# Patient Record
Sex: FEMALE | Race: WHITE | URBAN - METROPOLITAN AREA
[De-identification: names, ages, dates, MRNs, and addresses within clinical notes are randomized per-mention and may not be internally consistent; named-entity substitution may affect disease eponyms.]

---

## 2018-07-11 ENCOUNTER — APPOINTMENT (OUTPATIENT)
Dept: OTHER | Facility: HOSPITAL | Age: 48
End: 2018-07-11
Attending: EMERGENCY MEDICINE

## 2022-12-10 ENCOUNTER — HOSPITAL ENCOUNTER (INPATIENT)
Age: 52
LOS: 3 days | Discharge: HOME | DRG: 470 | End: 2022-12-14
Attending: EMERGENCY MEDICINE | Admitting: INTERNAL MEDICINE

## 2022-12-10 ENCOUNTER — APPOINTMENT (OUTPATIENT)
Dept: ULTRASOUND IMAGING | Age: 52
DRG: 470 | End: 2022-12-10
Attending: INTERNAL MEDICINE

## 2022-12-10 ENCOUNTER — HOSPITAL ENCOUNTER (OUTPATIENT)
Dept: LAB | Age: 52
End: 2022-12-10

## 2022-12-10 ENCOUNTER — APPOINTMENT (OUTPATIENT)
Dept: LAB | Age: 52
End: 2022-12-10

## 2022-12-10 ENCOUNTER — HOSPITAL ENCOUNTER (OUTPATIENT)
Dept: LAB | Age: 52
Discharge: HOME | End: 2022-12-10

## 2022-12-10 DIAGNOSIS — N17.9 ACUTE RENAL FAILURE, UNSPECIFIED ACUTE RENAL FAILURE TYPE (CMD): Primary | ICD-10-CM

## 2022-12-10 DIAGNOSIS — I10 ESSENTIAL (PRIMARY) HYPERTENSION: ICD-10-CM

## 2022-12-10 DIAGNOSIS — D64.9 ANEMIA, UNSPECIFIED: ICD-10-CM

## 2022-12-10 DIAGNOSIS — I10 HYPERTENSION, UNSPECIFIED TYPE: ICD-10-CM

## 2022-12-10 DIAGNOSIS — Z00.00 ENCOUNTER FOR GENERAL ADULT MEDICAL EXAMINATION WITHOUT ABNORMAL FINDINGS: Primary | ICD-10-CM

## 2022-12-10 DIAGNOSIS — D64.9 ANEMIA: Primary | ICD-10-CM

## 2022-12-10 LAB
ALBUMIN SERPL-MCNC: 3.6 G/DL (ref 3.6–5.1)
ALBUMIN/GLOB SERPL: 1.1 {RATIO} (ref 1–2.4)
ALP SERPL-CCNC: 120 UNITS/L (ref 45–117)
ALT SERPL-CCNC: 17 UNITS/L
ANION GAP SERPL CALC-SCNC: 12 MMOL/L (ref 7–19)
APPEARANCE UR: CLEAR
AST SERPL-CCNC: 14 UNITS/L
BACTERIA #/AREA URNS HPF: ABNORMAL /HPF
BASOPHILS # BLD: 0.1 K/MCL (ref 0–0.3)
BASOPHILS NFR BLD: 1 %
BILIRUB SERPL-MCNC: 0.2 MG/DL (ref 0.2–1)
BILIRUB UR QL STRIP: NEGATIVE
BUN SERPL-MCNC: 89 MG/DL (ref 6–20)
BUN/CREAT SERPL: 18 (ref 7–25)
CALCIUM SERPL-MCNC: 8.3 MG/DL (ref 8.4–10.2)
CHLORIDE SERPL-SCNC: 121 MMOL/L (ref 97–110)
CHOLEST SERPL-MCNC: 169 MG/DL
CHOLEST/HDLC SERPL: 4.8 {RATIO}
CO2 SERPL-SCNC: 14 MMOL/L (ref 21–32)
COLOR UR: COLORLESS
CREAT SERPL-MCNC: 5.01 MG/DL (ref 0.51–0.95)
CREAT UR-MCNC: 53.7 MG/DL
DEPRECATED RDW RBC: 46.8 FL (ref 39–50)
EOSINOPHIL # BLD: 0.2 K/MCL (ref 0–0.5)
EOSINOPHIL NFR BLD: 4 %
EOSINOPHIL URNS QL WRIGHT STN: NORMAL
ERYTHROCYTE [DISTWIDTH] IN BLOOD: 14.2 % (ref 11–15)
FASTING DURATION TIME PATIENT: 10 HOURS (ref 0–999)
FASTING DURATION TIME PATIENT: 10 HOURS (ref 0–999)
FERRITIN SERPL-MCNC: 50 NG/ML (ref 8–252)
GFR SERPLBLD BASED ON 1.73 SQ M-ARVRAT: 10 ML/MIN
GLOBULIN SER-MCNC: 3.3 G/DL (ref 2–4)
GLUCOSE SERPL-MCNC: 94 MG/DL (ref 70–99)
GLUCOSE UR STRIP-MCNC: NEGATIVE MG/DL
HCT VFR BLD CALC: 32.4 % (ref 36–46.5)
HDLC SERPL-MCNC: 35 MG/DL
HGB BLD-MCNC: 10.1 G/DL (ref 12–15.5)
HGB UR QL STRIP: ABNORMAL
HYALINE CASTS #/AREA URNS LPF: ABNORMAL /LPF
IMM GRANULOCYTES # BLD AUTO: 0 K/MCL (ref 0–0.2)
IMM GRANULOCYTES # BLD: 0 %
IRON SATN MFR SERPL: 23 % (ref 15–45)
IRON SERPL-MCNC: 46 MCG/DL (ref 50–170)
KETONES UR STRIP-MCNC: NEGATIVE MG/DL
LDLC SERPL CALC-MCNC: 102 MG/DL
LEUKOCYTE ESTERASE UR QL STRIP: ABNORMAL
LYMPHOCYTES # BLD: 1.6 K/MCL (ref 1–4.8)
LYMPHOCYTES NFR BLD: 29 %
MCH RBC QN AUTO: 28 PG (ref 26–34)
MCHC RBC AUTO-ENTMCNC: 31.2 G/DL (ref 32–36.5)
MCV RBC AUTO: 89.8 FL (ref 78–100)
MONOCYTES # BLD: 0.3 K/MCL (ref 0.3–0.9)
MONOCYTES NFR BLD: 5 %
NEUTROPHILS # BLD: 3.3 K/MCL (ref 1.8–7.7)
NEUTROPHILS NFR BLD: 61 %
NITRITE UR QL STRIP: NEGATIVE
NONHDLC SERPL-MCNC: 134 MG/DL
NRBC BLD MANUAL-RTO: 0 /100 WBC
PH UR STRIP: 6 [PH] (ref 5–7)
PLATELET # BLD AUTO: 128 K/MCL (ref 140–450)
POTASSIUM SERPL-SCNC: 4.8 MMOL/L (ref 3.4–5.1)
PROT SERPL-MCNC: 6.9 G/DL (ref 6.4–8.2)
PROT UR STRIP-MCNC: 100 MG/DL
PROT UR-MCNC: 212 MG/DL
RBC # BLD: 3.61 MIL/MCL (ref 4–5.2)
RBC #/AREA URNS HPF: ABNORMAL /HPF
SARS-COV-2 RNA RESP QL NAA+PROBE: NOT DETECTED
SERVICE CMNT-IMP: NORMAL
SERVICE CMNT-IMP: NORMAL
SODIUM SERPL-SCNC: 142 MMOL/L (ref 135–145)
SP GR UR STRIP: 1.01 (ref 1–1.03)
SQUAMOUS #/AREA URNS HPF: ABNORMAL /HPF
T4 FREE SERPL-MCNC: 0.8 NG/DL (ref 0.8–1.5)
TIBC SERPL-MCNC: 203 MCG/DL (ref 250–450)
TRIGL SERPL-MCNC: 161 MG/DL
TSH SERPL-ACNC: 2.04 MCUNITS/ML (ref 0.35–5)
UROBILINOGEN UR STRIP-MCNC: 0.2 MG/DL
WBC # BLD: 5.4 K/MCL (ref 4.2–11)
WBC #/AREA URNS HPF: ABNORMAL /HPF

## 2022-12-10 PROCEDURE — 80074 ACUTE HEPATITIS PANEL: CPT | Performed by: INTERNAL MEDICINE

## 2022-12-10 PROCEDURE — 36415 COLL VENOUS BLD VENIPUNCTURE: CPT

## 2022-12-10 PROCEDURE — 10002803 HB RX 637: Performed by: INTERNAL MEDICINE

## 2022-12-10 PROCEDURE — 10002807 HB RX 258: Performed by: EMERGENCY MEDICINE

## 2022-12-10 PROCEDURE — 86160 COMPLEMENT ANTIGEN: CPT | Performed by: INTERNAL MEDICINE

## 2022-12-10 PROCEDURE — G0378 HOSPITAL OBSERVATION PER HR: HCPCS

## 2022-12-10 PROCEDURE — 83540 ASSAY OF IRON: CPT | Performed by: INTERNAL MEDICINE

## 2022-12-10 PROCEDURE — 82728 ASSAY OF FERRITIN: CPT | Performed by: INTERNAL MEDICINE

## 2022-12-10 PROCEDURE — 96372 THER/PROPH/DIAG INJ SC/IM: CPT

## 2022-12-10 PROCEDURE — 82652 VIT D 1 25-DIHYDROXY: CPT

## 2022-12-10 PROCEDURE — 84443 ASSAY THYROID STIM HORMONE: CPT

## 2022-12-10 PROCEDURE — 10002800 HB RX 250 W HCPCS: Performed by: INTERNAL MEDICINE

## 2022-12-10 PROCEDURE — 84165 PROTEIN E-PHORESIS SERUM: CPT | Performed by: INTERNAL MEDICINE

## 2022-12-10 PROCEDURE — 84439 ASSAY OF FREE THYROXINE: CPT

## 2022-12-10 PROCEDURE — 76775 US EXAM ABDO BACK WALL LIM: CPT

## 2022-12-10 PROCEDURE — 86038 ANTINUCLEAR ANTIBODIES: CPT | Performed by: INTERNAL MEDICINE

## 2022-12-10 PROCEDURE — 82570 ASSAY OF URINE CREATININE: CPT | Performed by: INTERNAL MEDICINE

## 2022-12-10 PROCEDURE — 84156 ASSAY OF PROTEIN URINE: CPT | Performed by: INTERNAL MEDICINE

## 2022-12-10 PROCEDURE — 80061 LIPID PANEL: CPT

## 2022-12-10 PROCEDURE — 83516 IMMUNOASSAY NONANTIBODY: CPT | Performed by: INTERNAL MEDICINE

## 2022-12-10 PROCEDURE — 87635 SARS-COV-2 COVID-19 AMP PRB: CPT | Performed by: EMERGENCY MEDICINE

## 2022-12-10 PROCEDURE — 85025 COMPLETE CBC W/AUTO DIFF WBC: CPT

## 2022-12-10 PROCEDURE — 99284 EMERGENCY DEPT VISIT MOD MDM: CPT

## 2022-12-10 PROCEDURE — 80053 COMPREHEN METABOLIC PANEL: CPT

## 2022-12-10 PROCEDURE — 87205 SMEAR GRAM STAIN: CPT | Performed by: INTERNAL MEDICINE

## 2022-12-10 PROCEDURE — 86335 IMMUNFIX E-PHORSIS/URINE/CSF: CPT | Performed by: INTERNAL MEDICINE

## 2022-12-10 PROCEDURE — 81001 URINALYSIS AUTO W/SCOPE: CPT

## 2022-12-10 RX ORDER — AMLODIPINE BESYLATE 5 MG/1
2.5 TABLET ORAL DAILY
Status: DISCONTINUED | OUTPATIENT
Start: 2022-12-10 | End: 2022-12-14 | Stop reason: HOSPADM

## 2022-12-10 RX ORDER — HYDROCODONE BITARTRATE AND ACETAMINOPHEN 5; 325 MG/1; MG/1
1 TABLET ORAL EVERY 4 HOURS PRN
Status: DISCONTINUED | OUTPATIENT
Start: 2022-12-10 | End: 2022-12-14 | Stop reason: HOSPADM

## 2022-12-10 RX ORDER — MAGNESIUM HYDROXIDE/ALUMINUM HYDROXICE/SIMETHICONE 120; 1200; 1200 MG/30ML; MG/30ML; MG/30ML
30 SUSPENSION ORAL EVERY 4 HOURS PRN
Status: DISCONTINUED | OUTPATIENT
Start: 2022-12-10 | End: 2022-12-14 | Stop reason: HOSPADM

## 2022-12-10 RX ORDER — SODIUM CHLORIDE 9 MG/ML
INJECTION, SOLUTION INTRAVENOUS CONTINUOUS
Status: DISCONTINUED | OUTPATIENT
Start: 2022-12-10 | End: 2022-12-12

## 2022-12-10 RX ORDER — SODIUM CHLORIDE, SODIUM LACTATE, POTASSIUM CHLORIDE, CALCIUM CHLORIDE 600; 310; 30; 20 MG/100ML; MG/100ML; MG/100ML; MG/100ML
INJECTION, SOLUTION INTRAVENOUS CONTINUOUS
Status: DISCONTINUED | OUTPATIENT
Start: 2022-12-10 | End: 2022-12-10

## 2022-12-10 RX ORDER — ACETAMINOPHEN 325 MG/1
650 TABLET ORAL EVERY 4 HOURS PRN
Status: DISCONTINUED | OUTPATIENT
Start: 2022-12-10 | End: 2022-12-14 | Stop reason: HOSPADM

## 2022-12-10 RX ORDER — ONDANSETRON 2 MG/ML
4 INJECTION INTRAMUSCULAR; INTRAVENOUS 2 TIMES DAILY PRN
Status: DISCONTINUED | OUTPATIENT
Start: 2022-12-10 | End: 2022-12-14 | Stop reason: HOSPADM

## 2022-12-10 RX ORDER — HEPARIN SODIUM 5000 [USP'U]/ML
5000 INJECTION, SOLUTION INTRAVENOUS; SUBCUTANEOUS EVERY 8 HOURS SCHEDULED
Status: DISCONTINUED | OUTPATIENT
Start: 2022-12-10 | End: 2022-12-14 | Stop reason: HOSPADM

## 2022-12-10 RX ORDER — AMOXICILLIN 250 MG
2 CAPSULE ORAL DAILY PRN
Status: DISCONTINUED | OUTPATIENT
Start: 2022-12-10 | End: 2022-12-14 | Stop reason: HOSPADM

## 2022-12-10 RX ADMIN — HEPARIN SODIUM 5000 UNITS: 5000 INJECTION INTRAVENOUS; SUBCUTANEOUS at 21:20

## 2022-12-10 RX ADMIN — SODIUM CHLORIDE: 9 INJECTION, SOLUTION INTRAVENOUS at 18:13

## 2022-12-10 RX ADMIN — AMLODIPINE BESYLATE 2.5 MG: 5 TABLET ORAL at 21:16

## 2022-12-10 RX ADMIN — SODIUM CHLORIDE: 9 INJECTION, SOLUTION INTRAVENOUS at 21:11

## 2022-12-10 ASSESSMENT — COLUMBIA-SUICIDE SEVERITY RATING SCALE - C-SSRS
1. WITHIN THE PAST MONTH, HAVE YOU WISHED YOU WERE DEAD OR WISHED YOU COULD GO TO SLEEP AND NOT WAKE UP?: NO
2. HAVE YOU ACTUALLY HAD ANY THOUGHTS OF KILLING YOURSELF?: NO
6. HAVE YOU EVER DONE ANYTHING, STARTED TO DO ANYTHING, OR PREPARED TO DO ANYTHING TO END YOUR LIFE?: NO
IS THE PATIENT ABLE TO COMPLETE C-SSRS: YES

## 2022-12-10 ASSESSMENT — ACTIVITIES OF DAILY LIVING (ADL)
ADL_SCORE: 12
SENSORY_SUPPORT_DEVICES: EYEGLASSES

## 2022-12-10 ASSESSMENT — LIFESTYLE VARIABLES
HAVE YOU BEEN EATING POORLY BECAUSE OF A DECREASED APPETITE: 0
HOW OFTEN DO YOU HAVE 6 OR MORE DRINKS ON ONE OCCASION: NEVER
ADL NEEDS ASSIST: NO
SMOKING_YEARS: NO
RECENTLY LOST WEIGHT WITHOUT TRYING: 0
ARE YOU DEAF OR DO YOU HAVE SERIOUS DIFFICULTY  HEARING: NO
SHORT OF BREATH OR FATIGUE WITH ADLS: NO
ALCOHOL_USE_STATUS: NO OR LOW RISK WITH VALIDATED TOOL
CHRONIC/CANCER PAIN PRESENT: NO
RECENT DECLINE IN ADLS: NO
HISTORY OF PROBLEMS WHEN YOU STOP DRINKING ALCOHOL: NO
AUDIT-C TOTAL SCORE: 0
LAST DRINK YOU HAD: DENIES INTAKE
HOW MANY STANDARD DRINKS CONTAINING ALCOHOL DO YOU HAVE ON A TYPICAL DAY: 0,1 OR 2
IS PATIENT ABLE TO COMPLETE ASSESSMENT AT THIS TIME: YES
ARE YOU BLIND OR DO YOU HAVE SERIOUS DIFFICULTY SEEING, EVEN WHEN WEARING GLASSES: NO
ADL BEFORE ADMISSION: INDEPENDENT
HOW OFTEN DO YOU HAVE A DRINK CONTAINING ALCOHOL: NEVER

## 2022-12-10 ASSESSMENT — PAIN SCALES - GENERAL: PAINLEVEL_OUTOF10: 0

## 2022-12-10 ASSESSMENT — COGNITIVE AND FUNCTIONAL STATUS - GENERAL
BECAUSE OF A PHYSICAL, MENTAL, OR EMOTIONAL CONDITION, DO YOU HAVE SERIOUS DIFFICULTY CONCENTRATING, REMEMBERING OR MAKING DECISIONS: NO
DO YOU HAVE SERIOUS DIFFICULTY WALKING OR CLIMBING STAIRS: NO
BECAUSE OF A PHYSICAL, MENTAL, OR EMOTIONAL CONDITION, DO YOU HAVE DIFFICULTY DOING ERRANDS ALONE: NO
DO YOU HAVE DIFFICULTY DRESSING OR BATHING: NO

## 2022-12-10 ASSESSMENT — PATIENT HEALTH QUESTIONNAIRE - PHQ9
1. LITTLE INTEREST OR PLEASURE IN DOING THINGS: NOT AT ALL
SUM OF ALL RESPONSES TO PHQ9 QUESTIONS 1 AND 2: 0
CLINICAL INTERPRETATION OF PHQ2 SCORE: NO FURTHER SCREENING NEEDED
IS PATIENT ABLE TO COMPLETE PHQ2 OR PHQ9: YES
2. FEELING DOWN, DEPRESSED OR HOPELESS: NOT AT ALL
SUM OF ALL RESPONSES TO PHQ9 QUESTIONS 1 AND 2: 0

## 2022-12-11 ENCOUNTER — APPOINTMENT (OUTPATIENT)
Dept: MRI IMAGING | Age: 52
DRG: 470 | End: 2022-12-11
Attending: INTERNAL MEDICINE

## 2022-12-11 ENCOUNTER — APPOINTMENT (OUTPATIENT)
Dept: CT IMAGING | Age: 52
DRG: 470 | End: 2022-12-11
Attending: UROLOGY

## 2022-12-11 PROBLEM — N17.9 ACUTE RENAL FAILURE (ARF) (CMD): Status: ACTIVE | Noted: 2022-12-11

## 2022-12-11 LAB
ALBUMIN SERPL-MCNC: 2.9 G/DL (ref 3.6–5.1)
ALBUMIN/GLOB SERPL: 1 {RATIO} (ref 1–2.4)
ALP SERPL-CCNC: 100 UNITS/L (ref 45–117)
ALT SERPL-CCNC: 13 UNITS/L
ANION GAP SERPL CALC-SCNC: 13 MMOL/L (ref 7–19)
ANNOTATION COMMENT IMP: NORMAL
AST SERPL-CCNC: 12 UNITS/L
BILIRUB SERPL-MCNC: 0.2 MG/DL (ref 0.2–1)
BUN SERPL-MCNC: 86 MG/DL (ref 6–20)
BUN/CREAT SERPL: 18 (ref 7–25)
C3 SERPL-MCNC: 73 MG/DL (ref 90–180)
C4 SERPL-MCNC: 31.3 MG/DL (ref 10–40)
CALCIUM SERPL-MCNC: 7.9 MG/DL (ref 8.4–10.2)
CHLORIDE SERPL-SCNC: 125 MMOL/L (ref 97–110)
CO2 SERPL-SCNC: 11 MMOL/L (ref 21–32)
CREAT SERPL-MCNC: 4.68 MG/DL (ref 0.51–0.95)
DEPRECATED RDW RBC: 48.5 FL (ref 39–50)
ERYTHROCYTE [DISTWIDTH] IN BLOOD: 14.3 % (ref 11–15)
FASTING DURATION TIME PATIENT: ABNORMAL H
GFR SERPLBLD BASED ON 1.73 SQ M-ARVRAT: 11 ML/MIN
GLOBULIN SER-MCNC: 2.9 G/DL (ref 2–4)
GLUCOSE SERPL-MCNC: 91 MG/DL (ref 70–99)
HAV IGM SER QL: NEGATIVE
HBV CORE IGM SER QL: NEGATIVE
HBV SURFACE AG SER QL: NEGATIVE
HCT VFR BLD CALC: 28.6 % (ref 36–46.5)
HCV AB SER QL: NEGATIVE
HGB BLD-MCNC: 8.7 G/DL (ref 12–15.5)
IMP & REVIEW OF LAB RESULTS: NORMAL
MAGNESIUM SERPL-MCNC: 1.9 MG/DL (ref 1.7–2.4)
MCH RBC QN AUTO: 28.2 PG (ref 26–34)
MCHC RBC AUTO-ENTMCNC: 30.4 G/DL (ref 32–36.5)
MCV RBC AUTO: 92.9 FL (ref 78–100)
NRBC BLD MANUAL-RTO: 0 /100 WBC
PLATELET # BLD AUTO: 119 K/MCL (ref 140–450)
POTASSIUM SERPL-SCNC: 4.9 MMOL/L (ref 3.4–5.1)
PROT SERPL-MCNC: 5.8 G/DL (ref 6.4–8.2)
RBC # BLD: 3.08 MIL/MCL (ref 4–5.2)
SODIUM SERPL-SCNC: 144 MMOL/L (ref 135–145)
WBC # BLD: 4.5 K/MCL (ref 4.2–11)

## 2022-12-11 PROCEDURE — 71250 CT THORAX DX C-: CPT

## 2022-12-11 PROCEDURE — 10002807 HB RX 258: Performed by: EMERGENCY MEDICINE

## 2022-12-11 PROCEDURE — 82784 ASSAY IGA/IGD/IGG/IGM EACH: CPT | Performed by: INTERNAL MEDICINE

## 2022-12-11 PROCEDURE — G0378 HOSPITAL OBSERVATION PER HR: HCPCS

## 2022-12-11 PROCEDURE — 10002800 HB RX 250 W HCPCS: Performed by: INTERNAL MEDICINE

## 2022-12-11 PROCEDURE — 99255 IP/OBS CONSLTJ NEW/EST HI 80: CPT | Performed by: INTERNAL MEDICINE

## 2022-12-11 PROCEDURE — 85027 COMPLETE CBC AUTOMATED: CPT | Performed by: INTERNAL MEDICINE

## 2022-12-11 PROCEDURE — 99254 IP/OBS CNSLTJ NEW/EST MOD 60: CPT | Performed by: SURGERY

## 2022-12-11 PROCEDURE — 10006031 HB ROOM CHARGE TELEMETRY

## 2022-12-11 PROCEDURE — 83735 ASSAY OF MAGNESIUM: CPT | Performed by: INTERNAL MEDICINE

## 2022-12-11 PROCEDURE — 36415 COLL VENOUS BLD VENIPUNCTURE: CPT | Performed by: INTERNAL MEDICINE

## 2022-12-11 PROCEDURE — G1004 CDSM NDSC: HCPCS

## 2022-12-11 PROCEDURE — 74181 MRI ABDOMEN W/O CONTRAST: CPT

## 2022-12-11 PROCEDURE — 74176 CT ABD & PELVIS W/O CONTRAST: CPT

## 2022-12-11 PROCEDURE — 96372 THER/PROPH/DIAG INJ SC/IM: CPT

## 2022-12-11 PROCEDURE — 10002803 HB RX 637: Performed by: INTERNAL MEDICINE

## 2022-12-11 PROCEDURE — 80053 COMPREHEN METABOLIC PANEL: CPT | Performed by: INTERNAL MEDICINE

## 2022-12-11 RX ORDER — SODIUM BICARBONATE 650 MG/1
1300 TABLET ORAL 2 TIMES DAILY
Status: DISCONTINUED | OUTPATIENT
Start: 2022-12-11 | End: 2022-12-12

## 2022-12-11 RX ADMIN — SODIUM CHLORIDE: 9 INJECTION, SOLUTION INTRAVENOUS at 07:08

## 2022-12-11 RX ADMIN — HEPARIN SODIUM 5000 UNITS: 5000 INJECTION INTRAVENOUS; SUBCUTANEOUS at 06:39

## 2022-12-11 RX ADMIN — IRON SUCROSE 200 MG: 20 INJECTION, SOLUTION INTRAVENOUS at 13:38

## 2022-12-11 RX ADMIN — AMLODIPINE BESYLATE 2.5 MG: 5 TABLET ORAL at 08:24

## 2022-12-11 RX ADMIN — SODIUM BICARBONATE 1300 MG: 650 TABLET ORAL at 21:10

## 2022-12-11 RX ADMIN — SODIUM CHLORIDE: 9 INJECTION, SOLUTION INTRAVENOUS at 19:45

## 2022-12-11 RX ADMIN — HEPARIN SODIUM 5000 UNITS: 5000 INJECTION INTRAVENOUS; SUBCUTANEOUS at 21:11

## 2022-12-11 RX ADMIN — SODIUM BICARBONATE 1300 MG: 650 TABLET ORAL at 13:37

## 2022-12-11 RX ADMIN — HEPARIN SODIUM 5000 UNITS: 5000 INJECTION INTRAVENOUS; SUBCUTANEOUS at 13:38

## 2022-12-11 ASSESSMENT — ENCOUNTER SYMPTOMS
WHEEZING: 0
NAUSEA: 0
SLEEP DISTURBANCE: 0
NUMBNESS: 0
BACK PAIN: 0
SHORTNESS OF BREATH: 0
FATIGUE: 0
CHEST TIGHTNESS: 0
ABDOMINAL PAIN: 0
CONFUSION: 0
DIAPHORESIS: 0
ABDOMINAL DISTENTION: 0
CHILLS: 0
VOMITING: 0
CHOKING: 0
TROUBLE SWALLOWING: 0
BLOOD IN STOOL: 0
CONSTIPATION: 0
COUGH: 0
APNEA: 0
HEADACHES: 0
LIGHT-HEADEDNESS: 0
VOICE CHANGE: 0
UNEXPECTED WEIGHT CHANGE: 0
APPETITE CHANGE: 0
FEVER: 0
DIARRHEA: 0
WEAKNESS: 0
ACTIVITY CHANGE: 0
ADENOPATHY: 0
DIZZINESS: 0
BRUISES/BLEEDS EASILY: 0
SPEECH DIFFICULTY: 0

## 2022-12-11 ASSESSMENT — PAIN SCALES - GENERAL
PAINLEVEL_OUTOF10: 0

## 2022-12-12 ENCOUNTER — APPOINTMENT (OUTPATIENT)
Dept: INTERVENTIONAL RADIOLOGY/VASCULAR | Age: 52
DRG: 470 | End: 2022-12-12
Attending: INTERNAL MEDICINE

## 2022-12-12 ENCOUNTER — APPOINTMENT (OUTPATIENT)
Dept: ULTRASOUND IMAGING | Age: 52
DRG: 470 | End: 2022-12-12
Attending: INTERNAL MEDICINE

## 2022-12-12 LAB
ANA SER QL IA: NEGATIVE
ANION GAP SERPL CALC-SCNC: 11 MMOL/L (ref 7–19)
BASOPHILS # BLD: 0.1 K/MCL (ref 0–0.3)
BASOPHILS NFR BLD: 1 %
BUN SERPL-MCNC: 80 MG/DL (ref 6–20)
BUN/CREAT SERPL: 17 (ref 7–25)
CALCIUM SERPL-MCNC: 8.2 MG/DL (ref 8.4–10.2)
CHLORIDE SERPL-SCNC: 125 MMOL/L (ref 97–110)
CO2 SERPL-SCNC: 13 MMOL/L (ref 21–32)
CREAT SERPL-MCNC: 4.68 MG/DL (ref 0.51–0.95)
DEPRECATED RDW RBC: 48.1 FL (ref 39–50)
EOSINOPHIL # BLD: 0.2 K/MCL (ref 0–0.5)
EOSINOPHIL NFR BLD: 4 %
ERYTHROCYTE [DISTWIDTH] IN BLOOD: 14.3 % (ref 11–15)
FASTING DURATION TIME PATIENT: ABNORMAL H
GFR SERPLBLD BASED ON 1.73 SQ M-ARVRAT: 11 ML/MIN
GLUCOSE SERPL-MCNC: 90 MG/DL (ref 70–99)
HCT VFR BLD CALC: 27.1 % (ref 36–46.5)
HGB BLD-MCNC: 8.4 G/DL (ref 12–15.5)
IMM GRANULOCYTES # BLD AUTO: 0 K/MCL (ref 0–0.2)
IMM GRANULOCYTES # BLD: 0 %
LYMPHOCYTES # BLD: 1.3 K/MCL (ref 1–4.8)
LYMPHOCYTES NFR BLD: 28 %
MAGNESIUM SERPL-MCNC: 1.8 MG/DL (ref 1.7–2.4)
MCH RBC QN AUTO: 28.4 PG (ref 26–34)
MCHC RBC AUTO-ENTMCNC: 31 G/DL (ref 32–36.5)
MCV RBC AUTO: 91.6 FL (ref 78–100)
MONOCYTES # BLD: 0.4 K/MCL (ref 0.3–0.9)
MONOCYTES NFR BLD: 8 %
MYELOPEROXIDASE AB SER-ACNC: <0.2 AI
NEUTROPHILS # BLD: 2.8 K/MCL (ref 1.8–7.7)
NEUTROPHILS NFR BLD: 59 %
NRBC BLD MANUAL-RTO: 0 /100 WBC
PHOSPHATE SERPL-MCNC: 4.7 MG/DL (ref 2.4–4.7)
PLATELET # BLD AUTO: 118 K/MCL (ref 140–450)
POTASSIUM SERPL-SCNC: 4.8 MMOL/L (ref 3.4–5.1)
PROTEINASE3 AB SER-ACNC: <0.2 AI
RBC # BLD: 2.96 MIL/MCL (ref 4–5.2)
SODIUM SERPL-SCNC: 144 MMOL/L (ref 135–145)
WBC # BLD: 4.8 K/MCL (ref 4.2–11)

## 2022-12-12 PROCEDURE — 10002803 HB RX 637: Performed by: INTERNAL MEDICINE

## 2022-12-12 PROCEDURE — 84100 ASSAY OF PHOSPHORUS: CPT | Performed by: INTERNAL MEDICINE

## 2022-12-12 PROCEDURE — 36415 COLL VENOUS BLD VENIPUNCTURE: CPT | Performed by: INTERNAL MEDICINE

## 2022-12-12 PROCEDURE — C1894 INTRO/SHEATH, NON-LASER: HCPCS

## 2022-12-12 PROCEDURE — 85025 COMPLETE CBC W/AUTO DIFF WBC: CPT | Performed by: INTERNAL MEDICINE

## 2022-12-12 PROCEDURE — 10006023 HB SUPPLY 272

## 2022-12-12 PROCEDURE — 10002800 HB RX 250 W HCPCS: Performed by: INTERNAL MEDICINE

## 2022-12-12 PROCEDURE — 10002801 HB RX 250 W/O HCPCS: Performed by: RADIOLOGY

## 2022-12-12 PROCEDURE — 83735 ASSAY OF MAGNESIUM: CPT | Performed by: INTERNAL MEDICINE

## 2022-12-12 PROCEDURE — 10006031 HB ROOM CHARGE TELEMETRY

## 2022-12-12 PROCEDURE — 0JH63XZ INSERTION OF TUNNELED VASCULAR ACCESS DEVICE INTO CHEST SUBCUTANEOUS TISSUE AND FASCIA, PERCUTANEOUS APPROACH: ICD-10-PCS | Performed by: RADIOLOGY

## 2022-12-12 PROCEDURE — 02H633Z INSERTION OF INFUSION DEVICE INTO RIGHT ATRIUM, PERCUTANEOUS APPROACH: ICD-10-PCS | Performed by: RADIOLOGY

## 2022-12-12 PROCEDURE — 76937 US GUIDE VASCULAR ACCESS: CPT

## 2022-12-12 PROCEDURE — 80048 BASIC METABOLIC PNL TOTAL CA: CPT | Performed by: INTERNAL MEDICINE

## 2022-12-12 PROCEDURE — 77001 FLUOROGUIDE FOR VEIN DEVICE: CPT

## 2022-12-12 PROCEDURE — C1750 CATH, HEMODIALYSIS,LONG-TERM: HCPCS

## 2022-12-12 PROCEDURE — 93970 EXTREMITY STUDY: CPT

## 2022-12-12 PROCEDURE — 10006027 HB SUPPLY 278

## 2022-12-12 PROCEDURE — 10002800 HB RX 250 W HCPCS: Performed by: RADIOLOGY

## 2022-12-12 PROCEDURE — 10002807 HB RX 258: Performed by: EMERGENCY MEDICINE

## 2022-12-12 RX ORDER — SODIUM BICARBONATE 650 MG/1
1300 TABLET ORAL 3 TIMES DAILY
Status: DISCONTINUED | OUTPATIENT
Start: 2022-12-12 | End: 2022-12-14 | Stop reason: HOSPADM

## 2022-12-12 RX ORDER — HEPARIN SODIUM 1000 [USP'U]/ML
INJECTION, SOLUTION INTRAVENOUS; SUBCUTANEOUS PRN
Status: COMPLETED | OUTPATIENT
Start: 2022-12-12 | End: 2022-12-12

## 2022-12-12 RX ORDER — LIDOCAINE HYDROCHLORIDE 10 MG/ML
INJECTION, SOLUTION INFILTRATION; PERINEURAL PRN
Status: COMPLETED | OUTPATIENT
Start: 2022-12-12 | End: 2022-12-12

## 2022-12-12 RX ADMIN — HEPARIN SODIUM 5000 UNITS: 5000 INJECTION INTRAVENOUS; SUBCUTANEOUS at 21:30

## 2022-12-12 RX ADMIN — ONDANSETRON 4 MG: 2 INJECTION INTRAMUSCULAR; INTRAVENOUS at 09:46

## 2022-12-12 RX ADMIN — LIDOCAINE HYDROCHLORIDE 7 ML: 10 INJECTION, SOLUTION INFILTRATION; PERINEURAL at 10:48

## 2022-12-12 RX ADMIN — HEPARIN SODIUM 1500 UNITS: 1000 INJECTION, SOLUTION INTRAVENOUS; SUBCUTANEOUS at 10:56

## 2022-12-12 RX ADMIN — SODIUM BICARBONATE 1300 MG: 650 TABLET ORAL at 22:41

## 2022-12-12 RX ADMIN — HEPARIN SODIUM 5000 UNITS: 5000 INJECTION INTRAVENOUS; SUBCUTANEOUS at 05:29

## 2022-12-12 RX ADMIN — SODIUM CHLORIDE: 9 INJECTION, SOLUTION INTRAVENOUS at 04:52

## 2022-12-12 RX ADMIN — HEPARIN SODIUM 5000 UNITS: 5000 INJECTION INTRAVENOUS; SUBCUTANEOUS at 13:36

## 2022-12-12 RX ADMIN — SODIUM BICARBONATE 1300 MG: 650 TABLET ORAL at 09:33

## 2022-12-12 RX ADMIN — SODIUM BICARBONATE 1300 MG: 650 TABLET ORAL at 13:36

## 2022-12-12 RX ADMIN — HEPARIN SODIUM 1500 UNITS: 1000 INJECTION, SOLUTION INTRAVENOUS; SUBCUTANEOUS at 10:55

## 2022-12-12 RX ADMIN — IRON SUCROSE 200 MG: 20 INJECTION, SOLUTION INTRAVENOUS at 09:34

## 2022-12-12 RX ADMIN — AMLODIPINE BESYLATE 2.5 MG: 5 TABLET ORAL at 09:33

## 2022-12-12 RX ADMIN — CEFAZOLIN SODIUM 1000 MG: 300 INJECTION, POWDER, LYOPHILIZED, FOR SOLUTION INTRAVENOUS at 10:49

## 2022-12-12 ASSESSMENT — PAIN SCALES - GENERAL
PAINLEVEL_OUTOF10: 0
PAINLEVEL_OUTOF10: 0

## 2022-12-12 ASSESSMENT — ENCOUNTER SYMPTOMS
DIETARY ISSUES: ADEQUATE INTAKE
VOMITING: 0
NAUSEA: 0

## 2022-12-13 ENCOUNTER — APPOINTMENT (OUTPATIENT)
Dept: GENERAL RADIOLOGY | Age: 52
DRG: 470 | End: 2022-12-13
Attending: INTERNAL MEDICINE

## 2022-12-13 LAB
ALBUMIN SERPL ELPH-MCNC: 3.5 G/DL (ref 3.5–4.9)
ALPHA 1: 0.3 G/DL (ref 0.2–0.4)
ALPHA2 GLOB SERPL ELPH-MCNC: 0.6 G/DL (ref 0.5–0.9)
ANION GAP SERPL CALC-SCNC: 10 MMOL/L (ref 7–19)
B-GLOBULIN SERPL ELPH-MCNC: 0.6 G/DL (ref 0.7–1.2)
BASOPHILS # BLD: 0 K/MCL (ref 0–0.3)
BASOPHILS NFR BLD: 1 %
BUN SERPL-MCNC: 77 MG/DL (ref 6–20)
BUN/CREAT SERPL: 17 (ref 7–25)
CALCIUM SERPL-MCNC: 8.2 MG/DL (ref 8.4–10.2)
CHLORIDE SERPL-SCNC: 122 MMOL/L (ref 97–110)
CO2 SERPL-SCNC: 16 MMOL/L (ref 21–32)
CREAT SERPL-MCNC: 4.66 MG/DL (ref 0.51–0.95)
DEPRECATED RDW RBC: 46.2 FL (ref 39–50)
EOSINOPHIL # BLD: 0.3 K/MCL (ref 0–0.5)
EOSINOPHIL NFR BLD: 5 %
ERYTHROCYTE [DISTWIDTH] IN BLOOD: 14.3 % (ref 11–15)
FASTING DURATION TIME PATIENT: ABNORMAL H
GAMMA GLOB SERPL ELPH-MCNC: 0.9 G/DL (ref 0.7–1.7)
GFR SERPLBLD BASED ON 1.73 SQ M-ARVRAT: 11 ML/MIN
GLOBULIN SER-MCNC: 2.4 G/DL (ref 2.1–4.2)
GLUCOSE SERPL-MCNC: 90 MG/DL (ref 70–99)
HCT VFR BLD CALC: 27.6 % (ref 36–46.5)
HGB BLD-MCNC: 8.5 G/DL (ref 12–15.5)
IGA SERPL-MCNC: 236 MG/DL (ref 70–400)
IGG SERPL-MCNC: 878 MG/DL (ref 700–1600)
IGM SERPL-MCNC: 68 MG/DL (ref 40–230)
IMM GRANULOCYTES # BLD AUTO: 0 K/MCL (ref 0–0.2)
IMM GRANULOCYTES # BLD: 0 %
KAPPA LC FREE SER NEPH-MCNC: 11.61 MG/DL (ref 0.33–1.94)
KAPPA LC/LAMBDA SER: 0.97 {RATIO} (ref 0.26–1.65)
LAMBDA LC FREE SER NEPH-MCNC: 11.93 MG/DL (ref 0.57–2.63)
LYMPHOCYTES # BLD: 1.5 K/MCL (ref 1–4.8)
LYMPHOCYTES NFR BLD: 27 %
MAGNESIUM SERPL-MCNC: 1.6 MG/DL (ref 1.7–2.4)
MCH RBC QN AUTO: 27.5 PG (ref 26–34)
MCHC RBC AUTO-ENTMCNC: 30.8 G/DL (ref 32–36.5)
MCV RBC AUTO: 89.3 FL (ref 78–100)
MONOCYTES # BLD: 0.4 K/MCL (ref 0.3–0.9)
MONOCYTES NFR BLD: 7 %
NEUTROPHILS # BLD: 3.3 K/MCL (ref 1.8–7.7)
NEUTROPHILS NFR BLD: 60 %
NRBC BLD MANUAL-RTO: 0 /100 WBC
PATH INTERP BLD-IMP: ABNORMAL
PATH INTERP SPEC-IMP: ABNORMAL
PATH INTERP SPEC-IMP: ABNORMAL
PATH INTERP SPEC-IMP: NORMAL
PHOSPHATE SERPL-MCNC: 4.4 MG/DL (ref 2.4–4.7)
PLATELET # BLD AUTO: 127 K/MCL (ref 140–450)
POTASSIUM SERPL-SCNC: 4.9 MMOL/L (ref 3.4–5.1)
PROT SERPL-MCNC: 5.9 G/DL (ref 6.4–8.2)
PROT UR-MCNC: 112 MG/DL
RBC # BLD: 3.09 MIL/MCL (ref 4–5.2)
SODIUM SERPL-SCNC: 143 MMOL/L (ref 135–145)
VITAMIN D2 SERPL-MCNC: 28.6 PG/ML (ref 19.9–79.3)
WBC # BLD: 5.4 K/MCL (ref 4.2–11)

## 2022-12-13 PROCEDURE — 71045 X-RAY EXAM CHEST 1 VIEW: CPT

## 2022-12-13 PROCEDURE — 96372 THER/PROPH/DIAG INJ SC/IM: CPT

## 2022-12-13 PROCEDURE — 5A1D70Z PERFORMANCE OF URINARY FILTRATION, INTERMITTENT, LESS THAN 6 HOURS PER DAY: ICD-10-PCS | Performed by: INTERNAL MEDICINE

## 2022-12-13 PROCEDURE — 84100 ASSAY OF PHOSPHORUS: CPT | Performed by: INTERNAL MEDICINE

## 2022-12-13 PROCEDURE — 36415 COLL VENOUS BLD VENIPUNCTURE: CPT | Performed by: INTERNAL MEDICINE

## 2022-12-13 PROCEDURE — 80048 BASIC METABOLIC PNL TOTAL CA: CPT | Performed by: INTERNAL MEDICINE

## 2022-12-13 PROCEDURE — 83735 ASSAY OF MAGNESIUM: CPT | Performed by: INTERNAL MEDICINE

## 2022-12-13 PROCEDURE — 10002800 HB RX 250 W HCPCS: Performed by: INTERNAL MEDICINE

## 2022-12-13 PROCEDURE — 10002803 HB RX 637: Performed by: INTERNAL MEDICINE

## 2022-12-13 PROCEDURE — G0378 HOSPITAL OBSERVATION PER HR: HCPCS

## 2022-12-13 PROCEDURE — 99233 SBSQ HOSP IP/OBS HIGH 50: CPT | Performed by: INTERNAL MEDICINE

## 2022-12-13 PROCEDURE — 90935 HEMODIALYSIS ONE EVALUATION: CPT

## 2022-12-13 PROCEDURE — 10006031 HB ROOM CHARGE TELEMETRY

## 2022-12-13 PROCEDURE — 85025 COMPLETE CBC W/AUTO DIFF WBC: CPT | Performed by: INTERNAL MEDICINE

## 2022-12-13 RX ADMIN — SODIUM BICARBONATE 1300 MG: 650 TABLET ORAL at 08:22

## 2022-12-13 RX ADMIN — HEPARIN SODIUM 5000 UNITS: 5000 INJECTION INTRAVENOUS; SUBCUTANEOUS at 21:00

## 2022-12-13 RX ADMIN — SODIUM BICARBONATE 1300 MG: 650 TABLET ORAL at 15:00

## 2022-12-13 RX ADMIN — SODIUM BICARBONATE 1300 MG: 650 TABLET ORAL at 21:04

## 2022-12-13 RX ADMIN — IRON SUCROSE 200 MG: 20 INJECTION, SOLUTION INTRAVENOUS at 08:23

## 2022-12-13 RX ADMIN — HEPARIN SODIUM 5000 UNITS: 5000 INJECTION INTRAVENOUS; SUBCUTANEOUS at 06:25

## 2022-12-13 RX ADMIN — HEPARIN SODIUM 5000 UNITS: 5000 INJECTION INTRAVENOUS; SUBCUTANEOUS at 15:00

## 2022-12-13 ASSESSMENT — PAIN SCALES - GENERAL
PAINLEVEL_OUTOF10: 0
PAINLEVEL_OUTOF10: 0

## 2022-12-14 ENCOUNTER — APPOINTMENT (OUTPATIENT)
Dept: DIALYSIS | Age: 52
DRG: 470 | End: 2022-12-14
Attending: INTERNAL MEDICINE

## 2022-12-14 VITALS
HEIGHT: 61 IN | SYSTOLIC BLOOD PRESSURE: 147 MMHG | TEMPERATURE: 96.4 F | WEIGHT: 128.97 LBS | DIASTOLIC BLOOD PRESSURE: 97 MMHG | RESPIRATION RATE: 16 BRPM | HEART RATE: 70 BPM | BODY MASS INDEX: 24.35 KG/M2 | OXYGEN SATURATION: 99 %

## 2022-12-14 PROCEDURE — 10002800 HB RX 250 W HCPCS: Performed by: INTERNAL MEDICINE

## 2022-12-14 PROCEDURE — 90935 HEMODIALYSIS ONE EVALUATION: CPT

## 2022-12-14 PROCEDURE — 10002803 HB RX 637: Performed by: INTERNAL MEDICINE

## 2022-12-14 PROCEDURE — G0378 HOSPITAL OBSERVATION PER HR: HCPCS

## 2022-12-14 PROCEDURE — 96372 THER/PROPH/DIAG INJ SC/IM: CPT

## 2022-12-14 RX ORDER — SODIUM BICARBONATE 650 MG/1
650 TABLET ORAL 2 TIMES DAILY
Qty: 60 TABLET | Refills: 3 | Status: SHIPPED | OUTPATIENT
Start: 2022-12-14 | End: 2023-01-13

## 2022-12-14 RX ORDER — AMLODIPINE BESYLATE 2.5 MG/1
2.5 TABLET ORAL DAILY
Qty: 30 TABLET | Refills: 3 | Status: ON HOLD | OUTPATIENT
Start: 2022-12-14 | End: 2023-09-23

## 2022-12-14 RX ADMIN — SODIUM BICARBONATE 1300 MG: 650 TABLET ORAL at 09:22

## 2022-12-14 RX ADMIN — HEPARIN SODIUM 5000 UNITS: 5000 INJECTION INTRAVENOUS; SUBCUTANEOUS at 14:55

## 2022-12-14 RX ADMIN — IRON SUCROSE 200 MG: 20 INJECTION, SOLUTION INTRAVENOUS at 09:17

## 2022-12-14 RX ADMIN — SODIUM BICARBONATE 1300 MG: 650 TABLET ORAL at 15:46

## 2022-12-14 RX ADMIN — HEPARIN SODIUM 5000 UNITS: 5000 INJECTION INTRAVENOUS; SUBCUTANEOUS at 06:46

## 2022-12-14 ASSESSMENT — PAIN SCALES - GENERAL: PAINLEVEL_OUTOF10: 0

## 2022-12-16 ENCOUNTER — APPOINTMENT (OUTPATIENT)
Dept: INTERPRETER SERVICES | Age: 52
End: 2022-12-16

## 2022-12-20 ENCOUNTER — HOSPITAL ENCOUNTER (OUTPATIENT)
Age: 52
Setting detail: SURGERY ADMIT
End: 2022-12-20
Attending: UROLOGY | Admitting: UROLOGY

## 2023-01-13 ENCOUNTER — HOSPITAL ENCOUNTER (OUTPATIENT)
Dept: LAB | Age: 53
Discharge: HOME | End: 2023-01-13
Attending: EMERGENCY MEDICINE

## 2023-01-13 ENCOUNTER — HOSPITAL ENCOUNTER (OUTPATIENT)
Dept: GENERAL RADIOLOGY | Age: 53
Discharge: HOME | End: 2023-01-13
Attending: EMERGENCY MEDICINE

## 2023-01-13 DIAGNOSIS — Z01.818 PRE-OP TESTING: Primary | ICD-10-CM

## 2023-01-13 DIAGNOSIS — Z01.818 PRE-OP TESTING: ICD-10-CM

## 2023-01-13 DIAGNOSIS — Z01.818 ENCOUNTER FOR OTHER PREPROCEDURAL EXAMINATION: Primary | ICD-10-CM

## 2023-01-13 DIAGNOSIS — Z01.818 PREOP EXAMINATION: ICD-10-CM

## 2023-01-13 DIAGNOSIS — Z01.818 ENCOUNTER FOR OTHER PREPROCEDURAL EXAMINATION: ICD-10-CM

## 2023-01-13 LAB
ALBUMIN SERPL-MCNC: 4.1 G/DL (ref 3.6–5.1)
ALBUMIN/GLOB SERPL: 1 {RATIO} (ref 1–2.4)
ALP SERPL-CCNC: 157 UNITS/L (ref 45–117)
ALT SERPL-CCNC: 34 UNITS/L
ANION GAP SERPL CALC-SCNC: 14 MMOL/L (ref 7–19)
APTT PPP: 24 SEC (ref 22–30)
AST SERPL-CCNC: 32 UNITS/L
ATRIAL RATE (BPM): 72
BASOPHILS # BLD: 0.1 K/MCL (ref 0–0.3)
BASOPHILS NFR BLD: 1 %
BILIRUB SERPL-MCNC: 0.5 MG/DL (ref 0.2–1)
BUN SERPL-MCNC: 37 MG/DL (ref 6–20)
BUN/CREAT SERPL: 9 (ref 7–25)
CALCIUM SERPL-MCNC: 9.6 MG/DL (ref 8.4–10.2)
CHLORIDE SERPL-SCNC: 100 MMOL/L (ref 97–110)
CO2 SERPL-SCNC: 27 MMOL/L (ref 21–32)
CREAT SERPL-MCNC: 4.34 MG/DL (ref 0.51–0.95)
DEPRECATED RDW RBC: 44.6 FL (ref 39–50)
EOSINOPHIL # BLD: 0.4 K/MCL (ref 0–0.5)
EOSINOPHIL NFR BLD: 7 %
ERYTHROCYTE [DISTWIDTH] IN BLOOD: 13.9 % (ref 11–15)
FASTING DURATION TIME PATIENT: ABNORMAL H
GFR SERPLBLD BASED ON 1.73 SQ M-ARVRAT: 12 ML/MIN
GLOBULIN SER-MCNC: 4 G/DL (ref 2–4)
GLUCOSE SERPL-MCNC: 96 MG/DL (ref 70–99)
HCT VFR BLD CALC: 38.3 % (ref 36–46.5)
HGB BLD-MCNC: 12 G/DL (ref 12–15.5)
IMM GRANULOCYTES # BLD AUTO: 0 K/MCL (ref 0–0.2)
IMM GRANULOCYTES # BLD: 0 %
INR PPP: 1
LYMPHOCYTES # BLD: 1.4 K/MCL (ref 1–4.8)
LYMPHOCYTES NFR BLD: 23 %
MCH RBC QN AUTO: 28.1 PG (ref 26–34)
MCHC RBC AUTO-ENTMCNC: 31.3 G/DL (ref 32–36.5)
MCV RBC AUTO: 89.7 FL (ref 78–100)
MONOCYTES # BLD: 0.4 K/MCL (ref 0.3–0.9)
MONOCYTES NFR BLD: 6 %
NEUTROPHILS # BLD: 3.9 K/MCL (ref 1.8–7.7)
NEUTROPHILS NFR BLD: 63 %
NRBC BLD MANUAL-RTO: 0 /100 WBC
P AXIS (DEGREES): 23
PLATELET # BLD AUTO: 179 K/MCL (ref 140–450)
POTASSIUM SERPL-SCNC: 5.5 MMOL/L (ref 3.4–5.1)
PR-INTERVAL (MSEC): 144
PROT SERPL-MCNC: 8.1 G/DL (ref 6.4–8.2)
PROTHROMBIN TIME: 10.1 SEC (ref 9.7–11.8)
QRS-INTERVAL (MSEC): 96
QT-INTERVAL (MSEC): 428
QTC: 468
R AXIS (DEGREES): 26
RBC # BLD: 4.27 MIL/MCL (ref 4–5.2)
REPORT TEXT: NORMAL
SODIUM SERPL-SCNC: 135 MMOL/L (ref 135–145)
T AXIS (DEGREES): -155
VENTRICULAR RATE EKG/MIN (BPM): 72
WBC # BLD: 6.1 K/MCL (ref 4.2–11)

## 2023-01-13 PROCEDURE — 85025 COMPLETE CBC W/AUTO DIFF WBC: CPT | Performed by: EMERGENCY MEDICINE

## 2023-01-13 PROCEDURE — 93005 ELECTROCARDIOGRAM TRACING: CPT | Performed by: EMERGENCY MEDICINE

## 2023-01-13 PROCEDURE — 85730 THROMBOPLASTIN TIME PARTIAL: CPT | Performed by: EMERGENCY MEDICINE

## 2023-01-13 PROCEDURE — 85610 PROTHROMBIN TIME: CPT | Performed by: EMERGENCY MEDICINE

## 2023-01-13 PROCEDURE — 80053 COMPREHEN METABOLIC PANEL: CPT | Performed by: EMERGENCY MEDICINE

## 2023-01-13 PROCEDURE — 36415 COLL VENOUS BLD VENIPUNCTURE: CPT | Performed by: EMERGENCY MEDICINE

## 2023-01-13 PROCEDURE — 71046 X-RAY EXAM CHEST 2 VIEWS: CPT

## 2023-01-16 ENCOUNTER — HOSPITAL ENCOUNTER (OUTPATIENT)
Dept: LAB | Age: 53
Discharge: HOME | End: 2023-01-16
Attending: UROLOGY

## 2023-01-16 DIAGNOSIS — Z01.812 PRE-PROCEDURAL LABORATORY EXAMINATIONS: ICD-10-CM

## 2023-01-16 LAB
SARS-COV-2 RNA RESP QL NAA+PROBE: NOT DETECTED
SERVICE CMNT-IMP: NORMAL
SERVICE CMNT-IMP: NORMAL

## 2023-01-16 PROCEDURE — U0003 INFECTIOUS AGENT DETECTION BY NUCLEIC ACID (DNA OR RNA); SEVERE ACUTE RESPIRATORY SYNDROME CORONAVIRUS 2 (SARS-COV-2) (CORONAVIRUS DISEASE [COVID-19]), AMPLIFIED PROBE TECHNIQUE, MAKING USE OF HIGH THROUGHPUT TECHNOLOGIES AS DESCRIBED BY CMS-2020-01-R: HCPCS | Performed by: UROLOGY

## 2023-01-17 RX ORDER — SODIUM BICARBONATE 650 MG/1
650 TABLET ORAL 2 TIMES DAILY
Status: ON HOLD | COMMUNITY
End: 2023-01-21 | Stop reason: HOSPADM

## 2023-01-17 ASSESSMENT — ACTIVITIES OF DAILY LIVING (ADL)
ADL_SHORT_OF_BREATH: NO
RECENT_DECLINE_ADL: NO
ADL_BEFORE_ADMISSION: INDEPENDENT
NEEDS_ASSIST: NO
ADL_SCORE: 12

## 2023-01-17 ASSESSMENT — COGNITIVE AND FUNCTIONAL STATUS - GENERAL
ARE YOU DEAF OR DO YOU HAVE SERIOUS DIFFICULTY  HEARING: NO
ARE YOU DEAF OR DO YOU HAVE SERIOUS DIFFICULTY  HEARING: NO
ARE YOU BLIND OR DO YOU HAVE SERIOUS DIFFICULTY SEEING, EVEN WHEN WEARING GLASSES: NO

## 2023-01-18 ENCOUNTER — ANESTHESIA EVENT (OUTPATIENT)
Dept: SURGERY | Age: 53
DRG: 443 | End: 2023-01-18

## 2023-01-18 ENCOUNTER — APPOINTMENT (OUTPATIENT)
Dept: INTERPRETER SERVICES | Age: 53
DRG: 443 | End: 2023-01-18
Attending: UROLOGY

## 2023-01-18 ENCOUNTER — ANESTHESIA (OUTPATIENT)
Dept: SURGERY | Age: 53
DRG: 443 | End: 2023-01-18

## 2023-01-18 ENCOUNTER — HOSPITAL ENCOUNTER (INPATIENT)
Age: 53
LOS: 3 days | Discharge: HOME | DRG: 443 | End: 2023-01-21
Attending: UROLOGY | Admitting: INTERNAL MEDICINE

## 2023-01-18 DIAGNOSIS — Z01.812 PRE-PROCEDURAL LABORATORY EXAMINATIONS: Primary | ICD-10-CM

## 2023-01-18 DIAGNOSIS — Z90.5 H/O LEFT NEPHRECTOMY: ICD-10-CM

## 2023-01-18 PROBLEM — N28.89 RENAL MASS: Status: ACTIVE | Noted: 2023-01-18

## 2023-01-18 PROBLEM — N18.6 ESRD (END STAGE RENAL DISEASE) (CMD): Status: ACTIVE | Noted: 2023-01-18

## 2023-01-18 PROBLEM — I10 ESSENTIAL HYPERTENSION: Status: ACTIVE | Noted: 2023-01-18

## 2023-01-18 PROCEDURE — 10006027 HB SUPPLY 278: Performed by: UROLOGY

## 2023-01-18 PROCEDURE — 10002801 HB RX 250 W/O HCPCS: Performed by: ANESTHESIOLOGY

## 2023-01-18 PROCEDURE — 10004651 HB RX, NO CHARGE ITEM: Performed by: UROLOGY

## 2023-01-18 PROCEDURE — 10002807 HB RX 258: Performed by: ANESTHESIOLOGY

## 2023-01-18 PROCEDURE — 10006023 HB SUPPLY 272: Performed by: UROLOGY

## 2023-01-18 PROCEDURE — 0TT10ZZ RESECTION OF LEFT KIDNEY, OPEN APPROACH: ICD-10-PCS | Performed by: UROLOGY

## 2023-01-18 PROCEDURE — 10004451 HB PACU RECOVERY 1ST 30 MINUTES: Performed by: UROLOGY

## 2023-01-18 PROCEDURE — 13000038 HB MAJOR COMPLEX CASE S/U + 1ST 15 MIN: Performed by: UROLOGY

## 2023-01-18 PROCEDURE — 10002803 HB RX 637: Performed by: ANESTHESIOLOGY

## 2023-01-18 PROCEDURE — 88341 IMHCHEM/IMCYTCHM EA ADD ANTB: CPT | Performed by: UROLOGY

## 2023-01-18 PROCEDURE — 5A1D70Z PERFORMANCE OF URINARY FILTRATION, INTERMITTENT, LESS THAN 6 HOURS PER DAY: ICD-10-PCS | Performed by: INTERNAL MEDICINE

## 2023-01-18 PROCEDURE — 10002803 HB RX 637: Performed by: INTERNAL MEDICINE

## 2023-01-18 PROCEDURE — 10002803 HB RX 637: Performed by: UROLOGY

## 2023-01-18 PROCEDURE — 10002800 HB RX 250 W HCPCS: Performed by: UROLOGY

## 2023-01-18 PROCEDURE — 13000002 HB ANESTHESIA  GENERAL  S/U + 1ST 15 MIN: Performed by: UROLOGY

## 2023-01-18 PROCEDURE — 10002800 HB RX 250 W HCPCS: Performed by: ANESTHESIOLOGY

## 2023-01-18 PROCEDURE — 10004452 HB PACU ADDL 30 MINUTES: Performed by: UROLOGY

## 2023-01-18 PROCEDURE — 13000003 HB ANESTHESIA  GENERAL EA ADD MINUTE: Performed by: UROLOGY

## 2023-01-18 PROCEDURE — 99223 1ST HOSP IP/OBS HIGH 75: CPT | Performed by: INTERNAL MEDICINE

## 2023-01-18 PROCEDURE — 10000002 HB ROOM CHARGE MED SURG

## 2023-01-18 PROCEDURE — 10002801 HB RX 250 W/O HCPCS: Performed by: UROLOGY

## 2023-01-18 PROCEDURE — 13001086 HB INCENTIVE SPIROMETER W INSTRUCT

## 2023-01-18 PROCEDURE — 13000039 HB MAJOR COMPLEX CASE EA ADD MINUTE: Performed by: UROLOGY

## 2023-01-18 DEVICE — CLIP INTERNAL LG CHEVRON HRT LIGATE TRIANGULATE XSECT WIRE: Type: IMPLANTABLE DEVICE | Site: FLANK | Status: FUNCTIONAL

## 2023-01-18 DEVICE — HORIZON TI MED 6/CART
Type: IMPLANTABLE DEVICE | Site: FLANK | Status: FUNCTIONAL
Brand: WECK

## 2023-01-18 RX ORDER — OXYCODONE HYDROCHLORIDE 5 MG/1
10 TABLET ORAL EVERY 4 HOURS PRN
Status: DISCONTINUED | OUTPATIENT
Start: 2023-01-18 | End: 2023-01-21 | Stop reason: HOSPADM

## 2023-01-18 RX ORDER — 0.9 % SODIUM CHLORIDE 0.9 %
2 VIAL (ML) INJECTION EVERY 12 HOURS SCHEDULED
Status: DISCONTINUED | OUTPATIENT
Start: 2023-01-18 | End: 2023-01-18 | Stop reason: HOSPADM

## 2023-01-18 RX ORDER — DEXAMETHASONE SODIUM PHOSPHATE 4 MG/ML
INJECTION, SOLUTION INTRA-ARTICULAR; INTRALESIONAL; INTRAMUSCULAR; INTRAVENOUS; SOFT TISSUE PRN
Status: DISCONTINUED | OUTPATIENT
Start: 2023-01-18 | End: 2023-01-18

## 2023-01-18 RX ORDER — ONDANSETRON 2 MG/ML
INJECTION INTRAMUSCULAR; INTRAVENOUS PRN
Status: DISCONTINUED | OUTPATIENT
Start: 2023-01-18 | End: 2023-01-18

## 2023-01-18 RX ORDER — AMLODIPINE BESYLATE 5 MG/1
2.5 TABLET ORAL DAILY
Status: DISCONTINUED | OUTPATIENT
Start: 2023-01-19 | End: 2023-01-21 | Stop reason: HOSPADM

## 2023-01-18 RX ORDER — SCOLOPAMINE TRANSDERMAL SYSTEM 1 MG/1
1 PATCH, EXTENDED RELEASE TRANSDERMAL PRN
Status: DISCONTINUED | OUTPATIENT
Start: 2023-01-18 | End: 2023-01-18 | Stop reason: HOSPADM

## 2023-01-18 RX ORDER — HEPARIN SODIUM 5000 [USP'U]/ML
5000 INJECTION, SOLUTION INTRAVENOUS; SUBCUTANEOUS EVERY 8 HOURS SCHEDULED
Status: DISCONTINUED | OUTPATIENT
Start: 2023-01-18 | End: 2023-01-21 | Stop reason: HOSPADM

## 2023-01-18 RX ORDER — FAMOTIDINE 20 MG/1
20 TABLET, FILM COATED ORAL
Status: COMPLETED | OUTPATIENT
Start: 2023-01-18 | End: 2023-01-18

## 2023-01-18 RX ORDER — DIPHENHYDRAMINE HYDROCHLORIDE 50 MG/ML
12.5 INJECTION INTRAMUSCULAR; INTRAVENOUS
Status: ACTIVE | OUTPATIENT
Start: 2023-01-18 | End: 2023-01-18

## 2023-01-18 RX ORDER — BUPIVACAINE HYDROCHLORIDE 5 MG/ML
INJECTION, SOLUTION EPIDURAL; INTRACAUDAL PRN
Status: DISCONTINUED | OUTPATIENT
Start: 2023-01-18 | End: 2023-01-18 | Stop reason: HOSPADM

## 2023-01-18 RX ORDER — LIDOCAINE HYDROCHLORIDE 10 MG/ML
5-10 INJECTION, SOLUTION EPIDURAL; INFILTRATION; INTRACAUDAL; PERINEURAL PRN
Status: DISCONTINUED | OUTPATIENT
Start: 2023-01-18 | End: 2023-01-18 | Stop reason: HOSPADM

## 2023-01-18 RX ORDER — ONDANSETRON 2 MG/ML
4 INJECTION INTRAMUSCULAR; INTRAVENOUS
Status: DISPENSED | OUTPATIENT
Start: 2023-01-18 | End: 2023-01-18

## 2023-01-18 RX ORDER — SODIUM CHLORIDE, SODIUM LACTATE, POTASSIUM CHLORIDE, CALCIUM CHLORIDE 600; 310; 30; 20 MG/100ML; MG/100ML; MG/100ML; MG/100ML
INJECTION, SOLUTION INTRAVENOUS CONTINUOUS PRN
Status: DISCONTINUED | OUTPATIENT
Start: 2023-01-18 | End: 2023-01-18

## 2023-01-18 RX ORDER — 0.9 % SODIUM CHLORIDE 0.9 %
2 VIAL (ML) INJECTION EVERY 12 HOURS SCHEDULED
Status: DISCONTINUED | OUTPATIENT
Start: 2023-01-18 | End: 2023-01-21 | Stop reason: HOSPADM

## 2023-01-18 RX ORDER — HYDRALAZINE HYDROCHLORIDE 20 MG/ML
5 INJECTION INTRAMUSCULAR; INTRAVENOUS EVERY 10 MIN PRN
Status: DISCONTINUED | OUTPATIENT
Start: 2023-01-18 | End: 2023-01-18 | Stop reason: HOSPADM

## 2023-01-18 RX ORDER — SODIUM CHLORIDE, SODIUM LACTATE, POTASSIUM CHLORIDE, CALCIUM CHLORIDE 600; 310; 30; 20 MG/100ML; MG/100ML; MG/100ML; MG/100ML
INJECTION, SOLUTION INTRAVENOUS CONTINUOUS
Status: DISCONTINUED | OUTPATIENT
Start: 2023-01-18 | End: 2023-01-18 | Stop reason: HOSPADM

## 2023-01-18 RX ORDER — HYDRALAZINE HYDROCHLORIDE 20 MG/ML
10 INJECTION INTRAMUSCULAR; INTRAVENOUS EVERY 6 HOURS PRN
Status: DISCONTINUED | OUTPATIENT
Start: 2023-01-18 | End: 2023-01-21 | Stop reason: HOSPADM

## 2023-01-18 RX ORDER — GLYCOPYRROLATE 0.2 MG/ML
INJECTION, SOLUTION INTRAMUSCULAR; INTRAVENOUS PRN
Status: DISCONTINUED | OUTPATIENT
Start: 2023-01-18 | End: 2023-01-18

## 2023-01-18 RX ORDER — LIDOCAINE HYDROCHLORIDE 20 MG/ML
INJECTION, SOLUTION INFILTRATION; PERINEURAL PRN
Status: DISCONTINUED | OUTPATIENT
Start: 2023-01-18 | End: 2023-01-18

## 2023-01-18 RX ORDER — DROPERIDOL 2.5 MG/ML
0.62 INJECTION, SOLUTION INTRAMUSCULAR; INTRAVENOUS
Status: ACTIVE | OUTPATIENT
Start: 2023-01-18 | End: 2023-01-18

## 2023-01-18 RX ORDER — PROPOFOL 10 MG/ML
INJECTION, EMULSION INTRAVENOUS PRN
Status: DISCONTINUED | OUTPATIENT
Start: 2023-01-18 | End: 2023-01-18

## 2023-01-18 RX ORDER — METOPROLOL TARTRATE 1 MG/ML
INJECTION, SOLUTION INTRAVENOUS PRN
Status: DISCONTINUED | OUTPATIENT
Start: 2023-01-18 | End: 2023-01-18

## 2023-01-18 RX ORDER — OXYCODONE HYDROCHLORIDE 5 MG/1
TABLET ORAL
Status: DISPENSED
Start: 2023-01-18 | End: 2023-01-19

## 2023-01-18 RX ORDER — POLYETHYLENE GLYCOL 3350 17 G/17G
17 POWDER, FOR SOLUTION ORAL DAILY
Status: DISCONTINUED | OUTPATIENT
Start: 2023-01-18 | End: 2023-01-21 | Stop reason: HOSPADM

## 2023-01-18 RX ADMIN — METOPROLOL TARTRATE 2 MG: 1 INJECTION, SOLUTION INTRAVENOUS at 15:55

## 2023-01-18 RX ADMIN — FENTANYL CITRATE 50 MCG: 50 INJECTION, SOLUTION INTRAMUSCULAR; INTRAVENOUS at 16:41

## 2023-01-18 RX ADMIN — SODIUM CHLORIDE, PRESERVATIVE FREE 2 ML: 5 INJECTION INTRAVENOUS at 20:51

## 2023-01-18 RX ADMIN — CEFAZOLIN SODIUM 2000 MG: 300 INJECTION, POWDER, LYOPHILIZED, FOR SOLUTION INTRAVENOUS at 15:28

## 2023-01-18 RX ADMIN — MORPHINE SULFATE 2 MG: 2 INJECTION, SOLUTION INTRAMUSCULAR; INTRAVENOUS at 20:46

## 2023-01-18 RX ADMIN — HEPARIN SODIUM 5000 UNITS: 5000 INJECTION INTRAVENOUS; SUBCUTANEOUS at 21:54

## 2023-01-18 RX ADMIN — FENTANYL CITRATE 50 MCG: 50 INJECTION, SOLUTION INTRAMUSCULAR; INTRAVENOUS at 15:54

## 2023-01-18 RX ADMIN — SODIUM CHLORIDE, POTASSIUM CHLORIDE, SODIUM LACTATE AND CALCIUM CHLORIDE: 600; 310; 30; 20 INJECTION, SOLUTION INTRAVENOUS at 15:14

## 2023-01-18 RX ADMIN — ONDANSETRON 4 MG: 2 INJECTION INTRAMUSCULAR; INTRAVENOUS at 16:41

## 2023-01-18 RX ADMIN — FAMOTIDINE 20 MG: 20 TABLET, FILM COATED ORAL at 14:25

## 2023-01-18 RX ADMIN — LIDOCAINE HYDROCHLORIDE 3 ML: 20 INJECTION, SOLUTION INFILTRATION; PERINEURAL at 15:21

## 2023-01-18 RX ADMIN — OXYCODONE HYDROCHLORIDE 10 MG: 5 TABLET ORAL at 17:46

## 2023-01-18 RX ADMIN — FENTANYL CITRATE 50 MCG: 50 INJECTION INTRAMUSCULAR; INTRAVENOUS at 17:35

## 2023-01-18 RX ADMIN — PROPOFOL 150 MG: 10 INJECTION, EMULSION INTRAVENOUS at 15:21

## 2023-01-18 RX ADMIN — CISATRACURIUM BESYLATE 10 MG: 2 INJECTION INTRAVENOUS at 15:22

## 2023-01-18 RX ADMIN — DEXAMETHASONE SODIUM PHOSPHATE 4 MG: 4 INJECTION, SOLUTION INTRAMUSCULAR; INTRAVENOUS at 15:22

## 2023-01-18 RX ADMIN — GLYCOPYRROLATE 0.2 MG: 0.2 INJECTION, SOLUTION INTRAMUSCULAR; INTRAVENOUS at 15:31

## 2023-01-18 RX ADMIN — POLYETHYLENE GLYCOL (3350) 17 G: 17 POWDER, FOR SOLUTION ORAL at 21:07

## 2023-01-18 SDOH — SOCIAL STABILITY: SOCIAL INSECURITY: RISK FACTORS: KIDNEY DISEASE

## 2023-01-18 ASSESSMENT — PAIN SCALES - GENERAL
PAINLEVEL_OUTOF10: 2
PAINLEVEL_OUTOF10: 5
PAINLEVEL_OUTOF10: 3
PAINLEVEL_OUTOF10: 0
PAINLEVEL_OUTOF10: 3
PAINLEVEL_OUTOF10: 5
PAINLEVEL_OUTOF10: 3
PAINLEVEL_OUTOF10: 5

## 2023-01-19 ENCOUNTER — APPOINTMENT (OUTPATIENT)
Dept: DIALYSIS | Age: 53
DRG: 443 | End: 2023-01-19
Attending: INTERNAL MEDICINE

## 2023-01-19 LAB
ALBUMIN SERPL-MCNC: 3.2 G/DL (ref 3.6–5.1)
ANION GAP SERPL CALC-SCNC: 18 MMOL/L (ref 7–19)
BUN SERPL-MCNC: 53 MG/DL (ref 6–20)
BUN/CREAT SERPL: 9 (ref 7–25)
CALCIUM SERPL-MCNC: 8.2 MG/DL (ref 8.4–10.2)
CHLORIDE SERPL-SCNC: 100 MMOL/L (ref 97–110)
CO2 SERPL-SCNC: 21 MMOL/L (ref 21–32)
CREAT SERPL-MCNC: 5.68 MG/DL (ref 0.51–0.95)
DEPRECATED RDW RBC: 45.8 FL (ref 39–50)
ERYTHROCYTE [DISTWIDTH] IN BLOOD: 14.1 % (ref 11–15)
FASTING DURATION TIME PATIENT: ABNORMAL H
GFR SERPLBLD BASED ON 1.73 SQ M-ARVRAT: 9 ML/MIN
GLUCOSE SERPL-MCNC: 125 MG/DL (ref 70–99)
HCT VFR BLD CALC: 32.9 % (ref 36–46.5)
HGB BLD-MCNC: 10.3 G/DL (ref 12–15.5)
MCH RBC QN AUTO: 28.1 PG (ref 26–34)
MCHC RBC AUTO-ENTMCNC: 31.3 G/DL (ref 32–36.5)
MCV RBC AUTO: 89.9 FL (ref 78–100)
NRBC BLD MANUAL-RTO: 0 /100 WBC
PHOSPHATE SERPL-MCNC: 6.9 MG/DL (ref 2.4–4.7)
PLATELET # BLD AUTO: 149 K/MCL (ref 140–450)
POTASSIUM SERPL-SCNC: 6.2 MMOL/L (ref 3.4–5.1)
RBC # BLD: 3.66 MIL/MCL (ref 4–5.2)
SODIUM SERPL-SCNC: 133 MMOL/L (ref 135–145)
WBC # BLD: 10.9 K/MCL (ref 4.2–11)

## 2023-01-19 PROCEDURE — 10004651 HB RX, NO CHARGE ITEM: Performed by: UROLOGY

## 2023-01-19 PROCEDURE — 10002800 HB RX 250 W HCPCS: Performed by: UROLOGY

## 2023-01-19 PROCEDURE — 36415 COLL VENOUS BLD VENIPUNCTURE: CPT | Performed by: INTERNAL MEDICINE

## 2023-01-19 PROCEDURE — 10002803 HB RX 637: Performed by: INTERNAL MEDICINE

## 2023-01-19 PROCEDURE — 99233 SBSQ HOSP IP/OBS HIGH 50: CPT | Performed by: INTERNAL MEDICINE

## 2023-01-19 PROCEDURE — 10000002 HB ROOM CHARGE MED SURG

## 2023-01-19 PROCEDURE — 13001086 HB INCENTIVE SPIROMETER W INSTRUCT

## 2023-01-19 PROCEDURE — 90935 HEMODIALYSIS ONE EVALUATION: CPT

## 2023-01-19 PROCEDURE — 85027 COMPLETE CBC AUTOMATED: CPT | Performed by: INTERNAL MEDICINE

## 2023-01-19 PROCEDURE — 10002016 HB COUNTER INCENTIVE SPIROMETRY

## 2023-01-19 PROCEDURE — 80069 RENAL FUNCTION PANEL: CPT | Performed by: INTERNAL MEDICINE

## 2023-01-19 PROCEDURE — 10002803 HB RX 637: Performed by: UROLOGY

## 2023-01-19 RX ORDER — HYDROXYZINE HYDROCHLORIDE 25 MG/1
25 TABLET, FILM COATED ORAL EVERY 6 HOURS PRN
Status: DISCONTINUED | OUTPATIENT
Start: 2023-01-19 | End: 2023-01-21 | Stop reason: HOSPADM

## 2023-01-19 RX ORDER — SODIUM BICARBONATE 650 MG/1
650 TABLET ORAL 2 TIMES DAILY
Status: DISCONTINUED | OUTPATIENT
Start: 2023-01-19 | End: 2023-01-21

## 2023-01-19 RX ADMIN — HEPARIN SODIUM 5000 UNITS: 5000 INJECTION INTRAVENOUS; SUBCUTANEOUS at 06:12

## 2023-01-19 RX ADMIN — HEPARIN SODIUM 5000 UNITS: 5000 INJECTION INTRAVENOUS; SUBCUTANEOUS at 21:09

## 2023-01-19 RX ADMIN — SODIUM BICARBONATE 650 MG: 650 TABLET ORAL at 21:11

## 2023-01-19 RX ADMIN — POLYETHYLENE GLYCOL (3350) 17 G: 17 POWDER, FOR SOLUTION ORAL at 08:56

## 2023-01-19 RX ADMIN — HYDROXYZINE HYDROCHLORIDE 25 MG: 25 TABLET ORAL at 18:50

## 2023-01-19 RX ADMIN — OXYCODONE HYDROCHLORIDE 10 MG: 5 TABLET ORAL at 00:15

## 2023-01-19 RX ADMIN — SODIUM CHLORIDE, PRESERVATIVE FREE 2 ML: 5 INJECTION INTRAVENOUS at 21:11

## 2023-01-19 RX ADMIN — SODIUM CHLORIDE, PRESERVATIVE FREE 2 ML: 5 INJECTION INTRAVENOUS at 08:56

## 2023-01-19 RX ADMIN — SODIUM ZIRCONIUM CYCLOSILICATE 10 G: 10 POWDER, FOR SUSPENSION ORAL at 09:05

## 2023-01-19 RX ADMIN — MORPHINE SULFATE 2 MG: 2 INJECTION, SOLUTION INTRAMUSCULAR; INTRAVENOUS at 22:54

## 2023-01-19 RX ADMIN — CEFAZOLIN SODIUM 2000 MG: 300 INJECTION, POWDER, LYOPHILIZED, FOR SOLUTION INTRAVENOUS at 00:19

## 2023-01-19 RX ADMIN — OXYCODONE HYDROCHLORIDE 10 MG: 5 TABLET ORAL at 20:47

## 2023-01-19 RX ADMIN — HEPARIN SODIUM 5000 UNITS: 5000 INJECTION INTRAVENOUS; SUBCUTANEOUS at 13:43

## 2023-01-19 RX ADMIN — OXYCODONE HYDROCHLORIDE 10 MG: 5 TABLET ORAL at 14:00

## 2023-01-19 SDOH — SOCIAL STABILITY: SOCIAL NETWORK
HOW OFTEN DO YOU SEE OR TALK TO PEOPLE THAT YOU CARE ABOUT AND FEEL CLOSE TO? (FOR EXAMPLE: TALKING TO FRIENDS ON THE PHONE, VISITING FRIENDS OR FAMILY, GOING TO CHURCH OR CLUB MEETINGS): 5 OR MORE TIMES A WEEK

## 2023-01-19 SDOH — ECONOMIC STABILITY: HOUSING INSECURITY: ARE YOU WORRIED ABOUT LOSING YOUR HOUSING?: NO

## 2023-01-19 SDOH — HEALTH STABILITY: PHYSICAL HEALTH: DO YOU HAVE DIFFICULTY DRESSING OR BATHING?: NO

## 2023-01-19 SDOH — HEALTH STABILITY: PHYSICAL HEALTH: DO YOU HAVE SERIOUS DIFFICULTY WALKING OR CLIMBING STAIRS?: NO

## 2023-01-19 SDOH — ECONOMIC STABILITY: HOUSING INSECURITY: WHAT IS YOUR LIVING SITUATION TODAY?: CHILDREN

## 2023-01-19 SDOH — SOCIAL STABILITY: SOCIAL NETWORK: SUPPORT SYSTEMS: CHILDREN;FAMILY MEMBERS

## 2023-01-19 SDOH — ECONOMIC STABILITY: HOUSING INSECURITY: WHAT IS YOUR LIVING SITUATION TODAY?: APARTMENT

## 2023-01-19 SDOH — ECONOMIC STABILITY: GENERAL

## 2023-01-19 SDOH — ECONOMIC STABILITY: TRANSPORTATION INSECURITY
IN THE PAST 12 MONTHS, HAS LACK OF TRANSPORTATION KEPT YOU FROM MEETINGS, WORK, OR FROM GETTING THINGS NEEDED FOR DAILY LIVING?: NO

## 2023-01-19 SDOH — HEALTH STABILITY: GENERAL: BECAUSE OF A PHYSICAL, MENTAL, OR EMOTIONAL CONDITION, DO YOU HAVE DIFFICULTY DOING ERRANDS ALONE?: NO

## 2023-01-19 SDOH — HEALTH STABILITY: GENERAL
BECAUSE OF A PHYSICAL, MENTAL, OR EMOTIONAL CONDITION, DO YOU HAVE SERIOUS DIFFICULTY CONCENTRATING, REMEMBERING OR MAKING DECISIONS?: NO

## 2023-01-19 SDOH — ECONOMIC STABILITY: TRANSPORTATION INSECURITY
IN THE PAST 12 MONTHS, HAS THE LACK OF TRANSPORTATION KEPT YOU FROM MEDICAL APPOINTMENTS OR FROM GETTING MEDICATIONS?: NO

## 2023-01-19 SDOH — ECONOMIC STABILITY: FOOD INSECURITY: HOW OFTEN IN THE PAST 12 MONTHS WERE YOU WORRIED OR STRESSED ABOUT HAVING ENOUGH MONEY TO BUY NUTRITIOUS MEALS?: NEVER

## 2023-01-19 ASSESSMENT — PAIN SCALES - GENERAL
PAINLEVEL_OUTOF10: 10
PAINLEVEL_OUTOF10: 5
PAINLEVEL_OUTOF10: 9
PAINLEVEL_OUTOF10: 0
PAINLEVEL_OUTOF10: 7
PAINLEVEL_OUTOF10: 0
PAINLEVEL_OUTOF10: 7

## 2023-01-19 ASSESSMENT — PATIENT HEALTH QUESTIONNAIRE - PHQ9
SUM OF ALL RESPONSES TO PHQ9 QUESTIONS 1 AND 2: 0
IS PATIENT ABLE TO COMPLETE PHQ2 OR PHQ9: YES
2. FEELING DOWN, DEPRESSED OR HOPELESS: NOT AT ALL
1. LITTLE INTEREST OR PLEASURE IN DOING THINGS: NOT AT ALL
CLINICAL INTERPRETATION OF PHQ2 SCORE: NO FURTHER SCREENING NEEDED
SUM OF ALL RESPONSES TO PHQ9 QUESTIONS 1 AND 2: 0

## 2023-01-19 ASSESSMENT — LIFESTYLE VARIABLES
HOW OFTEN DO YOU HAVE 6 OR MORE DRINKS ON ONE OCCASION: NEVER
AUDIT-C TOTAL SCORE: 0
HOW OFTEN DO YOU HAVE A DRINK CONTAINING ALCOHOL: NEVER
ALCOHOL_USE_STATUS: NO OR LOW RISK WITH VALIDATED TOOL
HOW MANY STANDARD DRINKS CONTAINING ALCOHOL DO YOU HAVE ON A TYPICAL DAY: 0,1 OR 2

## 2023-01-19 ASSESSMENT — ACTIVITIES OF DAILY LIVING (ADL)
RECENT_DECLINE_ADL: NO
ADL_SHORT_OF_BREATH: NO
ADL_BEFORE_ADMISSION: INDEPENDENT
ADL_SCORE: 12

## 2023-01-19 ASSESSMENT — COLUMBIA-SUICIDE SEVERITY RATING SCALE - C-SSRS
2. HAVE YOU ACTUALLY HAD ANY THOUGHTS OF KILLING YOURSELF?: NO
6. HAVE YOU EVER DONE ANYTHING, STARTED TO DO ANYTHING, OR PREPARED TO DO ANYTHING TO END YOUR LIFE?: NO
IS THE PATIENT ABLE TO COMPLETE C-SSRS: YES
1. WITHIN THE PAST MONTH, HAVE YOU WISHED YOU WERE DEAD OR WISHED YOU COULD GO TO SLEEP AND NOT WAKE UP?: NO

## 2023-01-19 ASSESSMENT — COGNITIVE AND FUNCTIONAL STATUS - GENERAL: DO YOU HAVE DIFFICULTY DRESSING OR BATHING: NO

## 2023-01-20 LAB
ANION GAP SERPL CALC-SCNC: 10 MMOL/L (ref 7–19)
BUN SERPL-MCNC: 25 MG/DL (ref 6–20)
BUN/CREAT SERPL: 5 (ref 7–25)
CALCIUM SERPL-MCNC: 8.2 MG/DL (ref 8.4–10.2)
CHLORIDE SERPL-SCNC: 95 MMOL/L (ref 97–110)
CO2 SERPL-SCNC: 31 MMOL/L (ref 21–32)
CREAT SERPL-MCNC: 5.26 MG/DL (ref 0.51–0.95)
FASTING DURATION TIME PATIENT: ABNORMAL H
GFR SERPLBLD BASED ON 1.73 SQ M-ARVRAT: 9 ML/MIN
GLUCOSE SERPL-MCNC: 89 MG/DL (ref 70–99)
POTASSIUM SERPL-SCNC: 3.5 MMOL/L (ref 3.4–5.1)
RAINBOW EXTRA TUBES HOLD SPECIMEN: NORMAL
SODIUM SERPL-SCNC: 132 MMOL/L (ref 135–145)

## 2023-01-20 PROCEDURE — 10002800 HB RX 250 W HCPCS: Performed by: UROLOGY

## 2023-01-20 PROCEDURE — 10000002 HB ROOM CHARGE MED SURG

## 2023-01-20 PROCEDURE — 10002803 HB RX 637: Performed by: INTERNAL MEDICINE

## 2023-01-20 PROCEDURE — 80048 BASIC METABOLIC PNL TOTAL CA: CPT | Performed by: INTERNAL MEDICINE

## 2023-01-20 PROCEDURE — 99233 SBSQ HOSP IP/OBS HIGH 50: CPT | Performed by: INTERNAL MEDICINE

## 2023-01-20 PROCEDURE — 10002803 HB RX 637: Performed by: UROLOGY

## 2023-01-20 PROCEDURE — 10004651 HB RX, NO CHARGE ITEM: Performed by: INTERNAL MEDICINE

## 2023-01-20 PROCEDURE — 36415 COLL VENOUS BLD VENIPUNCTURE: CPT | Performed by: INTERNAL MEDICINE

## 2023-01-20 PROCEDURE — 10004651 HB RX, NO CHARGE ITEM: Performed by: UROLOGY

## 2023-01-20 RX ORDER — ACETAMINOPHEN 500 MG
1000 TABLET ORAL 3 TIMES DAILY
Status: DISCONTINUED | OUTPATIENT
Start: 2023-01-20 | End: 2023-01-21 | Stop reason: HOSPADM

## 2023-01-20 RX ORDER — POLYETHYLENE GLYCOL 3350 17 G/17G
17 POWDER, FOR SOLUTION ORAL DAILY
Status: SHIPPED | COMMUNITY
Start: 2023-01-20 | End: 2023-08-01 | Stop reason: ALTCHOICE

## 2023-01-20 RX ORDER — ACETAMINOPHEN 500 MG
500 TABLET ORAL EVERY 6 HOURS PRN
Qty: 30 TABLET | Refills: 0 | Status: ON HOLD | OUTPATIENT
Start: 2023-01-20 | End: 2023-02-10 | Stop reason: HOSPADM

## 2023-01-20 RX ADMIN — SODIUM CHLORIDE, PRESERVATIVE FREE 2 ML: 5 INJECTION INTRAVENOUS at 08:26

## 2023-01-20 RX ADMIN — HEPARIN SODIUM 5000 UNITS: 5000 INJECTION INTRAVENOUS; SUBCUTANEOUS at 14:49

## 2023-01-20 RX ADMIN — HEPARIN SODIUM 5000 UNITS: 5000 INJECTION INTRAVENOUS; SUBCUTANEOUS at 05:33

## 2023-01-20 RX ADMIN — AMLODIPINE BESYLATE 2.5 MG: 5 TABLET ORAL at 08:24

## 2023-01-20 RX ADMIN — SODIUM BICARBONATE 650 MG: 650 TABLET ORAL at 08:25

## 2023-01-20 RX ADMIN — SODIUM CHLORIDE, PRESERVATIVE FREE 2 ML: 5 INJECTION INTRAVENOUS at 20:38

## 2023-01-20 RX ADMIN — HEPARIN SODIUM 5000 UNITS: 5000 INJECTION INTRAVENOUS; SUBCUTANEOUS at 20:37

## 2023-01-20 RX ADMIN — ACETAMINOPHEN 1000 MG: 500 TABLET ORAL at 14:48

## 2023-01-20 RX ADMIN — OXYCODONE HYDROCHLORIDE 10 MG: 5 TABLET ORAL at 08:28

## 2023-01-20 RX ADMIN — SODIUM BICARBONATE 650 MG: 650 TABLET ORAL at 20:37

## 2023-01-20 RX ADMIN — ACETAMINOPHEN 1000 MG: 500 TABLET ORAL at 20:37

## 2023-01-20 RX ADMIN — POLYETHYLENE GLYCOL (3350) 17 G: 17 POWDER, FOR SOLUTION ORAL at 08:24

## 2023-01-20 ASSESSMENT — PAIN SCALES - GENERAL
PAINLEVEL_OUTOF10: 8
PAINLEVEL_OUTOF10: 5
PAINLEVEL_OUTOF10: 0
PAINLEVEL_OUTOF10: 2

## 2023-01-21 ENCOUNTER — APPOINTMENT (OUTPATIENT)
Dept: DIALYSIS | Age: 53
DRG: 443 | End: 2023-01-21
Attending: INTERNAL MEDICINE

## 2023-01-21 VITALS
RESPIRATION RATE: 17 BRPM | SYSTOLIC BLOOD PRESSURE: 110 MMHG | HEART RATE: 81 BPM | BODY MASS INDEX: 24.8 KG/M2 | TEMPERATURE: 97 F | DIASTOLIC BLOOD PRESSURE: 69 MMHG | HEIGHT: 60 IN | WEIGHT: 126.32 LBS | OXYGEN SATURATION: 99 %

## 2023-01-21 PROCEDURE — 10002803 HB RX 637: Performed by: INTERNAL MEDICINE

## 2023-01-21 PROCEDURE — 10004651 HB RX, NO CHARGE ITEM: Performed by: INTERNAL MEDICINE

## 2023-01-21 PROCEDURE — 10002800 HB RX 250 W HCPCS: Performed by: UROLOGY

## 2023-01-21 PROCEDURE — 99239 HOSP IP/OBS DSCHRG MGMT >30: CPT | Performed by: INTERNAL MEDICINE

## 2023-01-21 PROCEDURE — 90935 HEMODIALYSIS ONE EVALUATION: CPT

## 2023-01-21 RX ORDER — OXYCODONE HYDROCHLORIDE 10 MG/1
10 TABLET ORAL EVERY 4 HOURS PRN
Qty: 10 TABLET | Refills: 0 | Status: ON HOLD | OUTPATIENT
Start: 2023-01-21 | End: 2023-02-09

## 2023-01-21 RX ORDER — MIDODRINE HYDROCHLORIDE 5 MG/1
10 TABLET ORAL ONCE
Status: COMPLETED | OUTPATIENT
Start: 2023-01-21 | End: 2023-01-21

## 2023-01-21 RX ADMIN — HEPARIN SODIUM 5000 UNITS: 5000 INJECTION INTRAVENOUS; SUBCUTANEOUS at 14:45

## 2023-01-21 RX ADMIN — HEPARIN SODIUM 5000 UNITS: 5000 INJECTION INTRAVENOUS; SUBCUTANEOUS at 06:26

## 2023-01-21 RX ADMIN — MIDODRINE HYDROCHLORIDE 10 MG: 5 TABLET ORAL at 13:01

## 2023-01-21 RX ADMIN — ACETAMINOPHEN 1000 MG: 500 TABLET ORAL at 13:01

## 2023-01-21 RX ADMIN — POLYETHYLENE GLYCOL (3350) 17 G: 17 POWDER, FOR SOLUTION ORAL at 09:30

## 2023-01-21 ASSESSMENT — PAIN SCALES - GENERAL: PAINLEVEL_OUTOF10: 2

## 2023-01-23 ENCOUNTER — HOSPITAL ENCOUNTER (OUTPATIENT)
Age: 53
Setting detail: OBSERVATION
Discharge: HOME | End: 2023-01-25
Attending: EMERGENCY MEDICINE | Admitting: STUDENT IN AN ORGANIZED HEALTH CARE EDUCATION/TRAINING PROGRAM

## 2023-01-23 ENCOUNTER — APPOINTMENT (OUTPATIENT)
Dept: CT IMAGING | Age: 53
End: 2023-01-23
Attending: EMERGENCY MEDICINE

## 2023-01-23 DIAGNOSIS — R34 DECREASED URINE OUTPUT: ICD-10-CM

## 2023-01-23 DIAGNOSIS — K59.00 CONSTIPATION, UNSPECIFIED CONSTIPATION TYPE: Primary | ICD-10-CM

## 2023-01-23 DIAGNOSIS — R10.84 ABDOMINAL PAIN, ACUTE, GENERALIZED: ICD-10-CM

## 2023-01-23 LAB
ALBUMIN SERPL-MCNC: 3.1 G/DL (ref 3.6–5.1)
ALBUMIN/GLOB SERPL: 0.8 {RATIO} (ref 1–2.4)
ALP SERPL-CCNC: 223 UNITS/L (ref 45–117)
ALT SERPL-CCNC: 33 UNITS/L
ANION GAP SERPL CALC-SCNC: 18 MMOL/L (ref 7–19)
AST SERPL-CCNC: 193 UNITS/L
BASOPHILS # BLD: 0 K/MCL (ref 0–0.3)
BASOPHILS NFR BLD: 0 %
BILIRUB SERPL-MCNC: 0.5 MG/DL (ref 0.2–1)
BUN SERPL-MCNC: 66 MG/DL (ref 6–20)
BUN/CREAT SERPL: 7 (ref 7–25)
CALCIUM SERPL-MCNC: 8.3 MG/DL (ref 8.4–10.2)
CHLORIDE SERPL-SCNC: 95 MMOL/L (ref 97–110)
CO2 SERPL-SCNC: 25 MMOL/L (ref 21–32)
CREAT SERPL-MCNC: 9.85 MG/DL (ref 0.51–0.95)
DEPRECATED RDW RBC: 41.8 FL (ref 39–50)
EOSINOPHIL # BLD: 0.7 K/MCL (ref 0–0.5)
EOSINOPHIL NFR BLD: 11 %
ERYTHROCYTE [DISTWIDTH] IN BLOOD: 13.5 % (ref 11–15)
FASTING DURATION TIME PATIENT: ABNORMAL H
GFR SERPLBLD BASED ON 1.73 SQ M-ARVRAT: 4 ML/MIN
GLOBULIN SER-MCNC: 4 G/DL (ref 2–4)
GLUCOSE SERPL-MCNC: 107 MG/DL (ref 70–99)
HCT VFR BLD CALC: 25.4 % (ref 36–46.5)
HGB BLD-MCNC: 8.5 G/DL (ref 12–15.5)
IMM GRANULOCYTES # BLD AUTO: 0 K/MCL (ref 0–0.2)
IMM GRANULOCYTES # BLD: 0 %
LIPASE SERPL-CCNC: 317 UNITS/L (ref 73–393)
LYMPHOCYTES # BLD: 1.5 K/MCL (ref 1–4.8)
LYMPHOCYTES NFR BLD: 26 %
MCH RBC QN AUTO: 28.8 PG (ref 26–34)
MCHC RBC AUTO-ENTMCNC: 33.5 G/DL (ref 32–36.5)
MCV RBC AUTO: 86.1 FL (ref 78–100)
MONOCYTES # BLD: 0.4 K/MCL (ref 0.3–0.9)
MONOCYTES NFR BLD: 7 %
NEUTROPHILS # BLD: 3.3 K/MCL (ref 1.8–7.7)
NEUTROPHILS NFR BLD: 56 %
NRBC BLD MANUAL-RTO: 0 /100 WBC
PLATELET # BLD AUTO: 175 K/MCL (ref 140–450)
POTASSIUM SERPL-SCNC: 4.7 MMOL/L (ref 3.4–5.1)
PROT SERPL-MCNC: 7.1 G/DL (ref 6.4–8.2)
RBC # BLD: 2.95 MIL/MCL (ref 4–5.2)
SODIUM SERPL-SCNC: 133 MMOL/L (ref 135–145)
WBC # BLD: 5.9 K/MCL (ref 4.2–11)

## 2023-01-23 PROCEDURE — 99285 EMERGENCY DEPT VISIT HI MDM: CPT

## 2023-01-23 PROCEDURE — 36415 COLL VENOUS BLD VENIPUNCTURE: CPT

## 2023-01-23 PROCEDURE — 83690 ASSAY OF LIPASE: CPT | Performed by: EMERGENCY MEDICINE

## 2023-01-23 PROCEDURE — 85025 COMPLETE CBC W/AUTO DIFF WBC: CPT | Performed by: EMERGENCY MEDICINE

## 2023-01-23 PROCEDURE — 74176 CT ABD & PELVIS W/O CONTRAST: CPT

## 2023-01-23 PROCEDURE — 80053 COMPREHEN METABOLIC PANEL: CPT | Performed by: EMERGENCY MEDICINE

## 2023-01-23 PROCEDURE — G1004 CDSM NDSC: HCPCS

## 2023-01-23 PROCEDURE — 87340 HEPATITIS B SURFACE AG IA: CPT | Performed by: INTERNAL MEDICINE

## 2023-01-23 ASSESSMENT — ENCOUNTER SYMPTOMS
DIARRHEA: 0
LIGHT-HEADEDNESS: 0
DIZZINESS: 0
NAUSEA: 0
CONSTIPATION: 1
VOMITING: 0
FEVER: 0
SHORTNESS OF BREATH: 0
ABDOMINAL PAIN: 1
CHILLS: 0

## 2023-01-23 ASSESSMENT — PAIN DESCRIPTION - PAIN TYPE: TYPE: ACUTE PAIN

## 2023-01-23 ASSESSMENT — PAIN SCALES - GENERAL: PAINLEVEL_OUTOF10: 9

## 2023-01-24 ENCOUNTER — APPOINTMENT (OUTPATIENT)
Dept: CT IMAGING | Age: 53
End: 2023-01-24

## 2023-01-24 PROBLEM — R10.9 ABDOMINAL PAIN: Status: ACTIVE | Noted: 2023-01-24

## 2023-01-24 LAB
ALBUMIN SERPL-MCNC: 2.7 G/DL (ref 3.6–5.1)
ALBUMIN/GLOB SERPL: 0.8 {RATIO} (ref 1–2.4)
ALP SERPL-CCNC: 197 UNITS/L (ref 45–117)
ALT SERPL-CCNC: 26 UNITS/L
ANION GAP SERPL CALC-SCNC: 16 MMOL/L (ref 7–19)
AST SERPL-CCNC: 134 UNITS/L
BASOPHILS # BLD: 0 K/MCL (ref 0–0.3)
BASOPHILS NFR BLD: 1 %
BILIRUB SERPL-MCNC: 0.4 MG/DL (ref 0.2–1)
BUN SERPL-MCNC: 70 MG/DL (ref 6–20)
BUN/CREAT SERPL: 7 (ref 7–25)
CALCIUM SERPL-MCNC: 8.1 MG/DL (ref 8.4–10.2)
CHLORIDE SERPL-SCNC: 95 MMOL/L (ref 97–110)
CO2 SERPL-SCNC: 26 MMOL/L (ref 21–32)
CREAT SERPL-MCNC: 10.7 MG/DL (ref 0.51–0.95)
DEPRECATED RDW RBC: 42 FL (ref 39–50)
EOSINOPHIL # BLD: 0.7 K/MCL (ref 0–0.5)
EOSINOPHIL NFR BLD: 12 %
ERYTHROCYTE [DISTWIDTH] IN BLOOD: 13.3 % (ref 11–15)
FASTING DURATION TIME PATIENT: ABNORMAL H
FLUAV RNA RESP QL NAA+PROBE: NOT DETECTED
FLUBV RNA RESP QL NAA+PROBE: NOT DETECTED
GFR SERPLBLD BASED ON 1.73 SQ M-ARVRAT: 4 ML/MIN
GLOBULIN SER-MCNC: 3.5 G/DL (ref 2–4)
GLUCOSE SERPL-MCNC: 102 MG/DL (ref 70–99)
HBV SURFACE AG SER QL: NEGATIVE
HCT VFR BLD CALC: 23.2 % (ref 36–46.5)
HGB BLD-MCNC: 7.5 G/DL (ref 12–15.5)
IMM GRANULOCYTES # BLD AUTO: 0 K/MCL (ref 0–0.2)
IMM GRANULOCYTES # BLD: 0 %
LYMPHOCYTES # BLD: 1.5 K/MCL (ref 1–4.8)
LYMPHOCYTES NFR BLD: 26 %
MAGNESIUM SERPL-MCNC: 2.1 MG/DL (ref 1.7–2.4)
MCH RBC QN AUTO: 28.2 PG (ref 26–34)
MCHC RBC AUTO-ENTMCNC: 32.3 G/DL (ref 32–36.5)
MCV RBC AUTO: 87.2 FL (ref 78–100)
MONOCYTES # BLD: 0.4 K/MCL (ref 0.3–0.9)
MONOCYTES NFR BLD: 8 %
NEUTROPHILS # BLD: 3.1 K/MCL (ref 1.8–7.7)
NEUTROPHILS NFR BLD: 53 %
NRBC BLD MANUAL-RTO: 0 /100 WBC
PLATELET # BLD AUTO: 146 K/MCL (ref 140–450)
POTASSIUM SERPL-SCNC: 4.5 MMOL/L (ref 3.4–5.1)
PROT SERPL-MCNC: 6.2 G/DL (ref 6.4–8.2)
RAINBOW EXTRA TUBES HOLD SPECIMEN: NORMAL
RBC # BLD: 2.66 MIL/MCL (ref 4–5.2)
RSV AG NPH QL IA.RAPID: NOT DETECTED
SARS-COV-2 RNA RESP QL NAA+PROBE: NOT DETECTED
SERVICE CMNT-IMP: NORMAL
SERVICE CMNT-IMP: NORMAL
SODIUM SERPL-SCNC: 132 MMOL/L (ref 135–145)
WBC # BLD: 5.7 K/MCL (ref 4.2–11)

## 2023-01-24 PROCEDURE — 80053 COMPREHEN METABOLIC PANEL: CPT | Performed by: STUDENT IN AN ORGANIZED HEALTH CARE EDUCATION/TRAINING PROGRAM

## 2023-01-24 PROCEDURE — 36415 COLL VENOUS BLD VENIPUNCTURE: CPT | Performed by: STUDENT IN AN ORGANIZED HEALTH CARE EDUCATION/TRAINING PROGRAM

## 2023-01-24 PROCEDURE — 86706 HEP B SURFACE ANTIBODY: CPT | Performed by: INTERNAL MEDICINE

## 2023-01-24 PROCEDURE — 10004651 HB RX, NO CHARGE ITEM: Performed by: STUDENT IN AN ORGANIZED HEALTH CARE EDUCATION/TRAINING PROGRAM

## 2023-01-24 PROCEDURE — G1004 CDSM NDSC: HCPCS

## 2023-01-24 PROCEDURE — G0378 HOSPITAL OBSERVATION PER HR: HCPCS

## 2023-01-24 PROCEDURE — 10002800 HB RX 250 W HCPCS: Performed by: STUDENT IN AN ORGANIZED HEALTH CARE EDUCATION/TRAINING PROGRAM

## 2023-01-24 PROCEDURE — 90935 HEMODIALYSIS ONE EVALUATION: CPT

## 2023-01-24 PROCEDURE — 86704 HEP B CORE ANTIBODY TOTAL: CPT | Performed by: INTERNAL MEDICINE

## 2023-01-24 PROCEDURE — 3078F DIAST BP <80 MM HG: CPT

## 2023-01-24 PROCEDURE — 74177 CT ABD & PELVIS W/CONTRAST: CPT

## 2023-01-24 PROCEDURE — 10002807 HB RX 258: Performed by: STUDENT IN AN ORGANIZED HEALTH CARE EDUCATION/TRAINING PROGRAM

## 2023-01-24 PROCEDURE — 10002805 HB CONTRAST AGENT

## 2023-01-24 PROCEDURE — 10002803 HB RX 637: Performed by: STUDENT IN AN ORGANIZED HEALTH CARE EDUCATION/TRAINING PROGRAM

## 2023-01-24 PROCEDURE — 3074F SYST BP LT 130 MM HG: CPT

## 2023-01-24 PROCEDURE — 85025 COMPLETE CBC W/AUTO DIFF WBC: CPT | Performed by: STUDENT IN AN ORGANIZED HEALTH CARE EDUCATION/TRAINING PROGRAM

## 2023-01-24 PROCEDURE — 0241U COVID/FLU/RSV PANEL: CPT | Performed by: EMERGENCY MEDICINE

## 2023-01-24 PROCEDURE — 10002803 HB RX 637: Performed by: INTERNAL MEDICINE

## 2023-01-24 PROCEDURE — 99245 OFF/OP CONSLTJ NEW/EST HI 55: CPT

## 2023-01-24 PROCEDURE — 83735 ASSAY OF MAGNESIUM: CPT | Performed by: STUDENT IN AN ORGANIZED HEALTH CARE EDUCATION/TRAINING PROGRAM

## 2023-01-24 PROCEDURE — 96372 THER/PROPH/DIAG INJ SC/IM: CPT

## 2023-01-24 PROCEDURE — 99223 1ST HOSP IP/OBS HIGH 75: CPT | Performed by: INTERNAL MEDICINE

## 2023-01-24 RX ORDER — ACETAMINOPHEN 325 MG/1
650 TABLET ORAL EVERY 4 HOURS PRN
Status: DISCONTINUED | OUTPATIENT
Start: 2023-01-24 | End: 2023-01-25 | Stop reason: HOSPADM

## 2023-01-24 RX ORDER — AMLODIPINE BESYLATE 5 MG/1
2.5 TABLET ORAL DAILY
Status: DISCONTINUED | OUTPATIENT
Start: 2023-01-24 | End: 2023-01-25 | Stop reason: HOSPADM

## 2023-01-24 RX ORDER — HEPARIN SODIUM 5000 [USP'U]/ML
5000 INJECTION, SOLUTION INTRAVENOUS; SUBCUTANEOUS EVERY 8 HOURS SCHEDULED
Status: DISCONTINUED | OUTPATIENT
Start: 2023-01-24 | End: 2023-01-25 | Stop reason: HOSPADM

## 2023-01-24 RX ORDER — SODIUM CHLORIDE, SODIUM LACTATE, POTASSIUM CHLORIDE, CALCIUM CHLORIDE 600; 310; 30; 20 MG/100ML; MG/100ML; MG/100ML; MG/100ML
INJECTION, SOLUTION INTRAVENOUS CONTINUOUS
Status: DISCONTINUED | OUTPATIENT
Start: 2023-01-24 | End: 2023-01-25 | Stop reason: HOSPADM

## 2023-01-24 RX ORDER — HYDROCORTISONE 10 MG/G
CREAM TOPICAL 2 TIMES DAILY
Status: DISCONTINUED | OUTPATIENT
Start: 2023-01-24 | End: 2023-01-25 | Stop reason: HOSPADM

## 2023-01-24 RX ORDER — AMOXICILLIN 250 MG
2 CAPSULE ORAL DAILY PRN
Status: DISCONTINUED | OUTPATIENT
Start: 2023-01-24 | End: 2023-01-25 | Stop reason: HOSPADM

## 2023-01-24 RX ORDER — SODIUM CITRATE 4 % (5 ML)
3 SYRINGE (ML) MISCELLANEOUS PRN
Status: ACTIVE | OUTPATIENT
Start: 2023-01-24 | End: 2023-01-25

## 2023-01-24 RX ORDER — ONDANSETRON 2 MG/ML
4 INJECTION INTRAMUSCULAR; INTRAVENOUS EVERY 6 HOURS PRN
Status: DISCONTINUED | OUTPATIENT
Start: 2023-01-24 | End: 2023-01-25 | Stop reason: HOSPADM

## 2023-01-24 RX ORDER — BISACODYL 10 MG
10 SUPPOSITORY, RECTAL RECTAL ONCE
Status: COMPLETED | OUTPATIENT
Start: 2023-01-24 | End: 2023-01-24

## 2023-01-24 RX ORDER — 0.9 % SODIUM CHLORIDE 0.9 %
2 VIAL (ML) INJECTION EVERY 12 HOURS SCHEDULED
Status: DISCONTINUED | OUTPATIENT
Start: 2023-01-24 | End: 2023-01-25 | Stop reason: HOSPADM

## 2023-01-24 RX ADMIN — IOHEXOL 75 ML: 350 INJECTION, SOLUTION INTRAVENOUS at 09:34

## 2023-01-24 RX ADMIN — AMLODIPINE BESYLATE 2.5 MG: 5 TABLET ORAL at 08:21

## 2023-01-24 RX ADMIN — HEPARIN SODIUM 5000 UNITS: 5000 INJECTION INTRAVENOUS; SUBCUTANEOUS at 21:17

## 2023-01-24 RX ADMIN — HEPARIN SODIUM 5000 UNITS: 5000 INJECTION INTRAVENOUS; SUBCUTANEOUS at 05:40

## 2023-01-24 RX ADMIN — SODIUM CHLORIDE, POTASSIUM CHLORIDE, SODIUM LACTATE AND CALCIUM CHLORIDE: 600; 310; 30; 20 INJECTION, SOLUTION INTRAVENOUS at 04:00

## 2023-01-24 RX ADMIN — HEPARIN SODIUM 5000 UNITS: 5000 INJECTION INTRAVENOUS; SUBCUTANEOUS at 13:54

## 2023-01-24 RX ADMIN — IOHEXOL 50 ML: 350 INJECTION, SOLUTION INTRAVENOUS at 09:34

## 2023-01-24 RX ADMIN — ACETAMINOPHEN 650 MG: 325 TABLET ORAL at 22:22

## 2023-01-24 RX ADMIN — HYDROCORTISONE: 10 CREAM TOPICAL at 21:18

## 2023-01-24 RX ADMIN — ACETAMINOPHEN 650 MG: 325 TABLET ORAL at 17:45

## 2023-01-24 RX ADMIN — BISACODYL 10 MG: 10 SUPPOSITORY RECTAL at 17:40

## 2023-01-24 RX ADMIN — SODIUM CHLORIDE, POTASSIUM CHLORIDE, SODIUM LACTATE AND CALCIUM CHLORIDE: 600; 310; 30; 20 INJECTION, SOLUTION INTRAVENOUS at 04:05

## 2023-01-24 RX ADMIN — HYDROCORTISONE: 10 CREAM TOPICAL at 13:56

## 2023-01-24 SDOH — HEALTH STABILITY: PHYSICAL HEALTH: DO YOU HAVE DIFFICULTY DRESSING OR BATHING?: NO

## 2023-01-24 SDOH — ECONOMIC STABILITY: HOUSING INSECURITY: ARE YOU WORRIED ABOUT LOSING YOUR HOUSING?: NO

## 2023-01-24 SDOH — HEALTH STABILITY: PHYSICAL HEALTH: DO YOU HAVE SERIOUS DIFFICULTY WALKING OR CLIMBING STAIRS?: YES

## 2023-01-24 SDOH — ECONOMIC STABILITY: FOOD INSECURITY: HOW OFTEN IN THE PAST 12 MONTHS WERE YOU WORRIED OR STRESSED ABOUT HAVING ENOUGH MONEY TO BUY NUTRITIOUS MEALS?: NEVER

## 2023-01-24 SDOH — SOCIAL STABILITY: SOCIAL NETWORK: SUPPORT SYSTEMS: CHILDREN

## 2023-01-24 SDOH — ECONOMIC STABILITY: GENERAL

## 2023-01-24 SDOH — ECONOMIC STABILITY: HOUSING INSECURITY: WHAT IS YOUR LIVING SITUATION TODAY?: APARTMENT

## 2023-01-24 SDOH — ECONOMIC STABILITY: HOUSING INSECURITY: WHAT IS YOUR LIVING SITUATION TODAY?: CHILDREN

## 2023-01-24 SDOH — HEALTH STABILITY: GENERAL: BECAUSE OF A PHYSICAL, MENTAL, OR EMOTIONAL CONDITION, DO YOU HAVE DIFFICULTY DOING ERRANDS ALONE?: YES

## 2023-01-24 ASSESSMENT — PATIENT HEALTH QUESTIONNAIRE - PHQ9
SUM OF ALL RESPONSES TO PHQ9 QUESTIONS 1 AND 2: 0
SUM OF ALL RESPONSES TO PHQ9 QUESTIONS 1 AND 2: 0
IS PATIENT ABLE TO COMPLETE PHQ2 OR PHQ9: YES
2. FEELING DOWN, DEPRESSED OR HOPELESS: NOT AT ALL
1. LITTLE INTEREST OR PLEASURE IN DOING THINGS: NOT AT ALL
CLINICAL INTERPRETATION OF PHQ2 SCORE: NO FURTHER SCREENING NEEDED

## 2023-01-24 ASSESSMENT — PAIN SCALES - GENERAL
PAINLEVEL_OUTOF10: 0
PAINLEVEL_OUTOF10: 0
PAINLEVEL_OUTOF10: 5
PAINLEVEL_OUTOF10: 0
PAINLEVEL_OUTOF10: 5

## 2023-01-24 ASSESSMENT — ACTIVITIES OF DAILY LIVING (ADL)
ADL_BEFORE_ADMISSION: INDEPENDENT
ADL_SHORT_OF_BREATH: NO
RECENT_DECLINE_ADL: NO
ADL_SCORE: 12

## 2023-01-24 ASSESSMENT — LIFESTYLE VARIABLES
ALCOHOL_USE_STATUS: NO OR LOW RISK WITH VALIDATED TOOL
HOW MANY STANDARD DRINKS CONTAINING ALCOHOL DO YOU HAVE ON A TYPICAL DAY: 0,1 OR 2
AUDIT-C TOTAL SCORE: 0
HOW OFTEN DO YOU HAVE A DRINK CONTAINING ALCOHOL: NEVER
HOW OFTEN DO YOU HAVE 6 OR MORE DRINKS ON ONE OCCASION: NEVER

## 2023-01-24 ASSESSMENT — COLUMBIA-SUICIDE SEVERITY RATING SCALE - C-SSRS
6. HAVE YOU EVER DONE ANYTHING, STARTED TO DO ANYTHING, OR PREPARED TO DO ANYTHING TO END YOUR LIFE?: NO
2. HAVE YOU ACTUALLY HAD ANY THOUGHTS OF KILLING YOURSELF?: NO
IS THE PATIENT ABLE TO COMPLETE C-SSRS: YES
1. WITHIN THE PAST MONTH, HAVE YOU WISHED YOU WERE DEAD OR WISHED YOU COULD GO TO SLEEP AND NOT WAKE UP?: NO

## 2023-01-24 ASSESSMENT — COGNITIVE AND FUNCTIONAL STATUS - GENERAL
BECAUSE OF A PHYSICAL, MENTAL, OR EMOTIONAL CONDITION, DO YOU HAVE DIFFICULTY DOING ERRANDS ALONE: NO
DO YOU HAVE SERIOUS DIFFICULTY WALKING OR CLIMBING STAIRS: NO
BECAUSE OF A PHYSICAL, MENTAL, OR EMOTIONAL CONDITION, DO YOU HAVE SERIOUS DIFFICULTY CONCENTRATING, REMEMBERING OR MAKING DECISIONS: NO
DO YOU HAVE DIFFICULTY DRESSING OR BATHING: NO

## 2023-01-25 ENCOUNTER — APPOINTMENT (OUTPATIENT)
Dept: DIALYSIS | Age: 53
End: 2023-01-25
Attending: INTERNAL MEDICINE

## 2023-01-25 VITALS
WEIGHT: 128.31 LBS | BODY MASS INDEX: 24.22 KG/M2 | HEART RATE: 75 BPM | DIASTOLIC BLOOD PRESSURE: 74 MMHG | OXYGEN SATURATION: 97 % | HEIGHT: 61 IN | TEMPERATURE: 98.2 F | SYSTOLIC BLOOD PRESSURE: 121 MMHG | RESPIRATION RATE: 16 BRPM

## 2023-01-25 LAB
DEPRECATED RDW RBC: 43.7 FL (ref 39–50)
ERYTHROCYTE [DISTWIDTH] IN BLOOD: 13.5 % (ref 11–15)
HBV CORE IGG+IGM SER QL: NEGATIVE
HBV SURFACE AB SER-ACNC: <3.1 MUNITS/ML
HCT VFR BLD CALC: 22.2 % (ref 36–46.5)
HGB BLD-MCNC: 7.2 G/DL (ref 12–15.5)
MCH RBC QN AUTO: 28.6 PG (ref 26–34)
MCHC RBC AUTO-ENTMCNC: 32.4 G/DL (ref 32–36.5)
MCV RBC AUTO: 88.1 FL (ref 78–100)
NRBC BLD MANUAL-RTO: 0 /100 WBC
PLATELET # BLD AUTO: 162 K/MCL (ref 140–450)
RAINBOW EXTRA TUBES HOLD SPECIMEN: NORMAL
RBC # BLD: 2.52 MIL/MCL (ref 4–5.2)
WBC # BLD: 4.2 K/MCL (ref 4.2–11)

## 2023-01-25 PROCEDURE — 99233 SBSQ HOSP IP/OBS HIGH 50: CPT | Performed by: NURSE PRACTITIONER

## 2023-01-25 PROCEDURE — G0378 HOSPITAL OBSERVATION PER HR: HCPCS

## 2023-01-25 PROCEDURE — 10002800 HB RX 250 W HCPCS: Performed by: STUDENT IN AN ORGANIZED HEALTH CARE EDUCATION/TRAINING PROGRAM

## 2023-01-25 PROCEDURE — 85027 COMPLETE CBC AUTOMATED: CPT | Performed by: INTERNAL MEDICINE

## 2023-01-25 PROCEDURE — 99239 HOSP IP/OBS DSCHRG MGMT >30: CPT | Performed by: INTERNAL MEDICINE

## 2023-01-25 PROCEDURE — G0257 UNSCHED DIALYSIS ESRD PT HOS: HCPCS

## 2023-01-25 PROCEDURE — 36415 COLL VENOUS BLD VENIPUNCTURE: CPT | Performed by: INTERNAL MEDICINE

## 2023-01-25 PROCEDURE — 10004651 HB RX, NO CHARGE ITEM: Performed by: STUDENT IN AN ORGANIZED HEALTH CARE EDUCATION/TRAINING PROGRAM

## 2023-01-25 PROCEDURE — 10002803 HB RX 637: Performed by: STUDENT IN AN ORGANIZED HEALTH CARE EDUCATION/TRAINING PROGRAM

## 2023-01-25 PROCEDURE — 96372 THER/PROPH/DIAG INJ SC/IM: CPT

## 2023-01-25 RX ORDER — HYDROCORTISONE 10 MG/G
CREAM TOPICAL 2 TIMES DAILY
Qty: 30 G | Refills: 0 | Status: ON HOLD | OUTPATIENT
Start: 2023-01-25 | End: 2023-09-28 | Stop reason: SDUPTHER

## 2023-01-25 RX ORDER — DOCUSATE SODIUM 100 MG/1
100 CAPSULE, LIQUID FILLED ORAL 2 TIMES DAILY
Qty: 20 CAPSULE | Refills: 0 | Status: SHIPPED | OUTPATIENT
Start: 2023-01-25 | End: 2023-02-04

## 2023-01-25 RX ADMIN — HYDROCORTISONE: 10 CREAM TOPICAL at 08:28

## 2023-01-25 RX ADMIN — SODIUM CHLORIDE, PRESERVATIVE FREE 2 ML: 5 INJECTION INTRAVENOUS at 08:25

## 2023-01-25 RX ADMIN — AMLODIPINE BESYLATE 2.5 MG: 5 TABLET ORAL at 08:25

## 2023-01-25 RX ADMIN — HEPARIN SODIUM 5000 UNITS: 5000 INJECTION INTRAVENOUS; SUBCUTANEOUS at 05:13

## 2023-01-25 ASSESSMENT — PAIN SCALES - GENERAL: PAINLEVEL_OUTOF10: 0

## 2023-01-30 LAB
ASR DISCLAIMER: NORMAL
CASE RPRT: NORMAL
CLINICAL INFO: NORMAL
PATH REPORT.FINAL DX SPEC: NORMAL
PATH REPORT.GROSS SPEC: NORMAL

## 2023-02-09 ENCOUNTER — HOSPITAL ENCOUNTER (OUTPATIENT)
Age: 53
Setting detail: OBSERVATION
Discharge: HOME | End: 2023-02-10
Attending: EMERGENCY MEDICINE | Admitting: FAMILY MEDICINE

## 2023-02-09 ENCOUNTER — APPOINTMENT (OUTPATIENT)
Dept: CT IMAGING | Age: 53
End: 2023-02-09
Attending: EMERGENCY MEDICINE

## 2023-02-09 DIAGNOSIS — D62 ACUTE BLOOD LOSS ANEMIA: Primary | ICD-10-CM

## 2023-02-09 LAB
ABO + RH BLD: NORMAL
ALBUMIN SERPL-MCNC: 3.2 G/DL (ref 3.6–5.1)
ALP SERPL-CCNC: 201 UNITS/L (ref 45–117)
ALT SERPL-CCNC: 434 UNITS/L
ANION GAP SERPL CALC-SCNC: 15 MMOL/L (ref 7–19)
APTT PPP: 21 SEC (ref 22–30)
AST SERPL-CCNC: 252 UNITS/L
BASOPHILS # BLD: 0 K/MCL (ref 0–0.3)
BASOPHILS NFR BLD: 0 %
BILIRUB CONJ SERPL-MCNC: <0.1 MG/DL (ref 0–0.2)
BILIRUB SERPL-MCNC: 0.4 MG/DL (ref 0.2–1)
BLD GP AB SCN SERPL QL GEL: NEGATIVE
BLOOD EXPIRATION DATE: NORMAL
BUN SERPL-MCNC: 95 MG/DL (ref 6–20)
BUN/CREAT SERPL: 10 (ref 7–25)
CALCIUM SERPL-MCNC: 7.7 MG/DL (ref 8.4–10.2)
CHLORIDE SERPL-SCNC: 102 MMOL/L (ref 97–110)
CO2 SERPL-SCNC: 25 MMOL/L (ref 21–32)
CREAT SERPL-MCNC: 9.52 MG/DL (ref 0.51–0.95)
CROSSMATCH RESULT: NORMAL
DEPRECATED RDW RBC: 47.7 FL (ref 39–50)
DISPENSE STATUS: NORMAL
EOSINOPHIL # BLD: 0.3 K/MCL (ref 0–0.5)
EOSINOPHIL NFR BLD: 4 %
ERYTHROCYTE [DISTWIDTH] IN BLOOD: 14.3 % (ref 11–15)
FASTING DURATION TIME PATIENT: ABNORMAL H
GFR SERPLBLD BASED ON 1.73 SQ M-ARVRAT: 5 ML/MIN
GLUCOSE SERPL-MCNC: 96 MG/DL (ref 70–99)
HBV SURFACE AG SER QL: NEGATIVE
HCT VFR BLD CALC: 23 % (ref 36–46.5)
HGB BLD-MCNC: 7.1 G/DL (ref 12–15.5)
IMM GRANULOCYTES # BLD AUTO: 0.2 K/MCL (ref 0–0.2)
IMM GRANULOCYTES # BLD: 2 %
INR PPP: 1
ISBT BLOOD TYPE: 5100
ISSUE DATE/TIME: NORMAL
LYMPHOCYTES # BLD: 1.7 K/MCL (ref 1–4.8)
LYMPHOCYTES NFR BLD: 23 %
MCH RBC QN AUTO: 29.1 PG (ref 26–34)
MCHC RBC AUTO-ENTMCNC: 30.9 G/DL (ref 32–36.5)
MCV RBC AUTO: 94.3 FL (ref 78–100)
MONOCYTES # BLD: 0.6 K/MCL (ref 0.3–0.9)
MONOCYTES NFR BLD: 9 %
NEUTROPHILS # BLD: 4.6 K/MCL (ref 1.8–7.7)
NEUTROPHILS NFR BLD: 62 %
NRBC BLD MANUAL-RTO: 0 /100 WBC
PLATELET # BLD AUTO: 273 K/MCL (ref 140–450)
POTASSIUM SERPL-SCNC: 5.4 MMOL/L (ref 3.4–5.1)
PRODUCT CODE: NORMAL
PRODUCT DESCRIPTION: NORMAL
PRODUCT ID: NORMAL
PROT SERPL-MCNC: 6.8 G/DL (ref 6.4–8.2)
PROTHROMBIN TIME: 10.5 SEC (ref 9.7–11.8)
RAINBOW EXTRA TUBES HOLD SPECIMEN: NORMAL
RBC # BLD: 2.44 MIL/MCL (ref 4–5.2)
SODIUM SERPL-SCNC: 137 MMOL/L (ref 135–145)
TYPE AND SCREEN EXPIRATION DATE: NORMAL
UNIT BLOOD TYPE: NORMAL
UNIT NUMBER: NORMAL
WBC # BLD: 7.3 K/MCL (ref 4.2–11)

## 2023-02-09 PROCEDURE — G0378 HOSPITAL OBSERVATION PER HR: HCPCS

## 2023-02-09 PROCEDURE — 80076 HEPATIC FUNCTION PANEL: CPT | Performed by: EMERGENCY MEDICINE

## 2023-02-09 PROCEDURE — 99284 EMERGENCY DEPT VISIT MOD MDM: CPT

## 2023-02-09 PROCEDURE — G1004 CDSM NDSC: HCPCS

## 2023-02-09 PROCEDURE — 87340 HEPATITIS B SURFACE AG IA: CPT | Performed by: INTERNAL MEDICINE

## 2023-02-09 PROCEDURE — 10004651 HB RX, NO CHARGE ITEM: Performed by: INTERNAL MEDICINE

## 2023-02-09 PROCEDURE — 74177 CT ABD & PELVIS W/CONTRAST: CPT

## 2023-02-09 PROCEDURE — 10002800 HB RX 250 W HCPCS: Performed by: EMERGENCY MEDICINE

## 2023-02-09 PROCEDURE — 80048 BASIC METABOLIC PNL TOTAL CA: CPT | Performed by: EMERGENCY MEDICINE

## 2023-02-09 PROCEDURE — 36430 TRANSFUSION BLD/BLD COMPNT: CPT

## 2023-02-09 PROCEDURE — 10002805 HB CONTRAST AGENT: Performed by: EMERGENCY MEDICINE

## 2023-02-09 PROCEDURE — 85730 THROMBOPLASTIN TIME PARTIAL: CPT | Performed by: EMERGENCY MEDICINE

## 2023-02-09 PROCEDURE — 85610 PROTHROMBIN TIME: CPT | Performed by: EMERGENCY MEDICINE

## 2023-02-09 PROCEDURE — 99223 1ST HOSP IP/OBS HIGH 75: CPT | Performed by: INTERNAL MEDICINE

## 2023-02-09 PROCEDURE — 85025 COMPLETE CBC W/AUTO DIFF WBC: CPT | Performed by: EMERGENCY MEDICINE

## 2023-02-09 PROCEDURE — 86901 BLOOD TYPING SEROLOGIC RH(D): CPT | Performed by: EMERGENCY MEDICINE

## 2023-02-09 PROCEDURE — G0257 UNSCHED DIALYSIS ESRD PT HOS: HCPCS

## 2023-02-09 PROCEDURE — 96374 THER/PROPH/DIAG INJ IV PUSH: CPT

## 2023-02-09 PROCEDURE — P9016 RBC LEUKOCYTES REDUCED: HCPCS

## 2023-02-09 PROCEDURE — 86923 COMPATIBILITY TEST ELECTRIC: CPT

## 2023-02-09 RX ORDER — AMLODIPINE BESYLATE 5 MG/1
2.5 TABLET ORAL DAILY
Status: DISCONTINUED | OUTPATIENT
Start: 2023-02-10 | End: 2023-02-10 | Stop reason: HOSPADM

## 2023-02-09 RX ORDER — POLYETHYLENE GLYCOL 3350 17 G/17G
17 POWDER, FOR SOLUTION ORAL DAILY PRN
Status: DISCONTINUED | OUTPATIENT
Start: 2023-02-09 | End: 2023-02-10 | Stop reason: HOSPADM

## 2023-02-09 RX ORDER — 0.9 % SODIUM CHLORIDE 0.9 %
2 VIAL (ML) INJECTION EVERY 12 HOURS SCHEDULED
Status: DISCONTINUED | OUTPATIENT
Start: 2023-02-09 | End: 2023-02-10 | Stop reason: HOSPADM

## 2023-02-09 RX ORDER — SODIUM CITRATE 4 % (5 ML)
3 SYRINGE (ML) MISCELLANEOUS PRN
Status: ACTIVE | OUTPATIENT
Start: 2023-02-09 | End: 2023-02-10

## 2023-02-09 RX ORDER — ACETAMINOPHEN 325 MG/1
650 TABLET ORAL EVERY 4 HOURS PRN
Status: DISCONTINUED | OUTPATIENT
Start: 2023-02-09 | End: 2023-02-10 | Stop reason: HOSPADM

## 2023-02-09 RX ORDER — DIPHENHYDRAMINE HYDROCHLORIDE 50 MG/ML
25 INJECTION INTRAMUSCULAR; INTRAVENOUS ONCE
Status: COMPLETED | OUTPATIENT
Start: 2023-02-09 | End: 2023-02-09

## 2023-02-09 RX ORDER — SODIUM CHLORIDE 9 MG/ML
INJECTION, SOLUTION INTRAVENOUS CONTINUOUS PRN
Status: DISCONTINUED | OUTPATIENT
Start: 2023-02-09 | End: 2023-02-10 | Stop reason: HOSPADM

## 2023-02-09 RX ORDER — ONDANSETRON 2 MG/ML
4 INJECTION INTRAMUSCULAR; INTRAVENOUS 2 TIMES DAILY PRN
Status: DISCONTINUED | OUTPATIENT
Start: 2023-02-09 | End: 2023-02-10 | Stop reason: HOSPADM

## 2023-02-09 RX ORDER — AMOXICILLIN 250 MG
2 CAPSULE ORAL DAILY PRN
Status: DISCONTINUED | OUTPATIENT
Start: 2023-02-09 | End: 2023-02-10 | Stop reason: HOSPADM

## 2023-02-09 RX ADMIN — DIPHENHYDRAMINE HYDROCHLORIDE 25 MG: 50 INJECTION, SOLUTION INTRAMUSCULAR; INTRAVENOUS at 11:49

## 2023-02-09 RX ADMIN — SODIUM CHLORIDE, PRESERVATIVE FREE 2 ML: 5 INJECTION INTRAVENOUS at 22:12

## 2023-02-09 RX ADMIN — IOHEXOL 75 ML: 350 INJECTION, SOLUTION INTRAVENOUS at 13:14

## 2023-02-09 SDOH — SOCIAL STABILITY: SOCIAL NETWORK: SUPPORT SYSTEMS: CHILDREN

## 2023-02-09 SDOH — SOCIAL STABILITY: SOCIAL NETWORK
HOW OFTEN DO YOU SEE OR TALK TO PEOPLE THAT YOU CARE ABOUT AND FEEL CLOSE TO? (FOR EXAMPLE: TALKING TO FRIENDS ON THE PHONE, VISITING FRIENDS OR FAMILY, GOING TO CHURCH OR CLUB MEETINGS): 1 OR 2 TIMES A WEEK

## 2023-02-09 SDOH — ECONOMIC STABILITY: GENERAL

## 2023-02-09 SDOH — ECONOMIC STABILITY: HOUSING INSECURITY: WHAT IS YOUR LIVING SITUATION TODAY?: CHILDREN

## 2023-02-09 SDOH — ECONOMIC STABILITY: HOUSING INSECURITY: ARE YOU WORRIED ABOUT LOSING YOUR HOUSING?: NO

## 2023-02-09 SDOH — ECONOMIC STABILITY: HOUSING INSECURITY: WHAT IS YOUR LIVING SITUATION TODAY?: APARTMENT

## 2023-02-09 SDOH — HEALTH STABILITY: GENERAL: BECAUSE OF A PHYSICAL, MENTAL, OR EMOTIONAL CONDITION, DO YOU HAVE DIFFICULTY DOING ERRANDS ALONE?: NO

## 2023-02-09 SDOH — HEALTH STABILITY: PHYSICAL HEALTH: DO YOU HAVE DIFFICULTY DRESSING OR BATHING?: NO

## 2023-02-09 SDOH — ECONOMIC STABILITY: FOOD INSECURITY: HOW OFTEN IN THE PAST 12 MONTHS WERE YOU WORRIED OR STRESSED ABOUT HAVING ENOUGH MONEY TO BUY NUTRITIOUS MEALS?: NEVER

## 2023-02-09 SDOH — HEALTH STABILITY: PHYSICAL HEALTH: DO YOU HAVE SERIOUS DIFFICULTY WALKING OR CLIMBING STAIRS?: NO

## 2023-02-09 ASSESSMENT — ACTIVITIES OF DAILY LIVING (ADL)
ADL_SCORE: 12
RECENT_DECLINE_ADL: NO
ADL_BEFORE_ADMISSION: INDEPENDENT
ADL_SHORT_OF_BREATH: NO

## 2023-02-09 ASSESSMENT — COLUMBIA-SUICIDE SEVERITY RATING SCALE - C-SSRS
1. WITHIN THE PAST MONTH, HAVE YOU WISHED YOU WERE DEAD OR WISHED YOU COULD GO TO SLEEP AND NOT WAKE UP?: NO
IS THE PATIENT ABLE TO COMPLETE C-SSRS: YES
2. HAVE YOU ACTUALLY HAD ANY THOUGHTS OF KILLING YOURSELF?: NO
6. HAVE YOU EVER DONE ANYTHING, STARTED TO DO ANYTHING, OR PREPARED TO DO ANYTHING TO END YOUR LIFE?: NO

## 2023-02-09 ASSESSMENT — PATIENT HEALTH QUESTIONNAIRE - PHQ9
SUM OF ALL RESPONSES TO PHQ9 QUESTIONS 1 AND 2: 0
2. FEELING DOWN, DEPRESSED OR HOPELESS: NOT AT ALL
SUM OF ALL RESPONSES TO PHQ9 QUESTIONS 1 AND 2: 0
CLINICAL INTERPRETATION OF PHQ2 SCORE: NO FURTHER SCREENING NEEDED
1. LITTLE INTEREST OR PLEASURE IN DOING THINGS: NOT AT ALL
IS PATIENT ABLE TO COMPLETE PHQ2 OR PHQ9: YES

## 2023-02-09 ASSESSMENT — PAIN SCALES - GENERAL
PAINLEVEL_OUTOF10: 0
PAINLEVEL_OUTOF10: 0

## 2023-02-10 VITALS
SYSTOLIC BLOOD PRESSURE: 151 MMHG | HEIGHT: 65 IN | BODY MASS INDEX: 21.93 KG/M2 | TEMPERATURE: 98.4 F | HEART RATE: 105 BPM | WEIGHT: 131.61 LBS | DIASTOLIC BLOOD PRESSURE: 93 MMHG | OXYGEN SATURATION: 99 % | RESPIRATION RATE: 16 BRPM

## 2023-02-10 LAB
ALBUMIN SERPL-MCNC: 3 G/DL (ref 3.6–5.1)
ALP SERPL-CCNC: 176 UNITS/L (ref 45–117)
ALT SERPL-CCNC: 313 UNITS/L
ANION GAP SERPL CALC-SCNC: 12 MMOL/L (ref 7–19)
AST SERPL-CCNC: 92 UNITS/L
BASOPHILS # BLD: 0.1 K/MCL (ref 0–0.3)
BASOPHILS NFR BLD: 1 %
BILIRUB CONJ SERPL-MCNC: <0.1 MG/DL (ref 0–0.2)
BILIRUB SERPL-MCNC: 0.5 MG/DL (ref 0.2–1)
BUN SERPL-MCNC: 43 MG/DL (ref 6–20)
BUN/CREAT SERPL: 8 (ref 7–25)
CALCIUM SERPL-MCNC: 7.7 MG/DL (ref 8.4–10.2)
CHLORIDE SERPL-SCNC: 102 MMOL/L (ref 97–110)
CO2 SERPL-SCNC: 28 MMOL/L (ref 21–32)
CREAT SERPL-MCNC: 5.73 MG/DL (ref 0.51–0.95)
DEPRECATED RDW RBC: 47.2 FL (ref 39–50)
EOSINOPHIL # BLD: 0.6 K/MCL (ref 0–0.5)
EOSINOPHIL NFR BLD: 7 %
ERYTHROCYTE [DISTWIDTH] IN BLOOD: 14.6 % (ref 11–15)
FASTING DURATION TIME PATIENT: ABNORMAL H
GFR SERPLBLD BASED ON 1.73 SQ M-ARVRAT: 8 ML/MIN
GLUCOSE SERPL-MCNC: 90 MG/DL (ref 70–99)
HCT VFR BLD CALC: 25.7 % (ref 36–46.5)
HGB BLD-MCNC: 8.2 G/DL (ref 12–15.5)
IMM GRANULOCYTES # BLD AUTO: 0.2 K/MCL (ref 0–0.2)
IMM GRANULOCYTES # BLD: 2 %
LYMPHOCYTES # BLD: 2.2 K/MCL (ref 1–4.8)
LYMPHOCYTES NFR BLD: 25 %
MAGNESIUM SERPL-MCNC: 1.8 MG/DL (ref 1.7–2.4)
MCH RBC QN AUTO: 29.1 PG (ref 26–34)
MCHC RBC AUTO-ENTMCNC: 31.9 G/DL (ref 32–36.5)
MCV RBC AUTO: 91.1 FL (ref 78–100)
MONOCYTES # BLD: 0.7 K/MCL (ref 0.3–0.9)
MONOCYTES NFR BLD: 8 %
NEUTROPHILS # BLD: 5 K/MCL (ref 1.8–7.7)
NEUTROPHILS NFR BLD: 57 %
NRBC BLD MANUAL-RTO: 0 /100 WBC
PLATELET # BLD AUTO: 284 K/MCL (ref 140–450)
POTASSIUM SERPL-SCNC: 5.2 MMOL/L (ref 3.4–5.1)
PROT SERPL-MCNC: 6.4 G/DL (ref 6.4–8.2)
RBC # BLD: 2.82 MIL/MCL (ref 4–5.2)
SODIUM SERPL-SCNC: 137 MMOL/L (ref 135–145)
WBC # BLD: 8.7 K/MCL (ref 4.2–11)

## 2023-02-10 PROCEDURE — 10004651 HB RX, NO CHARGE ITEM: Performed by: INTERNAL MEDICINE

## 2023-02-10 PROCEDURE — 36415 COLL VENOUS BLD VENIPUNCTURE: CPT | Performed by: INTERNAL MEDICINE

## 2023-02-10 PROCEDURE — 85025 COMPLETE CBC W/AUTO DIFF WBC: CPT | Performed by: INTERNAL MEDICINE

## 2023-02-10 PROCEDURE — G0378 HOSPITAL OBSERVATION PER HR: HCPCS

## 2023-02-10 PROCEDURE — 80076 HEPATIC FUNCTION PANEL: CPT | Performed by: INTERNAL MEDICINE

## 2023-02-10 PROCEDURE — 99239 HOSP IP/OBS DSCHRG MGMT >30: CPT | Performed by: INTERNAL MEDICINE

## 2023-02-10 PROCEDURE — 80048 BASIC METABOLIC PNL TOTAL CA: CPT | Performed by: INTERNAL MEDICINE

## 2023-02-10 PROCEDURE — 83735 ASSAY OF MAGNESIUM: CPT | Performed by: INTERNAL MEDICINE

## 2023-02-10 PROCEDURE — 10002803 HB RX 637: Performed by: INTERNAL MEDICINE

## 2023-02-10 RX ORDER — SODIUM POLYSTYRENE SULFONATE 15 G/60ML
15 SUSPENSION ORAL; RECTAL ONCE
Status: COMPLETED | OUTPATIENT
Start: 2023-02-10 | End: 2023-02-10

## 2023-02-10 RX ADMIN — AMLODIPINE BESYLATE 2.5 MG: 5 TABLET ORAL at 08:43

## 2023-02-10 RX ADMIN — SODIUM POLYSTYRENE SULFONATE 15 G: 15 SUSPENSION ORAL; RECTAL at 12:34

## 2023-02-10 RX ADMIN — SODIUM CHLORIDE, PRESERVATIVE FREE 2 ML: 5 INJECTION INTRAVENOUS at 08:43

## 2023-02-10 ASSESSMENT — PAIN SCALES - GENERAL: PAINLEVEL_OUTOF10: 0

## 2023-02-24 ENCOUNTER — HOSPITAL ENCOUNTER (EMERGENCY)
Age: 53
Discharge: HOME | End: 2023-02-24
Attending: EMERGENCY MEDICINE

## 2023-02-24 VITALS
TEMPERATURE: 98.2 F | DIASTOLIC BLOOD PRESSURE: 88 MMHG | SYSTOLIC BLOOD PRESSURE: 125 MMHG | OXYGEN SATURATION: 100 % | HEART RATE: 96 BPM | RESPIRATION RATE: 16 BRPM

## 2023-02-24 DIAGNOSIS — D64.9 CHRONIC ANEMIA: Primary | ICD-10-CM

## 2023-02-24 LAB
ABO + RH BLD: NORMAL
ANION GAP SERPL CALC-SCNC: 12 MMOL/L (ref 7–19)
BASOPHILS # BLD: 0 K/MCL (ref 0–0.3)
BASOPHILS NFR BLD: 1 %
BLD GP AB SCN SERPL QL GEL: NEGATIVE
BUN SERPL-MCNC: 49 MG/DL (ref 6–20)
BUN/CREAT SERPL: 7 (ref 7–25)
CALCIUM SERPL-MCNC: 8.2 MG/DL (ref 8.4–10.2)
CHLORIDE SERPL-SCNC: 100 MMOL/L (ref 97–110)
CO2 SERPL-SCNC: 28 MMOL/L (ref 21–32)
CREAT SERPL-MCNC: 7.3 MG/DL (ref 0.51–0.95)
DEPRECATED RDW RBC: 49.1 FL (ref 39–50)
EOSINOPHIL # BLD: 0.3 K/MCL (ref 0–0.5)
EOSINOPHIL NFR BLD: 5 %
ERYTHROCYTE [DISTWIDTH] IN BLOOD: 14.5 % (ref 11–15)
FASTING DURATION TIME PATIENT: ABNORMAL H
GFR SERPLBLD BASED ON 1.73 SQ M-ARVRAT: 6 ML/MIN
GLUCOSE SERPL-MCNC: 107 MG/DL (ref 70–99)
HCT VFR BLD CALC: 24.8 % (ref 36–46.5)
HGB BLD-MCNC: 7.7 G/DL (ref 12–15.5)
IMM GRANULOCYTES # BLD AUTO: 0 K/MCL (ref 0–0.2)
IMM GRANULOCYTES # BLD: 0 %
LYMPHOCYTES # BLD: 0.9 K/MCL (ref 1–4.8)
LYMPHOCYTES NFR BLD: 20 %
MCH RBC QN AUTO: 29.8 PG (ref 26–34)
MCHC RBC AUTO-ENTMCNC: 31 G/DL (ref 32–36.5)
MCV RBC AUTO: 96.1 FL (ref 78–100)
MONOCYTES # BLD: 0.3 K/MCL (ref 0.3–0.9)
MONOCYTES NFR BLD: 7 %
NEUTROPHILS # BLD: 3.1 K/MCL (ref 1.8–7.7)
NEUTROPHILS NFR BLD: 67 %
NRBC BLD MANUAL-RTO: 0 /100 WBC
PLATELET # BLD AUTO: 150 K/MCL (ref 140–450)
POTASSIUM SERPL-SCNC: 5.5 MMOL/L (ref 3.4–5.1)
RBC # BLD: 2.58 MIL/MCL (ref 4–5.2)
SODIUM SERPL-SCNC: 134 MMOL/L (ref 135–145)
TYPE AND SCREEN EXPIRATION DATE: NORMAL
WBC # BLD: 4.7 K/MCL (ref 4.2–11)

## 2023-02-24 PROCEDURE — 80048 BASIC METABOLIC PNL TOTAL CA: CPT | Performed by: EMERGENCY MEDICINE

## 2023-02-24 PROCEDURE — 85025 COMPLETE CBC W/AUTO DIFF WBC: CPT | Performed by: EMERGENCY MEDICINE

## 2023-02-24 PROCEDURE — 36415 COLL VENOUS BLD VENIPUNCTURE: CPT

## 2023-02-24 PROCEDURE — 99283 EMERGENCY DEPT VISIT LOW MDM: CPT

## 2023-02-24 PROCEDURE — 86901 BLOOD TYPING SEROLOGIC RH(D): CPT | Performed by: EMERGENCY MEDICINE

## 2023-02-24 RX ORDER — SODIUM CHLORIDE 9 MG/ML
INJECTION, SOLUTION INTRAVENOUS CONTINUOUS PRN
Status: DISCONTINUED | OUTPATIENT
Start: 2023-02-24 | End: 2023-02-24 | Stop reason: HOSPADM

## 2023-02-24 ASSESSMENT — PAIN SCALES - GENERAL: PAINLEVEL_OUTOF10: 0

## 2023-02-25 LAB
RAINBOW EXTRA TUBES HOLD SPECIMEN: NORMAL

## 2023-03-01 ENCOUNTER — OFFICE VISIT (OUTPATIENT)
Dept: SURGERY | Age: 53
End: 2023-03-01

## 2023-03-01 VITALS — OXYGEN SATURATION: 100 % | HEART RATE: 99 BPM | SYSTOLIC BLOOD PRESSURE: 125 MMHG | DIASTOLIC BLOOD PRESSURE: 85 MMHG

## 2023-03-01 DIAGNOSIS — N18.6 ESRD (END STAGE RENAL DISEASE) (CMD): Primary | ICD-10-CM

## 2023-03-01 PROCEDURE — 3079F DIAST BP 80-89 MM HG: CPT | Performed by: SURGERY

## 2023-03-01 PROCEDURE — 99244 OFF/OP CNSLTJ NEW/EST MOD 40: CPT | Performed by: SURGERY

## 2023-03-01 PROCEDURE — 3074F SYST BP LT 130 MM HG: CPT | Performed by: SURGERY

## 2023-03-01 RX ORDER — ACETAMINOPHEN 500 MG
1 TABLET ORAL EVERY 6 HOURS PRN
COMMUNITY

## 2023-03-01 RX ORDER — SODIUM BICARBONATE 650 MG/1
650 TABLET ORAL 2 TIMES DAILY
Status: ON HOLD | COMMUNITY
Start: 2023-02-23 | End: 2023-09-23

## 2023-03-01 ASSESSMENT — PAIN SCALES - GENERAL: PAINLEVEL: 0

## 2023-08-01 ENCOUNTER — TELEPHONE (OUTPATIENT)
Dept: PREADMISSION TESTING | Age: 53
End: 2023-08-01

## 2023-08-01 VITALS — BODY MASS INDEX: 20.83 KG/M2 | HEIGHT: 65 IN | WEIGHT: 125 LBS

## 2023-08-01 ASSESSMENT — ACTIVITIES OF DAILY LIVING (ADL)
ADL_BEFORE_ADMISSION: INDEPENDENT
ADL_SCORE: 12

## 2023-08-03 ENCOUNTER — HOSPITAL ENCOUNTER (OUTPATIENT)
Dept: INTERVENTIONAL RADIOLOGY/VASCULAR | Age: 53
Discharge: HOME | End: 2023-08-03
Attending: INTERNAL MEDICINE

## 2023-08-03 DIAGNOSIS — N18.6 END STAGE RENAL DISEASE (CMD): ICD-10-CM

## 2023-08-03 PROCEDURE — 10002801 HB RX 250 W/O HCPCS: Performed by: RADIOLOGY

## 2023-08-03 PROCEDURE — 36589 REMOVAL TUNNELED CV CATH: CPT

## 2023-08-03 RX ORDER — LIDOCAINE HYDROCHLORIDE 10 MG/ML
INJECTION, SOLUTION INFILTRATION; PERINEURAL PRN
Status: COMPLETED | OUTPATIENT
Start: 2023-08-03 | End: 2023-08-03

## 2023-08-03 RX ADMIN — LIDOCAINE HYDROCHLORIDE 17 ML: 10 INJECTION, SOLUTION INFILTRATION; PERINEURAL at 07:41

## 2023-09-22 ENCOUNTER — APPOINTMENT (OUTPATIENT)
Dept: CT IMAGING | Age: 53
DRG: 466 | End: 2023-09-22
Attending: EMERGENCY MEDICINE

## 2023-09-22 ENCOUNTER — HOSPITAL ENCOUNTER (INPATIENT)
Age: 53
LOS: 7 days | Discharge: HOME | DRG: 466 | End: 2023-09-29
Attending: EMERGENCY MEDICINE | Admitting: FAMILY MEDICINE

## 2023-09-22 DIAGNOSIS — T85.71XA PERITONITIS ASSOCIATED WITH PERITONEAL DIALYSIS, INITIAL ENCOUNTER (CMD): Primary | ICD-10-CM

## 2023-09-22 PROBLEM — K65.9 PERITONITIS  (CMD): Status: ACTIVE | Noted: 2023-09-22

## 2023-09-22 LAB
ALBUMIN SERPL-MCNC: 2.7 G/DL (ref 3.6–5.1)
ALBUMIN/GLOB SERPL: 0.7 {RATIO} (ref 1–2.4)
ALP SERPL-CCNC: 213 UNITS/L (ref 45–117)
ALT SERPL-CCNC: 47 UNITS/L
ANION GAP SERPL CALC-SCNC: 14 MMOL/L (ref 7–19)
APPEARANCE PRT: ABNORMAL
AST SERPL-CCNC: 11 UNITS/L
BASOPHILS # BLD: 0 K/MCL (ref 0–0.3)
BASOPHILS NFR BLD: 1 %
BILIRUB SERPL-MCNC: 0.5 MG/DL (ref 0.2–1)
BUN SERPL-MCNC: 68 MG/DL (ref 6–20)
BUN/CREAT SERPL: 6 (ref 7–25)
CALCIUM SERPL-MCNC: 8.2 MG/DL (ref 8.4–10.2)
CHLORIDE SERPL-SCNC: 95 MMOL/L (ref 97–110)
CO2 SERPL-SCNC: 24 MMOL/L (ref 21–32)
COLOR PRT: ABNORMAL
CREAT SERPL-MCNC: 11.2 MG/DL (ref 0.51–0.95)
DEPRECATED RDW RBC: 50.4 FL (ref 39–50)
EGFRCR SERPLBLD CKD-EPI 2021: 4 ML/MIN/{1.73_M2}
EOSINOPHIL # BLD: 0 K/MCL (ref 0–0.5)
EOSINOPHIL NFR BLD: 0 %
EOSINOPHIL NFR FLD: 5 %
ERYTHROCYTE [DISTWIDTH] IN BLOOD: 15 % (ref 11–15)
FASTING DURATION TIME PATIENT: ABNORMAL H
GLOBULIN SER-MCNC: 4 G/DL (ref 2–4)
GLUCOSE SERPL-MCNC: 101 MG/DL (ref 70–99)
HCT VFR BLD CALC: 39.3 % (ref 36–46.5)
HGB BLD-MCNC: 12.5 G/DL (ref 12–15.5)
IMM GRANULOCYTES # BLD AUTO: 0 K/MCL (ref 0–0.2)
IMM GRANULOCYTES # BLD: 0 %
LIPASE SERPL-CCNC: 62 UNITS/L (ref 15–77)
LYMPHOCYTES # BLD: 0.3 K/MCL (ref 1–4)
LYMPHOCYTES NFR BLD: 4 %
LYMPHOCYTES NFR FLD: 3 %
MCH RBC QN AUTO: 29.2 PG (ref 26–34)
MCHC RBC AUTO-ENTMCNC: 31.8 G/DL (ref 32–36.5)
MCV RBC AUTO: 91.8 FL (ref 78–100)
MONOCYTES # BLD: 0.2 K/MCL (ref 0.3–0.9)
MONOCYTES NFR BLD: 4 %
MONOCYTES NFR FLD: 26 %
NEUTROPHILS # BLD: 5.8 K/MCL (ref 1.8–7.7)
NEUTROPHILS NFR BLD: 91 %
NEUTS SEG NFR PRT: 66 %
NRBC BLD MANUAL-RTO: 0 /100 WBC
PLATELET # BLD AUTO: 143 K/MCL (ref 140–450)
POTASSIUM SERPL-SCNC: 3.9 MMOL/L (ref 3.4–5.1)
PROT SERPL-MCNC: 6.7 G/DL (ref 6.4–8.2)
RBC # BLD: 4.28 MIL/MCL (ref 4–5.2)
SODIUM SERPL-SCNC: 129 MMOL/L (ref 135–145)
TOTAL CELLS COUNTED FLD: 100
WBC # BLD: 6.3 K/MCL (ref 4.2–11)
WBC # PRT: ABNORMAL /MCL (ref 0–1000)

## 2023-09-22 PROCEDURE — 96375 TX/PRO/DX INJ NEW DRUG ADDON: CPT

## 2023-09-22 PROCEDURE — 10002807 HB RX 258: Performed by: EMERGENCY MEDICINE

## 2023-09-22 PROCEDURE — 96374 THER/PROPH/DIAG INJ IV PUSH: CPT

## 2023-09-22 PROCEDURE — 10002800 HB RX 250 W HCPCS: Performed by: EMERGENCY MEDICINE

## 2023-09-22 PROCEDURE — 80053 COMPREHEN METABOLIC PANEL: CPT | Performed by: EMERGENCY MEDICINE

## 2023-09-22 PROCEDURE — 85025 COMPLETE CBC W/AUTO DIFF WBC: CPT | Performed by: EMERGENCY MEDICINE

## 2023-09-22 PROCEDURE — 74176 CT ABD & PELVIS W/O CONTRAST: CPT

## 2023-09-22 PROCEDURE — 83690 ASSAY OF LIPASE: CPT | Performed by: EMERGENCY MEDICINE

## 2023-09-22 PROCEDURE — 87186 SC STD MICRODIL/AGAR DIL: CPT | Performed by: EMERGENCY MEDICINE

## 2023-09-22 PROCEDURE — 99284 EMERGENCY DEPT VISIT MOD MDM: CPT

## 2023-09-22 PROCEDURE — 89051 BODY FLUID CELL COUNT: CPT | Performed by: EMERGENCY MEDICINE

## 2023-09-22 PROCEDURE — 10006031 HB ROOM CHARGE TELEMETRY

## 2023-09-22 RX ORDER — ONDANSETRON 2 MG/ML
4 INJECTION INTRAMUSCULAR; INTRAVENOUS ONCE
Status: COMPLETED | OUTPATIENT
Start: 2023-09-22 | End: 2023-09-22

## 2023-09-22 RX ADMIN — MORPHINE SULFATE 2 MG: 2 INJECTION, SOLUTION INTRAMUSCULAR; INTRAVENOUS at 23:32

## 2023-09-22 RX ADMIN — MORPHINE SULFATE 2 MG: 2 INJECTION, SOLUTION INTRAMUSCULAR; INTRAVENOUS at 20:55

## 2023-09-22 RX ADMIN — CEFEPIME 1000 MG: 1 INJECTION, POWDER, FOR SOLUTION INTRAMUSCULAR; INTRAVENOUS at 23:39

## 2023-09-22 RX ADMIN — ONDANSETRON 4 MG: 2 INJECTION INTRAMUSCULAR; INTRAVENOUS at 20:55

## 2023-09-22 ASSESSMENT — PAIN SCALES - GENERAL
PAINLEVEL_OUTOF10: 8
PAINLEVEL_OUTOF10: 7

## 2023-09-23 PROBLEM — E11.29 TYPE 2 DIABETES MELLITUS WITH KIDNEY COMPLICATION, WITHOUT LONG-TERM CURRENT USE OF INSULIN (CMD): Status: ACTIVE | Noted: 2023-09-23

## 2023-09-23 LAB
ALBUMIN SERPL-MCNC: 2 G/DL (ref 3.6–5.1)
ALBUMIN/GLOB SERPL: 0.6 {RATIO} (ref 1–2.4)
ALP SERPL-CCNC: 160 UNITS/L (ref 45–117)
ALT SERPL-CCNC: 34 UNITS/L
ANION GAP SERPL CALC-SCNC: 15 MMOL/L (ref 7–19)
AST SERPL-CCNC: 7 UNITS/L
BASOPHILS # BLD: 0 K/MCL (ref 0–0.3)
BASOPHILS NFR BLD: 0 %
BILIRUB SERPL-MCNC: 0.5 MG/DL (ref 0.2–1)
BUN SERPL-MCNC: 70 MG/DL (ref 6–20)
BUN/CREAT SERPL: 6 (ref 7–25)
CALCIUM SERPL-MCNC: 7.5 MG/DL (ref 8.4–10.2)
CHLORIDE SERPL-SCNC: 97 MMOL/L (ref 97–110)
CO2 SERPL-SCNC: 22 MMOL/L (ref 21–32)
CREAT SERPL-MCNC: 11.9 MG/DL (ref 0.51–0.95)
DEPRECATED RDW RBC: 51.8 FL (ref 39–50)
EGFRCR SERPLBLD CKD-EPI 2021: 3 ML/MIN/{1.73_M2}
EOSINOPHIL # BLD: 0 K/MCL (ref 0–0.5)
EOSINOPHIL NFR BLD: 1 %
ERYTHROCYTE [DISTWIDTH] IN BLOOD: 15 % (ref 11–15)
FASTING DURATION TIME PATIENT: ABNORMAL H
GLOBULIN SER-MCNC: 3.4 G/DL (ref 2–4)
GLUCOSE BLDC GLUCOMTR-MCNC: 116 MG/DL (ref 70–99)
GLUCOSE BLDC GLUCOMTR-MCNC: 122 MG/DL (ref 70–99)
GLUCOSE BLDC GLUCOMTR-MCNC: 93 MG/DL (ref 70–99)
GLUCOSE SERPL-MCNC: 88 MG/DL (ref 70–99)
HCT VFR BLD CALC: 32.9 % (ref 36–46.5)
HGB BLD-MCNC: 10.4 G/DL (ref 12–15.5)
IMM GRANULOCYTES # BLD AUTO: 0 K/MCL (ref 0–0.2)
IMM GRANULOCYTES # BLD: 0 %
LACTATE BLDV-SCNC: 1.4 MMOL/L (ref 0–2)
LYMPHOCYTES # BLD: 0.5 K/MCL (ref 1–4)
LYMPHOCYTES NFR BLD: 8 %
MAGNESIUM SERPL-MCNC: 1.5 MG/DL (ref 1.7–2.4)
MCH RBC QN AUTO: 29.4 PG (ref 26–34)
MCHC RBC AUTO-ENTMCNC: 31.6 G/DL (ref 32–36.5)
MCV RBC AUTO: 92.9 FL (ref 78–100)
MONOCYTES # BLD: 0.4 K/MCL (ref 0.3–0.9)
MONOCYTES NFR BLD: 6 %
NEUTROPHILS # BLD: 5.9 K/MCL (ref 1.8–7.7)
NEUTROPHILS NFR BLD: 85 %
NRBC BLD MANUAL-RTO: 0 /100 WBC
PLATELET # BLD AUTO: 128 K/MCL (ref 140–450)
POTASSIUM SERPL-SCNC: 3.8 MMOL/L (ref 3.4–5.1)
PROT SERPL-MCNC: 5.4 G/DL (ref 6.4–8.2)
RAINBOW EXTRA TUBES HOLD SPECIMEN: NORMAL
RAINBOW EXTRA TUBES HOLD SPECIMEN: NORMAL
RBC # BLD: 3.54 MIL/MCL (ref 4–5.2)
SODIUM SERPL-SCNC: 130 MMOL/L (ref 135–145)
WBC # BLD: 7 K/MCL (ref 4.2–11)

## 2023-09-23 PROCEDURE — 10002800 HB RX 250 W HCPCS: Performed by: EMERGENCY MEDICINE

## 2023-09-23 PROCEDURE — 83735 ASSAY OF MAGNESIUM: CPT | Performed by: FAMILY MEDICINE

## 2023-09-23 PROCEDURE — 10004651 HB RX, NO CHARGE ITEM: Performed by: FAMILY MEDICINE

## 2023-09-23 PROCEDURE — 80053 COMPREHEN METABOLIC PANEL: CPT | Performed by: FAMILY MEDICINE

## 2023-09-23 PROCEDURE — 3E1M39Z IRRIGATION OF PERITONEAL CAVITY USING DIALYSATE, PERCUTANEOUS APPROACH: ICD-10-PCS | Performed by: INTERNAL MEDICINE

## 2023-09-23 PROCEDURE — 87040 BLOOD CULTURE FOR BACTERIA: CPT | Performed by: FAMILY MEDICINE

## 2023-09-23 PROCEDURE — 83605 ASSAY OF LACTIC ACID: CPT | Performed by: FAMILY MEDICINE

## 2023-09-23 PROCEDURE — 10002019 HB COUNTER RESP ASSESSMENT

## 2023-09-23 PROCEDURE — 10002801 HB RX 250 W/O HCPCS: Performed by: EMERGENCY MEDICINE

## 2023-09-23 PROCEDURE — 85025 COMPLETE CBC W/AUTO DIFF WBC: CPT | Performed by: FAMILY MEDICINE

## 2023-09-23 PROCEDURE — 36415 COLL VENOUS BLD VENIPUNCTURE: CPT | Performed by: FAMILY MEDICINE

## 2023-09-23 PROCEDURE — 10002801 HB RX 250 W/O HCPCS: Performed by: INTERNAL MEDICINE

## 2023-09-23 PROCEDURE — 10006031 HB ROOM CHARGE TELEMETRY

## 2023-09-23 PROCEDURE — 10002800 HB RX 250 W HCPCS: Performed by: FAMILY MEDICINE

## 2023-09-23 PROCEDURE — 96372 THER/PROPH/DIAG INJ SC/IM: CPT | Performed by: FAMILY MEDICINE

## 2023-09-23 PROCEDURE — 10002803 HB RX 637: Performed by: FAMILY MEDICINE

## 2023-09-23 PROCEDURE — 99253 IP/OBS CNSLTJ NEW/EST LOW 45: CPT | Performed by: SURGERY

## 2023-09-23 PROCEDURE — 10002807 HB RX 258: Performed by: EMERGENCY MEDICINE

## 2023-09-23 PROCEDURE — 90945 DIALYSIS ONE EVALUATION: CPT

## 2023-09-23 PROCEDURE — 10004180 HB COUNTER-TRANSPORT

## 2023-09-23 RX ORDER — MAGNESIUM HYDROXIDE/ALUMINUM HYDROXICE/SIMETHICONE 120; 1200; 1200 MG/30ML; MG/30ML; MG/30ML
30 SUSPENSION ORAL EVERY 4 HOURS PRN
Status: DISCONTINUED | OUTPATIENT
Start: 2023-09-23 | End: 2023-09-29 | Stop reason: HOSPADM

## 2023-09-23 RX ORDER — SODIUM CHLORIDE 450 MG/100ML
INJECTION, SOLUTION INTRAVENOUS CONTINUOUS
Status: DISCONTINUED | OUTPATIENT
Start: 2023-09-23 | End: 2023-09-24

## 2023-09-23 RX ORDER — 0.9 % SODIUM CHLORIDE 0.9 %
2 VIAL (ML) INJECTION EVERY 12 HOURS SCHEDULED
Status: DISCONTINUED | OUTPATIENT
Start: 2023-09-23 | End: 2023-09-29 | Stop reason: HOSPADM

## 2023-09-23 RX ORDER — HEPARIN SODIUM 5000 [USP'U]/ML
5000 INJECTION, SOLUTION INTRAVENOUS; SUBCUTANEOUS EVERY 8 HOURS SCHEDULED
Status: DISCONTINUED | OUTPATIENT
Start: 2023-09-23 | End: 2023-09-29 | Stop reason: HOSPADM

## 2023-09-23 RX ORDER — SEVELAMER CARBONATE 800 MG/1
800 TABLET, FILM COATED ORAL
COMMUNITY

## 2023-09-23 RX ORDER — PROCHLORPERAZINE MALEATE 5 MG/1
5 TABLET ORAL EVERY 4 HOURS PRN
Status: DISCONTINUED | OUTPATIENT
Start: 2023-09-23 | End: 2023-09-29 | Stop reason: HOSPADM

## 2023-09-23 RX ORDER — POLYETHYLENE GLYCOL 3350 17 G/17G
17 POWDER, FOR SOLUTION ORAL DAILY PRN
Status: DISCONTINUED | OUTPATIENT
Start: 2023-09-23 | End: 2023-09-29 | Stop reason: HOSPADM

## 2023-09-23 RX ORDER — ACETAMINOPHEN 325 MG/1
650 TABLET ORAL EVERY 4 HOURS PRN
Status: DISCONTINUED | OUTPATIENT
Start: 2023-09-23 | End: 2023-09-29 | Stop reason: HOSPADM

## 2023-09-23 RX ORDER — TRAMADOL HYDROCHLORIDE 50 MG/1
50 TABLET ORAL EVERY 6 HOURS PRN
Status: DISCONTINUED | OUTPATIENT
Start: 2023-09-23 | End: 2023-09-29 | Stop reason: HOSPADM

## 2023-09-23 RX ORDER — HYDRALAZINE HYDROCHLORIDE 20 MG/ML
10 INJECTION INTRAMUSCULAR; INTRAVENOUS EVERY 6 HOURS PRN
Status: DISCONTINUED | OUTPATIENT
Start: 2023-09-23 | End: 2023-09-29 | Stop reason: HOSPADM

## 2023-09-23 RX ADMIN — SODIUM CHLORIDE: 4.5 INJECTION, SOLUTION INTRAVENOUS at 09:48

## 2023-09-23 RX ADMIN — SODIUM CHLORIDE, PRESERVATIVE FREE 2 ML: 5 INJECTION INTRAVENOUS at 21:28

## 2023-09-23 RX ADMIN — HEPARIN SODIUM 5000 UNITS: 5000 INJECTION INTRAVENOUS; SUBCUTANEOUS at 06:40

## 2023-09-23 RX ADMIN — TRAMADOL HYDROCHLORIDE 50 MG: 50 TABLET, COATED ORAL at 21:26

## 2023-09-23 RX ADMIN — HEPARIN SODIUM 5000 UNITS: 5000 INJECTION INTRAVENOUS; SUBCUTANEOUS at 13:43

## 2023-09-23 RX ADMIN — VANCOMYCIN HYDROCHLORIDE 1250 MG: 10 INJECTION, POWDER, LYOPHILIZED, FOR SOLUTION INTRAVENOUS at 00:17

## 2023-09-23 RX ADMIN — ACETAMINOPHEN 650 MG: 325 TABLET ORAL at 04:45

## 2023-09-23 RX ADMIN — ACETAMINOPHEN 650 MG: 325 TABLET ORAL at 16:28

## 2023-09-23 RX ADMIN — SODIUM CHLORIDE, PRESERVATIVE FREE 2 ML: 5 INJECTION INTRAVENOUS at 09:48

## 2023-09-23 RX ADMIN — HEPARIN SODIUM 5000 UNITS: 5000 INJECTION INTRAVENOUS; SUBCUTANEOUS at 21:29

## 2023-09-23 SDOH — ECONOMIC STABILITY: HOUSING INSECURITY: WHAT IS YOUR LIVING SITUATION TODAY?: CHILDREN

## 2023-09-23 SDOH — ECONOMIC STABILITY: FOOD INSECURITY
HOW OFTEN IN THE PAST 12 MONTHS WERE YOU WORRIED OR STRESSED ABOUT HAVING ENOUGH MONEY TO BUY NUTRITIOUS MEALS?: SOMETIMES

## 2023-09-23 SDOH — HEALTH STABILITY: PHYSICAL HEALTH: DO YOU HAVE DIFFICULTY DRESSING OR BATHING?: NO

## 2023-09-23 SDOH — ECONOMIC STABILITY: GENERAL

## 2023-09-23 SDOH — ECONOMIC STABILITY: HOUSING INSECURITY: ARE YOU WORRIED ABOUT LOSING YOUR HOUSING?: NO

## 2023-09-23 SDOH — SOCIAL STABILITY: SOCIAL NETWORK: SUPPORT SYSTEMS: CHILDREN

## 2023-09-23 SDOH — HEALTH STABILITY: GENERAL: BECAUSE OF A PHYSICAL, MENTAL, OR EMOTIONAL CONDITION, DO YOU HAVE DIFFICULTY DOING ERRANDS ALONE?: NO

## 2023-09-23 SDOH — HEALTH STABILITY: PHYSICAL HEALTH: DO YOU HAVE SERIOUS DIFFICULTY WALKING OR CLIMBING STAIRS?: YES

## 2023-09-23 SDOH — ECONOMIC STABILITY: HOUSING INSECURITY: WHAT IS YOUR LIVING SITUATION TODAY?: APARTMENT

## 2023-09-23 SDOH — SOCIAL STABILITY: SOCIAL NETWORK
HOW OFTEN DO YOU SEE OR TALK TO PEOPLE THAT YOU CARE ABOUT AND FEEL CLOSE TO? (FOR EXAMPLE: TALKING TO FRIENDS ON THE PHONE, VISITING FRIENDS OR FAMILY, GOING TO CHURCH OR CLUB MEETINGS): 3 TO 5 TIMES A WEEK

## 2023-09-23 ASSESSMENT — ENCOUNTER SYMPTOMS
ABDOMINAL PAIN: 1
VOMITING: 0
HEARTBURN: 0
PSYCHIATRIC NEGATIVE: 1
COUGH: 0
SHORTNESS OF BREATH: 0
FEVER: 1
BLURRED VISION: 0
RESPIRATORY NEGATIVE: 1
HEMOPTYSIS: 0
DOUBLE VISION: 0
DIARRHEA: 1
SPUTUM PRODUCTION: 0
HEADACHES: 0
PHOTOPHOBIA: 0
EYES NEGATIVE: 1
CHILLS: 0
WEAKNESS: 0
NAUSEA: 0
SORE THROAT: 0
DIZZINESS: 0
ORTHOPNEA: 0
FOCAL WEAKNESS: 0
WHEEZING: 0
TINGLING: 0

## 2023-09-23 ASSESSMENT — PAIN SCALES - GENERAL
PAINLEVEL_OUTOF10: 0
PAINLEVEL_OUTOF10: 5
PAINLEVEL_OUTOF10: 9
PAINLEVEL_OUTOF10: 8
PAINLEVEL_OUTOF10: 0
PAINLEVEL_OUTOF10: 0

## 2023-09-23 ASSESSMENT — LIFESTYLE VARIABLES
ALCOHOL_USE_STATUS: NO OR LOW RISK WITH VALIDATED TOOL
HOW OFTEN DO YOU HAVE A DRINK CONTAINING ALCOHOL: NEVER
HOW OFTEN DO YOU HAVE 6 OR MORE DRINKS ON ONE OCCASION: NEVER
AUDIT-C TOTAL SCORE: 0
HOW MANY STANDARD DRINKS CONTAINING ALCOHOL DO YOU HAVE ON A TYPICAL DAY: 0,1 OR 2

## 2023-09-23 ASSESSMENT — ACTIVITIES OF DAILY LIVING (ADL)
ADL_SCORE: 12
RECENT_DECLINE_ADL: NO
ADL_SHORT_OF_BREATH: NO
ADL_BEFORE_ADMISSION: INDEPENDENT

## 2023-09-23 ASSESSMENT — PATIENT HEALTH QUESTIONNAIRE - PHQ9
1. LITTLE INTEREST OR PLEASURE IN DOING THINGS: NOT AT ALL
CLINICAL INTERPRETATION OF PHQ2 SCORE: NO FURTHER SCREENING NEEDED
SUM OF ALL RESPONSES TO PHQ9 QUESTIONS 1 AND 2: 0
IS PATIENT ABLE TO COMPLETE PHQ2 OR PHQ9: YES
2. FEELING DOWN, DEPRESSED OR HOPELESS: NOT AT ALL
SUM OF ALL RESPONSES TO PHQ9 QUESTIONS 1 AND 2: 0

## 2023-09-23 ASSESSMENT — PULMONARY FUNCTION TESTS: FEV1/FVC: UNABLE TO OBTAIN, OR GREATER THAN 70%

## 2023-09-23 ASSESSMENT — COLUMBIA-SUICIDE SEVERITY RATING SCALE - C-SSRS
1. WITHIN THE PAST MONTH, HAVE YOU WISHED YOU WERE DEAD OR WISHED YOU COULD GO TO SLEEP AND NOT WAKE UP?: NO
6. HAVE YOU EVER DONE ANYTHING, STARTED TO DO ANYTHING, OR PREPARED TO DO ANYTHING TO END YOUR LIFE?: NO
2. HAVE YOU ACTUALLY HAD ANY THOUGHTS OF KILLING YOURSELF?: NO
IS THE PATIENT ABLE TO COMPLETE C-SSRS: YES

## 2023-09-24 LAB
BASOPHILS # BLD: 0 K/MCL (ref 0–0.3)
BASOPHILS NFR BLD: 1 %
DEPRECATED RDW RBC: 51.8 FL (ref 39–50)
EOSINOPHIL # BLD: 0 K/MCL (ref 0–0.5)
EOSINOPHIL NFR BLD: 0 %
ERYTHROCYTE [DISTWIDTH] IN BLOOD: 15.2 % (ref 11–15)
ERYTHROCYTE [SEDIMENTATION RATE] IN BLOOD BY WESTERGREN METHOD: 33 MM/HR (ref 0–20)
GLUCOSE BLDC GLUCOMTR-MCNC: 101 MG/DL (ref 70–99)
GLUCOSE BLDC GLUCOMTR-MCNC: 119 MG/DL (ref 70–99)
GLUCOSE BLDC GLUCOMTR-MCNC: 128 MG/DL (ref 70–99)
GLUCOSE BLDC GLUCOMTR-MCNC: 135 MG/DL (ref 70–99)
HCT VFR BLD CALC: 30.1 % (ref 36–46.5)
HGB BLD-MCNC: 9.5 G/DL (ref 12–15.5)
IMM GRANULOCYTES # BLD AUTO: 0 K/MCL (ref 0–0.2)
IMM GRANULOCYTES # BLD: 0 %
LACTATE BLDV-SCNC: 0.7 MMOL/L (ref 0–2)
LYMPHOCYTES # BLD: 0.4 K/MCL (ref 1–4)
LYMPHOCYTES NFR BLD: 7 %
MCH RBC QN AUTO: 29.3 PG (ref 26–34)
MCHC RBC AUTO-ENTMCNC: 31.6 G/DL (ref 32–36.5)
MCV RBC AUTO: 92.9 FL (ref 78–100)
MONOCYTES # BLD: 0.4 K/MCL (ref 0.3–0.9)
MONOCYTES NFR BLD: 7 %
NEUTROPHILS # BLD: 4.9 K/MCL (ref 1.8–7.7)
NEUTROPHILS NFR BLD: 85 %
NRBC BLD MANUAL-RTO: 0 /100 WBC
PLATELET # BLD AUTO: 110 K/MCL (ref 140–450)
RAINBOW EXTRA TUBES HOLD SPECIMEN: NORMAL
RBC # BLD: 3.24 MIL/MCL (ref 4–5.2)
WBC # BLD: 5.7 K/MCL (ref 4.2–11)

## 2023-09-24 PROCEDURE — 10006031 HB ROOM CHARGE TELEMETRY

## 2023-09-24 PROCEDURE — 83605 ASSAY OF LACTIC ACID: CPT | Performed by: SURGERY

## 2023-09-24 PROCEDURE — 96372 THER/PROPH/DIAG INJ SC/IM: CPT | Performed by: FAMILY MEDICINE

## 2023-09-24 PROCEDURE — 10002800 HB RX 250 W HCPCS

## 2023-09-24 PROCEDURE — 10002801 HB RX 250 W/O HCPCS: Performed by: INTERNAL MEDICINE

## 2023-09-24 PROCEDURE — 10002807 HB RX 258

## 2023-09-24 PROCEDURE — 36415 COLL VENOUS BLD VENIPUNCTURE: CPT | Performed by: SURGERY

## 2023-09-24 PROCEDURE — 10004651 HB RX, NO CHARGE ITEM: Performed by: FAMILY MEDICINE

## 2023-09-24 PROCEDURE — 85652 RBC SED RATE AUTOMATED: CPT | Performed by: SURGERY

## 2023-09-24 PROCEDURE — 10002800 HB RX 250 W HCPCS: Performed by: FAMILY MEDICINE

## 2023-09-24 PROCEDURE — 85025 COMPLETE CBC W/AUTO DIFF WBC: CPT | Performed by: SURGERY

## 2023-09-24 RX ADMIN — HEPARIN SODIUM 5000 UNITS: 5000 INJECTION INTRAVENOUS; SUBCUTANEOUS at 05:52

## 2023-09-24 RX ADMIN — ACETAMINOPHEN 650 MG: 325 TABLET ORAL at 05:57

## 2023-09-24 RX ADMIN — SODIUM CHLORIDE, PRESERVATIVE FREE 2 ML: 5 INJECTION INTRAVENOUS at 09:15

## 2023-09-24 RX ADMIN — CEFEPIME 1000 MG: 1 INJECTION, POWDER, FOR SOLUTION INTRAMUSCULAR; INTRAVENOUS at 09:15

## 2023-09-24 RX ADMIN — HEPARIN SODIUM 5000 UNITS: 5000 INJECTION INTRAVENOUS; SUBCUTANEOUS at 21:01

## 2023-09-24 RX ADMIN — HEPARIN SODIUM 5000 UNITS: 5000 INJECTION INTRAVENOUS; SUBCUTANEOUS at 13:39

## 2023-09-24 RX ADMIN — SODIUM CHLORIDE, PRESERVATIVE FREE 2 ML: 5 INJECTION INTRAVENOUS at 21:10

## 2023-09-24 RX ADMIN — SODIUM CHLORIDE: 4.5 INJECTION, SOLUTION INTRAVENOUS at 05:52

## 2023-09-24 ASSESSMENT — PAIN SCALES - GENERAL
PAINLEVEL_OUTOF10: 4
PAINLEVEL_OUTOF10: 5
PAINLEVEL_OUTOF10: 0

## 2023-09-25 LAB
ALBUMIN SERPL-MCNC: 1.7 G/DL (ref 3.6–5.1)
ALBUMIN/GLOB SERPL: 0.5 {RATIO} (ref 1–2.4)
ALP SERPL-CCNC: 137 UNITS/L (ref 45–117)
ALT SERPL-CCNC: 20 UNITS/L
ANION GAP SERPL CALC-SCNC: 17 MMOL/L (ref 7–19)
APPEARANCE FLD: ABNORMAL
AST SERPL-CCNC: 17 UNITS/L
BILIRUB SERPL-MCNC: 0.4 MG/DL (ref 0.2–1)
BUN SERPL-MCNC: 93 MG/DL (ref 6–20)
BUN/CREAT SERPL: 7 (ref 7–25)
CALCIUM SERPL-MCNC: 7.3 MG/DL (ref 8.4–10.2)
CHLORIDE SERPL-SCNC: 96 MMOL/L (ref 97–110)
CO2 SERPL-SCNC: 21 MMOL/L (ref 21–32)
COLOR FLD: YELLOW
CREAT SERPL-MCNC: 13.3 MG/DL (ref 0.51–0.95)
EGFRCR SERPLBLD CKD-EPI 2021: 3 ML/MIN/{1.73_M2}
EOSINOPHIL NFR FLD: 4 %
FASTING DURATION TIME PATIENT: ABNORMAL H
GLOBULIN SER-MCNC: 3.3 G/DL (ref 2–4)
GLUCOSE BLDC GLUCOMTR-MCNC: 132 MG/DL (ref 70–99)
GLUCOSE SERPL-MCNC: 94 MG/DL (ref 70–99)
LYMPHOCYTES NFR FLD: 5 %
MONOCYTES NFR FLD: 5 %
NEUTS SEG NFR FLD: 86 %
POTASSIUM SERPL-SCNC: 4.7 MMOL/L (ref 3.4–5.1)
PROT SERPL-MCNC: 5 G/DL (ref 6.4–8.2)
RAINBOW EXTRA TUBES HOLD SPECIMEN: NORMAL
SODIUM SERPL-SCNC: 129 MMOL/L (ref 135–145)
TOTAL CELLS COUNTED FLD: 100
WBC # FLD: ABNORMAL /MCL (ref 0–1000)

## 2023-09-25 PROCEDURE — 10006031 HB ROOM CHARGE TELEMETRY

## 2023-09-25 PROCEDURE — 96372 THER/PROPH/DIAG INJ SC/IM: CPT | Performed by: INTERNAL MEDICINE

## 2023-09-25 PROCEDURE — 80053 COMPREHEN METABOLIC PANEL: CPT | Performed by: INTERNAL MEDICINE

## 2023-09-25 PROCEDURE — 96372 THER/PROPH/DIAG INJ SC/IM: CPT | Performed by: FAMILY MEDICINE

## 2023-09-25 PROCEDURE — 36415 COLL VENOUS BLD VENIPUNCTURE: CPT | Performed by: FAMILY MEDICINE

## 2023-09-25 PROCEDURE — 10004651 HB RX, NO CHARGE ITEM: Performed by: FAMILY MEDICINE

## 2023-09-25 PROCEDURE — 10002803 HB RX 637: Performed by: FAMILY MEDICINE

## 2023-09-25 PROCEDURE — 10002807 HB RX 258

## 2023-09-25 PROCEDURE — 89051 BODY FLUID CELL COUNT: CPT | Performed by: INTERNAL MEDICINE

## 2023-09-25 PROCEDURE — 10002800 HB RX 250 W HCPCS: Performed by: INTERNAL MEDICINE

## 2023-09-25 PROCEDURE — 10002800 HB RX 250 W HCPCS: Performed by: FAMILY MEDICINE

## 2023-09-25 PROCEDURE — 90945 DIALYSIS ONE EVALUATION: CPT

## 2023-09-25 PROCEDURE — 10002800 HB RX 250 W HCPCS

## 2023-09-25 RX ORDER — CEFAZOLIN SODIUM/WATER 2 G/20 ML
2000 SYRINGE (ML) INTRAVENOUS DAILY
Status: DISCONTINUED | OUTPATIENT
Start: 2023-09-26 | End: 2023-09-29 | Stop reason: HOSPADM

## 2023-09-25 RX ADMIN — SODIUM CHLORIDE, PRESERVATIVE FREE 2 ML: 5 INJECTION INTRAVENOUS at 10:01

## 2023-09-25 RX ADMIN — HEPARIN SODIUM 5000 UNITS: 5000 INJECTION INTRAVENOUS; SUBCUTANEOUS at 05:43

## 2023-09-25 RX ADMIN — POLYETHYLENE GLYCOL (3350) 17 G: 17 POWDER, FOR SOLUTION ORAL at 10:01

## 2023-09-25 RX ADMIN — EPOETIN ALFA-EPBX 10000 UNITS: 10000 INJECTION, SOLUTION INTRAVENOUS; SUBCUTANEOUS at 14:44

## 2023-09-25 RX ADMIN — HEPARIN SODIUM 5000 UNITS: 5000 INJECTION INTRAVENOUS; SUBCUTANEOUS at 21:44

## 2023-09-25 RX ADMIN — CEFEPIME 1000 MG: 1 INJECTION, POWDER, FOR SOLUTION INTRAMUSCULAR; INTRAVENOUS at 10:04

## 2023-09-25 RX ADMIN — ACETAMINOPHEN 650 MG: 325 TABLET ORAL at 21:54

## 2023-09-25 RX ADMIN — ACETAMINOPHEN 650 MG: 325 TABLET ORAL at 14:44

## 2023-09-25 RX ADMIN — PROCHLORPERAZINE MALEATE 5 MG: 5 TABLET ORAL at 14:44

## 2023-09-25 RX ADMIN — HEPARIN SODIUM 5000 UNITS: 5000 INJECTION INTRAVENOUS; SUBCUTANEOUS at 15:24

## 2023-09-25 RX ADMIN — TRAMADOL HYDROCHLORIDE 50 MG: 50 TABLET, COATED ORAL at 10:01

## 2023-09-25 RX ADMIN — SODIUM CHLORIDE, PRESERVATIVE FREE 2 ML: 5 INJECTION INTRAVENOUS at 21:43

## 2023-09-25 ASSESSMENT — PAIN SCALES - GENERAL
PAINLEVEL_OUTOF10: 0
PAINLEVEL_OUTOF10: 5
PAINLEVEL_OUTOF10: 3
PAINLEVEL_OUTOF10: 3
PAINLEVEL_OUTOF10: 0

## 2023-09-26 LAB
ANION GAP SERPL CALC-SCNC: 18 MMOL/L (ref 7–19)
BUN SERPL-MCNC: 92 MG/DL (ref 6–20)
BUN/CREAT SERPL: 7 (ref 7–25)
CALCIUM SERPL-MCNC: 7.6 MG/DL (ref 8.4–10.2)
CHLORIDE SERPL-SCNC: 96 MMOL/L (ref 97–110)
CO2 SERPL-SCNC: 21 MMOL/L (ref 21–32)
CREAT SERPL-MCNC: 12.6 MG/DL (ref 0.51–0.95)
DEPRECATED RDW RBC: 50.4 FL (ref 39–50)
EGFRCR SERPLBLD CKD-EPI 2021: 3 ML/MIN/{1.73_M2}
ERYTHROCYTE [DISTWIDTH] IN BLOOD: 14.9 % (ref 11–15)
FASTING DURATION TIME PATIENT: ABNORMAL H
GLUCOSE SERPL-MCNC: 96 MG/DL (ref 70–99)
HCT VFR BLD CALC: 31.1 % (ref 36–46.5)
HGB BLD-MCNC: 9.9 G/DL (ref 12–15.5)
MCH RBC QN AUTO: 29.3 PG (ref 26–34)
MCHC RBC AUTO-ENTMCNC: 31.8 G/DL (ref 32–36.5)
MCV RBC AUTO: 92 FL (ref 78–100)
NRBC BLD MANUAL-RTO: 0 /100 WBC
PLATELET # BLD AUTO: 128 K/MCL (ref 140–450)
POTASSIUM SERPL-SCNC: 4.6 MMOL/L (ref 3.4–5.1)
RBC # BLD: 3.38 MIL/MCL (ref 4–5.2)
SODIUM SERPL-SCNC: 130 MMOL/L (ref 135–145)
WBC # BLD: 4.3 K/MCL (ref 4.2–11)

## 2023-09-26 PROCEDURE — 10002800 HB RX 250 W HCPCS: Performed by: FAMILY MEDICINE

## 2023-09-26 PROCEDURE — 10006031 HB ROOM CHARGE TELEMETRY

## 2023-09-26 PROCEDURE — 96372 THER/PROPH/DIAG INJ SC/IM: CPT | Performed by: FAMILY MEDICINE

## 2023-09-26 PROCEDURE — 90945 DIALYSIS ONE EVALUATION: CPT

## 2023-09-26 PROCEDURE — 10002803 HB RX 637: Performed by: FAMILY MEDICINE

## 2023-09-26 PROCEDURE — 36415 COLL VENOUS BLD VENIPUNCTURE: CPT | Performed by: INTERNAL MEDICINE

## 2023-09-26 PROCEDURE — 10004651 HB RX, NO CHARGE ITEM: Performed by: FAMILY MEDICINE

## 2023-09-26 PROCEDURE — 80048 BASIC METABOLIC PNL TOTAL CA: CPT | Performed by: INTERNAL MEDICINE

## 2023-09-26 PROCEDURE — 10002807 HB RX 258: Performed by: INTERNAL MEDICINE

## 2023-09-26 PROCEDURE — 85027 COMPLETE CBC AUTOMATED: CPT | Performed by: INTERNAL MEDICINE

## 2023-09-26 PROCEDURE — 10002800 HB RX 250 W HCPCS: Performed by: INTERNAL MEDICINE

## 2023-09-26 RX ORDER — CEFTRIAXONE SODIUM
1 POWDER (GRAM) MISCELLANEOUS DAILY
Qty: 21 G | Refills: 0 | Status: SHIPPED | OUTPATIENT
Start: 2023-09-26 | End: 2023-09-27 | Stop reason: HOSPADM

## 2023-09-26 RX ORDER — MIDODRINE HYDROCHLORIDE 5 MG/1
10 TABLET ORAL 3 TIMES DAILY PRN
Status: DISCONTINUED | OUTPATIENT
Start: 2023-09-26 | End: 2023-09-29 | Stop reason: HOSPADM

## 2023-09-26 RX ORDER — TIZANIDINE 4 MG/1
4 TABLET ORAL EVERY 8 HOURS PRN
Status: DISCONTINUED | OUTPATIENT
Start: 2023-09-26 | End: 2023-09-29 | Stop reason: HOSPADM

## 2023-09-26 RX ORDER — HYDROCODONE BITARTRATE AND ACETAMINOPHEN 5; 325 MG/1; MG/1
1 TABLET ORAL EVERY 4 HOURS PRN
Status: DISCONTINUED | OUTPATIENT
Start: 2023-09-26 | End: 2023-09-29 | Stop reason: HOSPADM

## 2023-09-26 RX ORDER — SODIUM CHLORIDE 9 MG/ML
INJECTION, SOLUTION INTRAVENOUS CONTINUOUS
Status: DISCONTINUED | OUTPATIENT
Start: 2023-09-26 | End: 2023-09-27

## 2023-09-26 RX ADMIN — CEFTRIAXONE SODIUM 2000 MG: 10 INJECTION, POWDER, FOR SOLUTION INTRAVENOUS at 08:24

## 2023-09-26 RX ADMIN — SODIUM CHLORIDE, PRESERVATIVE FREE 2 ML: 5 INJECTION INTRAVENOUS at 08:25

## 2023-09-26 RX ADMIN — SODIUM CHLORIDE, PRESERVATIVE FREE 2 ML: 5 INJECTION INTRAVENOUS at 20:46

## 2023-09-26 RX ADMIN — HYDROCODONE BITARTRATE AND ACETAMINOPHEN 1 TABLET: 5; 325 TABLET ORAL at 10:19

## 2023-09-26 RX ADMIN — HYDROCODONE BITARTRATE AND ACETAMINOPHEN 1 TABLET: 5; 325 TABLET ORAL at 01:49

## 2023-09-26 RX ADMIN — ACETAMINOPHEN 650 MG: 325 TABLET ORAL at 22:56

## 2023-09-26 RX ADMIN — ACETAMINOPHEN 650 MG: 325 TABLET ORAL at 18:06

## 2023-09-26 RX ADMIN — TRAMADOL HYDROCHLORIDE 50 MG: 50 TABLET, COATED ORAL at 01:25

## 2023-09-26 RX ADMIN — TIZANIDINE 4 MG: 4 TABLET ORAL at 13:39

## 2023-09-26 RX ADMIN — POLYETHYLENE GLYCOL (3350) 17 G: 17 POWDER, FOR SOLUTION ORAL at 13:40

## 2023-09-26 RX ADMIN — HEPARIN SODIUM 5000 UNITS: 5000 INJECTION INTRAVENOUS; SUBCUTANEOUS at 05:53

## 2023-09-26 RX ADMIN — HEPARIN SODIUM 5000 UNITS: 5000 INJECTION INTRAVENOUS; SUBCUTANEOUS at 13:40

## 2023-09-26 RX ADMIN — HEPARIN SODIUM 5000 UNITS: 5000 INJECTION INTRAVENOUS; SUBCUTANEOUS at 22:38

## 2023-09-26 RX ADMIN — SODIUM CHLORIDE: 9 INJECTION, SOLUTION INTRAVENOUS at 20:40

## 2023-09-26 ASSESSMENT — PAIN SCALES - GENERAL
PAINLEVEL_OUTOF10: 3
PAINLEVEL_OUTOF10: 2
PAINLEVEL_OUTOF10: 8
PAINLEVEL_OUTOF10: 5
PAINLEVEL_OUTOF10: 7
PAINLEVEL_OUTOF10: 0
PAINLEVEL_OUTOF10: 10
PAINLEVEL_OUTOF10: 2
PAINLEVEL_OUTOF10: 0
PAINLEVEL_OUTOF10: 10

## 2023-09-27 ENCOUNTER — APPOINTMENT (OUTPATIENT)
Dept: GENERAL RADIOLOGY | Age: 53
DRG: 466 | End: 2023-09-27
Attending: INTERNAL MEDICINE

## 2023-09-27 LAB
ALBUMIN SERPL-MCNC: 1.6 G/DL (ref 3.6–5.1)
ALBUMIN/GLOB SERPL: 0.4 {RATIO} (ref 1–2.4)
ALP SERPL-CCNC: 182 UNITS/L (ref 45–117)
ALT SERPL-CCNC: 38 UNITS/L
ANION GAP SERPL CALC-SCNC: 15 MMOL/L (ref 7–19)
APPEARANCE FLD: NORMAL
AST SERPL-CCNC: 36 UNITS/L
BACTERIA SPEC ANAEROBE+AEROBE CULT: ABNORMAL
BASOPHILS NFR FLD: 4 %
BILIRUB SERPL-MCNC: 0.3 MG/DL (ref 0.2–1)
BUN SERPL-MCNC: 73 MG/DL (ref 6–20)
BUN/CREAT SERPL: 7 (ref 7–25)
CALCIUM SERPL-MCNC: 7.8 MG/DL (ref 8.4–10.2)
CHLORIDE SERPL-SCNC: 94 MMOL/L (ref 97–110)
CO2 SERPL-SCNC: 24 MMOL/L (ref 21–32)
COLOR FLD: NORMAL
CREAT SERPL-MCNC: 10.1 MG/DL (ref 0.51–0.95)
EGFRCR SERPLBLD CKD-EPI 2021: 4 ML/MIN/{1.73_M2}
FASTING DURATION TIME PATIENT: ABNORMAL H
GLOBULIN SER-MCNC: 3.6 G/DL (ref 2–4)
GLUCOSE SERPL-MCNC: 140 MG/DL (ref 70–99)
GRAM STN SPEC: ABNORMAL
LYMPHOCYTES NFR FLD: 18 %
MONOCYTES NFR FLD: 32 %
NEUTS SEG NFR FLD: 46 %
POTASSIUM SERPL-SCNC: 3.7 MMOL/L (ref 3.4–5.1)
PROT SERPL-MCNC: 5.2 G/DL (ref 6.4–8.2)
RAINBOW EXTRA TUBES HOLD SPECIMEN: NORMAL
SODIUM SERPL-SCNC: 129 MMOL/L (ref 135–145)
TOTAL CELLS COUNTED FLD: 100
WBC # FLD: 810 /MCL (ref 0–1000)

## 2023-09-27 PROCEDURE — 89051 BODY FLUID CELL COUNT: CPT | Performed by: INTERNAL MEDICINE

## 2023-09-27 PROCEDURE — 10002800 HB RX 250 W HCPCS: Performed by: FAMILY MEDICINE

## 2023-09-27 PROCEDURE — 10006031 HB ROOM CHARGE TELEMETRY

## 2023-09-27 PROCEDURE — 36415 COLL VENOUS BLD VENIPUNCTURE: CPT | Performed by: INTERNAL MEDICINE

## 2023-09-27 PROCEDURE — 71046 X-RAY EXAM CHEST 2 VIEWS: CPT

## 2023-09-27 PROCEDURE — 36415 COLL VENOUS BLD VENIPUNCTURE: CPT | Performed by: FAMILY MEDICINE

## 2023-09-27 PROCEDURE — 90945 DIALYSIS ONE EVALUATION: CPT

## 2023-09-27 PROCEDURE — G0257 UNSCHED DIALYSIS ESRD PT HOS: HCPCS

## 2023-09-27 PROCEDURE — 80053 COMPREHEN METABOLIC PANEL: CPT | Performed by: FAMILY MEDICINE

## 2023-09-27 PROCEDURE — 10004651 HB RX, NO CHARGE ITEM: Performed by: FAMILY MEDICINE

## 2023-09-27 PROCEDURE — 96372 THER/PROPH/DIAG INJ SC/IM: CPT | Performed by: FAMILY MEDICINE

## 2023-09-27 PROCEDURE — 10002803 HB RX 637: Performed by: FAMILY MEDICINE

## 2023-09-27 PROCEDURE — 10002803 HB RX 637: Performed by: INTERNAL MEDICINE

## 2023-09-27 PROCEDURE — 10002800 HB RX 250 W HCPCS: Performed by: INTERNAL MEDICINE

## 2023-09-27 RX ORDER — POTASSIUM CHLORIDE 20 MEQ/1
20 TABLET, EXTENDED RELEASE ORAL ONCE
Status: COMPLETED | OUTPATIENT
Start: 2023-09-27 | End: 2023-09-27

## 2023-09-27 RX ORDER — CEFTAZIDIME 100 %
3 POWDER (GRAM) MISCELLANEOUS ONCE
Qty: 3 G | Refills: 0 | Status: SHIPPED | OUTPATIENT
Start: 2023-09-28 | End: 2023-09-27 | Stop reason: SDUPTHER

## 2023-09-27 RX ORDER — CEFTAZIDIME 100 %
1.5 POWDER (GRAM) MISCELLANEOUS DAILY
Qty: 27 G | Refills: 0 | Status: SHIPPED | OUTPATIENT
Start: 2023-09-29 | End: 2023-10-15

## 2023-09-27 RX ORDER — CEFTAZIDIME 100 %
3 POWDER (GRAM) MISCELLANEOUS ONCE
Qty: 3 G | Refills: 0 | Status: SHIPPED | OUTPATIENT
Start: 2023-09-28 | End: 2023-09-28

## 2023-09-27 RX ADMIN — SODIUM CHLORIDE, PRESERVATIVE FREE 2 ML: 5 INJECTION INTRAVENOUS at 08:56

## 2023-09-27 RX ADMIN — TRAMADOL HYDROCHLORIDE 50 MG: 50 TABLET, COATED ORAL at 18:40

## 2023-09-27 RX ADMIN — POTASSIUM CHLORIDE 20 MEQ: 1500 TABLET, EXTENDED RELEASE ORAL at 14:08

## 2023-09-27 RX ADMIN — CEFTRIAXONE SODIUM 2000 MG: 10 INJECTION, POWDER, FOR SOLUTION INTRAVENOUS at 08:55

## 2023-09-27 RX ADMIN — HEPARIN SODIUM 5000 UNITS: 5000 INJECTION INTRAVENOUS; SUBCUTANEOUS at 05:43

## 2023-09-27 RX ADMIN — PROCHLORPERAZINE MALEATE 5 MG: 5 TABLET ORAL at 16:29

## 2023-09-27 RX ADMIN — HEPARIN SODIUM 5000 UNITS: 5000 INJECTION INTRAVENOUS; SUBCUTANEOUS at 14:08

## 2023-09-27 RX ADMIN — SODIUM CHLORIDE, PRESERVATIVE FREE 2 ML: 5 INJECTION INTRAVENOUS at 21:15

## 2023-09-27 RX ADMIN — PROCHLORPERAZINE MALEATE 5 MG: 5 TABLET ORAL at 07:49

## 2023-09-27 RX ADMIN — HEPARIN SODIUM 5000 UNITS: 5000 INJECTION INTRAVENOUS; SUBCUTANEOUS at 21:12

## 2023-09-27 ASSESSMENT — PAIN SCALES - GENERAL
PAINLEVEL_OUTOF10: 0
PAINLEVEL_OUTOF10: 2
PAINLEVEL_OUTOF10: 0
PAINLEVEL_OUTOF10: 8

## 2023-09-28 LAB
BACTERIA BLD CULT: NORMAL
BACTERIA BLD CULT: NORMAL

## 2023-09-28 PROCEDURE — 10004651 HB RX, NO CHARGE ITEM: Performed by: FAMILY MEDICINE

## 2023-09-28 PROCEDURE — 10002800 HB RX 250 W HCPCS: Performed by: FAMILY MEDICINE

## 2023-09-28 PROCEDURE — 90945 DIALYSIS ONE EVALUATION: CPT

## 2023-09-28 PROCEDURE — 10006031 HB ROOM CHARGE TELEMETRY

## 2023-09-28 PROCEDURE — 10002800 HB RX 250 W HCPCS: Performed by: INTERNAL MEDICINE

## 2023-09-28 PROCEDURE — 96372 THER/PROPH/DIAG INJ SC/IM: CPT | Performed by: FAMILY MEDICINE

## 2023-09-28 RX ORDER — HYDROCORTISONE 10 MG/G
CREAM TOPICAL 2 TIMES DAILY PRN
Status: SHIPPED | COMMUNITY
Start: 2023-09-28

## 2023-09-28 RX ADMIN — SODIUM CHLORIDE, PRESERVATIVE FREE 2 ML: 5 INJECTION INTRAVENOUS at 08:36

## 2023-09-28 RX ADMIN — HEPARIN SODIUM 5000 UNITS: 5000 INJECTION INTRAVENOUS; SUBCUTANEOUS at 13:45

## 2023-09-28 RX ADMIN — HEPARIN SODIUM 5000 UNITS: 5000 INJECTION INTRAVENOUS; SUBCUTANEOUS at 21:17

## 2023-09-28 RX ADMIN — HEPARIN SODIUM 5000 UNITS: 5000 INJECTION INTRAVENOUS; SUBCUTANEOUS at 05:11

## 2023-09-28 RX ADMIN — SODIUM CHLORIDE, PRESERVATIVE FREE 2 ML: 5 INJECTION INTRAVENOUS at 21:18

## 2023-09-28 RX ADMIN — CEFTRIAXONE SODIUM 2000 MG: 10 INJECTION, POWDER, FOR SOLUTION INTRAVENOUS at 08:36

## 2023-09-28 ASSESSMENT — PAIN SCALES - GENERAL
PAINLEVEL_OUTOF10: 0
PAINLEVEL_OUTOF10: 0

## 2023-09-29 VITALS
DIASTOLIC BLOOD PRESSURE: 74 MMHG | OXYGEN SATURATION: 99 % | BODY MASS INDEX: 24.22 KG/M2 | TEMPERATURE: 97.9 F | SYSTOLIC BLOOD PRESSURE: 107 MMHG | RESPIRATION RATE: 16 BRPM | HEART RATE: 70 BPM | WEIGHT: 128.3 LBS | HEIGHT: 61 IN

## 2023-09-29 PROCEDURE — 96372 THER/PROPH/DIAG INJ SC/IM: CPT | Performed by: FAMILY MEDICINE

## 2023-09-29 PROCEDURE — 10002800 HB RX 250 W HCPCS: Performed by: FAMILY MEDICINE

## 2023-09-29 PROCEDURE — 10002800 HB RX 250 W HCPCS: Performed by: INTERNAL MEDICINE

## 2023-09-29 PROCEDURE — 10004651 HB RX, NO CHARGE ITEM: Performed by: FAMILY MEDICINE

## 2023-09-29 PROCEDURE — 10002803 HB RX 637: Performed by: FAMILY MEDICINE

## 2023-09-29 RX ADMIN — HEPARIN SODIUM 5000 UNITS: 5000 INJECTION INTRAVENOUS; SUBCUTANEOUS at 05:22

## 2023-09-29 RX ADMIN — CEFTRIAXONE SODIUM 2000 MG: 10 INJECTION, POWDER, FOR SOLUTION INTRAVENOUS at 09:46

## 2023-09-29 RX ADMIN — SODIUM CHLORIDE, PRESERVATIVE FREE 2 ML: 5 INJECTION INTRAVENOUS at 09:46

## 2023-09-29 RX ADMIN — TRAMADOL HYDROCHLORIDE 50 MG: 50 TABLET, COATED ORAL at 03:37

## 2023-09-29 ASSESSMENT — PAIN SCALES - GENERAL
PAINLEVEL_OUTOF10: 0
PAINLEVEL_OUTOF10: 0
PAINLEVEL_OUTOF10: 7

## 2023-11-18 ENCOUNTER — ANCILLARY PROCEDURE (OUTPATIENT)
Dept: LAB | Age: 53
End: 2023-11-18

## 2023-11-18 ENCOUNTER — LAB SERVICES (OUTPATIENT)
Dept: LAB | Age: 53
End: 2023-11-18

## 2023-11-18 ENCOUNTER — HOSPITAL ENCOUNTER (OUTPATIENT)
Dept: GENERAL RADIOLOGY | Age: 53
Discharge: HOME | End: 2023-11-18

## 2023-11-18 DIAGNOSIS — R11.0 CHRONIC NAUSEA: ICD-10-CM

## 2023-11-18 DIAGNOSIS — R11.0 NAUSEA: ICD-10-CM

## 2023-11-18 DIAGNOSIS — R07.9 LEFT-SIDED CHEST PAIN: ICD-10-CM

## 2023-11-18 DIAGNOSIS — R07.9 CHEST PAIN: ICD-10-CM

## 2023-11-18 DIAGNOSIS — R07.9 CHEST PAIN, UNSPECIFIED: Primary | ICD-10-CM

## 2023-11-18 DIAGNOSIS — R11.0 CHRONIC NAUSEA: Primary | ICD-10-CM

## 2023-11-18 LAB
ALBUMIN SERPL-MCNC: 2.4 G/DL (ref 3.6–5.1)
ALBUMIN/GLOB SERPL: 0.7 {RATIO} (ref 1–2.4)
ALP SERPL-CCNC: 301 UNITS/L (ref 45–117)
ALT SERPL-CCNC: 43 UNITS/L
ANION GAP SERPL CALC-SCNC: 13 MMOL/L (ref 7–19)
APPEARANCE UR: CLEAR
AST SERPL-CCNC: 40 UNITS/L
BASOPHILS # BLD: 0.1 K/MCL (ref 0–0.3)
BASOPHILS NFR BLD: 1 %
BILIRUB SERPL-MCNC: 0.4 MG/DL (ref 0.2–1)
BILIRUB UR QL STRIP: NEGATIVE
BUN SERPL-MCNC: 45 MG/DL (ref 6–20)
BUN/CREAT SERPL: 4 (ref 7–25)
CALCIUM SERPL-MCNC: 7.1 MG/DL (ref 8.4–10.2)
CHLORIDE SERPL-SCNC: 97 MMOL/L (ref 97–110)
CHOLEST SERPL-MCNC: 152 MG/DL
CHOLEST/HDLC SERPL: 3.5 {RATIO}
CO2 SERPL-SCNC: 30 MMOL/L (ref 21–32)
COLOR UR: COLORLESS
CREAT SERPL-MCNC: 10.1 MG/DL (ref 0.51–0.95)
DEPRECATED RDW RBC: 48.5 FL (ref 39–50)
EGFRCR SERPLBLD CKD-EPI 2021: 4 ML/MIN/{1.73_M2}
EOSINOPHIL # BLD: 0.1 K/MCL (ref 0–0.5)
EOSINOPHIL NFR BLD: 2 %
ERYTHROCYTE [DISTWIDTH] IN BLOOD: 14.3 % (ref 11–15)
FASTING DURATION TIME PATIENT: ABNORMAL H
GLOBULIN SER-MCNC: 3.3 G/DL (ref 2–4)
GLUCOSE SERPL-MCNC: 103 MG/DL (ref 70–99)
GLUCOSE UR STRIP-MCNC: >1000 MG/DL
HBA1C MFR BLD: 4.8 % (ref 4.5–5.6)
HCT VFR BLD CALC: 30.1 % (ref 36–46.5)
HDLC SERPL-MCNC: 44 MG/DL
HGB BLD-MCNC: 9.4 G/DL (ref 12–15.5)
HGB UR QL STRIP: ABNORMAL
IMM GRANULOCYTES # BLD AUTO: 0 K/MCL (ref 0–0.2)
IMM GRANULOCYTES # BLD: 0 %
KETONES UR STRIP-MCNC: NEGATIVE MG/DL
LDLC SERPL CALC-MCNC: 87 MG/DL
LEUKOCYTE ESTERASE UR QL STRIP: NEGATIVE
LYMPHOCYTES # BLD: 0.6 K/MCL (ref 1–4)
LYMPHOCYTES NFR BLD: 9 %
MCH RBC QN AUTO: 29.4 PG (ref 26–34)
MCHC RBC AUTO-ENTMCNC: 31.2 G/DL (ref 32–36.5)
MCV RBC AUTO: 94.1 FL (ref 78–100)
MONOCYTES # BLD: 0.4 K/MCL (ref 0.3–0.9)
MONOCYTES NFR BLD: 6 %
NEUTROPHILS # BLD: 5.5 K/MCL (ref 1.8–7.7)
NEUTROPHILS NFR BLD: 82 %
NITRITE UR QL STRIP: NEGATIVE
NONHDLC SERPL-MCNC: 108 MG/DL
NRBC BLD MANUAL-RTO: 0 /100 WBC
PH UR STRIP: 7.5 [PH] (ref 5–7)
PLATELET # BLD AUTO: 175 K/MCL (ref 140–450)
POTASSIUM SERPL-SCNC: 4.8 MMOL/L (ref 3.4–5.1)
PROT SERPL-MCNC: 5.7 G/DL (ref 6.4–8.2)
PROT UR STRIP-MCNC: 30 MG/DL
RBC # BLD: 3.2 MIL/MCL (ref 4–5.2)
SODIUM SERPL-SCNC: 135 MMOL/L (ref 135–145)
SP GR UR STRIP: 1.01 (ref 1–1.03)
T4 FREE SERPL-MCNC: 1.1 NG/DL (ref 0.8–1.5)
TRIGL SERPL-MCNC: 107 MG/DL
TSH SERPL-ACNC: 2.79 MCUNITS/ML (ref 0.35–5)
UROBILINOGEN UR STRIP-MCNC: 0.2 MG/DL
WBC # BLD: 6.7 K/MCL (ref 4.2–11)

## 2023-11-18 PROCEDURE — 36415 COLL VENOUS BLD VENIPUNCTURE: CPT

## 2023-11-18 PROCEDURE — 93005 ELECTROCARDIOGRAM TRACING: CPT

## 2023-11-18 PROCEDURE — 84439 ASSAY OF FREE THYROXINE: CPT

## 2023-11-18 PROCEDURE — 83036 HEMOGLOBIN GLYCOSYLATED A1C: CPT

## 2023-11-18 PROCEDURE — 84443 ASSAY THYROID STIM HORMONE: CPT

## 2023-11-18 PROCEDURE — 80061 LIPID PANEL: CPT

## 2023-11-18 PROCEDURE — 81003 URINALYSIS AUTO W/O SCOPE: CPT

## 2023-11-18 PROCEDURE — 71046 X-RAY EXAM CHEST 2 VIEWS: CPT

## 2023-11-18 PROCEDURE — 85025 COMPLETE CBC W/AUTO DIFF WBC: CPT

## 2023-11-18 PROCEDURE — 80053 COMPREHEN METABOLIC PANEL: CPT

## 2023-11-19 LAB
ATRIAL RATE (BPM): 90
P AXIS (DEGREES): 30
PR-INTERVAL (MSEC): 152
QRS-INTERVAL (MSEC): 94
QT-INTERVAL (MSEC): 446
QTC: 546
R AXIS (DEGREES): 28
REPORT TEXT: NORMAL
T AXIS (DEGREES): -160
VENTRICULAR RATE EKG/MIN (BPM): 90

## 2024-09-12 ENCOUNTER — HOSPITAL ENCOUNTER (INPATIENT)
Age: 54
LOS: 1 days | Discharge: HOME | DRG: 207 | End: 2024-09-14
Attending: STUDENT IN AN ORGANIZED HEALTH CARE EDUCATION/TRAINING PROGRAM | Admitting: STUDENT IN AN ORGANIZED HEALTH CARE EDUCATION/TRAINING PROGRAM

## 2024-09-12 DIAGNOSIS — R42 LIGHTHEADEDNESS: Primary | ICD-10-CM

## 2024-09-12 LAB
ALBUMIN SERPL-MCNC: 3.7 G/DL (ref 3.6–5.1)
ALBUMIN/GLOB SERPL: 0.9 {RATIO} (ref 1–2.4)
ALP SERPL-CCNC: 638 UNITS/L (ref 45–117)
ALT SERPL-CCNC: 57 UNITS/L
ANION GAP SERPL CALC-SCNC: 19 MMOL/L (ref 7–19)
AST SERPL-CCNC: 37 UNITS/L
ATRIAL RATE (BPM): 74
ATRIAL RATE (BPM): 77
BASOPHILS # BLD: 0.1 K/MCL (ref 0–0.3)
BASOPHILS NFR BLD: 1 %
BILIRUB SERPL-MCNC: 0.5 MG/DL (ref 0.2–1)
BUN SERPL-MCNC: 92 MG/DL (ref 6–20)
BUN/CREAT SERPL: 8 (ref 7–25)
CALCIUM SERPL-MCNC: 8.5 MG/DL (ref 8.4–10.2)
CHLORIDE SERPL-SCNC: 93 MMOL/L (ref 97–110)
CO2 SERPL-SCNC: 22 MMOL/L (ref 21–32)
CREAT SERPL-MCNC: 11.8 MG/DL (ref 0.51–0.95)
DEPRECATED RDW RBC: 57.5 FL (ref 39–50)
EGFRCR SERPLBLD CKD-EPI 2021: 4 ML/MIN/{1.73_M2}
EOSINOPHIL # BLD: 0.2 K/MCL (ref 0–0.5)
EOSINOPHIL NFR BLD: 3 %
ERYTHROCYTE [DISTWIDTH] IN BLOOD: 15.6 % (ref 11–15)
FASTING DURATION TIME PATIENT: ABNORMAL H
GLOBULIN SER-MCNC: 4 G/DL (ref 2–4)
GLUCOSE SERPL-MCNC: 111 MG/DL (ref 70–99)
HCT VFR BLD CALC: 28.3 % (ref 36–46.5)
HGB BLD-MCNC: 9.5 G/DL (ref 12–15.5)
IMM GRANULOCYTES # BLD AUTO: 0 K/MCL (ref 0–0.2)
IMM GRANULOCYTES # BLD: 1 %
LYMPHOCYTES # BLD: 1.2 K/MCL (ref 1–4)
LYMPHOCYTES NFR BLD: 20 %
MAGNESIUM SERPL-MCNC: 2.6 MG/DL (ref 1.7–2.4)
MCH RBC QN AUTO: 33.8 PG (ref 26–34)
MCHC RBC AUTO-ENTMCNC: 33.6 G/DL (ref 32–36.5)
MCV RBC AUTO: 100.7 FL (ref 78–100)
MONOCYTES # BLD: 0.5 K/MCL (ref 0.3–0.9)
MONOCYTES NFR BLD: 8 %
NEUTROPHILS # BLD: 4.1 K/MCL (ref 1.8–7.7)
NEUTROPHILS NFR BLD: 67 %
NRBC BLD MANUAL-RTO: 0 /100 WBC
P AXIS (DEGREES): -2
P AXIS (DEGREES): 3
PHOSPHATE SERPL-MCNC: 5.2 MG/DL (ref 2.4–4.7)
PLATELET # BLD AUTO: 143 K/MCL (ref 140–450)
POTASSIUM SERPL-SCNC: 4.3 MMOL/L (ref 3.4–5.1)
PR-INTERVAL (MSEC): 152
PR-INTERVAL (MSEC): 154
PROT SERPL-MCNC: 7.7 G/DL (ref 6.4–8.2)
QRS-INTERVAL (MSEC): 98
QRS-INTERVAL (MSEC): 98
QT-INTERVAL (MSEC): 438
QT-INTERVAL (MSEC): 452
QTC: 496
QTC: 501
R AXIS (DEGREES): -12
R AXIS (DEGREES): -5
RBC # BLD: 2.81 MIL/MCL (ref 4–5.2)
REPORT TEXT: NORMAL
REPORT TEXT: NORMAL
SODIUM SERPL-SCNC: 130 MMOL/L (ref 135–145)
T AXIS (DEGREES): 161
T AXIS (DEGREES): 169
TROPONIN I SERPL DL<=0.01 NG/ML-MCNC: 12 NG/L
VENTRICULAR RATE EKG/MIN (BPM): 74
VENTRICULAR RATE EKG/MIN (BPM): 77
WBC # BLD: 5.9 K/MCL (ref 4.2–11)

## 2024-09-12 PROCEDURE — 99284 EMERGENCY DEPT VISIT MOD MDM: CPT

## 2024-09-12 PROCEDURE — 10002807 HB RX 258: Performed by: STUDENT IN AN ORGANIZED HEALTH CARE EDUCATION/TRAINING PROGRAM

## 2024-09-12 PROCEDURE — 80053 COMPREHEN METABOLIC PANEL: CPT | Performed by: STUDENT IN AN ORGANIZED HEALTH CARE EDUCATION/TRAINING PROGRAM

## 2024-09-12 PROCEDURE — 84484 ASSAY OF TROPONIN QUANT: CPT | Performed by: STUDENT IN AN ORGANIZED HEALTH CARE EDUCATION/TRAINING PROGRAM

## 2024-09-12 PROCEDURE — 84100 ASSAY OF PHOSPHORUS: CPT | Performed by: STUDENT IN AN ORGANIZED HEALTH CARE EDUCATION/TRAINING PROGRAM

## 2024-09-12 PROCEDURE — 93005 ELECTROCARDIOGRAM TRACING: CPT | Performed by: STUDENT IN AN ORGANIZED HEALTH CARE EDUCATION/TRAINING PROGRAM

## 2024-09-12 PROCEDURE — 96360 HYDRATION IV INFUSION INIT: CPT

## 2024-09-12 PROCEDURE — 85025 COMPLETE CBC W/AUTO DIFF WBC: CPT | Performed by: STUDENT IN AN ORGANIZED HEALTH CARE EDUCATION/TRAINING PROGRAM

## 2024-09-12 PROCEDURE — G0378 HOSPITAL OBSERVATION PER HR: HCPCS

## 2024-09-12 PROCEDURE — 93005 ELECTROCARDIOGRAM TRACING: CPT | Performed by: EMERGENCY MEDICINE

## 2024-09-12 PROCEDURE — 83735 ASSAY OF MAGNESIUM: CPT | Performed by: STUDENT IN AN ORGANIZED HEALTH CARE EDUCATION/TRAINING PROGRAM

## 2024-09-12 RX ORDER — 0.9 % SODIUM CHLORIDE 0.9 %
2 VIAL (ML) INJECTION EVERY 12 HOURS SCHEDULED
Status: DISCONTINUED | OUTPATIENT
Start: 2024-09-12 | End: 2024-09-14 | Stop reason: HOSPADM

## 2024-09-12 RX ORDER — AMOXICILLIN 250 MG
2 CAPSULE ORAL 2 TIMES DAILY PRN
Status: DISCONTINUED | OUTPATIENT
Start: 2024-09-12 | End: 2024-09-14 | Stop reason: HOSPADM

## 2024-09-12 RX ORDER — SEVELAMER CARBONATE 800 MG/1
2400 TABLET, FILM COATED ORAL 2 TIMES DAILY WITH MEALS
Status: DISCONTINUED | OUTPATIENT
Start: 2024-09-13 | End: 2024-09-14 | Stop reason: HOSPADM

## 2024-09-12 RX ORDER — RIFAMPIN 300 MG/1
600 CAPSULE ORAL DAILY
COMMUNITY

## 2024-09-12 RX ORDER — METOPROLOL SUCCINATE 50 MG/1
50 TABLET, EXTENDED RELEASE ORAL DAILY
Status: DISCONTINUED | OUTPATIENT
Start: 2024-09-13 | End: 2024-09-14 | Stop reason: HOSPADM

## 2024-09-12 RX ORDER — METOPROLOL SUCCINATE 50 MG/1
50 TABLET, EXTENDED RELEASE ORAL DAILY
Status: ON HOLD | COMMUNITY
Start: 2024-09-04 | End: 2024-09-14 | Stop reason: HOSPADM

## 2024-09-12 RX ORDER — SACUBITRIL AND VALSARTAN 24; 26 MG/1; MG/1
1 TABLET, FILM COATED ORAL 2 TIMES DAILY
Status: ON HOLD | COMMUNITY
End: 2024-09-14 | Stop reason: HOSPADM

## 2024-09-12 RX ORDER — POLYETHYLENE GLYCOL 3350 17 G/17G
17 POWDER, FOR SOLUTION ORAL DAILY PRN
Status: DISCONTINUED | OUTPATIENT
Start: 2024-09-12 | End: 2024-09-14 | Stop reason: HOSPADM

## 2024-09-12 RX ORDER — ONDANSETRON 4 MG/1
4 TABLET, ORALLY DISINTEGRATING ORAL EVERY 12 HOURS PRN
Status: DISCONTINUED | OUTPATIENT
Start: 2024-09-12 | End: 2024-09-14 | Stop reason: HOSPADM

## 2024-09-12 RX ORDER — MIDODRINE HYDROCHLORIDE 5 MG/1
2.5 TABLET ORAL
Status: DISCONTINUED | OUTPATIENT
Start: 2024-09-13 | End: 2024-09-14 | Stop reason: HOSPADM

## 2024-09-12 RX ORDER — BISACODYL 10 MG
10 SUPPOSITORY, RECTAL RECTAL DAILY PRN
Status: DISCONTINUED | OUTPATIENT
Start: 2024-09-12 | End: 2024-09-14 | Stop reason: HOSPADM

## 2024-09-12 RX ORDER — RIFAMPIN 300 MG/1
600 CAPSULE ORAL DAILY
Status: DISCONTINUED | OUTPATIENT
Start: 2024-09-13 | End: 2024-09-14 | Stop reason: HOSPADM

## 2024-09-12 RX ORDER — ACETAMINOPHEN 650 MG/1
650 SUPPOSITORY RECTAL EVERY 4 HOURS PRN
Status: DISCONTINUED | OUTPATIENT
Start: 2024-09-12 | End: 2024-09-14 | Stop reason: HOSPADM

## 2024-09-12 RX ORDER — HEPARIN SODIUM 5000 [USP'U]/ML
5000 INJECTION, SOLUTION INTRAVENOUS; SUBCUTANEOUS EVERY 8 HOURS SCHEDULED
Status: DISCONTINUED | OUTPATIENT
Start: 2024-09-12 | End: 2024-09-14 | Stop reason: HOSPADM

## 2024-09-12 RX ORDER — ACETAMINOPHEN 325 MG/1
650 TABLET ORAL EVERY 4 HOURS PRN
Status: DISCONTINUED | OUTPATIENT
Start: 2024-09-12 | End: 2024-09-14 | Stop reason: HOSPADM

## 2024-09-12 RX ORDER — ONDANSETRON 2 MG/ML
4 INJECTION INTRAMUSCULAR; INTRAVENOUS EVERY 12 HOURS PRN
Status: DISCONTINUED | OUTPATIENT
Start: 2024-09-12 | End: 2024-09-14 | Stop reason: HOSPADM

## 2024-09-12 RX ADMIN — SODIUM CHLORIDE, SODIUM LACTATE, POTASSIUM CHLORIDE, AND CALCIUM CHLORIDE 500 ML: .6; .31; .03; .02 INJECTION, SOLUTION INTRAVENOUS at 15:19

## 2024-09-12 SDOH — SOCIAL STABILITY: SOCIAL INSECURITY: HOW OFTEN DOES ANYONE, INCLUDING FAMILY AND FRIENDS, INSULT OR TALK DOWN TO YOU?: NEVER

## 2024-09-12 SDOH — ECONOMIC STABILITY: GENERAL

## 2024-09-12 SDOH — ECONOMIC STABILITY: HOUSING INSECURITY: WHAT IS YOUR LIVING SITUATION TODAY?: APARTMENT

## 2024-09-12 SDOH — HEALTH STABILITY: GENERAL: BECAUSE OF A PHYSICAL, MENTAL, OR EMOTIONAL CONDITION, DO YOU HAVE DIFFICULTY DOING ERRANDS ALONE?: NO

## 2024-09-12 SDOH — ECONOMIC STABILITY: TRANSPORTATION INSECURITY
IN THE PAST 12 MONTHS, HAS LACK OF RELIABLE TRANSPORTATION KEPT YOU FROM MEDICAL APPOINTMENTS, MEETINGS, WORK OR FROM GETTING THINGS NEEDED FOR DAILY LIVING?: NO

## 2024-09-12 SDOH — SOCIAL STABILITY: SOCIAL NETWORK: SUPPORT SYSTEMS: FAMILY MEMBERS;FRIENDS

## 2024-09-12 SDOH — ECONOMIC STABILITY: HOUSING INSECURITY: WHAT IS YOUR LIVING SITUATION TODAY?: ADULT CHILDREN

## 2024-09-12 SDOH — ECONOMIC STABILITY: FOOD INSECURITY: WITHIN THE PAST 12 MONTHS, THE FOOD YOU BOUGHT JUST DIDN'T LAST AND YOU DIDN'T HAVE MONEY TO GET MORE.: NEVER TRUE

## 2024-09-12 SDOH — ECONOMIC STABILITY: INCOME INSECURITY: IN THE PAST 12 MONTHS, HAS THE ELECTRIC, GAS, OIL, OR WATER COMPANY THREATENED TO SHUT OFF SERVICE IN YOUR HOME?: NO

## 2024-09-12 SDOH — HEALTH STABILITY: PHYSICAL HEALTH: DO YOU HAVE DIFFICULTY DRESSING OR BATHING?: NO

## 2024-09-12 SDOH — SOCIAL STABILITY: SOCIAL INSECURITY: HOW OFTEN DOES ANYONE, INCLUDING FAMILY AND FRIENDS, THREATEN YOU WITH HARM?: NEVER

## 2024-09-12 SDOH — ECONOMIC STABILITY: GENERAL: WOULD YOU LIKE HELP WITH ANY OF THE FOLLOWING NEEDS?: I DON'T WANT HELP WITH ANY OF THESE

## 2024-09-12 SDOH — SOCIAL STABILITY: SOCIAL INSECURITY: HOW OFTEN DOES ANYONE, INCLUDING FAMILY AND FRIENDS, PHYSICALLY HURT YOU?: NEVER

## 2024-09-12 SDOH — HEALTH STABILITY: PHYSICAL HEALTH: DO YOU HAVE SERIOUS DIFFICULTY WALKING OR CLIMBING STAIRS?: NO

## 2024-09-12 SDOH — ECONOMIC STABILITY: HOUSING INSECURITY: WHAT IS YOUR LIVING SITUATION TODAY?: I HAVE A STEADY PLACE TO LIVE

## 2024-09-12 SDOH — ECONOMIC STABILITY: HOUSING INSECURITY: DO YOU HAVE PROBLEMS WITH ANY OF THE FOLLOWING?: PATIENT UNABLE TO ANSWER

## 2024-09-12 SDOH — SOCIAL STABILITY: SOCIAL INSECURITY: HOW OFTEN DOES ANYONE, INCLUDING FAMILY AND FRIENDS, SCREAM OR CURSE AT YOU?: NEVER

## 2024-09-12 ASSESSMENT — PATIENT HEALTH QUESTIONNAIRE - PHQ9
SUM OF ALL RESPONSES TO PHQ9 QUESTIONS 1 AND 2: 0
IS PATIENT ABLE TO COMPLETE PHQ2 OR PHQ9: YES
SUM OF ALL RESPONSES TO PHQ9 QUESTIONS 1 AND 2: 0
1. LITTLE INTEREST OR PLEASURE IN DOING THINGS: NOT AT ALL
CLINICAL INTERPRETATION OF PHQ2 SCORE: NO FURTHER SCREENING NEEDED
2. FEELING DOWN, DEPRESSED OR HOPELESS: NOT AT ALL

## 2024-09-12 ASSESSMENT — ACTIVITIES OF DAILY LIVING (ADL)
ADL_SCORE: 12
ADL_BEFORE_ADMISSION: INDEPENDENT
RECENT_DECLINE_ADL: YES, DECLINE IN BATHING/DRESSING/FEEDING, COLLABORATE WITH PROVIDER (T)
ADL_SHORT_OF_BREATH: NO

## 2024-09-12 ASSESSMENT — COLUMBIA-SUICIDE SEVERITY RATING SCALE - C-SSRS
2. HAVE YOU ACTUALLY HAD ANY THOUGHTS OF KILLING YOURSELF?: NO
IS THE PATIENT ABLE TO COMPLETE C-SSRS: YES
6. HAVE YOU EVER DONE ANYTHING, STARTED TO DO ANYTHING, OR PREPARED TO DO ANYTHING TO END YOUR LIFE?: NO
1. WITHIN THE PAST MONTH, HAVE YOU WISHED YOU WERE DEAD OR WISHED YOU COULD GO TO SLEEP AND NOT WAKE UP?: NO

## 2024-09-12 ASSESSMENT — LIFESTYLE VARIABLES
ALCOHOL_USE_STATUS: NO OR LOW RISK WITH VALIDATED TOOL
HOW MANY STANDARD DRINKS CONTAINING ALCOHOL DO YOU HAVE ON A TYPICAL DAY: 0,1 OR 2
HOW OFTEN DO YOU HAVE A DRINK CONTAINING ALCOHOL: NEVER
HOW OFTEN DO YOU HAVE 6 OR MORE DRINKS ON ONE OCCASION: NEVER
AUDIT-C TOTAL SCORE: 0

## 2024-09-12 ASSESSMENT — PAIN SCALES - GENERAL
PAINLEVEL_OUTOF10: 0
PAINLEVEL_OUTOF10: 0

## 2024-09-13 ENCOUNTER — APPOINTMENT (OUTPATIENT)
Dept: CARDIOLOGY | Age: 54
DRG: 207 | End: 2024-09-13
Attending: INTERNAL MEDICINE

## 2024-09-13 LAB
ALBUMIN SERPL-MCNC: 3.4 G/DL (ref 3.6–5.1)
ALBUMIN/GLOB SERPL: 1 {RATIO} (ref 1–2.4)
ALP SERPL-CCNC: 463 UNITS/L (ref 45–117)
ALT SERPL-CCNC: 50 UNITS/L
ANION GAP SERPL CALC-SCNC: 14 MMOL/L (ref 7–19)
AORTIC VALVE AREA (AVA): 0.64
AORTIC VALVE AREA: 2.15
ASCENDING AORTA (AAD): 8
AST SERPL-CCNC: 31 UNITS/L
AV MEAN GRADIENT (AVMG): 5
AV MEAN VELOCITY (AVMV): 0.91
AV PEAK GRADIENT (AVPG): 7
AV PEAK VELOCITY (AVPV): 1.34
AV STENOSIS SEVERITY TEXT: NORMAL
AVI LVOT PEAK GRADIENT (LVOTMG): 0.6
BASOPHILS # BLD: 0.1 K/MCL (ref 0–0.3)
BASOPHILS NFR BLD: 1 %
BILIRUB SERPL-MCNC: 0.6 MG/DL (ref 0.2–1)
BUN SERPL-MCNC: 108 MG/DL (ref 6–20)
BUN/CREAT SERPL: 8 (ref 7–25)
CALCIUM SERPL-MCNC: 8.2 MG/DL (ref 8.4–10.2)
CHLORIDE SERPL-SCNC: 95 MMOL/L (ref 97–110)
CO2 SERPL-SCNC: 25 MMOL/L (ref 21–32)
CREAT SERPL-MCNC: 13.9 MG/DL (ref 0.51–0.95)
DEPRECATED RDW RBC: 59.7 FL (ref 39–50)
E WAVE DECELARATION TIME (MDT): 11.27
EGFRCR SERPLBLD CKD-EPI 2021: 3 ML/MIN/{1.73_M2}
EOSINOPHIL # BLD: 0.2 K/MCL (ref 0–0.5)
EOSINOPHIL NFR BLD: 4 %
ERYTHROCYTE [DISTWIDTH] IN BLOOD: 15.9 % (ref 11–15)
FASTING DURATION TIME PATIENT: ABNORMAL H
GLOBULIN SER-MCNC: 3.4 G/DL (ref 2–4)
GLUCOSE SERPL-MCNC: 101 MG/DL (ref 70–99)
HCT VFR BLD CALC: 26.4 % (ref 36–46.5)
HGB BLD-MCNC: 8.7 G/DL (ref 12–15.5)
IMM GRANULOCYTES # BLD AUTO: 0 K/MCL (ref 0–0.2)
IMM GRANULOCYTES # BLD: 1 %
INTERVENTRICULAR SEPTUM IN END DIASTOLE (IVSD): 1.91
LEFT INTERNAL DIMENSION IN SYSTOLE (LVSD): 0.4
LEFT VENTRICULAR INTERNAL DIMENSION IN DIASTOLE (LVDD): 5.3
LEFT VENTRICULAR POSTERIOR WALL IN END DIASTOLE (LVPW): 6.3
LV EF: NORMAL %
LVOT 2D (LVOTD): 17.7
LVOT VTI (LVOTVTI): 0.89
LYMPHOCYTES # BLD: 1.3 K/MCL (ref 1–4)
LYMPHOCYTES NFR BLD: 26 %
MCH RBC QN AUTO: 34.1 PG (ref 26–34)
MCHC RBC AUTO-ENTMCNC: 33 G/DL (ref 32–36.5)
MCV RBC AUTO: 103.5 FL (ref 78–100)
MONOCYTES # BLD: 0.4 K/MCL (ref 0.3–0.9)
MONOCYTES NFR BLD: 9 %
MV E TISSUE VEL MED (MESV): 6.64
MV E WAVE VEL/E TISSUE VEL MED(MSR): 5.66
MV PEAK A VELOCITY (MVPAV): 191
MV PEAK E VELOCITY (MVPEV): 0.75
NEUTROPHILS # BLD: 2.9 K/MCL (ref 1.8–7.7)
NEUTROPHILS NFR BLD: 59 %
NRBC BLD MANUAL-RTO: 0 /100 WBC
PLATELET # BLD AUTO: 140 K/MCL (ref 140–450)
POTASSIUM SERPL-SCNC: 4.2 MMOL/L (ref 3.4–5.1)
PROT SERPL-MCNC: 6.8 G/DL (ref 6.4–8.2)
RAINBOW EXTRA TUBES HOLD SPECIMEN: NORMAL
RBC # BLD: 2.55 MIL/MCL (ref 4–5.2)
RV END SYSTOLIC LONGITUDINAL STRAIN FREE WALL (RVGS): 2.1
SODIUM SERPL-SCNC: 130 MMOL/L (ref 135–145)
TRICUSPID VALVE ANNULAR PEAK VELOCITY (TVAPV): 34
TRICUSPID VALVE PEAK REGURGITATION VELOCITY (TRPV): 2
TV ESTIMATED RIGHT ARTERIAL PRESSURE (RAP): 5.98
WBC # BLD: 4.8 K/MCL (ref 4.2–11)

## 2024-09-13 PROCEDURE — 80053 COMPREHEN METABOLIC PANEL: CPT | Performed by: STUDENT IN AN ORGANIZED HEALTH CARE EDUCATION/TRAINING PROGRAM

## 2024-09-13 PROCEDURE — 85025 COMPLETE CBC W/AUTO DIFF WBC: CPT | Performed by: STUDENT IN AN ORGANIZED HEALTH CARE EDUCATION/TRAINING PROGRAM

## 2024-09-13 PROCEDURE — 93306 TTE W/DOPPLER COMPLETE: CPT

## 2024-09-13 PROCEDURE — 10002800 HB RX 250 W HCPCS: Performed by: STUDENT IN AN ORGANIZED HEALTH CARE EDUCATION/TRAINING PROGRAM

## 2024-09-13 PROCEDURE — 10002803 HB RX 637: Performed by: INTERNAL MEDICINE

## 2024-09-13 PROCEDURE — 10002805 HB CONTRAST AGENT: Performed by: STUDENT IN AN ORGANIZED HEALTH CARE EDUCATION/TRAINING PROGRAM

## 2024-09-13 PROCEDURE — 10006031 HB ROOM CHARGE TELEMETRY

## 2024-09-13 PROCEDURE — 96372 THER/PROPH/DIAG INJ SC/IM: CPT | Performed by: STUDENT IN AN ORGANIZED HEALTH CARE EDUCATION/TRAINING PROGRAM

## 2024-09-13 PROCEDURE — 10004651 HB RX, NO CHARGE ITEM: Performed by: STUDENT IN AN ORGANIZED HEALTH CARE EDUCATION/TRAINING PROGRAM

## 2024-09-13 PROCEDURE — 93306 TTE W/DOPPLER COMPLETE: CPT | Performed by: INTERNAL MEDICINE

## 2024-09-13 PROCEDURE — 36415 COLL VENOUS BLD VENIPUNCTURE: CPT | Performed by: STUDENT IN AN ORGANIZED HEALTH CARE EDUCATION/TRAINING PROGRAM

## 2024-09-13 PROCEDURE — 10002803 HB RX 637: Performed by: STUDENT IN AN ORGANIZED HEALTH CARE EDUCATION/TRAINING PROGRAM

## 2024-09-13 PROCEDURE — 10002800 HB RX 250 W HCPCS: Performed by: INTERNAL MEDICINE

## 2024-09-13 PROCEDURE — G0378 HOSPITAL OBSERVATION PER HR: HCPCS

## 2024-09-13 PROCEDURE — 99255 IP/OBS CONSLTJ NEW/EST HI 80: CPT | Performed by: INTERNAL MEDICINE

## 2024-09-13 PROCEDURE — 96372 THER/PROPH/DIAG INJ SC/IM: CPT | Performed by: INTERNAL MEDICINE

## 2024-09-13 RX ADMIN — SODIUM CHLORIDE, PRESERVATIVE FREE 2 ML: 5 INJECTION INTRAVENOUS at 00:11

## 2024-09-13 RX ADMIN — SEVELAMER CARBONATE 2400 MG: 800 TABLET, FILM COATED ORAL at 18:05

## 2024-09-13 RX ADMIN — HEPARIN SODIUM 5000 UNITS: 5000 INJECTION INTRAVENOUS; SUBCUTANEOUS at 06:16

## 2024-09-13 RX ADMIN — MIDODRINE HYDROCHLORIDE 2.5 MG: 5 TABLET ORAL at 06:16

## 2024-09-13 RX ADMIN — SACUBITRIL AND VALSARTAN 0.5 TABLET: 24; 26 TABLET, FILM COATED ORAL at 14:56

## 2024-09-13 RX ADMIN — MIDODRINE HYDROCHLORIDE 2.5 MG: 5 TABLET ORAL at 16:13

## 2024-09-13 RX ADMIN — MIDODRINE HYDROCHLORIDE 2.5 MG: 5 TABLET ORAL at 11:13

## 2024-09-13 RX ADMIN — EPOETIN ALFA-EPBX 10000 UNITS: 10000 INJECTION, SOLUTION INTRAVENOUS; SUBCUTANEOUS at 16:15

## 2024-09-13 RX ADMIN — HEPARIN SODIUM 5000 UNITS: 5000 INJECTION INTRAVENOUS; SUBCUTANEOUS at 21:00

## 2024-09-13 RX ADMIN — PERFLUTREN 2 ML: 6.52 INJECTION, SUSPENSION INTRAVENOUS at 10:50

## 2024-09-13 RX ADMIN — HEPARIN SODIUM 5000 UNITS: 5000 INJECTION INTRAVENOUS; SUBCUTANEOUS at 00:06

## 2024-09-13 RX ADMIN — SODIUM CHLORIDE, PRESERVATIVE FREE 2 ML: 5 INJECTION INTRAVENOUS at 09:00

## 2024-09-13 RX ADMIN — SODIUM CHLORIDE, PRESERVATIVE FREE 2 ML: 5 INJECTION INTRAVENOUS at 20:57

## 2024-09-13 RX ADMIN — RIFAMPIN 600 MG: 300 CAPSULE ORAL at 09:00

## 2024-09-13 RX ADMIN — HEPARIN SODIUM 5000 UNITS: 5000 INJECTION INTRAVENOUS; SUBCUTANEOUS at 14:56

## 2024-09-13 RX ADMIN — SEVELAMER CARBONATE 2400 MG: 800 TABLET, FILM COATED ORAL at 09:00

## 2024-09-13 SDOH — ECONOMIC STABILITY: INCOME INSECURITY: IN THE PAST 12 MONTHS, HAS THE ELECTRIC, GAS, OIL, OR WATER COMPANY THREATENED TO SHUT OFF SERVICE IN YOUR HOME?: NO

## 2024-09-13 SDOH — ECONOMIC STABILITY: HOUSING INSECURITY: WHAT IS YOUR LIVING SITUATION TODAY?: I HAVE A STEADY PLACE TO LIVE

## 2024-09-13 SDOH — ECONOMIC STABILITY: HOUSING INSECURITY: WHAT IS YOUR LIVING SITUATION TODAY?: APARTMENT

## 2024-09-13 SDOH — SOCIAL STABILITY: SOCIAL NETWORK: SUPPORT SYSTEMS: FAMILY MEMBERS;FRIENDS

## 2024-09-13 SDOH — ECONOMIC STABILITY: HOUSING INSECURITY: WHAT IS YOUR LIVING SITUATION TODAY?: ADULT CHILDREN

## 2024-09-13 SDOH — ECONOMIC STABILITY: HOUSING INSECURITY: DO YOU HAVE PROBLEMS WITH ANY OF THE FOLLOWING?: NONE OF THE ABOVE

## 2024-09-13 ASSESSMENT — ENCOUNTER SYMPTOMS
RESPIRATORY NEGATIVE: 1
LIGHT-HEADEDNESS: 1
HEMATOLOGIC/LYMPHATIC NEGATIVE: 1
GASTROINTESTINAL NEGATIVE: 1
ACTIVITY CHANGE: 1
PSYCHIATRIC NEGATIVE: 1
EYES NEGATIVE: 1
ENDOCRINE NEGATIVE: 1

## 2024-09-13 ASSESSMENT — PAIN SCALES - GENERAL
PAINLEVEL_OUTOF10: 0

## 2024-09-14 VITALS
HEIGHT: 66 IN | WEIGHT: 139.77 LBS | OXYGEN SATURATION: 100 % | TEMPERATURE: 98.1 F | HEART RATE: 63 BPM | DIASTOLIC BLOOD PRESSURE: 61 MMHG | SYSTOLIC BLOOD PRESSURE: 102 MMHG | RESPIRATION RATE: 16 BRPM | BODY MASS INDEX: 22.46 KG/M2

## 2024-09-14 LAB
ALBUMIN SERPL-MCNC: 3.1 G/DL (ref 3.6–5.1)
ALBUMIN/GLOB SERPL: 1 {RATIO} (ref 1–2.4)
ALP SERPL-CCNC: 398 UNITS/L (ref 45–117)
ALT SERPL-CCNC: 50 UNITS/L
ANION GAP SERPL CALC-SCNC: 18 MMOL/L (ref 7–19)
APPEARANCE FLD: CLEAR
AST SERPL-CCNC: 28 UNITS/L
BASOPHILS # BLD: 0 K/MCL (ref 0–0.3)
BASOPHILS NFR BLD: 1 %
BILIRUB SERPL-MCNC: 0.5 MG/DL (ref 0.2–1)
BUN SERPL-MCNC: 101 MG/DL (ref 6–20)
BUN/CREAT SERPL: 7 (ref 7–25)
CALCIUM SERPL-MCNC: 7.8 MG/DL (ref 8.4–10.2)
CHLORIDE SERPL-SCNC: 94 MMOL/L (ref 97–110)
CO2 SERPL-SCNC: 23 MMOL/L (ref 21–32)
COLOR FLD: YELLOW
CREAT SERPL-MCNC: 13.5 MG/DL (ref 0.51–0.95)
DEPRECATED RDW RBC: 57.5 FL (ref 39–50)
EGFRCR SERPLBLD CKD-EPI 2021: 3 ML/MIN/{1.73_M2}
EOSINOPHIL # BLD: 0.2 K/MCL (ref 0–0.5)
EOSINOPHIL NFR BLD: 4 %
ERYTHROCYTE [DISTWIDTH] IN BLOOD: 16.2 % (ref 11–15)
FASTING DURATION TIME PATIENT: ABNORMAL H
GLOBULIN SER-MCNC: 3.2 G/DL (ref 2–4)
GLUCOSE SERPL-MCNC: 105 MG/DL (ref 70–99)
HCT VFR BLD CALC: 24 % (ref 36–46.5)
HGB BLD-MCNC: 8 G/DL (ref 12–15.5)
IMM GRANULOCYTES # BLD AUTO: 0 K/MCL (ref 0–0.2)
IMM GRANULOCYTES # BLD: 0 %
LYMPHOCYTES # BLD: 1 K/MCL (ref 1–4)
LYMPHOCYTES NFR BLD: 28 %
MCH RBC QN AUTO: 34.2 PG (ref 26–34)
MCHC RBC AUTO-ENTMCNC: 33.3 G/DL (ref 32–36.5)
MCV RBC AUTO: 102.6 FL (ref 78–100)
MONOCYTES # BLD: 0.3 K/MCL (ref 0.3–0.9)
MONOCYTES NFR BLD: 8 %
NEUTROPHILS # BLD: 2.1 K/MCL (ref 1.8–7.7)
NEUTROPHILS NFR BLD: 59 %
NRBC BLD MANUAL-RTO: 0 /100 WBC
PLATELET # BLD AUTO: 106 K/MCL (ref 140–450)
POTASSIUM SERPL-SCNC: 4.2 MMOL/L (ref 3.4–5.1)
PROT SERPL-MCNC: 6.3 G/DL (ref 6.4–8.2)
RBC # BLD: 2.34 MIL/MCL (ref 4–5.2)
SODIUM SERPL-SCNC: 131 MMOL/L (ref 135–145)
WBC # BLD: 3.6 K/MCL (ref 4.2–11)
WBC # FLD: 1 /MCL (ref 0–1000)

## 2024-09-14 PROCEDURE — 99233 SBSQ HOSP IP/OBS HIGH 50: CPT | Performed by: NURSE PRACTITIONER

## 2024-09-14 PROCEDURE — 96372 THER/PROPH/DIAG INJ SC/IM: CPT | Performed by: STUDENT IN AN ORGANIZED HEALTH CARE EDUCATION/TRAINING PROGRAM

## 2024-09-14 PROCEDURE — 3E1M39Z IRRIGATION OF PERITONEAL CAVITY USING DIALYSATE, PERCUTANEOUS APPROACH: ICD-10-PCS | Performed by: INTERNAL MEDICINE

## 2024-09-14 PROCEDURE — 10002800 HB RX 250 W HCPCS: Performed by: STUDENT IN AN ORGANIZED HEALTH CARE EDUCATION/TRAINING PROGRAM

## 2024-09-14 PROCEDURE — 10002803 HB RX 637: Performed by: INTERNAL MEDICINE

## 2024-09-14 PROCEDURE — 36415 COLL VENOUS BLD VENIPUNCTURE: CPT | Performed by: STUDENT IN AN ORGANIZED HEALTH CARE EDUCATION/TRAINING PROGRAM

## 2024-09-14 PROCEDURE — 80053 COMPREHEN METABOLIC PANEL: CPT | Performed by: STUDENT IN AN ORGANIZED HEALTH CARE EDUCATION/TRAINING PROGRAM

## 2024-09-14 PROCEDURE — 10002803 HB RX 637: Performed by: STUDENT IN AN ORGANIZED HEALTH CARE EDUCATION/TRAINING PROGRAM

## 2024-09-14 PROCEDURE — 85025 COMPLETE CBC W/AUTO DIFF WBC: CPT | Performed by: STUDENT IN AN ORGANIZED HEALTH CARE EDUCATION/TRAINING PROGRAM

## 2024-09-14 PROCEDURE — 10004651 HB RX, NO CHARGE ITEM: Performed by: STUDENT IN AN ORGANIZED HEALTH CARE EDUCATION/TRAINING PROGRAM

## 2024-09-14 PROCEDURE — 89051 BODY FLUID CELL COUNT: CPT | Performed by: INTERNAL MEDICINE

## 2024-09-14 RX ORDER — MIDODRINE HYDROCHLORIDE 2.5 MG/1
2.5 TABLET ORAL
Qty: 90 TABLET | Refills: 0 | Status: SHIPPED | OUTPATIENT
Start: 2024-09-14

## 2024-09-14 RX ORDER — SACUBITRIL AND VALSARTAN 24; 26 MG/1; MG/1
0.5 TABLET, FILM COATED ORAL 2 TIMES DAILY
Status: SHIPPED | COMMUNITY
Start: 2024-09-14

## 2024-09-14 RX ADMIN — SODIUM CHLORIDE, PRESERVATIVE FREE 2 ML: 5 INJECTION INTRAVENOUS at 08:50

## 2024-09-14 RX ADMIN — SEVELAMER CARBONATE 2400 MG: 800 TABLET, FILM COATED ORAL at 08:48

## 2024-09-14 RX ADMIN — RIFAMPIN 600 MG: 300 CAPSULE ORAL at 08:49

## 2024-09-14 RX ADMIN — MIDODRINE HYDROCHLORIDE 2.5 MG: 5 TABLET ORAL at 05:28

## 2024-09-14 RX ADMIN — HEPARIN SODIUM 5000 UNITS: 5000 INJECTION INTRAVENOUS; SUBCUTANEOUS at 05:30

## 2024-09-14 RX ADMIN — MIDODRINE HYDROCHLORIDE 2.5 MG: 5 TABLET ORAL at 11:19

## 2024-09-14 RX ADMIN — SACUBITRIL AND VALSARTAN 0.5 TABLET: 24; 26 TABLET, FILM COATED ORAL at 08:49

## 2024-09-14 ASSESSMENT — PAIN SCALES - GENERAL: PAINLEVEL_OUTOF10: 0

## 2024-09-17 ENCOUNTER — TELEPHONE (OUTPATIENT)
Dept: CARE COORDINATION | Age: 54
End: 2024-09-17

## 2024-09-24 ENCOUNTER — TELEPHONE (OUTPATIENT)
Dept: CARE COORDINATION | Age: 54
End: 2024-09-24

## 2024-10-01 ENCOUNTER — TELEPHONE (OUTPATIENT)
Dept: CARE COORDINATION | Age: 54
End: 2024-10-01

## 2024-10-14 ENCOUNTER — HOSPITAL ENCOUNTER (INPATIENT)
Age: 54
Discharge: HOME | DRG: 721 | End: 2024-10-14
Attending: EMERGENCY MEDICINE | Admitting: EMERGENCY MEDICINE

## 2024-10-14 ENCOUNTER — APPOINTMENT (OUTPATIENT)
Dept: ULTRASOUND IMAGING | Age: 54
DRG: 721 | End: 2024-10-14
Attending: EMERGENCY MEDICINE

## 2024-10-14 DIAGNOSIS — A41.9 SEPSIS, DUE TO UNSPECIFIED ORGANISM, UNSPECIFIED WHETHER ACUTE ORGAN DYSFUNCTION PRESENT  (CMD): ICD-10-CM

## 2024-10-14 DIAGNOSIS — T85.71XA: Primary | ICD-10-CM

## 2024-10-14 LAB
ALBUMIN SERPL-MCNC: 3.9 G/DL (ref 3.4–5)
ALBUMIN/GLOB SERPL: 0.8 {RATIO} (ref 1–2.4)
ALP SERPL-CCNC: 518 UNITS/L (ref 45–117)
ALT SERPL-CCNC: 55 UNITS/L
ANION GAP SERPL CALC-SCNC: 23 MMOL/L (ref 7–19)
APTT PPP: 25 SEC (ref 22–32)
AST SERPL-CCNC: 47 UNITS/L
BASOPHILS # BLD: 0.1 K/MCL (ref 0–0.3)
BASOPHILS NFR BLD: 1 %
BILIRUB SERPL-MCNC: 0.7 MG/DL (ref 0.2–1)
BUN SERPL-MCNC: 79 MG/DL (ref 6–20)
BUN/CREAT SERPL: 6 (ref 7–25)
CALCIUM SERPL-MCNC: 8.6 MG/DL (ref 8.4–10.2)
CHLORIDE SERPL-SCNC: 92 MMOL/L (ref 97–110)
CO2 SERPL-SCNC: 20 MMOL/L (ref 21–32)
CREAT SERPL-MCNC: 12.3 MG/DL (ref 0.51–0.95)
DEPRECATED RDW RBC: 68.7 FL (ref 39–50)
EGFRCR SERPLBLD CKD-EPI 2021: 3 ML/MIN/{1.73_M2}
EOSINOPHIL # BLD: 0.1 K/MCL (ref 0–0.5)
EOSINOPHIL NFR BLD: 1 %
ERYTHROCYTE [DISTWIDTH] IN BLOOD: 17.2 % (ref 11–15)
FASTING DURATION TIME PATIENT: ABNORMAL H
GLOBULIN SER-MCNC: 4.7 G/DL (ref 2–4)
GLUCOSE SERPL-MCNC: 130 MG/DL (ref 70–99)
HCT VFR BLD CALC: 31.3 % (ref 36–46.5)
HGB BLD-MCNC: 10.1 G/DL (ref 12–15.5)
IMM GRANULOCYTES # BLD AUTO: 0 K/MCL (ref 0–0.2)
IMM GRANULOCYTES # BLD: 0 %
INR PPP: 1
LIPASE SERPL-CCNC: 366 UNITS/L (ref 15–77)
LYMPHOCYTES # BLD: 0.9 K/MCL (ref 1–4)
LYMPHOCYTES NFR BLD: 9 %
MAGNESIUM SERPL-MCNC: 2.6 MG/DL (ref 1.7–2.4)
MCH RBC QN AUTO: 34.7 PG (ref 26–34)
MCHC RBC AUTO-ENTMCNC: 32.3 G/DL (ref 32–36.5)
MCV RBC AUTO: 107.6 FL (ref 78–100)
MONOCYTES # BLD: 0.5 K/MCL (ref 0.3–0.9)
MONOCYTES NFR BLD: 5 %
NEUTROPHILS # BLD: 7.9 K/MCL (ref 1.8–7.7)
NEUTROPHILS NFR BLD: 84 %
NRBC BLD MANUAL-RTO: 0 /100 WBC
PLATELET # BLD AUTO: 162 K/MCL (ref 140–450)
POTASSIUM SERPL-SCNC: 4.7 MMOL/L (ref 3.4–5.1)
PROT SERPL-MCNC: 8.6 G/DL (ref 6.4–8.2)
PROTHROMBIN TIME: 10.7 SEC (ref 9.7–11.8)
RBC # BLD: 2.91 MIL/MCL (ref 4–5.2)
SODIUM SERPL-SCNC: 130 MMOL/L (ref 135–145)
WBC # BLD: 9.5 K/MCL (ref 4.2–11)

## 2024-10-14 PROCEDURE — 83690 ASSAY OF LIPASE: CPT | Performed by: EMERGENCY MEDICINE

## 2024-10-14 PROCEDURE — 10002800 HB RX 250 W HCPCS: Performed by: EMERGENCY MEDICINE

## 2024-10-14 PROCEDURE — 84703 CHORIONIC GONADOTROPIN ASSAY: CPT | Performed by: EMERGENCY MEDICINE

## 2024-10-14 PROCEDURE — 36415 COLL VENOUS BLD VENIPUNCTURE: CPT

## 2024-10-14 PROCEDURE — 83735 ASSAY OF MAGNESIUM: CPT | Performed by: EMERGENCY MEDICINE

## 2024-10-14 PROCEDURE — 96374 THER/PROPH/DIAG INJ IV PUSH: CPT

## 2024-10-14 PROCEDURE — 76705 ECHO EXAM OF ABDOMEN: CPT

## 2024-10-14 PROCEDURE — 99285 EMERGENCY DEPT VISIT HI MDM: CPT

## 2024-10-14 PROCEDURE — 85730 THROMBOPLASTIN TIME PARTIAL: CPT | Performed by: EMERGENCY MEDICINE

## 2024-10-14 PROCEDURE — 80053 COMPREHEN METABOLIC PANEL: CPT | Performed by: EMERGENCY MEDICINE

## 2024-10-14 PROCEDURE — 85025 COMPLETE CBC W/AUTO DIFF WBC: CPT | Performed by: EMERGENCY MEDICINE

## 2024-10-14 PROCEDURE — 96375 TX/PRO/DX INJ NEW DRUG ADDON: CPT

## 2024-10-14 PROCEDURE — 84702 CHORIONIC GONADOTROPIN TEST: CPT | Performed by: EMERGENCY MEDICINE

## 2024-10-14 PROCEDURE — 85610 PROTHROMBIN TIME: CPT | Performed by: EMERGENCY MEDICINE

## 2024-10-14 RX ORDER — ONDANSETRON 2 MG/ML
4 INJECTION INTRAMUSCULAR; INTRAVENOUS ONCE
Status: DISCONTINUED | OUTPATIENT
Start: 2024-10-14 | End: 2024-10-14 | Stop reason: ALTCHOICE

## 2024-10-14 RX ADMIN — MORPHINE SULFATE 4 MG: 4 INJECTION INTRAVENOUS at 22:53

## 2024-10-14 RX ADMIN — ONDANSETRON 4 MG: 2 INJECTION INTRAMUSCULAR; INTRAVENOUS at 22:49

## 2024-10-14 ASSESSMENT — PAIN DESCRIPTION - PAIN TYPE: TYPE: ACUTE PAIN

## 2024-10-14 ASSESSMENT — PAIN SCALES - GENERAL: PAINLEVEL_OUTOF10: 9

## 2024-10-15 ENCOUNTER — APPOINTMENT (OUTPATIENT)
Dept: CT IMAGING | Age: 54
DRG: 721 | End: 2024-10-15
Attending: EMERGENCY MEDICINE

## 2024-10-15 LAB
ALBUMIN SERPL-MCNC: 2.9 G/DL (ref 3.4–5)
ALBUMIN/GLOB SERPL: 0.8 {RATIO} (ref 1–2.4)
ALP SERPL-CCNC: 376 UNITS/L (ref 45–117)
ALT SERPL-CCNC: 41 UNITS/L
ANION GAP SERPL CALC-SCNC: 19 MMOL/L (ref 7–19)
APPEARANCE FLD: ABNORMAL
AST SERPL-CCNC: 26 UNITS/L
BASOPHILS # BLD: 0 K/MCL (ref 0–0.3)
BASOPHILS NFR BLD: 0 %
BILIRUB SERPL-MCNC: 0.7 MG/DL (ref 0.2–1)
BUN SERPL-MCNC: 79 MG/DL (ref 6–20)
BUN/CREAT SERPL: 6 (ref 7–25)
CALCIUM SERPL-MCNC: 7.4 MG/DL (ref 8.4–10.2)
CHLORIDE SERPL-SCNC: 97 MMOL/L (ref 97–110)
CO2 SERPL-SCNC: 21 MMOL/L (ref 21–32)
COLOR FLD: YELLOW
CREAT SERPL-MCNC: 12.5 MG/DL (ref 0.51–0.95)
DEPRECATED RDW RBC: 70.1 FL (ref 39–50)
EGFRCR SERPLBLD CKD-EPI 2021: 3 ML/MIN/{1.73_M2}
EOSINOPHIL # BLD: 0.1 K/MCL (ref 0–0.5)
EOSINOPHIL NFR BLD: 1 %
ERYTHROCYTE [DISTWIDTH] IN BLOOD: 17.2 % (ref 11–15)
FASTING DURATION TIME PATIENT: ABNORMAL H
GLOBULIN SER-MCNC: 3.8 G/DL (ref 2–4)
GLUCOSE BLDC GLUCOMTR-MCNC: 103 MG/DL (ref 70–99)
GLUCOSE SERPL-MCNC: 122 MG/DL (ref 70–99)
HCG SERPL QL: POSITIVE
HCG SERPL-ACNC: 7 MUNITS/ML
HCT VFR BLD CALC: 24.5 % (ref 36–46.5)
HGB BLD-MCNC: 7.9 G/DL (ref 12–15.5)
IMM GRANULOCYTES # BLD AUTO: 0.1 K/MCL (ref 0–0.2)
IMM GRANULOCYTES # BLD: 1 %
INR PPP: 1.1
LACTATE BLDV-SCNC: 2.4 MMOL/L (ref 0–2)
LACTATE BLDV-SCNC: 2.4 MMOL/L (ref 0–2)
LYMPHOCYTES # BLD: 0.9 K/MCL (ref 1–4)
LYMPHOCYTES NFR BLD: 9 %
LYMPHOCYTES NFR FLD: 1 %
MAGNESIUM SERPL-MCNC: 2.5 MG/DL (ref 1.7–2.4)
MCH RBC QN AUTO: 35.6 PG (ref 26–34)
MCHC RBC AUTO-ENTMCNC: 32.2 G/DL (ref 32–36.5)
MCV RBC AUTO: 110.4 FL (ref 78–100)
MONOCYTES # BLD: 0.7 K/MCL (ref 0.3–0.9)
MONOCYTES NFR BLD: 7 %
MONOCYTES NFR FLD: 4 %
NEUTROPHILS # BLD: 7.7 K/MCL (ref 1.8–7.7)
NEUTROPHILS NFR BLD: 82 %
NEUTS SEG NFR FLD: 95 %
NRBC BLD MANUAL-RTO: 0 /100 WBC
PHOSPHATE SERPL-MCNC: 5.6 MG/DL (ref 2.4–4.7)
PLATELET # BLD AUTO: 125 K/MCL (ref 140–450)
POTASSIUM SERPL-SCNC: 5.5 MMOL/L (ref 3.4–5.1)
PROT SERPL-MCNC: 6.7 G/DL (ref 6.4–8.2)
PROTHROMBIN TIME: 11.3 SEC (ref 9.7–11.8)
RAINBOW EXTRA TUBES HOLD SPECIMEN: NORMAL
RBC # BLD: 2.22 MIL/MCL (ref 4–5.2)
SODIUM SERPL-SCNC: 131 MMOL/L (ref 135–145)
TOTAL CELLS COUNTED FLD: 100
WBC # BLD: 9.4 K/MCL (ref 4.2–11)
WBC # FLD: 9680 /MCL (ref 0–1000)

## 2024-10-15 PROCEDURE — 99223 1ST HOSP IP/OBS HIGH 75: CPT | Performed by: INTERNAL MEDICINE

## 2024-10-15 PROCEDURE — 36415 COLL VENOUS BLD VENIPUNCTURE: CPT | Performed by: INTERNAL MEDICINE

## 2024-10-15 PROCEDURE — 87040 BLOOD CULTURE FOR BACTERIA: CPT | Performed by: EMERGENCY MEDICINE

## 2024-10-15 PROCEDURE — 10002803 HB RX 637: Performed by: INTERNAL MEDICINE

## 2024-10-15 PROCEDURE — 10004651 HB RX, NO CHARGE ITEM: Performed by: INTERNAL MEDICINE

## 2024-10-15 PROCEDURE — 96372 THER/PROPH/DIAG INJ SC/IM: CPT | Performed by: INTERNAL MEDICINE

## 2024-10-15 PROCEDURE — 10004651 HB RX, NO CHARGE ITEM

## 2024-10-15 PROCEDURE — 10000008 HB ROOM CHARGE ICU OR CCU

## 2024-10-15 PROCEDURE — 84100 ASSAY OF PHOSPHORUS: CPT | Performed by: INTERNAL MEDICINE

## 2024-10-15 PROCEDURE — 10002801 HB RX 250 W/O HCPCS: Performed by: INTERNAL MEDICINE

## 2024-10-15 PROCEDURE — 10002800 HB RX 250 W HCPCS: Performed by: INTERNAL MEDICINE

## 2024-10-15 PROCEDURE — 87205 SMEAR GRAM STAIN: CPT | Performed by: EMERGENCY MEDICINE

## 2024-10-15 PROCEDURE — 82728 ASSAY OF FERRITIN: CPT | Performed by: INTERNAL MEDICINE

## 2024-10-15 PROCEDURE — 83550 IRON BINDING TEST: CPT | Performed by: INTERNAL MEDICINE

## 2024-10-15 PROCEDURE — 85610 PROTHROMBIN TIME: CPT | Performed by: INTERNAL MEDICINE

## 2024-10-15 PROCEDURE — 10002803 HB RX 637: Performed by: EMERGENCY MEDICINE

## 2024-10-15 PROCEDURE — 10002807 HB RX 258: Performed by: EMERGENCY MEDICINE

## 2024-10-15 PROCEDURE — 83735 ASSAY OF MAGNESIUM: CPT | Performed by: INTERNAL MEDICINE

## 2024-10-15 PROCEDURE — 83605 ASSAY OF LACTIC ACID: CPT | Performed by: INTERNAL MEDICINE

## 2024-10-15 PROCEDURE — 89051 BODY FLUID CELL COUNT: CPT | Performed by: EMERGENCY MEDICINE

## 2024-10-15 PROCEDURE — 74176 CT ABD & PELVIS W/O CONTRAST: CPT

## 2024-10-15 PROCEDURE — 80053 COMPREHEN METABOLIC PANEL: CPT | Performed by: INTERNAL MEDICINE

## 2024-10-15 PROCEDURE — 85025 COMPLETE CBC W/AUTO DIFF WBC: CPT | Performed by: INTERNAL MEDICINE

## 2024-10-15 PROCEDURE — 82962 GLUCOSE BLOOD TEST: CPT

## 2024-10-15 PROCEDURE — 90945 DIALYSIS ONE EVALUATION: CPT

## 2024-10-15 PROCEDURE — 83605 ASSAY OF LACTIC ACID: CPT | Performed by: EMERGENCY MEDICINE

## 2024-10-15 PROCEDURE — 10002800 HB RX 250 W HCPCS: Performed by: EMERGENCY MEDICINE

## 2024-10-15 RX ORDER — BISACODYL 10 MG
10 SUPPOSITORY, RECTAL RECTAL DAILY PRN
Status: CANCELLED | OUTPATIENT
Start: 2024-10-15

## 2024-10-15 RX ORDER — HEPARIN SODIUM 5000 [USP'U]/ML
5000 INJECTION, SOLUTION INTRAVENOUS; SUBCUTANEOUS EVERY 8 HOURS SCHEDULED
Status: CANCELLED | OUTPATIENT
Start: 2024-10-15

## 2024-10-15 RX ORDER — SODIUM CHLORIDE 9 MG/ML
INJECTION, SOLUTION INTRAVENOUS CONTINUOUS PRN
Status: DISCONTINUED | OUTPATIENT
Start: 2024-10-15 | End: 2024-10-16

## 2024-10-15 RX ORDER — ACETAMINOPHEN 325 MG/1
650 TABLET ORAL EVERY 4 HOURS PRN
Status: CANCELLED | OUTPATIENT
Start: 2024-10-15

## 2024-10-15 RX ORDER — MIDODRINE HYDROCHLORIDE 5 MG/1
2.5 TABLET ORAL ONCE
Status: DISCONTINUED | OUTPATIENT
Start: 2024-10-15 | End: 2024-10-15

## 2024-10-15 RX ORDER — 0.9 % SODIUM CHLORIDE 0.9 %
2 VIAL (ML) INJECTION EVERY 12 HOURS SCHEDULED
Status: DISCONTINUED | OUTPATIENT
Start: 2024-10-15 | End: 2024-10-21 | Stop reason: HOSPADM

## 2024-10-15 RX ORDER — CALCIUM CARBONATE 500 MG/1
500 TABLET, CHEWABLE ORAL EVERY 4 HOURS PRN
Status: CANCELLED | OUTPATIENT
Start: 2024-10-15

## 2024-10-15 RX ORDER — ONDANSETRON 2 MG/ML
4 INJECTION INTRAMUSCULAR; INTRAVENOUS EVERY 6 HOURS PRN
Status: CANCELLED | OUTPATIENT
Start: 2024-10-15

## 2024-10-15 RX ORDER — MIDODRINE HYDROCHLORIDE 5 MG/1
5 TABLET ORAL ONCE
Status: DISCONTINUED | OUTPATIENT
Start: 2024-10-15 | End: 2024-10-15 | Stop reason: ALTCHOICE

## 2024-10-15 RX ORDER — HEPARIN SODIUM 5000 [USP'U]/ML
5000 INJECTION, SOLUTION INTRAVENOUS; SUBCUTANEOUS EVERY 8 HOURS SCHEDULED
Status: DISCONTINUED | OUTPATIENT
Start: 2024-10-15 | End: 2024-10-21 | Stop reason: HOSPADM

## 2024-10-15 RX ORDER — 0.9 % SODIUM CHLORIDE 0.9 %
2 VIAL (ML) INJECTION EVERY 12 HOURS SCHEDULED
Status: CANCELLED | OUTPATIENT
Start: 2024-10-15

## 2024-10-15 RX ORDER — NOREPINEPHRINE BITARTRATE/D5W 8 MG/250ML
0-30 PLASTIC BAG, INJECTION (ML) INTRAVENOUS CONTINUOUS
Status: DISCONTINUED | OUTPATIENT
Start: 2024-10-15 | End: 2024-10-15

## 2024-10-15 RX ORDER — ACETAMINOPHEN 325 MG/1
TABLET ORAL
Status: COMPLETED
Start: 2024-10-15 | End: 2024-10-15

## 2024-10-15 RX ORDER — CEFAZOLIN SODIUM/WATER 2 G/20 ML
2000 SYRINGE (ML) INTRAVENOUS ONCE
Status: DISCONTINUED | OUTPATIENT
Start: 2024-10-15 | End: 2024-10-15 | Stop reason: ALTCHOICE

## 2024-10-15 RX ORDER — NOREPINEPHRINE BITARTRATE/D5W 8 MG/250ML
0-30 PLASTIC BAG, INJECTION (ML) INTRAVENOUS CONTINUOUS
Status: DISCONTINUED | OUTPATIENT
Start: 2024-10-15 | End: 2024-10-16

## 2024-10-15 RX ORDER — MIDODRINE HYDROCHLORIDE 5 MG/1
10 TABLET ORAL EVERY 8 HOURS SCHEDULED
Status: DISCONTINUED | OUTPATIENT
Start: 2024-10-15 | End: 2024-10-21 | Stop reason: HOSPADM

## 2024-10-15 RX ORDER — ONDANSETRON 4 MG/1
4 TABLET, ORALLY DISINTEGRATING ORAL EVERY 12 HOURS PRN
Status: CANCELLED | OUTPATIENT
Start: 2024-10-15

## 2024-10-15 RX ORDER — ONDANSETRON 2 MG/ML
INJECTION INTRAMUSCULAR; INTRAVENOUS
Status: DISPENSED
Start: 2024-10-15 | End: 2024-10-16

## 2024-10-15 RX ORDER — MIDODRINE HYDROCHLORIDE 5 MG/1
2.5 TABLET ORAL
Status: DISCONTINUED | OUTPATIENT
Start: 2024-10-15 | End: 2024-10-15

## 2024-10-15 RX ORDER — ACETAMINOPHEN 325 MG/1
650 TABLET ORAL EVERY 4 HOURS PRN
Status: DISCONTINUED | OUTPATIENT
Start: 2024-10-15 | End: 2024-10-21 | Stop reason: HOSPADM

## 2024-10-15 RX ORDER — AMOXICILLIN 250 MG
2 CAPSULE ORAL 2 TIMES DAILY PRN
Status: CANCELLED | OUTPATIENT
Start: 2024-10-15

## 2024-10-15 RX ORDER — ACETAMINOPHEN 650 MG/1
650 SUPPOSITORY RECTAL EVERY 4 HOURS PRN
Status: CANCELLED | OUTPATIENT
Start: 2024-10-15

## 2024-10-15 RX ORDER — 0.9 % SODIUM CHLORIDE 0.9 %
10 VIAL (ML) INJECTION PRN
Status: CANCELLED | OUTPATIENT
Start: 2024-10-15

## 2024-10-15 RX ORDER — RIFAMPIN 300 MG/1
600 CAPSULE ORAL DAILY
Status: DISCONTINUED | OUTPATIENT
Start: 2024-10-15 | End: 2024-10-17

## 2024-10-15 RX ORDER — LANOLIN ALCOHOL/MO/W.PET/CERES
3 CREAM (GRAM) TOPICAL NIGHTLY PRN
Status: CANCELLED | OUTPATIENT
Start: 2024-10-15

## 2024-10-15 RX ORDER — MIDODRINE HYDROCHLORIDE 5 MG/1
5 TABLET ORAL
Status: ON HOLD | COMMUNITY
End: 2024-10-21 | Stop reason: HOSPADM

## 2024-10-15 RX ORDER — POLYETHYLENE GLYCOL 3350 17 G/17G
17 POWDER, FOR SOLUTION ORAL DAILY PRN
Status: CANCELLED | OUTPATIENT
Start: 2024-10-15

## 2024-10-15 RX ORDER — SEVELAMER CARBONATE 800 MG/1
2400 TABLET, FILM COATED ORAL 2 TIMES DAILY WITH MEALS
Status: DISCONTINUED | OUTPATIENT
Start: 2024-10-15 | End: 2024-10-21 | Stop reason: HOSPADM

## 2024-10-15 RX ORDER — AMLODIPINE BESYLATE 2.5 MG/1
2.5 TABLET ORAL DAILY
Status: ON HOLD | COMMUNITY
End: 2024-10-21 | Stop reason: HOSPADM

## 2024-10-15 RX ORDER — 0.9 % SODIUM CHLORIDE 0.9 %
10 VIAL (ML) INJECTION PRN
Status: DISCONTINUED | OUTPATIENT
Start: 2024-10-15 | End: 2024-10-21 | Stop reason: HOSPADM

## 2024-10-15 RX ORDER — CEFAZOLIN SODIUM/WATER 2 G/20 ML
2000 SYRINGE (ML) INTRAVENOUS EVERY 24 HOURS
Status: DISCONTINUED | OUTPATIENT
Start: 2024-10-16 | End: 2024-10-16

## 2024-10-15 RX ORDER — BISACODYL 5 MG/1
5 TABLET, DELAYED RELEASE ORAL DAILY PRN
COMMUNITY

## 2024-10-15 RX ADMIN — SODIUM CHLORIDE, PRESERVATIVE FREE 2 ML: 5 INJECTION INTRAVENOUS at 20:19

## 2024-10-15 RX ADMIN — EPOETIN ALFA-EPBX 10000 UNITS: 10000 INJECTION, SOLUTION INTRAVENOUS; SUBCUTANEOUS at 14:43

## 2024-10-15 RX ADMIN — RIFAMPIN 600 MG: 300 CAPSULE ORAL at 14:41

## 2024-10-15 RX ADMIN — SODIUM CHLORIDE, PRESERVATIVE FREE 2 ML: 5 INJECTION INTRAVENOUS at 08:11

## 2024-10-15 RX ADMIN — VANCOMYCIN HYDROCHLORIDE 1000 MG: 1 INJECTION, POWDER, LYOPHILIZED, FOR SOLUTION INTRAVENOUS at 03:05

## 2024-10-15 RX ADMIN — CEFTRIAXONE SODIUM 2000 MG: 10 INJECTION, POWDER, FOR SOLUTION INTRAVENOUS at 02:41

## 2024-10-15 RX ADMIN — MIDODRINE HYDROCHLORIDE 10 MG: 5 TABLET ORAL at 14:41

## 2024-10-15 RX ADMIN — MIDODRINE HYDROCHLORIDE 2.5 MG: 5 TABLET ORAL at 08:10

## 2024-10-15 RX ADMIN — NOREPINEPHRINE BITARTRATE 2 MCG/MIN: 8 INJECTION, SOLUTION INTRAVENOUS at 05:16

## 2024-10-15 RX ADMIN — HEPARIN SODIUM 5000 UNITS: 5000 INJECTION INTRAVENOUS; SUBCUTANEOUS at 21:25

## 2024-10-15 RX ADMIN — SODIUM CHLORIDE 500 ML: 9 INJECTION, SOLUTION INTRAVENOUS at 03:29

## 2024-10-15 RX ADMIN — MIDODRINE HYDROCHLORIDE 5 MG: 5 TABLET ORAL at 01:02

## 2024-10-15 RX ADMIN — HEPARIN SODIUM 5000 UNITS: 5000 INJECTION INTRAVENOUS; SUBCUTANEOUS at 14:43

## 2024-10-15 RX ADMIN — ACETAMINOPHEN 650 MG: 325 TABLET ORAL at 17:30

## 2024-10-15 RX ADMIN — SODIUM CHLORIDE, PRESERVATIVE FREE 10 ML: 5 INJECTION INTRAVENOUS at 08:10

## 2024-10-15 RX ADMIN — SODIUM ZIRCONIUM CYCLOSILICATE 10 G: 10 POWDER, FOR SUSPENSION ORAL at 12:53

## 2024-10-15 RX ADMIN — SODIUM CHLORIDE 500 ML: 9 INJECTION, SOLUTION INTRAVENOUS at 02:22

## 2024-10-15 RX ADMIN — MIDODRINE HYDROCHLORIDE 10 MG: 5 TABLET ORAL at 21:25

## 2024-10-15 RX ADMIN — MIDODRINE HYDROCHLORIDE 5 MG: 5 TABLET ORAL at 02:41

## 2024-10-15 SDOH — ECONOMIC STABILITY: FOOD INSECURITY: WITHIN THE PAST 12 MONTHS, THE FOOD YOU BOUGHT JUST DIDN'T LAST AND YOU DIDN'T HAVE MONEY TO GET MORE.: NEVER TRUE

## 2024-10-15 SDOH — ECONOMIC STABILITY: GENERAL: WOULD YOU LIKE HELP WITH ANY OF THE FOLLOWING NEEDS?: I DON'T WANT HELP WITH ANY OF THESE

## 2024-10-15 SDOH — ECONOMIC STABILITY: HOUSING INSECURITY: DO YOU HAVE PROBLEMS WITH ANY OF THE FOLLOWING?: NONE OF THE ABOVE

## 2024-10-15 SDOH — HEALTH STABILITY: PHYSICAL HEALTH: DO YOU HAVE DIFFICULTY DRESSING OR BATHING?: NO

## 2024-10-15 SDOH — ECONOMIC STABILITY: GENERAL

## 2024-10-15 SDOH — SOCIAL STABILITY: SOCIAL INSECURITY: HOW OFTEN DOES ANYONE, INCLUDING FAMILY AND FRIENDS, THREATEN YOU WITH HARM?: NEVER

## 2024-10-15 SDOH — ECONOMIC STABILITY: HOUSING INSECURITY: WHAT IS YOUR LIVING SITUATION TODAY?: I HAVE A STEADY PLACE TO LIVE

## 2024-10-15 SDOH — SOCIAL STABILITY: SOCIAL INSECURITY: HOW OFTEN DOES ANYONE, INCLUDING FAMILY AND FRIENDS, PHYSICALLY HURT YOU?: NEVER

## 2024-10-15 SDOH — SOCIAL STABILITY: SOCIAL INSECURITY: HOW OFTEN DOES ANYONE, INCLUDING FAMILY AND FRIENDS, INSULT OR TALK DOWN TO YOU?: NEVER

## 2024-10-15 SDOH — SOCIAL STABILITY: SOCIAL INSECURITY: HOW OFTEN DOES ANYONE, INCLUDING FAMILY AND FRIENDS, SCREAM OR CURSE AT YOU?: NEVER

## 2024-10-15 SDOH — HEALTH STABILITY: PHYSICAL HEALTH: DO YOU HAVE SERIOUS DIFFICULTY WALKING OR CLIMBING STAIRS?: NO

## 2024-10-15 SDOH — ECONOMIC STABILITY: INCOME INSECURITY: IN THE PAST 12 MONTHS, HAS THE ELECTRIC, GAS, OIL, OR WATER COMPANY THREATENED TO SHUT OFF SERVICE IN YOUR HOME?: NO

## 2024-10-15 SDOH — HEALTH STABILITY: GENERAL: BECAUSE OF A PHYSICAL, MENTAL, OR EMOTIONAL CONDITION, DO YOU HAVE DIFFICULTY DOING ERRANDS ALONE?: NO

## 2024-10-15 SDOH — ECONOMIC STABILITY: HOUSING INSECURITY: WHAT IS YOUR LIVING SITUATION TODAY?: HOUSE

## 2024-10-15 ASSESSMENT — PAIN SCALES - GENERAL
PAINLEVEL_OUTOF10: 2
PAINLEVEL_OUTOF10: 2
PAINLEVEL_OUTOF10: 0
PAINLEVEL_OUTOF10: 3
PAINLEVEL_OUTOF10: 4
PAINLEVEL_OUTOF10: 2

## 2024-10-15 ASSESSMENT — PATIENT HEALTH QUESTIONNAIRE - PHQ9
SUM OF ALL RESPONSES TO PHQ9 QUESTIONS 1 AND 2: 0
SUM OF ALL RESPONSES TO PHQ9 QUESTIONS 1 AND 2: 0
CLINICAL INTERPRETATION OF PHQ2 SCORE: NO FURTHER SCREENING NEEDED
IS PATIENT ABLE TO COMPLETE PHQ2 OR PHQ9: YES
2. FEELING DOWN, DEPRESSED OR HOPELESS: NOT AT ALL
1. LITTLE INTEREST OR PLEASURE IN DOING THINGS: NOT AT ALL

## 2024-10-15 ASSESSMENT — LIFESTYLE VARIABLES
HOW OFTEN DO YOU HAVE 6 OR MORE DRINKS ON ONE OCCASION: NEVER
HOW MANY STANDARD DRINKS CONTAINING ALCOHOL DO YOU HAVE ON A TYPICAL DAY: 0,1 OR 2
ALCOHOL_USE_STATUS: NO OR LOW RISK WITH VALIDATED TOOL
AUDIT-C TOTAL SCORE: 0
HOW OFTEN DO YOU HAVE A DRINK CONTAINING ALCOHOL: NEVER

## 2024-10-15 ASSESSMENT — ACTIVITIES OF DAILY LIVING (ADL)
ADL_SCORE: 12
ADL_BEFORE_ADMISSION: INDEPENDENT
ADL_SHORT_OF_BREATH: NO
RECENT_DECLINE_ADL: NO

## 2024-10-15 ASSESSMENT — ENCOUNTER SYMPTOMS: ABDOMINAL PAIN: 1

## 2024-10-16 LAB
ABO + RH BLD: NORMAL
ALBUMIN SERPL-MCNC: 2.4 G/DL (ref 3.4–5)
ALBUMIN/GLOB SERPL: 0.6 {RATIO} (ref 1–2.4)
ALP SERPL-CCNC: 270 UNITS/L (ref 45–117)
ALT SERPL-CCNC: 30 UNITS/L
ANION GAP SERPL CALC-SCNC: 17 MMOL/L (ref 7–19)
APPEARANCE FLD: ABNORMAL
AST SERPL-CCNC: 15 UNITS/L
BASOPHILS # BLD: 0 K/MCL (ref 0–0.3)
BASOPHILS NFR BLD: 0 %
BILIRUB SERPL-MCNC: 0.8 MG/DL (ref 0.2–1)
BLD GP AB SCN SERPL QL GEL: NEGATIVE
BLOOD EXPIRATION DATE: NORMAL
BUN SERPL-MCNC: 77 MG/DL (ref 6–20)
BUN/CREAT SERPL: 6 (ref 7–25)
CALCIUM SERPL-MCNC: 8.1 MG/DL (ref 8.4–10.2)
CHLORIDE SERPL-SCNC: 96 MMOL/L (ref 97–110)
CO2 SERPL-SCNC: 22 MMOL/L (ref 21–32)
COLOR FLD: YELLOW
CREAT SERPL-MCNC: 11.9 MG/DL (ref 0.51–0.95)
CROSSMATCH RESULT: NORMAL
DEPRECATED RDW RBC: 65.1 FL (ref 39–50)
DISPENSE STATUS: NORMAL
EGFRCR SERPLBLD CKD-EPI 2021: 3 ML/MIN/{1.73_M2}
EOSINOPHIL # BLD: 0.1 K/MCL (ref 0–0.5)
EOSINOPHIL NFR BLD: 1 %
ERYTHROCYTE [DISTWIDTH] IN BLOOD: 16.7 % (ref 11–15)
FASTING DURATION TIME PATIENT: ABNORMAL H
FERRITIN SERPL-MCNC: 1077 NG/ML (ref 8–252)
GLOBULIN SER-MCNC: 3.7 G/DL (ref 2–4)
GLUCOSE SERPL-MCNC: 116 MG/DL (ref 70–99)
HCT VFR BLD CALC: 21 % (ref 36–46.5)
HCT VFR BLD CALC: 23.3 % (ref 36–46.5)
HGB BLD-MCNC: 6.9 G/DL (ref 12–15.5)
HGB BLD-MCNC: 7.8 G/DL (ref 12–15.5)
IMM GRANULOCYTES # BLD AUTO: 0 K/MCL (ref 0–0.2)
IMM GRANULOCYTES # BLD: 0 %
IRON SATN MFR SERPL: 29 % (ref 15–45)
IRON SERPL-MCNC: 47 MCG/DL (ref 50–170)
ISBT BLOOD TYPE: 9500
ISSUE DATE/TIME: NORMAL
LYMPHOCYTES # BLD: 0.8 K/MCL (ref 1–4)
LYMPHOCYTES NFR BLD: 13 %
LYMPHOCYTES NFR FLD: 3 %
MCH RBC QN AUTO: 34.7 PG (ref 26–34)
MCHC RBC AUTO-ENTMCNC: 32.9 G/DL (ref 32–36.5)
MCV RBC AUTO: 105.5 FL (ref 78–100)
MESOTHL CELL NFR FLD: 1 %
MONOCYTES # BLD: 0.4 K/MCL (ref 0.3–0.9)
MONOCYTES NFR BLD: 7 %
MONOCYTES NFR FLD: 2 %
NEUTROPHILS # BLD: 4.7 K/MCL (ref 1.8–7.7)
NEUTROPHILS NFR BLD: 79 %
NEUTS SEG NFR FLD: 94 %
NRBC BLD MANUAL-RTO: 0 /100 WBC
PLATELET # BLD AUTO: 104 K/MCL (ref 140–450)
POTASSIUM SERPL-SCNC: 4.1 MMOL/L (ref 3.4–5.1)
PRODUCT CODE: NORMAL
PRODUCT DESCRIPTION: NORMAL
PRODUCT ID: NORMAL
PROT SERPL-MCNC: 6.1 G/DL (ref 6.4–8.2)
RBC # BLD: 1.99 MIL/MCL (ref 4–5.2)
SODIUM SERPL-SCNC: 131 MMOL/L (ref 135–145)
TIBC SERPL-MCNC: 160 MCG/DL (ref 250–450)
TOTAL CELLS COUNTED FLD: 100
TYPE AND SCREEN EXPIRATION DATE: NORMAL
UNIT BLOOD TYPE: NORMAL
UNIT NUMBER: NORMAL
WBC # BLD: 6 K/MCL (ref 4.2–11)
WBC # FLD: 2168 /MCL (ref 0–1000)

## 2024-10-16 PROCEDURE — 96372 THER/PROPH/DIAG INJ SC/IM: CPT | Performed by: INTERNAL MEDICINE

## 2024-10-16 PROCEDURE — 10002800 HB RX 250 W HCPCS: Performed by: INTERNAL MEDICINE

## 2024-10-16 PROCEDURE — 10002803 HB RX 637: Performed by: INTERNAL MEDICINE

## 2024-10-16 PROCEDURE — 86901 BLOOD TYPING SEROLOGIC RH(D): CPT | Performed by: INTERNAL MEDICINE

## 2024-10-16 PROCEDURE — 10002807 HB RX 258

## 2024-10-16 PROCEDURE — 89051 BODY FLUID CELL COUNT: CPT | Performed by: INTERNAL MEDICINE

## 2024-10-16 PROCEDURE — 85014 HEMATOCRIT: CPT | Performed by: INTERNAL MEDICINE

## 2024-10-16 PROCEDURE — 85025 COMPLETE CBC W/AUTO DIFF WBC: CPT | Performed by: INTERNAL MEDICINE

## 2024-10-16 PROCEDURE — P9016 RBC LEUKOCYTES REDUCED: HCPCS

## 2024-10-16 PROCEDURE — 86923 COMPATIBILITY TEST ELECTRIC: CPT

## 2024-10-16 PROCEDURE — 10004651 HB RX, NO CHARGE ITEM: Performed by: INTERNAL MEDICINE

## 2024-10-16 PROCEDURE — 99233 SBSQ HOSP IP/OBS HIGH 50: CPT | Performed by: INTERNAL MEDICINE

## 2024-10-16 PROCEDURE — 90945 DIALYSIS ONE EVALUATION: CPT

## 2024-10-16 PROCEDURE — 36415 COLL VENOUS BLD VENIPUNCTURE: CPT | Performed by: INTERNAL MEDICINE

## 2024-10-16 PROCEDURE — 80053 COMPREHEN METABOLIC PANEL: CPT | Performed by: INTERNAL MEDICINE

## 2024-10-16 PROCEDURE — 10002807 HB RX 258: Performed by: INTERNAL MEDICINE

## 2024-10-16 PROCEDURE — 10006031 HB ROOM CHARGE TELEMETRY

## 2024-10-16 RX ORDER — SODIUM CHLORIDE 9 MG/ML
INJECTION, SOLUTION INTRAVENOUS
Status: COMPLETED
Start: 2024-10-16 | End: 2024-10-16

## 2024-10-16 RX ORDER — DIPHENHYDRAMINE HCL 25 MG
25 CAPSULE ORAL ONCE
Status: DISCONTINUED | OUTPATIENT
Start: 2024-10-16 | End: 2024-10-21 | Stop reason: HOSPADM

## 2024-10-16 RX ORDER — SODIUM CHLORIDE 9 MG/ML
INJECTION, SOLUTION INTRAVENOUS CONTINUOUS PRN
Status: DISCONTINUED | OUTPATIENT
Start: 2024-10-16 | End: 2024-10-16

## 2024-10-16 RX ADMIN — HEPARIN SODIUM 5000 UNITS: 5000 INJECTION INTRAVENOUS; SUBCUTANEOUS at 06:15

## 2024-10-16 RX ADMIN — RIFAMPIN 600 MG: 300 CAPSULE ORAL at 08:19

## 2024-10-16 RX ADMIN — MIDODRINE HYDROCHLORIDE 10 MG: 5 TABLET ORAL at 06:14

## 2024-10-16 RX ADMIN — MIDODRINE HYDROCHLORIDE 10 MG: 5 TABLET ORAL at 15:00

## 2024-10-16 RX ADMIN — SODIUM CHLORIDE, PRESERVATIVE FREE 2 ML: 5 INJECTION INTRAVENOUS at 22:00

## 2024-10-16 RX ADMIN — SODIUM CHLORIDE: 900 INJECTION INTRAVENOUS at 12:30

## 2024-10-16 RX ADMIN — SEVELAMER CARBONATE 2400 MG: 800 TABLET, FILM COATED ORAL at 08:18

## 2024-10-16 RX ADMIN — SEVELAMER CARBONATE 2400 MG: 800 TABLET, FILM COATED ORAL at 17:29

## 2024-10-16 RX ADMIN — MIDODRINE HYDROCHLORIDE 10 MG: 5 TABLET ORAL at 22:04

## 2024-10-16 RX ADMIN — HEPARIN SODIUM 5000 UNITS: 5000 INJECTION INTRAVENOUS; SUBCUTANEOUS at 21:58

## 2024-10-16 RX ADMIN — ACETAMINOPHEN 650 MG: 325 TABLET ORAL at 08:19

## 2024-10-16 RX ADMIN — PIPERACILLIN SODIUM AND TAZOBACTAM SODIUM 4.5 G: 4; .5 INJECTION, POWDER, FOR SOLUTION INTRAVENOUS at 15:45

## 2024-10-16 RX ADMIN — SODIUM CHLORIDE, PRESERVATIVE FREE 2 ML: 5 INJECTION INTRAVENOUS at 08:20

## 2024-10-16 RX ADMIN — CEFTRIAXONE SODIUM 2000 MG: 10 INJECTION, POWDER, FOR SOLUTION INTRAVENOUS at 02:00

## 2024-10-16 ASSESSMENT — PAIN SCALES - GENERAL
PAINLEVEL_OUTOF10: 0
PAINLEVEL_OUTOF10: 0
PAINLEVEL_OUTOF10: 6
PAINLEVEL_OUTOF10: 2
PAINLEVEL_OUTOF10: 2
PAINLEVEL_OUTOF10: 0

## 2024-10-16 ASSESSMENT — PATIENT HEALTH QUESTIONNAIRE - PHQ9: IS PATIENT ABLE TO COMPLETE PHQ2 OR PHQ9: YES

## 2024-10-17 LAB
ANION GAP SERPL CALC-SCNC: 16 MMOL/L (ref 7–19)
APPEARANCE FLD: ABNORMAL
BASOPHILS # BLD: 0.1 K/MCL (ref 0–0.3)
BASOPHILS NFR BLD: 1 %
BUN SERPL-MCNC: 66 MG/DL (ref 6–20)
BUN/CREAT SERPL: 6 (ref 7–25)
CALCIUM SERPL-MCNC: 8.2 MG/DL (ref 8.4–10.2)
CHLORIDE SERPL-SCNC: 95 MMOL/L (ref 97–110)
CO2 SERPL-SCNC: 25 MMOL/L (ref 21–32)
COLOR FLD: YELLOW
CREAT SERPL-MCNC: 10.3 MG/DL (ref 0.51–0.95)
DEPRECATED RDW RBC: 72.6 FL (ref 39–50)
EGFRCR SERPLBLD CKD-EPI 2021: 4 ML/MIN/{1.73_M2}
EOSINOPHIL # BLD: 0.2 K/MCL (ref 0–0.5)
EOSINOPHIL NFR BLD: 4 %
EOSINOPHIL NFR FLD: 2 %
ERYTHROCYTE [DISTWIDTH] IN BLOOD: 20.1 % (ref 11–15)
FASTING DURATION TIME PATIENT: ABNORMAL H
GLUCOSE SERPL-MCNC: 149 MG/DL (ref 70–99)
HCT VFR BLD CALC: 24.1 % (ref 36–46.5)
HGB BLD-MCNC: 8 G/DL (ref 12–15.5)
IMM GRANULOCYTES # BLD AUTO: 0 K/MCL (ref 0–0.2)
IMM GRANULOCYTES # BLD: 0 %
LYMPHOCYTES # BLD: 0.5 K/MCL (ref 1–4)
LYMPHOCYTES NFR BLD: 12 %
LYMPHOCYTES NFR FLD: 1 %
MCH RBC QN AUTO: 32.5 PG (ref 26–34)
MCHC RBC AUTO-ENTMCNC: 33.2 G/DL (ref 32–36.5)
MCV RBC AUTO: 98 FL (ref 78–100)
MONOCYTES # BLD: 0.4 K/MCL (ref 0.3–0.9)
MONOCYTES NFR BLD: 9 %
MONOCYTES NFR FLD: 2 %
NEUTROPHILS # BLD: 3.4 K/MCL (ref 1.8–7.7)
NEUTROPHILS NFR BLD: 74 %
NEUTS SEG NFR FLD: 95 %
NRBC BLD MANUAL-RTO: 0 /100 WBC
PLATELET # BLD AUTO: 106 K/MCL (ref 140–450)
POTASSIUM SERPL-SCNC: 3.7 MMOL/L (ref 3.4–5.1)
RBC # BLD: 2.46 MIL/MCL (ref 4–5.2)
SODIUM SERPL-SCNC: 132 MMOL/L (ref 135–145)
TOTAL CELLS COUNTED FLD: 100
WBC # BLD: 4.5 K/MCL (ref 4.2–11)
WBC # FLD: 3272 /MCL (ref 0–1000)

## 2024-10-17 PROCEDURE — 90945 DIALYSIS ONE EVALUATION: CPT

## 2024-10-17 PROCEDURE — 10002807 HB RX 258: Performed by: INTERNAL MEDICINE

## 2024-10-17 PROCEDURE — 10002800 HB RX 250 W HCPCS: Performed by: INTERNAL MEDICINE

## 2024-10-17 PROCEDURE — 10006031 HB ROOM CHARGE TELEMETRY

## 2024-10-17 PROCEDURE — 36415 COLL VENOUS BLD VENIPUNCTURE: CPT | Performed by: INTERNAL MEDICINE

## 2024-10-17 PROCEDURE — 96372 THER/PROPH/DIAG INJ SC/IM: CPT | Performed by: INTERNAL MEDICINE

## 2024-10-17 PROCEDURE — 85025 COMPLETE CBC W/AUTO DIFF WBC: CPT | Performed by: INTERNAL MEDICINE

## 2024-10-17 PROCEDURE — 80048 BASIC METABOLIC PNL TOTAL CA: CPT | Performed by: INTERNAL MEDICINE

## 2024-10-17 PROCEDURE — 89051 BODY FLUID CELL COUNT: CPT | Performed by: INTERNAL MEDICINE

## 2024-10-17 PROCEDURE — 10002803 HB RX 637: Performed by: INTERNAL MEDICINE

## 2024-10-17 PROCEDURE — 3E1M39Z IRRIGATION OF PERITONEAL CAVITY USING DIALYSATE, PERCUTANEOUS APPROACH: ICD-10-PCS | Performed by: INTERNAL MEDICINE

## 2024-10-17 PROCEDURE — 99233 SBSQ HOSP IP/OBS HIGH 50: CPT | Performed by: INTERNAL MEDICINE

## 2024-10-17 PROCEDURE — 10004651 HB RX, NO CHARGE ITEM: Performed by: INTERNAL MEDICINE

## 2024-10-17 RX ADMIN — RIFAMPIN 600 MG: 300 CAPSULE ORAL at 08:16

## 2024-10-17 RX ADMIN — HEPARIN SODIUM 5000 UNITS: 5000 INJECTION INTRAVENOUS; SUBCUTANEOUS at 14:18

## 2024-10-17 RX ADMIN — HEPARIN SODIUM 5000 UNITS: 5000 INJECTION INTRAVENOUS; SUBCUTANEOUS at 21:40

## 2024-10-17 RX ADMIN — MIDODRINE HYDROCHLORIDE 10 MG: 5 TABLET ORAL at 21:40

## 2024-10-17 RX ADMIN — TRIMETHOBENZAMIDE HYDROCHLORIDE 200 MG: 100 INJECTION INTRAMUSCULAR at 17:57

## 2024-10-17 RX ADMIN — HEPARIN SODIUM 5000 UNITS: 5000 INJECTION INTRAVENOUS; SUBCUTANEOUS at 06:13

## 2024-10-17 RX ADMIN — GENTAMICIN SULFATE: 40 INJECTION, SOLUTION INTRAMUSCULAR; INTRAVENOUS at 23:22

## 2024-10-17 RX ADMIN — SEVELAMER CARBONATE 2400 MG: 800 TABLET, FILM COATED ORAL at 17:53

## 2024-10-17 RX ADMIN — SEVELAMER CARBONATE 2400 MG: 800 TABLET, FILM COATED ORAL at 08:16

## 2024-10-17 RX ADMIN — MIDODRINE HYDROCHLORIDE 10 MG: 5 TABLET ORAL at 06:12

## 2024-10-17 RX ADMIN — MIDODRINE HYDROCHLORIDE 10 MG: 5 TABLET ORAL at 14:18

## 2024-10-17 RX ADMIN — PIPERACILLIN SODIUM AND TAZOBACTAM SODIUM 4.5 G: 4; .5 INJECTION, POWDER, FOR SOLUTION INTRAVENOUS at 04:11

## 2024-10-17 RX ADMIN — SODIUM CHLORIDE, PRESERVATIVE FREE 2 ML: 5 INJECTION INTRAVENOUS at 21:41

## 2024-10-17 RX ADMIN — PIPERACILLIN SODIUM AND TAZOBACTAM SODIUM 4.5 G: 4; .5 INJECTION, POWDER, FOR SOLUTION INTRAVENOUS at 14:18

## 2024-10-17 RX ADMIN — SODIUM CHLORIDE, PRESERVATIVE FREE 2 ML: 5 INJECTION INTRAVENOUS at 08:18

## 2024-10-17 ASSESSMENT — PAIN SCALES - GENERAL
PAINLEVEL_OUTOF10: 0
PAINLEVEL_OUTOF10: 0

## 2024-10-18 LAB
ANION GAP SERPL CALC-SCNC: 16 MMOL/L (ref 7–19)
BASOPHILS # BLD: 0 K/MCL (ref 0–0.3)
BASOPHILS NFR BLD: 1 %
BLOOD EXPIRATION DATE: NORMAL
BUN SERPL-MCNC: 68 MG/DL (ref 6–20)
BUN/CREAT SERPL: 6 (ref 7–25)
CALCIUM SERPL-MCNC: 7.6 MG/DL (ref 8.4–10.2)
CHLORIDE SERPL-SCNC: 95 MMOL/L (ref 97–110)
CO2 SERPL-SCNC: 25 MMOL/L (ref 21–32)
CREAT SERPL-MCNC: 11.4 MG/DL (ref 0.51–0.95)
CROSSMATCH RESULT: NORMAL
DEPRECATED RDW RBC: 69.5 FL (ref 39–50)
DISPENSE STATUS: NORMAL
EGFRCR SERPLBLD CKD-EPI 2021: 4 ML/MIN/{1.73_M2}
EOSINOPHIL # BLD: 0.2 K/MCL (ref 0–0.5)
EOSINOPHIL NFR BLD: 6 %
ERYTHROCYTE [DISTWIDTH] IN BLOOD: 19.2 % (ref 11–15)
FASTING DURATION TIME PATIENT: ABNORMAL H
GLUCOSE SERPL-MCNC: 105 MG/DL (ref 70–99)
HCT VFR BLD CALC: 22.1 % (ref 36–46.5)
HGB BLD-MCNC: 7.4 G/DL (ref 12–15.5)
IMM GRANULOCYTES # BLD AUTO: 0 K/MCL (ref 0–0.2)
IMM GRANULOCYTES # BLD: 0 %
ISBT BLOOD TYPE: 5100
ISSUE DATE/TIME: NORMAL
LYMPHOCYTES # BLD: 0.8 K/MCL (ref 1–4)
LYMPHOCYTES NFR BLD: 19 %
MCH RBC QN AUTO: 32.7 PG (ref 26–34)
MCHC RBC AUTO-ENTMCNC: 33.5 G/DL (ref 32–36.5)
MCV RBC AUTO: 97.8 FL (ref 78–100)
MONOCYTES # BLD: 0.4 K/MCL (ref 0.3–0.9)
MONOCYTES NFR BLD: 9 %
NEUTROPHILS # BLD: 2.5 K/MCL (ref 1.8–7.7)
NEUTROPHILS NFR BLD: 65 %
NRBC BLD MANUAL-RTO: 0 /100 WBC
PLATELET # BLD AUTO: 111 K/MCL (ref 140–450)
POTASSIUM SERPL-SCNC: 4.5 MMOL/L (ref 3.4–5.1)
PRODUCT CODE: NORMAL
PRODUCT DESCRIPTION: NORMAL
PRODUCT ID: NORMAL
RBC # BLD: 2.26 MIL/MCL (ref 4–5.2)
SODIUM SERPL-SCNC: 131 MMOL/L (ref 135–145)
UNIT BLOOD TYPE: NORMAL
UNIT NUMBER: NORMAL
VANCOMYCIN TROUGH SERPL-MCNC: 14.7 MCG/ML (ref 10–20)
WBC # BLD: 3.9 K/MCL (ref 4.2–11)

## 2024-10-18 PROCEDURE — 10004651 HB RX, NO CHARGE ITEM: Performed by: INTERNAL MEDICINE

## 2024-10-18 PROCEDURE — 80048 BASIC METABOLIC PNL TOTAL CA: CPT | Performed by: INTERNAL MEDICINE

## 2024-10-18 PROCEDURE — 99233 SBSQ HOSP IP/OBS HIGH 50: CPT | Performed by: INTERNAL MEDICINE

## 2024-10-18 PROCEDURE — 80202 ASSAY OF VANCOMYCIN: CPT | Performed by: INTERNAL MEDICINE

## 2024-10-18 PROCEDURE — 86923 COMPATIBILITY TEST ELECTRIC: CPT

## 2024-10-18 PROCEDURE — 85025 COMPLETE CBC W/AUTO DIFF WBC: CPT | Performed by: INTERNAL MEDICINE

## 2024-10-18 PROCEDURE — 90945 DIALYSIS ONE EVALUATION: CPT

## 2024-10-18 PROCEDURE — 10006031 HB ROOM CHARGE TELEMETRY

## 2024-10-18 PROCEDURE — 10002800 HB RX 250 W HCPCS: Performed by: INTERNAL MEDICINE

## 2024-10-18 PROCEDURE — 96372 THER/PROPH/DIAG INJ SC/IM: CPT | Performed by: INTERNAL MEDICINE

## 2024-10-18 PROCEDURE — 10002803 HB RX 637: Performed by: INTERNAL MEDICINE

## 2024-10-18 PROCEDURE — 36415 COLL VENOUS BLD VENIPUNCTURE: CPT | Performed by: INTERNAL MEDICINE

## 2024-10-18 RX ORDER — TOBRAMYCIN 1.2 G/30ML
40 INJECTION, POWDER, LYOPHILIZED, FOR SOLUTION INTRAVENOUS EVERY 24 HOURS
Qty: 1 EACH | Refills: 0 | Status: SHIPPED | OUTPATIENT
Start: 2024-10-18 | End: 2024-10-21

## 2024-10-18 RX ORDER — SODIUM CHLORIDE 9 MG/ML
INJECTION, SOLUTION INTRAVENOUS
Status: DISCONTINUED
Start: 2024-10-18 | End: 2024-10-18 | Stop reason: WASHOUT

## 2024-10-18 RX ORDER — SODIUM CHLORIDE 9 MG/ML
INJECTION, SOLUTION INTRAVENOUS CONTINUOUS PRN
Status: DISPENSED | OUTPATIENT
Start: 2024-10-18 | End: 2024-10-18

## 2024-10-18 RX ORDER — ONDANSETRON 4 MG/1
4 TABLET, ORALLY DISINTEGRATING ORAL EVERY 6 HOURS PRN
Status: DISCONTINUED | OUTPATIENT
Start: 2024-10-18 | End: 2024-10-21 | Stop reason: HOSPADM

## 2024-10-18 RX ADMIN — SEVELAMER CARBONATE 2400 MG: 800 TABLET, FILM COATED ORAL at 18:25

## 2024-10-18 RX ADMIN — GENTAMICIN SULFATE: 40 INJECTION, SOLUTION INTRAMUSCULAR; INTRAVENOUS at 22:22

## 2024-10-18 RX ADMIN — SEVELAMER CARBONATE 2400 MG: 800 TABLET, FILM COATED ORAL at 09:15

## 2024-10-18 RX ADMIN — SODIUM CHLORIDE, PRESERVATIVE FREE 2 ML: 5 INJECTION INTRAVENOUS at 21:42

## 2024-10-18 RX ADMIN — HEPARIN SODIUM 5000 UNITS: 5000 INJECTION INTRAVENOUS; SUBCUTANEOUS at 14:02

## 2024-10-18 RX ADMIN — MIDODRINE HYDROCHLORIDE 10 MG: 5 TABLET ORAL at 14:02

## 2024-10-18 RX ADMIN — MIDODRINE HYDROCHLORIDE 10 MG: 5 TABLET ORAL at 05:21

## 2024-10-18 RX ADMIN — HEPARIN SODIUM 5000 UNITS: 5000 INJECTION INTRAVENOUS; SUBCUTANEOUS at 05:21

## 2024-10-18 RX ADMIN — HEPARIN SODIUM 5000 UNITS: 5000 INJECTION INTRAVENOUS; SUBCUTANEOUS at 21:40

## 2024-10-18 RX ADMIN — SODIUM CHLORIDE, PRESERVATIVE FREE 2 ML: 5 INJECTION INTRAVENOUS at 09:15

## 2024-10-18 RX ADMIN — MIDODRINE HYDROCHLORIDE 10 MG: 5 TABLET ORAL at 21:39

## 2024-10-18 RX ADMIN — ACETAMINOPHEN 650 MG: 325 TABLET ORAL at 17:18

## 2024-10-18 ASSESSMENT — PAIN SCALES - GENERAL
PAINLEVEL_OUTOF10: 0
PAINLEVEL_OUTOF10: 7
PAINLEVEL_OUTOF10: 3
PAINLEVEL_OUTOF10: 3

## 2024-10-19 VITALS
HEIGHT: 60 IN | BODY MASS INDEX: 26.32 KG/M2 | OXYGEN SATURATION: 99 % | WEIGHT: 134.04 LBS | TEMPERATURE: 98.1 F | HEART RATE: 84 BPM | SYSTOLIC BLOOD PRESSURE: 104 MMHG | RESPIRATION RATE: 16 BRPM | DIASTOLIC BLOOD PRESSURE: 69 MMHG

## 2024-10-19 LAB
ANION GAP SERPL CALC-SCNC: 15 MMOL/L (ref 7–19)
APPEARANCE FLD: CLEAR
BACTERIA BLD CULT: NORMAL
BACTERIA BLD CULT: NORMAL
BACTERIA SPEC ANAEROBE+AEROBE CULT: ABNORMAL
BASOPHILS # BLD: 0.1 K/MCL (ref 0–0.3)
BASOPHILS NFR BLD: 1 %
BASOPHILS NFR FLD: 2 %
BUN SERPL-MCNC: 55 MG/DL (ref 6–20)
BUN/CREAT SERPL: 6 (ref 7–25)
CALCIUM SERPL-MCNC: 8 MG/DL (ref 8.4–10.2)
CHLORIDE SERPL-SCNC: 94 MMOL/L (ref 97–110)
CO2 SERPL-SCNC: 25 MMOL/L (ref 21–32)
COLOR FLD: YELLOW
CREAT SERPL-MCNC: 9.78 MG/DL (ref 0.51–0.95)
DEPRECATED RDW RBC: 66.4 FL (ref 39–50)
EGFRCR SERPLBLD CKD-EPI 2021: 4 ML/MIN/{1.73_M2}
EOSINOPHIL # BLD: 0.2 K/MCL (ref 0–0.5)
EOSINOPHIL NFR BLD: 4 %
EOSINOPHIL NFR FLD: 1 %
ERYTHROCYTE [DISTWIDTH] IN BLOOD: 18.9 % (ref 11–15)
FASTING DURATION TIME PATIENT: ABNORMAL H
GLUCOSE SERPL-MCNC: 153 MG/DL (ref 70–99)
GRAM STN SPEC: ABNORMAL
HCT VFR BLD CALC: 25.5 % (ref 36–46.5)
HGB BLD-MCNC: 8.6 G/DL (ref 12–15.5)
IMM GRANULOCYTES # BLD AUTO: 0 K/MCL (ref 0–0.2)
IMM GRANULOCYTES # BLD: 1 %
LYMPHOCYTES # BLD: 0.8 K/MCL (ref 1–4)
LYMPHOCYTES NFR BLD: 17 %
LYMPHOCYTES NFR FLD: 19 %
MCH RBC QN AUTO: 32.5 PG (ref 26–34)
MCHC RBC AUTO-ENTMCNC: 33.7 G/DL (ref 32–36.5)
MCV RBC AUTO: 96.2 FL (ref 78–100)
MONOCYTES # BLD: 0.5 K/MCL (ref 0.3–0.9)
MONOCYTES NFR BLD: 12 %
MONOCYTES NFR FLD: 59 %
NEUTROPHILS # BLD: 2.9 K/MCL (ref 1.8–7.7)
NEUTROPHILS NFR BLD: 65 %
NEUTS SEG NFR FLD: 19 %
NRBC BLD MANUAL-RTO: 0 /100 WBC
PLATELET # BLD AUTO: 155 K/MCL (ref 140–450)
POTASSIUM SERPL-SCNC: 3.9 MMOL/L (ref 3.4–5.1)
RBC # BLD: 2.65 MIL/MCL (ref 4–5.2)
SODIUM SERPL-SCNC: 130 MMOL/L (ref 135–145)
TOTAL CELLS COUNTED FLD: 100
WBC # BLD: 4.4 K/MCL (ref 4.2–11)
WBC # FLD: 161 /MCL (ref 0–1000)

## 2024-10-19 PROCEDURE — 10002800 HB RX 250 W HCPCS: Performed by: INTERNAL MEDICINE

## 2024-10-19 PROCEDURE — 10006031 HB ROOM CHARGE TELEMETRY

## 2024-10-19 PROCEDURE — 10002803 HB RX 637: Performed by: INTERNAL MEDICINE

## 2024-10-19 PROCEDURE — 89051 BODY FLUID CELL COUNT: CPT | Performed by: INTERNAL MEDICINE

## 2024-10-19 PROCEDURE — 85025 COMPLETE CBC W/AUTO DIFF WBC: CPT | Performed by: INTERNAL MEDICINE

## 2024-10-19 PROCEDURE — 96372 THER/PROPH/DIAG INJ SC/IM: CPT | Performed by: INTERNAL MEDICINE

## 2024-10-19 PROCEDURE — 80048 BASIC METABOLIC PNL TOTAL CA: CPT | Performed by: INTERNAL MEDICINE

## 2024-10-19 PROCEDURE — 10004651 HB RX, NO CHARGE ITEM: Performed by: INTERNAL MEDICINE

## 2024-10-19 PROCEDURE — 90945 DIALYSIS ONE EVALUATION: CPT

## 2024-10-19 PROCEDURE — 36415 COLL VENOUS BLD VENIPUNCTURE: CPT | Performed by: INTERNAL MEDICINE

## 2024-10-19 PROCEDURE — 99233 SBSQ HOSP IP/OBS HIGH 50: CPT | Performed by: INTERNAL MEDICINE

## 2024-10-19 RX ORDER — DICYCLOMINE HYDROCHLORIDE 10 MG/1
10 CAPSULE ORAL 2 TIMES DAILY PRN
Status: DISCONTINUED | OUTPATIENT
Start: 2024-10-19 | End: 2024-10-21 | Stop reason: HOSPADM

## 2024-10-19 RX ADMIN — MIDODRINE HYDROCHLORIDE 10 MG: 5 TABLET ORAL at 22:05

## 2024-10-19 RX ADMIN — SODIUM CHLORIDE, PRESERVATIVE FREE 2 ML: 5 INJECTION INTRAVENOUS at 10:13

## 2024-10-19 RX ADMIN — SEVELAMER CARBONATE 2400 MG: 800 TABLET, FILM COATED ORAL at 17:50

## 2024-10-19 RX ADMIN — HEPARIN SODIUM 5000 UNITS: 5000 INJECTION INTRAVENOUS; SUBCUTANEOUS at 06:55

## 2024-10-19 RX ADMIN — DICYCLOMINE HYDROCHLORIDE 10 MG: 10 CAPSULE ORAL at 18:21

## 2024-10-19 RX ADMIN — SODIUM CHLORIDE, PRESERVATIVE FREE 2 ML: 5 INJECTION INTRAVENOUS at 22:23

## 2024-10-19 RX ADMIN — HEPARIN SODIUM 5000 UNITS: 5000 INJECTION INTRAVENOUS; SUBCUTANEOUS at 22:06

## 2024-10-19 RX ADMIN — SEVELAMER CARBONATE 2400 MG: 800 TABLET, FILM COATED ORAL at 10:13

## 2024-10-19 RX ADMIN — HEPARIN SODIUM 5000 UNITS: 5000 INJECTION INTRAVENOUS; SUBCUTANEOUS at 15:28

## 2024-10-19 RX ADMIN — ONDANSETRON 4 MG: 4 TABLET, ORALLY DISINTEGRATING ORAL at 06:55

## 2024-10-19 RX ADMIN — ONDANSETRON 4 MG: 4 TABLET, ORALLY DISINTEGRATING ORAL at 18:21

## 2024-10-19 RX ADMIN — GENTAMICIN SULFATE: 40 INJECTION, SOLUTION INTRAMUSCULAR; INTRAVENOUS at 22:24

## 2024-10-19 RX ADMIN — MIDODRINE HYDROCHLORIDE 10 MG: 5 TABLET ORAL at 06:55

## 2024-10-19 RX ADMIN — MIDODRINE HYDROCHLORIDE 10 MG: 5 TABLET ORAL at 15:28

## 2024-10-19 ASSESSMENT — PAIN SCALES - GENERAL
PAINLEVEL_OUTOF10: 0
PAINLEVEL_OUTOF10: 3

## 2024-10-20 LAB
ANION GAP SERPL CALC-SCNC: 15 MMOL/L (ref 7–19)
BACTERIA BLD CULT: NORMAL
BACTERIA BLD CULT: NORMAL
BASOPHILS # BLD: 0.1 K/MCL (ref 0–0.3)
BASOPHILS NFR BLD: 1 %
BUN SERPL-MCNC: 48 MG/DL (ref 6–20)
BUN/CREAT SERPL: 5 (ref 7–25)
CALCIUM SERPL-MCNC: 8.4 MG/DL (ref 8.4–10.2)
CHLORIDE SERPL-SCNC: 94 MMOL/L (ref 97–110)
CO2 SERPL-SCNC: 25 MMOL/L (ref 21–32)
CREAT SERPL-MCNC: 9.95 MG/DL (ref 0.51–0.95)
DEPRECATED RDW RBC: 66.5 FL (ref 39–50)
EGFRCR SERPLBLD CKD-EPI 2021: 4 ML/MIN/{1.73_M2}
EOSINOPHIL # BLD: 0.1 K/MCL (ref 0–0.5)
EOSINOPHIL NFR BLD: 3 %
ERYTHROCYTE [DISTWIDTH] IN BLOOD: 18.7 % (ref 11–15)
FASTING DURATION TIME PATIENT: ABNORMAL H
GLUCOSE SERPL-MCNC: 121 MG/DL (ref 70–99)
HCT VFR BLD CALC: 27 % (ref 36–46.5)
HGB BLD-MCNC: 8.9 G/DL (ref 12–15.5)
IMM GRANULOCYTES # BLD AUTO: 0 K/MCL (ref 0–0.2)
IMM GRANULOCYTES # BLD: 1 %
LYMPHOCYTES # BLD: 1.2 K/MCL (ref 1–4)
LYMPHOCYTES NFR BLD: 24 %
MCH RBC QN AUTO: 32.2 PG (ref 26–34)
MCHC RBC AUTO-ENTMCNC: 33 G/DL (ref 32–36.5)
MCV RBC AUTO: 97.8 FL (ref 78–100)
MONOCYTES # BLD: 0.6 K/MCL (ref 0.3–0.9)
MONOCYTES NFR BLD: 12 %
NEUTROPHILS # BLD: 2.8 K/MCL (ref 1.8–7.7)
NEUTROPHILS NFR BLD: 59 %
NRBC BLD MANUAL-RTO: 0 /100 WBC
PLATELET # BLD AUTO: 149 K/MCL (ref 140–450)
POTASSIUM SERPL-SCNC: 3.9 MMOL/L (ref 3.4–5.1)
RBC # BLD: 2.76 MIL/MCL (ref 4–5.2)
SODIUM SERPL-SCNC: 130 MMOL/L (ref 135–145)
WBC # BLD: 4.8 K/MCL (ref 4.2–11)

## 2024-10-20 PROCEDURE — 10006031 HB ROOM CHARGE TELEMETRY

## 2024-10-20 PROCEDURE — 10002803 HB RX 637: Performed by: INTERNAL MEDICINE

## 2024-10-20 PROCEDURE — 80048 BASIC METABOLIC PNL TOTAL CA: CPT | Performed by: INTERNAL MEDICINE

## 2024-10-20 PROCEDURE — 85025 COMPLETE CBC W/AUTO DIFF WBC: CPT | Performed by: INTERNAL MEDICINE

## 2024-10-20 PROCEDURE — 90945 DIALYSIS ONE EVALUATION: CPT

## 2024-10-20 PROCEDURE — 99233 SBSQ HOSP IP/OBS HIGH 50: CPT | Performed by: INTERNAL MEDICINE

## 2024-10-20 PROCEDURE — 10002800 HB RX 250 W HCPCS: Performed by: INTERNAL MEDICINE

## 2024-10-20 PROCEDURE — 10004651 HB RX, NO CHARGE ITEM: Performed by: INTERNAL MEDICINE

## 2024-10-20 PROCEDURE — 96372 THER/PROPH/DIAG INJ SC/IM: CPT | Performed by: INTERNAL MEDICINE

## 2024-10-20 PROCEDURE — 36415 COLL VENOUS BLD VENIPUNCTURE: CPT | Performed by: INTERNAL MEDICINE

## 2024-10-20 RX ORDER — ONDANSETRON 4 MG/1
4 TABLET, ORALLY DISINTEGRATING ORAL EVERY 8 HOURS PRN
Qty: 12 TABLET | Refills: 0 | Status: SHIPPED | OUTPATIENT
Start: 2024-10-20

## 2024-10-20 RX ORDER — MIDODRINE HYDROCHLORIDE 10 MG/1
10 TABLET ORAL EVERY 8 HOURS SCHEDULED
Qty: 90 TABLET | Refills: 0 | Status: SHIPPED | OUTPATIENT
Start: 2024-10-20 | End: 2024-11-20

## 2024-10-20 RX ORDER — DICYCLOMINE HYDROCHLORIDE 10 MG/1
10 CAPSULE ORAL 2 TIMES DAILY PRN
Qty: 10 CAPSULE | Refills: 0 | Status: SHIPPED | OUTPATIENT
Start: 2024-10-20

## 2024-10-20 RX ORDER — SIMETHICONE 40MG/0.6ML
40 SUSPENSION, DROPS(FINAL DOSAGE FORM)(ML) ORAL 4 TIMES DAILY PRN
Status: DISCONTINUED | OUTPATIENT
Start: 2024-10-20 | End: 2024-10-21 | Stop reason: HOSPADM

## 2024-10-20 RX ADMIN — MIDODRINE HYDROCHLORIDE 10 MG: 5 TABLET ORAL at 06:54

## 2024-10-20 RX ADMIN — SEVELAMER CARBONATE 2400 MG: 800 TABLET, FILM COATED ORAL at 17:35

## 2024-10-20 RX ADMIN — HEPARIN SODIUM 5000 UNITS: 5000 INJECTION INTRAVENOUS; SUBCUTANEOUS at 06:56

## 2024-10-20 RX ADMIN — MIDODRINE HYDROCHLORIDE 10 MG: 5 TABLET ORAL at 22:33

## 2024-10-20 RX ADMIN — ONDANSETRON 4 MG: 4 TABLET, ORALLY DISINTEGRATING ORAL at 17:35

## 2024-10-20 RX ADMIN — HEPARIN SODIUM 5000 UNITS: 5000 INJECTION INTRAVENOUS; SUBCUTANEOUS at 15:23

## 2024-10-20 RX ADMIN — GENTAMICIN SULFATE: 40 INJECTION, SOLUTION INTRAMUSCULAR; INTRAVENOUS at 22:34

## 2024-10-20 RX ADMIN — MIDODRINE HYDROCHLORIDE 10 MG: 5 TABLET ORAL at 15:23

## 2024-10-20 RX ADMIN — HEPARIN SODIUM 5000 UNITS: 5000 INJECTION INTRAVENOUS; SUBCUTANEOUS at 22:33

## 2024-10-20 RX ADMIN — SODIUM CHLORIDE, PRESERVATIVE FREE 2 ML: 5 INJECTION INTRAVENOUS at 22:38

## 2024-10-20 RX ADMIN — ONDANSETRON 4 MG: 4 TABLET, ORALLY DISINTEGRATING ORAL at 09:08

## 2024-10-20 RX ADMIN — ACETAMINOPHEN 650 MG: 325 TABLET ORAL at 01:46

## 2024-10-20 RX ADMIN — SEVELAMER CARBONATE 2400 MG: 800 TABLET, FILM COATED ORAL at 09:08

## 2024-10-20 RX ADMIN — SODIUM CHLORIDE, PRESERVATIVE FREE 2 ML: 5 INJECTION INTRAVENOUS at 09:11

## 2024-10-20 ASSESSMENT — PAIN SCALES - GENERAL
PAINLEVEL_OUTOF10: 3
PAINLEVEL_OUTOF10: 6
PAINLEVEL_OUTOF10: 3
PAINLEVEL_OUTOF10: 2

## 2024-10-21 VITALS
OXYGEN SATURATION: 99 % | DIASTOLIC BLOOD PRESSURE: 82 MMHG | RESPIRATION RATE: 16 BRPM | TEMPERATURE: 98.1 F | HEART RATE: 76 BPM | WEIGHT: 145.28 LBS | SYSTOLIC BLOOD PRESSURE: 119 MMHG | BODY MASS INDEX: 28.52 KG/M2 | HEIGHT: 60 IN

## 2024-10-21 LAB
ANION GAP SERPL CALC-SCNC: 18 MMOL/L (ref 7–19)
BASOPHILS # BLD: 0.1 K/MCL (ref 0–0.3)
BASOPHILS NFR BLD: 1 %
BUN SERPL-MCNC: 50 MG/DL (ref 6–20)
BUN/CREAT SERPL: 5 (ref 7–25)
CALCIUM SERPL-MCNC: 8.3 MG/DL (ref 8.4–10.2)
CHLORIDE SERPL-SCNC: 94 MMOL/L (ref 97–110)
CO2 SERPL-SCNC: 24 MMOL/L (ref 21–32)
CREAT SERPL-MCNC: 10.1 MG/DL (ref 0.51–0.95)
DEPRECATED RDW RBC: 66.1 FL (ref 39–50)
EGFRCR SERPLBLD CKD-EPI 2021: 4 ML/MIN/{1.73_M2}
EOSINOPHIL # BLD: 0.2 K/MCL (ref 0–0.5)
EOSINOPHIL NFR BLD: 3 %
ERYTHROCYTE [DISTWIDTH] IN BLOOD: 18.6 % (ref 11–15)
FASTING DURATION TIME PATIENT: ABNORMAL H
GENTAMICIN SERPL-MCNC: 1.6 MCG/ML
GLUCOSE SERPL-MCNC: 126 MG/DL (ref 70–99)
HCT VFR BLD CALC: 25.9 % (ref 36–46.5)
HGB BLD-MCNC: 8.7 G/DL (ref 12–15.5)
IMM GRANULOCYTES # BLD AUTO: 0.1 K/MCL (ref 0–0.2)
IMM GRANULOCYTES # BLD: 1 %
LYMPHOCYTES # BLD: 1 K/MCL (ref 1–4)
LYMPHOCYTES NFR BLD: 14 %
MCH RBC QN AUTO: 32.8 PG (ref 26–34)
MCHC RBC AUTO-ENTMCNC: 33.6 G/DL (ref 32–36.5)
MCV RBC AUTO: 97.7 FL (ref 78–100)
MONOCYTES # BLD: 0.7 K/MCL (ref 0.3–0.9)
MONOCYTES NFR BLD: 10 %
NEUTROPHILS # BLD: 5.1 K/MCL (ref 1.8–7.7)
NEUTROPHILS NFR BLD: 71 %
NRBC BLD MANUAL-RTO: 1 /100 WBC
PLATELET # BLD AUTO: 171 K/MCL (ref 140–450)
POTASSIUM SERPL-SCNC: 3.7 MMOL/L (ref 3.4–5.1)
RBC # BLD: 2.65 MIL/MCL (ref 4–5.2)
SODIUM SERPL-SCNC: 132 MMOL/L (ref 135–145)
WBC # BLD: 7.2 K/MCL (ref 4.2–11)

## 2024-10-21 PROCEDURE — 80170 ASSAY OF GENTAMICIN: CPT | Performed by: INTERNAL MEDICINE

## 2024-10-21 PROCEDURE — 80048 BASIC METABOLIC PNL TOTAL CA: CPT | Performed by: INTERNAL MEDICINE

## 2024-10-21 PROCEDURE — 90945 DIALYSIS ONE EVALUATION: CPT

## 2024-10-21 PROCEDURE — 96372 THER/PROPH/DIAG INJ SC/IM: CPT | Performed by: INTERNAL MEDICINE

## 2024-10-21 PROCEDURE — 10002803 HB RX 637: Performed by: INTERNAL MEDICINE

## 2024-10-21 PROCEDURE — 10004651 HB RX, NO CHARGE ITEM: Performed by: INTERNAL MEDICINE

## 2024-10-21 PROCEDURE — 99239 HOSP IP/OBS DSCHRG MGMT >30: CPT | Performed by: INTERNAL MEDICINE

## 2024-10-21 PROCEDURE — 36415 COLL VENOUS BLD VENIPUNCTURE: CPT | Performed by: INTERNAL MEDICINE

## 2024-10-21 PROCEDURE — 10002800 HB RX 250 W HCPCS: Performed by: INTERNAL MEDICINE

## 2024-10-21 PROCEDURE — 85025 COMPLETE CBC W/AUTO DIFF WBC: CPT | Performed by: INTERNAL MEDICINE

## 2024-10-21 RX ORDER — TOBRAMYCIN 1.2 G/30ML
40 INJECTION, POWDER, LYOPHILIZED, FOR SOLUTION INTRAVENOUS EVERY 24 HOURS
Qty: 1 EACH | Refills: 0 | Status: SHIPPED | OUTPATIENT
Start: 2024-10-21 | End: 2024-11-08

## 2024-10-21 RX ADMIN — EPOETIN ALFA-EPBX 10000 UNITS: 10000 INJECTION, SOLUTION INTRAVENOUS; SUBCUTANEOUS at 12:35

## 2024-10-21 RX ADMIN — SODIUM CHLORIDE, PRESERVATIVE FREE 2 ML: 5 INJECTION INTRAVENOUS at 08:26

## 2024-10-21 RX ADMIN — SEVELAMER CARBONATE 2400 MG: 800 TABLET, FILM COATED ORAL at 08:24

## 2024-10-21 RX ADMIN — MIDODRINE HYDROCHLORIDE 10 MG: 5 TABLET ORAL at 06:49

## 2024-10-21 ASSESSMENT — PAIN SCALES - GENERAL: PAINLEVEL_OUTOF10: 0

## 2024-10-23 ENCOUNTER — TELEPHONE (OUTPATIENT)
Dept: CARE COORDINATION | Age: 54
End: 2024-10-23

## 2024-10-24 ENCOUNTER — TELEPHONE (OUTPATIENT)
Dept: CARE COORDINATION | Age: 54
End: 2024-10-24

## 2024-10-30 ENCOUNTER — TELEPHONE (OUTPATIENT)
Dept: CARE COORDINATION | Age: 54
End: 2024-10-30

## 2024-10-31 ENCOUNTER — TELEPHONE (OUTPATIENT)
Dept: CARE COORDINATION | Age: 54
End: 2024-10-31

## 2024-11-06 ENCOUNTER — TELEPHONE (OUTPATIENT)
Dept: CARE COORDINATION | Age: 54
End: 2024-11-06

## 2024-11-07 ENCOUNTER — TELEPHONE (OUTPATIENT)
Dept: CARE COORDINATION | Age: 54
End: 2024-11-07

## 2024-11-13 ENCOUNTER — TELEPHONE (OUTPATIENT)
Dept: CARE COORDINATION | Age: 54
End: 2024-11-13

## 2024-11-20 ENCOUNTER — TELEPHONE (OUTPATIENT)
Dept: CARE COORDINATION | Age: 54
End: 2024-11-20

## 2025-01-20 ENCOUNTER — HOSPITAL ENCOUNTER (EMERGENCY)
Age: 55
Discharge: HOME | End: 2025-01-20
Attending: STUDENT IN AN ORGANIZED HEALTH CARE EDUCATION/TRAINING PROGRAM

## 2025-01-20 ENCOUNTER — APPOINTMENT (OUTPATIENT)
Dept: GENERAL RADIOLOGY | Age: 55
End: 2025-01-20
Attending: STUDENT IN AN ORGANIZED HEALTH CARE EDUCATION/TRAINING PROGRAM

## 2025-01-20 VITALS
HEART RATE: 92 BPM | OXYGEN SATURATION: 99 % | RESPIRATION RATE: 16 BRPM | DIASTOLIC BLOOD PRESSURE: 67 MMHG | TEMPERATURE: 97.7 F | SYSTOLIC BLOOD PRESSURE: 103 MMHG

## 2025-01-20 DIAGNOSIS — N76.0 BACTERIAL VAGINOSIS: Primary | ICD-10-CM

## 2025-01-20 DIAGNOSIS — R11.10 VOMITING, UNSPECIFIED VOMITING TYPE, UNSPECIFIED WHETHER NAUSEA PRESENT: ICD-10-CM

## 2025-01-20 DIAGNOSIS — B96.89 BACTERIAL VAGINOSIS: Primary | ICD-10-CM

## 2025-01-20 DIAGNOSIS — J98.8 VIRAL RESPIRATORY ILLNESS: ICD-10-CM

## 2025-01-20 DIAGNOSIS — B97.89 VIRAL RESPIRATORY ILLNESS: ICD-10-CM

## 2025-01-20 LAB
ALBUMIN SERPL-MCNC: 3.3 G/DL (ref 3.4–5)
ALBUMIN/GLOB SERPL: 0.7 {RATIO} (ref 1–2.4)
ALP SERPL-CCNC: 234 UNITS/L (ref 45–117)
ALT SERPL-CCNC: 20 UNITS/L
ANION GAP SERPL CALC-SCNC: 17 MMOL/L (ref 7–19)
AST SERPL-CCNC: 15 UNITS/L
BASOPHILS # BLD: 0.1 K/MCL (ref 0–0.3)
BASOPHILS NFR BLD: 0 %
BILIRUB SERPL-MCNC: 0.8 MG/DL (ref 0.2–1)
BUN SERPL-MCNC: 46 MG/DL (ref 6–20)
BUN/CREAT SERPL: 4 (ref 7–25)
CALCIUM SERPL-MCNC: 8.2 MG/DL (ref 8.4–10.2)
CHLORIDE SERPL-SCNC: 90 MMOL/L (ref 97–110)
CLUE CELLS VAG QL WET PREP: PRESENT
CO2 SERPL-SCNC: 27 MMOL/L (ref 21–32)
CREAT SERPL-MCNC: 11.3 MG/DL (ref 0.51–0.95)
DEPRECATED RDW RBC: 54.1 FL (ref 39–50)
EGFRCR SERPLBLD CKD-EPI 2021: 4 ML/MIN/{1.73_M2}
EOSINOPHIL # BLD: 0.2 K/MCL (ref 0–0.5)
EOSINOPHIL NFR BLD: 1 %
ERYTHROCYTE [DISTWIDTH] IN BLOOD: 15.3 % (ref 11–15)
FASTING DURATION TIME PATIENT: ABNORMAL H
FLUAV RNA RESP QL NAA+PROBE: NOT DETECTED
FLUBV RNA RESP QL NAA+PROBE: NOT DETECTED
GLOBULIN SER-MCNC: 4.6 G/DL (ref 2–4)
GLUCOSE SERPL-MCNC: 122 MG/DL (ref 70–99)
HCT VFR BLD CALC: 24.9 % (ref 36–46.5)
HGB BLD-MCNC: 8 G/DL (ref 12–15.5)
IMM GRANULOCYTES # BLD AUTO: 0.1 K/MCL (ref 0–0.2)
IMM GRANULOCYTES # BLD: 0 %
LIPASE SERPL-CCNC: 112 UNITS/L (ref 15–77)
LYMPHOCYTES # BLD: 0.7 K/MCL (ref 1–4)
LYMPHOCYTES NFR BLD: 5 %
MCH RBC QN AUTO: 31.5 PG (ref 26–34)
MCHC RBC AUTO-ENTMCNC: 32.1 G/DL (ref 32–36.5)
MCV RBC AUTO: 98 FL (ref 78–100)
MONOCYTES # BLD: 0.6 K/MCL (ref 0.3–0.9)
MONOCYTES NFR BLD: 5 %
NEUTROPHILS # BLD: 11.7 K/MCL (ref 1.8–7.7)
NEUTROPHILS NFR BLD: 89 %
NRBC BLD MANUAL-RTO: 0 /100 WBC
PLATELET # BLD AUTO: 243 K/MCL (ref 140–450)
POTASSIUM SERPL-SCNC: 3.9 MMOL/L (ref 3.4–5.1)
PROT SERPL-MCNC: 7.9 G/DL (ref 6.4–8.2)
RAINBOW EXTRA TUBES HOLD SPECIMEN: NORMAL
RBC # BLD: 2.54 MIL/MCL (ref 4–5.2)
RSV AG NPH QL IA.RAPID: DETECTED
SARS-COV-2 N GENE CT SPEC QN NAA N2: 40.8
SARS-COV-2 RNA RESP QL NAA+PROBE: DETECTED
SERVICE CMNT-IMP: ABNORMAL
SODIUM SERPL-SCNC: 130 MMOL/L (ref 135–145)
T VAGINALIS VAG QL WET PREP: ABNORMAL
WBC # BLD: 13.3 K/MCL (ref 4.2–11)
YEAST VAG QL WET PREP: ABNORMAL

## 2025-01-20 PROCEDURE — 0241U COVID/FLU/RSV PANEL: CPT | Performed by: STUDENT IN AN ORGANIZED HEALTH CARE EDUCATION/TRAINING PROGRAM

## 2025-01-20 PROCEDURE — 85025 COMPLETE CBC W/AUTO DIFF WBC: CPT | Performed by: STUDENT IN AN ORGANIZED HEALTH CARE EDUCATION/TRAINING PROGRAM

## 2025-01-20 PROCEDURE — 80053 COMPREHEN METABOLIC PANEL: CPT | Performed by: STUDENT IN AN ORGANIZED HEALTH CARE EDUCATION/TRAINING PROGRAM

## 2025-01-20 PROCEDURE — 87210 SMEAR WET MOUNT SALINE/INK: CPT | Performed by: STUDENT IN AN ORGANIZED HEALTH CARE EDUCATION/TRAINING PROGRAM

## 2025-01-20 PROCEDURE — 10002800 HB RX 250 W HCPCS: Performed by: STUDENT IN AN ORGANIZED HEALTH CARE EDUCATION/TRAINING PROGRAM

## 2025-01-20 PROCEDURE — 71046 X-RAY EXAM CHEST 2 VIEWS: CPT

## 2025-01-20 PROCEDURE — 96374 THER/PROPH/DIAG INJ IV PUSH: CPT

## 2025-01-20 PROCEDURE — 99284 EMERGENCY DEPT VISIT MOD MDM: CPT

## 2025-01-20 PROCEDURE — 83690 ASSAY OF LIPASE: CPT | Performed by: STUDENT IN AN ORGANIZED HEALTH CARE EDUCATION/TRAINING PROGRAM

## 2025-01-20 RX ORDER — ONDANSETRON 2 MG/ML
4 INJECTION INTRAMUSCULAR; INTRAVENOUS ONCE
Status: COMPLETED | OUTPATIENT
Start: 2025-01-20 | End: 2025-01-20

## 2025-01-20 RX ORDER — METRONIDAZOLE 500 MG/1
500 TABLET ORAL 2 TIMES DAILY
Qty: 14 TABLET | Refills: 0 | Status: SHIPPED | OUTPATIENT
Start: 2025-01-20 | End: 2025-01-27

## 2025-01-20 RX ORDER — ONDANSETRON 4 MG/1
4 TABLET, ORALLY DISINTEGRATING ORAL EVERY 6 HOURS PRN
Qty: 10 TABLET | Refills: 0 | Status: SHIPPED | OUTPATIENT
Start: 2025-01-20 | End: 2025-01-23

## 2025-01-20 RX ORDER — BENZONATATE 100 MG/1
100 CAPSULE ORAL 3 TIMES DAILY PRN
Qty: 30 CAPSULE | Refills: 0 | Status: SHIPPED | OUTPATIENT
Start: 2025-01-20

## 2025-01-20 RX ADMIN — ONDANSETRON 4 MG: 2 INJECTION, SOLUTION INTRAMUSCULAR; INTRAVENOUS at 12:45

## 2025-01-28 ENCOUNTER — HOSPITAL ENCOUNTER (EMERGENCY)
Age: 55
Discharge: HOME | End: 2025-01-28
Attending: STUDENT IN AN ORGANIZED HEALTH CARE EDUCATION/TRAINING PROGRAM

## 2025-01-28 VITALS
SYSTOLIC BLOOD PRESSURE: 128 MMHG | RESPIRATION RATE: 19 BRPM | HEIGHT: 65 IN | TEMPERATURE: 97.7 F | DIASTOLIC BLOOD PRESSURE: 82 MMHG | OXYGEN SATURATION: 99 % | HEART RATE: 74 BPM | BODY MASS INDEX: 24.18 KG/M2

## 2025-01-28 DIAGNOSIS — R42 LIGHTHEADEDNESS: Primary | ICD-10-CM

## 2025-01-28 DIAGNOSIS — N18.6 ESRD (END STAGE RENAL DISEASE)  (CMD): ICD-10-CM

## 2025-01-28 DIAGNOSIS — E86.0 DEHYDRATION: ICD-10-CM

## 2025-01-28 LAB
ALBUMIN SERPL-MCNC: 3.4 G/DL (ref 3.4–5)
ALBUMIN/GLOB SERPL: 0.7 {RATIO} (ref 1–2.4)
ALP SERPL-CCNC: 234 UNITS/L (ref 45–117)
ALT SERPL-CCNC: 16 UNITS/L
ANION GAP SERPL CALC-SCNC: 17 MMOL/L (ref 7–19)
AST SERPL-CCNC: 18 UNITS/L
ATRIAL RATE (BPM): 78
BASOPHILS # BLD: 0.1 K/MCL (ref 0–0.3)
BASOPHILS NFR BLD: 1 %
BILIRUB SERPL-MCNC: 1 MG/DL (ref 0.2–1)
BUN SERPL-MCNC: 36 MG/DL (ref 6–20)
BUN/CREAT SERPL: 3 (ref 7–25)
CALCIUM SERPL-MCNC: 8.7 MG/DL (ref 8.4–10.2)
CHLORIDE SERPL-SCNC: 91 MMOL/L (ref 97–110)
CO2 SERPL-SCNC: 23 MMOL/L (ref 21–32)
CREAT SERPL-MCNC: 11.8 MG/DL (ref 0.51–0.95)
DEPRECATED RDW RBC: 60.3 FL (ref 39–50)
EGFRCR SERPLBLD CKD-EPI 2021: 4 ML/MIN/{1.73_M2}
EOSINOPHIL # BLD: 0.3 K/MCL (ref 0–0.5)
EOSINOPHIL NFR BLD: 3 %
ERYTHROCYTE [DISTWIDTH] IN BLOOD: 18.1 % (ref 11–15)
FASTING DURATION TIME PATIENT: ABNORMAL H
GLOBULIN SER-MCNC: 5 G/DL (ref 2–4)
GLUCOSE SERPL-MCNC: 133 MG/DL (ref 70–99)
HCT VFR BLD CALC: 23.5 % (ref 36–46.5)
HGB BLD-MCNC: 7.8 G/DL (ref 12–15.5)
IMM GRANULOCYTES # BLD AUTO: 0.2 K/MCL (ref 0–0.2)
IMM GRANULOCYTES # BLD: 2 %
LYMPHOCYTES # BLD: 1.6 K/MCL (ref 1–4)
LYMPHOCYTES NFR BLD: 16 %
MAGNESIUM SERPL-MCNC: 2.1 MG/DL (ref 1.7–2.4)
MCH RBC QN AUTO: 32.9 PG (ref 26–34)
MCHC RBC AUTO-ENTMCNC: 33.2 G/DL (ref 32–36.5)
MCV RBC AUTO: 99.2 FL (ref 78–100)
MONOCYTES # BLD: 1.1 K/MCL (ref 0.3–0.9)
MONOCYTES NFR BLD: 11 %
NEUTROPHILS # BLD: 6.9 K/MCL (ref 1.8–7.7)
NEUTROPHILS NFR BLD: 67 %
NRBC BLD MANUAL-RTO: 0 /100 WBC
P AXIS (DEGREES): 28
PLATELET # BLD AUTO: 220 K/MCL (ref 140–450)
POTASSIUM SERPL-SCNC: 3.1 MMOL/L (ref 3.4–5.1)
PR-INTERVAL (MSEC): 140
PROT SERPL-MCNC: 8.4 G/DL (ref 6.4–8.2)
QRS-INTERVAL (MSEC): 92
QT-INTERVAL (MSEC): 498
QTC: 567
R AXIS (DEGREES): 15
RAINBOW EXTRA TUBES HOLD SPECIMEN: NORMAL
RAINBOW EXTRA TUBES HOLD SPECIMEN: NORMAL
RBC # BLD: 2.37 MIL/MCL (ref 4–5.2)
REPORT TEXT: NORMAL
SODIUM SERPL-SCNC: 128 MMOL/L (ref 135–145)
T AXIS (DEGREES): -135
VENTRICULAR RATE EKG/MIN (BPM): 78
WBC # BLD: 10.2 K/MCL (ref 4.2–11)

## 2025-01-28 PROCEDURE — 80053 COMPREHEN METABOLIC PANEL: CPT | Performed by: STUDENT IN AN ORGANIZED HEALTH CARE EDUCATION/TRAINING PROGRAM

## 2025-01-28 PROCEDURE — 10002803 HB RX 637: Performed by: STUDENT IN AN ORGANIZED HEALTH CARE EDUCATION/TRAINING PROGRAM

## 2025-01-28 PROCEDURE — 83735 ASSAY OF MAGNESIUM: CPT | Performed by: STUDENT IN AN ORGANIZED HEALTH CARE EDUCATION/TRAINING PROGRAM

## 2025-01-28 PROCEDURE — 93005 ELECTROCARDIOGRAM TRACING: CPT | Performed by: STUDENT IN AN ORGANIZED HEALTH CARE EDUCATION/TRAINING PROGRAM

## 2025-01-28 PROCEDURE — 10004651 HB RX, NO CHARGE ITEM: Performed by: STUDENT IN AN ORGANIZED HEALTH CARE EDUCATION/TRAINING PROGRAM

## 2025-01-28 PROCEDURE — 93010 ELECTROCARDIOGRAM REPORT: CPT | Performed by: INTERNAL MEDICINE

## 2025-01-28 PROCEDURE — 85025 COMPLETE CBC W/AUTO DIFF WBC: CPT | Performed by: STUDENT IN AN ORGANIZED HEALTH CARE EDUCATION/TRAINING PROGRAM

## 2025-01-28 PROCEDURE — 10002807 HB RX 258: Performed by: STUDENT IN AN ORGANIZED HEALTH CARE EDUCATION/TRAINING PROGRAM

## 2025-01-28 PROCEDURE — 99284 EMERGENCY DEPT VISIT MOD MDM: CPT

## 2025-01-28 PROCEDURE — 96360 HYDRATION IV INFUSION INIT: CPT

## 2025-01-28 RX ORDER — ONDANSETRON 2 MG/ML
4 INJECTION INTRAMUSCULAR; INTRAVENOUS ONCE
Status: DISCONTINUED | OUTPATIENT
Start: 2025-01-28 | End: 2025-01-28 | Stop reason: HOSPADM

## 2025-01-28 RX ORDER — POTASSIUM CHLORIDE 1500 MG/1
40 TABLET, EXTENDED RELEASE ORAL ONCE
Status: COMPLETED | OUTPATIENT
Start: 2025-01-28 | End: 2025-01-28

## 2025-01-28 RX ORDER — ACETAMINOPHEN 500 MG
1000 TABLET ORAL ONCE
Status: COMPLETED | OUTPATIENT
Start: 2025-01-28 | End: 2025-01-28

## 2025-01-28 RX ADMIN — SODIUM CHLORIDE 500 ML: 9 INJECTION, SOLUTION INTRAVENOUS at 12:31

## 2025-01-28 RX ADMIN — POTASSIUM CHLORIDE 40 MEQ: 1500 TABLET, EXTENDED RELEASE ORAL at 13:19

## 2025-01-28 RX ADMIN — ACETAMINOPHEN 1000 MG: 500 TABLET ORAL at 16:35

## 2025-01-28 ASSESSMENT — PAIN SCALES - GENERAL: PAINLEVEL_OUTOF10: 3

## 2025-01-28 ASSESSMENT — ENCOUNTER SYMPTOMS
COUGH: 0
LIGHT-HEADEDNESS: 1
SHORTNESS OF BREATH: 0
FEVER: 0
CHILLS: 0
WOUND: 0
ABDOMINAL PAIN: 0
ABDOMINAL DISTENTION: 0
VOMITING: 1
NAUSEA: 0
WEAKNESS: 1

## 2025-01-31 ENCOUNTER — HOSPITAL ENCOUNTER (EMERGENCY)
Age: 55
Discharge: HOME | End: 2025-01-31
Attending: EMERGENCY MEDICINE

## 2025-01-31 VITALS
SYSTOLIC BLOOD PRESSURE: 109 MMHG | HEART RATE: 67 BPM | OXYGEN SATURATION: 100 % | RESPIRATION RATE: 16 BRPM | WEIGHT: 138.45 LBS | DIASTOLIC BLOOD PRESSURE: 76 MMHG | BODY MASS INDEX: 23.04 KG/M2 | TEMPERATURE: 97.7 F

## 2025-01-31 DIAGNOSIS — E86.1 HYPOTENSION DUE TO HYPOVOLEMIA: Primary | ICD-10-CM

## 2025-01-31 DIAGNOSIS — Z99.2 PERITONEAL DIALYSIS STATUS (CMD): ICD-10-CM

## 2025-01-31 LAB
ANION GAP SERPL CALC-SCNC: 15 MMOL/L (ref 7–19)
BASOPHILS # BLD: 0.1 K/MCL (ref 0–0.3)
BASOPHILS NFR BLD: 1 %
BUN SERPL-MCNC: 46 MG/DL (ref 6–20)
BUN/CREAT SERPL: 4 (ref 7–25)
CALCIUM SERPL-MCNC: 8.1 MG/DL (ref 8.4–10.2)
CHLORIDE SERPL-SCNC: 90 MMOL/L (ref 97–110)
CO2 SERPL-SCNC: 24 MMOL/L (ref 21–32)
CREAT SERPL-MCNC: 11.7 MG/DL (ref 0.51–0.95)
DEPRECATED RDW RBC: 64.6 FL (ref 39–50)
EGFRCR SERPLBLD CKD-EPI 2021: 4 ML/MIN/{1.73_M2}
EOSINOPHIL # BLD: 0.3 K/MCL (ref 0–0.5)
EOSINOPHIL NFR BLD: 2 %
ERYTHROCYTE [DISTWIDTH] IN BLOOD: 20.2 % (ref 11–15)
FASTING DURATION TIME PATIENT: ABNORMAL H
GLUCOSE SERPL-MCNC: 108 MG/DL (ref 70–99)
HCT VFR BLD CALC: 25.9 % (ref 36–46.5)
HGB BLD-MCNC: 8.9 G/DL (ref 12–15.5)
IMM GRANULOCYTES # BLD AUTO: 0.2 K/MCL (ref 0–0.2)
IMM GRANULOCYTES # BLD: 2 %
LYMPHOCYTES # BLD: 1.8 K/MCL (ref 1–4)
LYMPHOCYTES NFR BLD: 15 %
MCH RBC QN AUTO: 35.7 PG (ref 26–34)
MCHC RBC AUTO-ENTMCNC: 34.4 G/DL (ref 32–36.5)
MCV RBC AUTO: 104 FL (ref 78–100)
MONOCYTES # BLD: 0.7 K/MCL (ref 0.3–0.9)
MONOCYTES NFR BLD: 6 %
NEUTROPHILS # BLD: 8.6 K/MCL (ref 1.8–7.7)
NEUTROPHILS NFR BLD: 74 %
NRBC BLD MANUAL-RTO: 0 /100 WBC
PLATELET # BLD AUTO: 218 K/MCL (ref 140–450)
POTASSIUM SERPL-SCNC: 3.8 MMOL/L (ref 3.4–5.1)
RBC # BLD: 2.49 MIL/MCL (ref 4–5.2)
SODIUM SERPL-SCNC: 125 MMOL/L (ref 135–145)
WBC # BLD: 11.6 K/MCL (ref 4.2–11)

## 2025-01-31 PROCEDURE — 85025 COMPLETE CBC W/AUTO DIFF WBC: CPT | Performed by: EMERGENCY MEDICINE

## 2025-01-31 PROCEDURE — 99284 EMERGENCY DEPT VISIT MOD MDM: CPT

## 2025-01-31 PROCEDURE — 10002803 HB RX 637: Performed by: EMERGENCY MEDICINE

## 2025-01-31 PROCEDURE — 93010 ELECTROCARDIOGRAM REPORT: CPT | Performed by: INTERNAL MEDICINE

## 2025-01-31 PROCEDURE — 80048 BASIC METABOLIC PNL TOTAL CA: CPT | Performed by: EMERGENCY MEDICINE

## 2025-01-31 PROCEDURE — 10002807 HB RX 258: Performed by: EMERGENCY MEDICINE

## 2025-01-31 PROCEDURE — 96360 HYDRATION IV INFUSION INIT: CPT

## 2025-01-31 PROCEDURE — 93005 ELECTROCARDIOGRAM TRACING: CPT | Performed by: EMERGENCY MEDICINE

## 2025-01-31 RX ORDER — MIDODRINE HYDROCHLORIDE 5 MG/1
10 TABLET ORAL ONCE
Status: COMPLETED | OUTPATIENT
Start: 2025-01-31 | End: 2025-01-31

## 2025-01-31 RX ADMIN — MIDODRINE HYDROCHLORIDE 10 MG: 5 TABLET ORAL at 19:00

## 2025-01-31 RX ADMIN — SODIUM CHLORIDE 500 ML: 9 INJECTION, SOLUTION INTRAVENOUS at 19:54

## 2025-01-31 ASSESSMENT — PAIN SCALES - GENERAL: PAINLEVEL_OUTOF10: 0

## 2025-02-01 LAB
ATRIAL RATE (BPM): 93
P AXIS (DEGREES): 3
PR-INTERVAL (MSEC): 148
QRS-INTERVAL (MSEC): 90
QT-INTERVAL (MSEC): 438
QTC: 545
R AXIS (DEGREES): -4
RAINBOW EXTRA TUBES HOLD SPECIMEN: NORMAL
RAINBOW EXTRA TUBES HOLD SPECIMEN: NORMAL
REPORT TEXT: NORMAL
T AXIS (DEGREES): -179
VENTRICULAR RATE EKG/MIN (BPM): 93

## 2025-02-09 ASSESSMENT — ENCOUNTER SYMPTOMS: LIGHT-HEADEDNESS: 1

## 2025-04-22 ENCOUNTER — APPOINTMENT (OUTPATIENT)
Dept: GENERAL RADIOLOGY | Facility: HOSPITAL | Age: 55
DRG: 907 | End: 2025-04-22
Attending: EMERGENCY MEDICINE
Payer: MEDICAID

## 2025-04-22 ENCOUNTER — HOSPITAL ENCOUNTER (INPATIENT)
Facility: HOSPITAL | Age: 55
LOS: 8 days | Discharge: HOME | DRG: 907 | End: 2025-04-30
Attending: EMERGENCY MEDICINE | Admitting: HOSPITALIST
Payer: MEDICAID

## 2025-04-22 DIAGNOSIS — K65.2 SBP (SPONTANEOUS BACTERIAL PERITONITIS) (HCC): ICD-10-CM

## 2025-04-22 DIAGNOSIS — A41.9 SEPTIC SHOCK (HCC): ICD-10-CM

## 2025-04-22 DIAGNOSIS — R10.9 ABDOMINAL PAIN, ACUTE: Primary | ICD-10-CM

## 2025-04-22 DIAGNOSIS — R65.21 SEPTIC SHOCK (HCC): ICD-10-CM

## 2025-04-22 DIAGNOSIS — I42.9 CARDIOMYOPATHY, UNSPECIFIED TYPE (HCC): ICD-10-CM

## 2025-04-22 PROBLEM — N18.6 ESRD (END STAGE RENAL DISEASE) (HCC): Status: ACTIVE | Noted: 2025-04-22

## 2025-04-22 PROBLEM — I95.9 HYPOTENSION: Status: ACTIVE | Noted: 2025-04-22

## 2025-04-22 LAB
ALBUMIN SERPL-MCNC: 4.5 G/DL (ref 3.2–4.8)
ALBUMIN/GLOB SERPL: 1.4 {RATIO} (ref 1–2)
ALP LIVER SERPL-CCNC: 263 U/L (ref 41–108)
ALT SERPL-CCNC: 25 U/L (ref 10–49)
AMYLASE PRT-CCNC: <20 U/L
ANION GAP SERPL CALC-SCNC: 15 MMOL/L (ref 0–18)
APTT PPP: 28.1 SECONDS (ref 23–36)
AST SERPL-CCNC: 31 U/L (ref ?–34)
BASOPHILS # BLD AUTO: 0.03 X10(3) UL (ref 0–0.2)
BASOPHILS NFR BLD AUTO: 0.4 %
BILIRUB SERPL-MCNC: 0.4 MG/DL (ref 0.3–1.2)
BUN BLD-MCNC: 32 MG/DL (ref 9–23)
BUN/CREAT SERPL: 3.1 (ref 10–20)
CALCIUM BLD-MCNC: 9.5 MG/DL (ref 8.7–10.4)
CHLORIDE SERPL-SCNC: 93 MMOL/L (ref 98–112)
CO2 SERPL-SCNC: 25 MMOL/L (ref 21–32)
CREAT BLD-MCNC: 10.29 MG/DL (ref 0.55–1.02)
DEPRECATED RDW RBC AUTO: 61.7 FL (ref 35.1–46.3)
EGFRCR SERPLBLD CKD-EPI 2021: 4 ML/MIN/1.73M2 (ref 60–?)
EOSINOPHIL # BLD AUTO: 0.18 X10(3) UL (ref 0–0.7)
EOSINOPHIL NFR BLD AUTO: 2.4 %
ERYTHROCYTE [DISTWIDTH] IN BLOOD BY AUTOMATED COUNT: 18.9 % (ref 11–15)
GLOBULIN PLAS-MCNC: 3.2 G/DL (ref 2–3.5)
GLUCOSE BLD-MCNC: 103 MG/DL (ref 70–99)
GLUCOSE BLDC GLUCOMTR-MCNC: 118 MG/DL (ref 70–99)
GLUCOSE PRT-MCNC: 119 MG/DL
HCT VFR BLD AUTO: 28.9 % (ref 35–48)
HGB BLD-MCNC: 9.4 G/DL (ref 12–16)
IMM GRANULOCYTES # BLD AUTO: 0.03 X10(3) UL (ref 0–1)
IMM GRANULOCYTES NFR BLD: 0.4 %
INR BLD: 1.03 (ref 0.8–1.2)
LACTATE SERPL-SCNC: 3.5 MMOL/L (ref 0.5–2)
LACTATE SERPL-SCNC: 3.8 MMOL/L (ref 0.5–2)
LACTATE SERPL-SCNC: 4.2 MMOL/L (ref 0.5–2)
LDH FLD L TO P-CCNC: 35 U/L
LYMPHOCYTES # BLD AUTO: 1.62 X10(3) UL (ref 1–4)
LYMPHOCYTES NFR BLD AUTO: 21.9 %
MCH RBC QN AUTO: 32.1 PG (ref 26–34)
MCHC RBC AUTO-ENTMCNC: 32.5 G/DL (ref 31–37)
MCV RBC AUTO: 98.6 FL (ref 80–100)
MONOCYTES # BLD AUTO: 0.42 X10(3) UL (ref 0.1–1)
MONOCYTES NFR BLD AUTO: 5.7 %
NEUTROPHILS # BLD AUTO: 5.12 X10 (3) UL (ref 1.5–7.7)
NEUTROPHILS # BLD AUTO: 5.12 X10(3) UL (ref 1.5–7.7)
NEUTROPHILS NFR BLD AUTO: 69.2 %
OSMOLALITY SERPL CALC.SUM OF ELEC: 283 MOSM/KG (ref 275–295)
PLATELET # BLD AUTO: 117 10(3)UL (ref 150–450)
PLATELETS.RETICULATED NFR BLD AUTO: 5.5 % (ref 0–7)
POTASSIUM SERPL-SCNC: 3.9 MMOL/L (ref 3.5–5.1)
PROT PRT-MCNC: <2 G/DL
PROT SERPL-MCNC: 7.7 G/DL (ref 5.7–8.2)
PROTHROMBIN TIME: 14.1 SECONDS (ref 11.6–14.8)
RBC # BLD AUTO: 2.93 X10(6)UL (ref 3.8–5.3)
SODIUM SERPL-SCNC: 133 MMOL/L (ref 136–145)
WBC # BLD AUTO: 7.4 X10(3) UL (ref 4–11)

## 2025-04-22 PROCEDURE — 71045 X-RAY EXAM CHEST 1 VIEW: CPT | Performed by: EMERGENCY MEDICINE

## 2025-04-22 PROCEDURE — 99283 EMERGENCY DEPT VISIT LOW MDM: CPT | Performed by: INTERNAL MEDICINE

## 2025-04-22 PROCEDURE — 99223 1ST HOSP IP/OBS HIGH 75: CPT | Performed by: HOSPITALIST

## 2025-04-22 PROCEDURE — 0W9G3ZZ DRAINAGE OF PERITONEAL CAVITY, PERCUTANEOUS APPROACH: ICD-10-PCS | Performed by: EMERGENCY MEDICINE

## 2025-04-22 RX ORDER — LANTHANUM CARBONATE 1000 MG/1
1000 TABLET, CHEWABLE ORAL
COMMUNITY
Start: 2024-11-20

## 2025-04-22 RX ORDER — PROCHLORPERAZINE EDISYLATE 5 MG/ML
5 INJECTION INTRAMUSCULAR; INTRAVENOUS EVERY 8 HOURS PRN
Status: DISCONTINUED | OUTPATIENT
Start: 2025-04-22 | End: 2025-04-30

## 2025-04-22 RX ORDER — VANCOMYCIN HYDROCHLORIDE
25 ONCE
Status: DISCONTINUED | OUTPATIENT
Start: 2025-04-22 | End: 2025-04-22 | Stop reason: DRUGHIGH

## 2025-04-22 RX ORDER — LIDOCAINE HYDROCHLORIDE 10 MG/ML
20 INJECTION, SOLUTION EPIDURAL; INFILTRATION; INTRACAUDAL; PERINEURAL ONCE
Status: COMPLETED | OUTPATIENT
Start: 2025-04-22 | End: 2025-04-22

## 2025-04-22 RX ORDER — DIPHENHYDRAMINE HYDROCHLORIDE 50 MG/ML
25 INJECTION, SOLUTION INTRAMUSCULAR; INTRAVENOUS ONCE
Status: COMPLETED | OUTPATIENT
Start: 2025-04-22 | End: 2025-04-22

## 2025-04-22 RX ORDER — SODIUM CHLORIDE 9 MG/ML
INJECTION, SOLUTION INTRAVENOUS CONTINUOUS
Status: DISCONTINUED | OUTPATIENT
Start: 2025-04-22 | End: 2025-04-24

## 2025-04-22 RX ORDER — HEPARIN SODIUM 5000 [USP'U]/ML
5000 INJECTION, SOLUTION INTRAVENOUS; SUBCUTANEOUS EVERY 12 HOURS SCHEDULED
Status: DISCONTINUED | OUTPATIENT
Start: 2025-04-22 | End: 2025-04-30

## 2025-04-22 RX ORDER — HYDROMORPHONE HYDROCHLORIDE 1 MG/ML
0.2 INJECTION, SOLUTION INTRAMUSCULAR; INTRAVENOUS; SUBCUTANEOUS EVERY 2 HOUR PRN
Status: DISCONTINUED | OUTPATIENT
Start: 2025-04-22 | End: 2025-04-30

## 2025-04-22 RX ORDER — MORPHINE SULFATE 4 MG/ML
4 INJECTION, SOLUTION INTRAMUSCULAR; INTRAVENOUS ONCE
Status: COMPLETED | OUTPATIENT
Start: 2025-04-22 | End: 2025-04-22

## 2025-04-22 RX ORDER — ALBUMIN (HUMAN) 12.5 G/50ML
25 SOLUTION INTRAVENOUS ONCE
Status: COMPLETED | OUTPATIENT
Start: 2025-04-22 | End: 2025-04-22

## 2025-04-22 RX ORDER — CINACALCET 30 MG/1
30 TABLET, FILM COATED ORAL DAILY
COMMUNITY

## 2025-04-22 RX ORDER — HYDROMORPHONE HYDROCHLORIDE 1 MG/ML
0.8 INJECTION, SOLUTION INTRAMUSCULAR; INTRAVENOUS; SUBCUTANEOUS EVERY 2 HOUR PRN
Status: DISCONTINUED | OUTPATIENT
Start: 2025-04-22 | End: 2025-04-30

## 2025-04-22 RX ORDER — SODIUM CHLORIDE 9 MG/ML
INJECTION, SOLUTION INTRAVENOUS CONTINUOUS
Status: DISCONTINUED | OUTPATIENT
Start: 2025-04-22 | End: 2025-04-23

## 2025-04-22 RX ORDER — LIDOCAINE HYDROCHLORIDE 10 MG/ML
INJECTION, SOLUTION EPIDURAL; INFILTRATION; INTRACAUDAL; PERINEURAL
Status: COMPLETED
Start: 2025-04-22 | End: 2025-04-22

## 2025-04-22 RX ORDER — HYDROMORPHONE HYDROCHLORIDE 1 MG/ML
0.4 INJECTION, SOLUTION INTRAMUSCULAR; INTRAVENOUS; SUBCUTANEOUS EVERY 2 HOUR PRN
Status: DISCONTINUED | OUTPATIENT
Start: 2025-04-22 | End: 2025-04-30

## 2025-04-22 RX ORDER — THIAMINE HCL 100 MG
1 TABLET ORAL DAILY
COMMUNITY
Start: 2025-04-05

## 2025-04-22 RX ORDER — RIFAMPIN 300 MG/1
600 CAPSULE ORAL DAILY
COMMUNITY
Start: 2025-04-05

## 2025-04-22 RX ORDER — ONDANSETRON 2 MG/ML
4 INJECTION INTRAMUSCULAR; INTRAVENOUS EVERY 6 HOURS PRN
Status: DISCONTINUED | OUTPATIENT
Start: 2025-04-22 | End: 2025-04-30

## 2025-04-22 RX ORDER — ACETAMINOPHEN 500 MG
500 TABLET ORAL EVERY 4 HOURS PRN
Status: DISCONTINUED | OUTPATIENT
Start: 2025-04-22 | End: 2025-04-24

## 2025-04-22 NOTE — ED QUICK NOTES
RN x2 to transport patient. Patient aware of plan of care, verbalizes understanding. Brought to floor with belongings.  used. Handoff given to ICU RN. Endorsed next lactic acid level time.

## 2025-04-22 NOTE — CONSULTS
[facility]  part of [facility]    Cardiology Consultation    Shavon Stuart Patient Status:  Emergency    1970 MRN [id number]   Location [facility] EMERGENCY DEPARTMENT Attending Davey Malik MD   Hosp Day # 0 PCP No primary care provider on file.     Date of Admission:  2025  Date of Consult:  2025  Reason for Consultation: NICM, sepsis shock    History of Present Illness:   Patient is a 54 year old female with sig PMH/o nonischemic cardiomyopathy (LVEF 30%), L kidney mass s/p nephrectomy, ESRD on daily PD, latent TB who presents with worsening abdominal pain and new subjective fevers for the past few days.   In the ED, patient was hypotensive down to 70/50s and started on pressor support with norepinephrine. CXR without acute cardiopulmonary process.   Labs with elevated lactic acid of 4.2.   Assessment and Plan:   Severe sepsis with shock  L kidney mass s/p nephrectomy  ESRD on PD  Nonischemic cardiomyopathy    -presented with worsening abdominal pain, fevers  -found to be hypotensive with elevated lactate - started on pressor support  -etiology concerning for peritonitis, especially given she is on daily PD; shock secondary to sepsis; during exam pt with wide pulse pressure and warm extremities - unlikely cardiogenic   -aggressive supportive care with pressor support, gentle IVF and Abx   -strict I/Os  -last TTE with EF 30% at OSH  -closely monitor hemodynamics and tele     Past Medical History  Past Medical History[1]    Past Surgical History  Past Surgical History[2]    Family History  Family History[3]    Social History  Pediatric History   Patient Parents    Not on file     Other Topics Concern    Not on file   Social History Narrative    Not on file           Current Medications:  Current Hospital Medications[4]  Prescriptions Prior to Admission[5]    Allergies  Allergies[6]    Review of Systems:   ROS  10 point review of systems completed and  negative except as noted.    Physical Exam:   Patient Vitals for the past 24 hrs:   BP Temp Temp src Pulse Resp SpO2 Height Weight   04/22/25 1509 135/83 -- -- 115 25 100 % -- --   04/22/25 1504 132/86 -- -- 115 25 100 % -- --   04/22/25 1459 125/79 -- -- 113 (!) 27 100 % -- --   04/22/25 1454 131/82 -- -- 115 26 100 % -- --   04/22/25 1449 127/84 -- -- 113 (!) 28 100 % -- --   04/22/25 1444 129/83 -- -- 115 (!) 28 100 % -- --   04/22/25 1439 123/84 -- -- 115 24 100 % -- --   04/22/25 1434 122/75 -- -- 106 22 100 % -- --   04/22/25 1429 130/64 99.1 °F (37.3 °C) Oral 109 (!) 27 100 % -- --   04/22/25 1424 112/65 -- -- 110 25 100 % -- --   04/22/25 1419 120/71 -- -- 106 23 100 % -- --   04/22/25 1414 115/67 -- -- 104 21 100 % -- --   04/22/25 1404 108/67 -- -- 102 24 100 % -- --   04/22/25 1359 118/73 -- -- 100 22 100 % -- --   04/22/25 1354 112/66 -- -- 92 23 100 % -- --   04/22/25 1349 120/62 -- -- 90 23 100 % -- --   04/22/25 1344 113/56 -- -- 84 22 100 % -- --   04/22/25 1341 -- -- -- 81 23 100 % -- --   04/22/25 1339 118/67 -- -- 74 25 100 % -- --   04/22/25 1336 116/72 -- -- 85 26 100 % -- --   04/22/25 1330 97/82 -- -- 83 (!) 29 100 % -- --   04/22/25 1326 113/76 -- -- 89 26 -- -- --   04/22/25 1321 101/52 -- -- 80 24 -- -- --   04/22/25 1315 109/73 -- -- 82 21 -- -- --   04/22/25 1300 (!) 62/50 -- -- 99 19 -- -- --   04/22/25 1246 (!) 71/51 98.7 °F (37.1 °C) Temporal 101 (!) 28 -- 5' (1.524 m) 134 lb (60.8 kg)       Intake/Output:   Last 3 shifts:   Intake/Output                   04/20/25 0700 - 04/21/25 0659 (Not Admitted) 04/21/25 0700 - 04/22/25 0659 (Not Admitted) 04/22/25 0700 - 04/23/25 0659       Intake    I.V.  --  --  1824    Volume (mL) (sodium chloride 0.9 % IV bolus 1,824 mL) -- -- 1824    IV PIGGYBACK  --  --  100    Volume (mL) (piperacillin-tazobactam (Zosyn) 4.5 g in dextrose 5% 100 mL IVPB-ADDV) -- -- 100    Total Intake -- -- 1924       Output    Total Output -- -- --       Net I/O     -- --  1924          Vent Settings:    Hemodynamic parameters (last 24 hours):    Scheduled Meds: Scheduled Medications[7]  Continuous Infusions: Medication Infusions[8]    Physical Exam:  General: No apparent distress.   HEENT: Normocephalic, anicteric sclera, neck supple, no thyromegaly or adenopathy.  Neck: No JVD, carotids 2+, no bruits.  Cardiac: Regular rate and rhythm. S1, S2 normal.  Lungs: Diminished BS  Abdomen: mild TTP, distended  Extremities: Without clubbing or cyanosis. No edema.    Neurologic: Alert and oriented, normal affect. No focal defects  Skin: Warm and dry.     Results:   Laboratory Data:  Lab Results   Component Value Date    WBC 7.4 04/22/2025    HGB 9.4 (L) 04/22/2025    HCT 28.9 (L) 04/22/2025    .0 (L) 04/22/2025    CREATSERUM 10.29 (H) 04/22/2025    BUN 32 (H) 04/22/2025     (L) 04/22/2025    K 3.9 04/22/2025    CL 93 (L) 04/22/2025    CO2 25.0 04/22/2025     (H) 04/22/2025    CA 9.5 04/22/2025    ALB 4.5 04/22/2025    ALKPHO 263 (H) 04/22/2025    TP 7.7 04/22/2025    AST 31 04/22/2025    ALT 25 04/22/2025    PTT 28.1 04/22/2025    INR 1.03 04/22/2025    PTP 14.1 04/22/2025         Recent Labs   Lab 04/22/25  1252   *   BUN 32*   CREATSERUM 10.29*   CA 9.5   *   K 3.9   CL 93*   CO2 25.0     Recent Labs   Lab 04/22/25  1252   RBC 2.93*   HGB 9.4*   HCT 28.9*   MCV 98.6   MCH 32.1   MCHC 32.5   RDW 18.9*   NEPRELIM 5.12   WBC 7.4   .0*       No results for input(s): \"BNPML\" in the last 168 hours.    No results for input(s): \"TROP\", \"CK\" in the last 168 hours.    Imaging:  XR CHEST AP PORTABLE  (CPT=71045)  Result Date: 4/22/2025  CONCLUSION: No acute cardiopulmonary process.    Dictated by (CST): Devante Beckford MD on 4/22/2025 at 1:57 PM     Finalized by (CST): Devante Beckford MD on 4/22/2025 at 1:57 PM            Thank you for allowing me to participate in the care of your patient.    Wellington Tapia, [facility] [place]         [1]  History reviewed. No pertinent past medical history.  [2] History reviewed. No pertinent surgical history.  [3] No family history on file.  [4]   Current Facility-Administered Medications   Medication Dose Route Frequency    norepinephrine (Levophed) 4 mg/250mL infusion premix  0.5-50 mcg/min Intravenous Continuous    sodium chloride 0.9% infusion   Intravenous Continuous   [5] (Not in a hospital admission)  [6]   Allergies  Allergen Reactions    Morphine ITCHING   [7] [8]    norepinephrine 5 mcg/min (04/22/25 9896)    sodium chloride

## 2025-04-22 NOTE — H&P
[facility]    PATIENT'S NAME: JASON DOHERTY   ATTENDING PHYSICIAN: Davey Malik MD   PATIENT ACCOUNT#:   [id number]    LOCATION:  [place] [address]  MEDICAL RECORD #:   [id number]       YOB: 1970  ADMISSION DATE:       04/22/2025    HISTORY AND PHYSICAL EXAMINATION    CHIEF COMPLAINT:  Hypotension, possible sepsis, rule out spontaneous bacterial peritonitis.    HISTORY OF PRESENT ILLNESS:  The patient is a 54-year-old  female, looks much older than her stated age.  Currently on peritoneal dialysis for end-stage renal disease.  Came in today to the emergency department for evaluation of abdominal pain for the last 2 to 3 days.  Upon arrival to the emergency room noted to have a low systolic blood pressure in the mid 60s, was given IV fluids, and lactic acid was obtained which was 4.2.  Chemistry showed potassium 3.9, BUN and creatinine are 32 and 10.29, sodium 133.  CBC, no leukocytosis, platelet count 117.  Blood cultures were obtained.  Started empirically on IV antibiotics, and she will be admitted to the hospital for further management.  Also, diagnostic dialysis fluid was sent for culture and analysis.  Chest x-ray was unremarkable.    PAST MEDICAL HISTORY:  History of left renal mass status post left nephrectomy and she had atrophic right kidney, eventually developed end-stage renal disease, and started on peritoneal dialysis.  She has anemia of chronic kidney disease and secondary hyperparathyroidism and generalized osteoarthritis, nonischemic cardiomyopathy, ejection fraction 20% to 30% with severe mitral regurgitation.  Being evaluated for kidney and liver transplant at [facility].  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  Started on rifampin, supposed to be on it for 4 months, start dose was April 1.    PAST SURGICAL HISTORY:  Peritoneal dialysis catheter, left and right heart catheterization, right ankle open  reduction and internal fixation.    MEDICATIONS:  Please see medication reconciliation list.    ALLERGIES:  No known drug allergies.    SOCIAL HISTORY:  No tobacco, alcohol, or drug use.    FAMILY HISTORY:  Positive for hypertension.    REVIEW OF SYSTEMS:  Abdominal pain and fatigue since yesterday.  The patient cannot tell me if she had fever or chills.  Other 12-point review of systems is negative.      PHYSICAL EXAMINATION:    GENERAL:  Alert and oriented to time, place, and person.  Mild to moderate distress.  VITAL SIGNS:  Temperature 99.1, pulse 115, respiratory rate 25, blood pressure initially 62/50, now 135/83 after IV fluids, pulse ox 100% on room air.  HEENT:  Atraumatic.  Oropharynx clear.  Dry mucous membranes.  Ears and nose normal.  Eyes:  Anicteric sclerae.  NECK:  Supple.  No lymphadenopathy.  Trachea midline.  Full range of motion.  LUNGS:  Clear to auscultation bilaterally.  Normal respiratory effort.  HEART:  Regular rate and rhythm.  S1 and S2 auscultated.  No murmur.  ABDOMEN:  Soft, nondistended.  No tenderness.  Discomfort to palpation throughout the abdomen.  No guarding or rebound tenderness.  EXTREMITIES:  No peripheral edema, clubbing, or cyanosis.  NEUROLOGIC:  Motor and sensory intact.    ASSESSMENT AND PLAN:    1.   Acute sepsis, rule out spontaneous bacterial peritonitis.  Dialysate fluid and blood cultures were obtained.  2.   End-stage renal disease, on peritoneal dialysis.  3.   Latent tuberculosis.  Currently on rifampin.    The patient was started on IV Levophed in the emergency room.  We will continue IV fluids, trend lactic acid, and monitor hemodynamic status.  Follow up on cultures.  Obtain nephrology, infectious disease, and cardiology consult.  Continue empiric IV vancomycin and Zosyn.  Further recommendations to follow.    Dictated By Heath Kamara MD  d: 04/22/2025 15:34:03  t: 04/22/2025 16:42:18  Job [id number]  FB/    cc: Davey Malik MD

## 2025-04-22 NOTE — ED INITIAL ASSESSMENT (HPI)
Patient arrives with reports of abd pain/fevers/SOB. Peritoneal dialysis daily, hypotensive.   Reports getting treatment for active TB.

## 2025-04-22 NOTE — ED PROVIDER NOTES
Patient Seen in: [facility] Emergency Department      History     Chief Complaint   Patient presents with    Abdomen/Flank Pain     Stated Complaint: Hypotensive/abd pain    Subjective:   HPI      History of Present Illness               Objective:     History reviewed. No pertinent past medical history.           History reviewed. No pertinent surgical history.             Social History     Socioeconomic History    Marital status:    Tobacco Use    Smoking status: Never    Smokeless tobacco: Never   Substance and Sexual Activity    Alcohol use: Never    Drug use: Never     Social Drivers of Health     Food Insecurity: Low Risk  (10/15/2024)    Received from [employer]    Food Insecurity     Within the past 12 months, you worried that your food would run out before you got money to buy more.  : Never true     Within the past 12 months, the food you bought just didn't last and you didn't have money to get more. : Never true   Transportation Needs: Not At Risk (10/15/2024)    Received from [facility]    Transportation Needs     In the past 12 months, has lack of reliable transportation kept you from medical appointments, meetings, work or from getting things needed for daily living? : No                                Physical Exam     ED Triage Vitals   BP 04/22/25 1246 (!) 71/51   Pulse 04/22/25 1246 101   Resp 04/22/25 1246 (!) 28   Temp 04/22/25 1246 98.7 °F (37.1 °C)   Temp src 04/22/25 1246 Temporal   SpO2 04/22/25 1330 100 %   O2 Device 04/22/25 1255 None (Room air)       Current Vitals:   Vital Signs  BP: 116/72  Pulse: 81  Resp: 23  Temp: 98.7 °F (37.1 °C)  Temp src: Temporal  MAP (mmHg): 86    Oxygen Therapy  SpO2: 100 %  O2 Device: None (Room air)        Physical Exam      Physical Exam                ED Course     Labs Reviewed   CBC WITH DIFFERENTIAL WITH PLATELET - Abnormal; Notable for the following components:       Result Value    RBC 2.93 (*)     HGB 9.4 (*)      HCT 28.9 (*)     RDW-SD 61.7 (*)     RDW 18.9 (*)     .0 (*)     All other components within normal limits   COMP METABOLIC PANEL (14) - Abnormal; Notable for the following components:    Glucose 103 (*)     Sodium 133 (*)     Chloride 93 (*)     BUN 32 (*)     Creatinine 10.29 (*)     BUN/CREA Ratio 3.1 (*)     eGFR-Cr 4 (*)     Alkaline Phosphatase 263 (*)     All other components within normal limits   LACTIC ACID, PLASMA - Abnormal; Notable for the following components:    Lactic Acid 4.2 (*)     All other components within normal limits   POCT GLUCOSE - Abnormal; Notable for the following components:    POC Glucose  118 (*)     All other components within normal limits   PROTHROMBIN TIME (PT) - Normal   PTT, ACTIVATED - Normal   URINALYSIS WITH CULTURE REFLEX   LDH PERITONEAL FLUID   GLUCOSE PERITONEAL FLUID   TOTAL PROTEIN PERITONEAL FLUID   AMYLASE PERITONEAL FLUID   LACTIC ACID REFLEX POST POSTIVE   RAINBOW DRAW LAVENDER   RAINBOW DRAW LIGHT GREEN   RAINBOW DRAW BLUE   BLOOD CULTURE   BLOOD CULTURE   BODY FLUID CULT,AEROBIC AND ANAEROBIC     EKG    Rate, intervals and axes as noted on EKG Report.  Rate: 101  Rhythm: Sinus Rhythm  Reading: Normal sinus rhythm with lateral and inferior T wave inversions but no other acute signs of ischemia or abnormal intervals.              Results                                 MDM      54-year-old female with a history of severe cardiomyopathy, end-stage renal disease on daily peritoneal dialysis, and who is currently under treatment for latent TB presents today for abdominal pain, fever, and shortness of breath.  Symptoms developing over the last 1 to 2 days.  Last peritoneal dialysis was yesterday.    On exam, initially hypotensive 70s over 50s, tachypneic, pale and ill-appearing.  Tenderness to palpation throughout the abdomen with mild fluid wave.  Lungs clear.  Conjunctive pale.  No scleral icterus.    Differential: Septic shock with consideration for  spontaneous bacterial peritonitis, worsening heart failure, electrolyte abnormality,    Point-of-care ultrasound performed and interpreted by me-  -No pericardial effusion  -moderately reduced LV EF (likely increased from previously documented  -RV<LV  -Flat IVC  -Normal caliber aortic outflow tract    POCUS abdomen with moderate ascites    I performed a bedside diagnostic paracentesis with pinkish clear fluid returned and sent for analysis.    Patient treated with empiric antibiotics and IV fluids per sepsis protocol and given morphine with Benadryl for pain.    Patient started on norepinephrine for her cardiomyopathy, septic shock, and hypotension with response in her blood pressure.      I consulted cardiology for her cardiomyopathy without immediate recommendations.    I consulted nephrology due to her renal failure without immediate recommendations.    I consulted infectious disease due to her septic shock and latent TB without immediate recommendations.    I consulted pulmonary critical care for ICU admission.    I spent a total of 50 minutes of critical care time in obtaining history, performing a physical exam, bedside monitoring of interventions, collecting and interpreting tests and discussion with consultants but not including time spent performing procedures.      [facility]  Procedure Note  Shavon ArriagaBenji Patient Status:  Inpatient    1970 MRN [id number]   Location [facility] 2W/SW Attending Contreras Alex MD   Hosp Day # 1 PCP No primary care provider on file.     Date of Service:  2025    Procedure: Paracentesis    Indications:     Initial Exam,  Diagnostic  Suspected peritonitis    Anesthesia:   Anesthesia:  Local infiltration  Local anesthetic:  lidocaine 1% without epinephrine  Anesthetic total (mL):  5ml    Sedation:    Patient Sedated?  no      Procedure Details:    Patient was prepped and draped in usual sterile fashion.    Needle Gauge:  20  Ultrasound guided:   Yes  Puncture site:  Right lower quadrant  Fluid removed (ml):  20ml  Fluid appearance:  Clear  Dressing:  4x4 sterile gauze    Patient tolerance:  Patient tolerated the procedure well with no immediate complications.    Comments:      Admission disposition: 4/22/2025  2:11 PM           [facility]    Disposition and Plan     Clinical Impression:  1. Abdominal pain, acute    2. SBP (spontaneous bacterial peritonitis) (HCC)    3. Septic shock (HCC)    4. Cardiomyopathy, unspecified type (HCC)         Disposition:  Admit  4/22/2025  2:11 pm    Follow-up:  No follow-up provider specified.        Medications Prescribed:  There are no discharge medications for this patient.      Supplementary Documentation:     [facility]  part of [facility]      Sepsis Reassessment Note    /72   Pulse 81   Temp 98.7 °F (37.1 °C) (Temporal)   Resp 23   Ht 152.4 cm (5')   Wt 60.8 kg   SpO2 100%   BMI 26.17 kg/m²      I completed the sepsis reassessment at 1430    Davey Malik MD  4/22/2025  2:28 PM             Hospital Problems       Present on Admission           ICD-10-CM Noted POA    Septic shock (HCC) A41.9, R65.21 4/22/2025 Unknown                             Sepsis dx documented at 4/22/2025  2:11 PM

## 2025-04-22 NOTE — CONSULTS
[facility]  part of [facility]    Report of Consultation    Shavon Stuart Patient Status:  Emergency    1970 MRN [id number]   Location [facility] EMERGENCY DEPARTMENT Attending Davey Malik MD   Hosp Day # 0 PCP No primary care provider on file.     Date of Admission:  2025    Reason for Consultation:   Hypotension, abdominal pain    History of Present Illness:   Patient is a 54-year-old female with past medical history significant for end-stage renal disease on peritoneal dialysis, cardiomyopathy, latent tuberculosis who presents with chief complaint of some abdominal discomfort with fatigue for the last 1 to 2 days.  Hypotensive during ED course.  Started on pressor support with norepinephrine.  Denies significant dyspnea.  Afebrile.    Past Medical History  Past Medical History[1]    Past Surgical History  Past Surgical History[2]    Family History  Family History[3]    Social History  Social Hx on file[4]        Current Medications:  Current Hospital Medications[5]  Prescriptions Prior to Admission[6]    Allergies  Allergies[7]    Review of Systems:   Constitutional: Fatigue, malaise  HEENT: denies headache, sore throat, vision loss  Cardio: denies chest pain, chest pressure, palpitations  Respiratory: denies dyspnea, cough, wheezing, hemoptysis   GI: denies nausea, vomiting, + abdominal pain  : denies dysuria, hematuria  Musculoskeletal: denies arthralgia, myalgia  Integumentary: denies rash, itching  Neurological: denies syncope, weakness, dizziness,   Psychiatric: denies depression, anxiety  Hematologic: denies bruising        Physical Exam:   Blood pressure 135/83, pulse 115, temperature 99.1 °F (37.3 °C), temperature source Oral, resp. rate 25, height 5' (1.524 m), weight 134 lb (60.8 kg), SpO2 100%.    Constitutional: Mild distress  Eyes: PERRL  ENT: nares patent  Neck: neck supple, no JVD  Cardio: RRR, S1 S2  Respiratory: clear to auscultation  bilaterally, no wheezing, rales, rhonchi, crackles  GI: abdomen soft, mild tenderness to palpation, no guarding  Extremities: no clubbing, cyanosis, edema  Neurologic: no gross motor deficits  Skin: warm, dry    Results:   Laboratory Data  Lab Results   Component Value Date    WBC 7.4 04/22/2025    HGB 9.4 (L) 04/22/2025    HCT 28.9 (L) 04/22/2025    .0 (L) 04/22/2025    CREATSERUM 10.29 (H) 04/22/2025    BUN 32 (H) 04/22/2025     (L) 04/22/2025    K 3.9 04/22/2025    CL 93 (L) 04/22/2025    CO2 25.0 04/22/2025     (H) 04/22/2025    CA 9.5 04/22/2025    ALB 4.5 04/22/2025    ALKPHO 263 (H) 04/22/2025    TP 7.7 04/22/2025    AST 31 04/22/2025    ALT 25 04/22/2025    PTT 28.1 04/22/2025    INR 1.03 04/22/2025    PTP 14.1 04/22/2025         Imaging  XR CHEST AP PORTABLE  (CPT=71045)  Result Date: 4/22/2025  CONCLUSION: No acute cardiopulmonary process.    Dictated by (CST): Devante Beckford MD on 4/22/2025 at 1:57 PM     Finalized by (CST): Devante Beckford MD on 4/22/2025 at 1:57 PM            Assessment   1.  Septic shock  2.  Lactic acidosis  3.  Anemia  4.  Thrombocytopenia  5.  Cardiomyopathy  6.  End-stage renal disease  7.  Left kidney mass  8.  Latent tuberculosis    Plan   -Patient with evidence of fatigue malaise abdominal pain hypotension.  Concern for septic shock with possible sources which include SBP.  Await paracentesis results.  - Empiric antibiotic therapy in the meantime blood and peritoneal cultures pending.  - Chest x-ray with no significant abnormality seen  - Prior history of latent tuberculosis diagnosed March 2024 started on rifampin which she did not complete due to interaction with other medications.  We started on rifampin 600 mg daily recently which we will continue at this time.  - Attempt to wean pressor support with norepinephrine  - Continue trending lactic acidosis  - Transfuse for hemoglobin below 7  -Monitor thrombocytopenia  - History of end-stage renal disease  on peritoneal dialysis.  Further recommendations per nephrology  - Prior history of left kidney mass status post left radical nephrectomy  - Reviewed vitals, labs and imaging    Upon my evaluation, this patient had a high probability of imminent or life-threatening deterioration due to shock, which required my direct attention, intervention, and personal management.    I have personally provided 30 minutes of critical care time, exclusive of time spent on separately billable procedures.  Time includes review of all pertinent laboratory/radiology results, discussion with consultants, and monitoring for potential decompensation.  Performed interventions included pressor drugs.      Jessica Clifford DO  Pulmonary Critical Care Medicine  [facility]  4/22/2025  4:04 PM        [1] History reviewed. No pertinent past medical history.  [2] History reviewed. No pertinent surgical history.  [3] No family history on file.  [4]   Social History  Socioeconomic History    Marital status:    Tobacco Use    Smoking status: Never    Smokeless tobacco: Never   Substance and Sexual Activity    Alcohol use: Never    Drug use: Never   [5]   Current Facility-Administered Medications   Medication Dose Route Frequency    norepinephrine (Levophed) 4 mg/250mL infusion premix  0.5-50 mcg/min Intravenous Continuous    sodium chloride 0.9% infusion   Intravenous Continuous    morphINE PF 4 MG/ML injection 4 mg  4 mg Intravenous Once    diphenhydrAMINE (Benadryl) 50 mg/mL  injection 25 mg  25 mg Intravenous Once   [6] (Not in a hospital admission)  [7]   Allergies  Allergen Reactions    Morphine ITCHING

## 2025-04-22 NOTE — PROGRESS NOTES
[facility] Pharmacy Dosing Service      Initial Pharmacokinetic Consult for Vancomycin Dosing     Shavon Stuart is a 54 year old female who is being initiated on vancomycin therapy for SBP.  Pharmacy has been asked to dose vancomycin by Dr. Kamara.  The initial treatment and monitoring approach will be non-AUC strategy.        Weight and Temperature:    Wt Readings from Last 1 Encounters:   25 62.8 kg (138 lb 7.2 oz)        Temp Readings from Last 1 Encounters:   25 99.1 °F (37.3 °C) (Oral)      Labs:   Recent Labs   Lab 25  1252   CREATSERUM 10.29*      Estimated Creatinine Clearance: 4.6 mL/min (A) (based on SCr of 10.29 mg/dL (H)).     Recent Labs   Lab 25  1252   WBC 7.4          The Pharmacokinetic Target is:    Trough/random 10-15 mg/L    Renal Dosing Considerations:    HD: N/A     Assessment/Plan:   Initial/Loading dose: Will receive 1500 mg IV (25 mg/kg, capped at 2250 mg) x 1 loading dose.      Maintenance dose: Pharmacy will dose vancomycin per levels    Monitorin) Plan for vancomycin trough to be obtained in approximately 72 hours    2) Pharmacy will order SCr as clinically indicated to assess renal function.    3) Pharmacy will monitor for toxicity and efficacy, adjust vancomycin dose and/or frequency, and order vancomycin levels as appropriate per the Pharmacy and Therapeutics Committee approved protocol until discontinuation of the medication.       We appreciate the opportunity to assist in the care of this patient.     Yesy Neves, PharmD  2025  6:42 PM  [facility]: [phone]

## 2025-04-22 NOTE — ED QUICK NOTES
Report called to ICU DEANN Welch. Per RN, not to bring up patient for 10 minutes d/t room not being set up.

## 2025-04-22 NOTE — CONSULTS
[facility]  part of [facility]    Report of Consultation    Shavon Stuart Patient Status:  Emergency    1970 MRN [id number]   Location [facility] EMERGENCY DEPARTMENT Attending Davey Malik MD   Hosp Day # 0 PCP No primary care provider on file.     Date of Admission:  2025  Date of Consult:  2025   Reason for Consultation:   ESRD/hypotensive    History of Present Illness:   Patient is a 54 year old female who was admitted to the hospital for <principal problem not specified>:    53 y/o F with h/o ESRD on nightly PD , NICM,HFrEF  being treated for latent TB presented with abdominal pain , fever, sob x few days  In ER hypotensive and tachypneac.  Started on levo  Denies any sob  Last pd was yest    DR Delaney is her nephrologist    Past Medical History  Past Medical History[1]    Past Surgical History  Past Surgical History[2]    Family History  Family History[3]    Social History  Short Social Hx on File[4]    Current Medications:  Current Hospital Medications[5]  Prescriptions Prior to Admission[6]    Allergies  Allergies[7]    Review of Systems:   GENERAL HEALTH: feels well otherwise  SKIN: denies any unusual skin lesions or rashes  RESPIRATORY: denies shortness of breath with exertion, no cough, no hemoptysis  CARDIOVASCULAR: denies chest pain on exertion, no palpitations  :  Denies hematuria, dysuria, foamy urine  GI: ++ abd pain  EXT: no edema  NEURO: denies headaches      A comprehensive 12 point review of systems was completed.  Pertinent positives as above and all the rest were negative.     Physical Exam:   /72   Pulse 81   Temp 99.1 °F (37.3 °C) (Oral)   Resp 23   Ht 5' (1.524 m)   Wt 134 lb (60.8 kg)   SpO2 100%   BMI 26.17 kg/m²    No intake or output data in the 24 hours ending 25 1507  Wt Readings from Last 1 Encounters:   25 134 lb (60.8 kg)       Exam  GENERAL: well developed, well nourished,in no apparent  distress  SKIN: no rashes  HEENT: no scleral icterus, moist mucus membranes  NECK: supple,no adenopathy,no bruits  LUNGS: clear to auscultation without crackles or wheezes  CARDIO: RRR without murmur  GI: TTP/ pd cath  EXTREMITIES: no cyanosis, clubbing or edema  NEURO: CN grossly intact, A+O x3  Access      Results:     Laboratory Data:  Recent Labs   Lab 04/22/25  1252   RBC 2.93*   HGB 9.4*   HCT 28.9*   MCV 98.6   MCH 32.1   MCHC 32.5   RDW 18.9*   NEPRELIM 5.12   WBC 7.4   .0*         Recent Labs   Lab 04/22/25  1252   *   BUN 32*   CREATSERUM 10.29*   CA 9.5   *   K 3.9   CL 93*   CO2 25.0        Imaging:  XR CHEST AP PORTABLE  (CPT=71045)  Result Date: 4/22/2025  CONCLUSION: No acute cardiopulmonary process.    Dictated by (CST): Devante Beckford MD on 4/22/2025 at 1:57 PM     Finalized by (CST): Devante Beckford MD on 4/22/2025 at 1:57 PM                Impression/Plan:     Impression:  ESRD. On PD. Will hold off tonight  Abd pain/ likely peritonitis. Pc fluid cell count and cx, unfortunately abx given . IV abx  Anemia, check iron panel  Sec HPTH  Cardiomyopathy  Latent TB  Sepsis/hypotension, IVF, cx  and abx, pressors      Plan:  Hold pd tonight  Cellcount, cx   abx  Iron panel  Pressors  IVF    Thank you very much for allowing me to participate in the care of your patient.  If you have any questions, please do not hesitate to contact me.     Latrice Altman MD  4/22/2025         [1] History reviewed. No pertinent past medical history.  [2] History reviewed. No pertinent surgical history.  [3] No family history on file.  [4]   Social History  Socioeconomic History    Marital status:    Tobacco Use    Smoking status: Never    Smokeless tobacco: Never   Substance and Sexual Activity    Alcohol use: Never    Drug use: Never     Social Drivers of Health     Food Insecurity: Low Risk  (10/15/2024)    Received from [facility]    Food [facility] [place] [facility]     Within the past 12 months,  you worried that your food would run out before you got money to buy more.  : Never true     Within the past 12 months, the food you bought just didn't last and you didn't have money to get more. : Never true   Transportation Needs: Not At Risk (10/15/2024)    Received from [facility]    Transportation Needs     In the past 12 months, has lack of reliable transportation kept you from medical appointments, meetings, work or from getting things needed for daily living? : No   [5]   Current Facility-Administered Medications   Medication Dose Route Frequency    sodium chloride 0.9 % IV bolus 1,824 mL  30 mL/kg Intravenous Once    norepinephrine (Levophed) 4 mg/250mL infusion premix  0.5-50 mcg/min Intravenous Continuous   [6] (Not in a hospital admission)   [7]   Allergies  Allergen Reactions    Morphine ITCHING

## 2025-04-23 ENCOUNTER — APPOINTMENT (OUTPATIENT)
Dept: CT IMAGING | Facility: HOSPITAL | Age: 55
DRG: 907 | End: 2025-04-23
Attending: INTERNAL MEDICINE
Payer: MEDICAID

## 2025-04-23 ENCOUNTER — APPOINTMENT (OUTPATIENT)
Dept: GENERAL RADIOLOGY | Facility: HOSPITAL | Age: 55
DRG: 907 | End: 2025-04-23
Attending: HOSPITALIST
Payer: MEDICAID

## 2025-04-23 LAB
ALBUMIN SERPL-MCNC: 3.9 G/DL (ref 3.2–4.8)
ANION GAP SERPL CALC-SCNC: 15 MMOL/L (ref 0–18)
BASOPHILS # BLD AUTO: 0.07 X10(3) UL (ref 0–0.2)
BASOPHILS NFR BLD AUTO: 0.5 %
BASOPHILS NFR PRT: 0 %
BUN BLD-MCNC: 31 MG/DL (ref 9–23)
BUN/CREAT SERPL: 3 (ref 10–20)
CALCIUM BLD-MCNC: 8.7 MG/DL (ref 8.7–10.4)
CHLORIDE SERPL-SCNC: 100 MMOL/L (ref 98–112)
CO2 SERPL-SCNC: 18 MMOL/L (ref 21–32)
COLOR FLD: YELLOW
CREAT BLD-MCNC: 10.42 MG/DL (ref 0.55–1.02)
DEPRECATED HBV CORE AB SER IA-ACNC: 2803 NG/ML (ref 50–306)
DEPRECATED RDW RBC AUTO: 63.9 FL (ref 35.1–46.3)
EGFRCR SERPLBLD CKD-EPI 2021: 4 ML/MIN/1.73M2 (ref 60–?)
EOSINOPHIL # BLD AUTO: 0 X10(3) UL (ref 0–0.7)
EOSINOPHIL NFR BLD AUTO: 0 %
EOSINOPHIL NFR PRT: 1 %
ERYTHROCYTE [DISTWIDTH] IN BLOOD BY AUTOMATED COUNT: 18.6 % (ref 11–15)
GLUCOSE BLD-MCNC: 110 MG/DL (ref 70–99)
HCT VFR BLD AUTO: 26.9 % (ref 35–48)
HGB BLD-MCNC: 8.5 G/DL (ref 12–16)
IMM GRANULOCYTES # BLD AUTO: 0.1 X10(3) UL (ref 0–1)
IMM GRANULOCYTES NFR BLD: 0.7 %
IRON SATN MFR SERPL: 12 % (ref 15–50)
IRON SERPL-MCNC: 19 UG/DL (ref 50–170)
LYMPHOCYTES # BLD AUTO: 1.06 X10(3) UL (ref 1–4)
LYMPHOCYTES NFR BLD AUTO: 7.1 %
LYMPHOCYTES NFR PRT: 3 %
MCH RBC QN AUTO: 32.4 PG (ref 26–34)
MCHC RBC AUTO-ENTMCNC: 31.6 G/DL (ref 31–37)
MCV RBC AUTO: 102.7 FL (ref 80–100)
MONOCYTES # BLD AUTO: 1.1 X10(3) UL (ref 0.1–1)
MONOCYTES NFR BLD AUTO: 7.3 %
MONOS+MACROS NFR PRT: 6 %
NEUTROPHILS # BLD AUTO: 12.69 X10 (3) UL (ref 1.5–7.7)
NEUTROPHILS # BLD AUTO: 12.69 X10(3) UL (ref 1.5–7.7)
NEUTROPHILS # PRT: ABNORMAL /CUMM (ref ?–250)
NEUTROPHILS NFR BLD AUTO: 84.4 %
NEUTROPHILS NFR FLD: 90 %
OSMOLALITY SERPL CALC.SUM OF ELEC: 283 MOSM/KG (ref 275–295)
PHOSPHATE SERPL-MCNC: 4.5 MG/DL (ref 2.4–5.1)
PLATELET # BLD AUTO: 124 10(3)UL (ref 150–450)
POTASSIUM SERPL-SCNC: 4.9 MMOL/L (ref 3.5–5.1)
RBC # BLD AUTO: 2.62 X10(6)UL (ref 3.8–5.3)
RBC # FLD: 9374 /CUMM (ref ?–1)
SODIUM SERPL-SCNC: 133 MMOL/L (ref 136–145)
TOTAL CELLS COUNTED FLD: 100
TOTAL CELLS COUNTED PRT: ABNORMAL /CUMM (ref ?–1)
TOTAL IRON BINDING CAPACITY: 156 UG/DL (ref 250–425)
TRANSFERRIN SERPL-MCNC: 87 MG/DL (ref 250–380)
WBC # BLD AUTO: 15 X10(3) UL (ref 4–11)
WBC # PRT: ABNORMAL /CUMM

## 2025-04-23 PROCEDURE — 99233 SBSQ HOSP IP/OBS HIGH 50: CPT | Performed by: INTERNAL MEDICINE

## 2025-04-23 PROCEDURE — 71045 X-RAY EXAM CHEST 1 VIEW: CPT | Performed by: HOSPITALIST

## 2025-04-23 PROCEDURE — 99291 CRITICAL CARE FIRST HOUR: CPT | Performed by: INTERNAL MEDICINE

## 2025-04-23 PROCEDURE — 36556 INSERT NON-TUNNEL CV CATH: CPT | Performed by: HOSPITALIST

## 2025-04-23 PROCEDURE — 74176 CT ABD & PELVIS W/O CONTRAST: CPT | Performed by: INTERNAL MEDICINE

## 2025-04-23 RX ORDER — DEXTROSE MONOHYDRATE, SODIUM CHLORIDE, SODIUM LACTATE, CALCIUM CHLORIDE, MAGNESIUM CHLORIDE 1.5; 538; 448; 18.4; 5.08 G/100ML; MG/100ML; MG/100ML; MG/100ML; MG/100ML
5000 SOLUTION INTRAPERITONEAL
Status: DISCONTINUED | OUTPATIENT
Start: 2025-04-23 | End: 2025-04-26

## 2025-04-23 NOTE — CONSULTS
[facility]  part of [facility] ID CONSULT NOTE    Shavon Stuart Patient Status:  Inpatient    1970 MRN [id number]   Location [facility] 2W/SW Attending Contreras Alex MD   Hosp Day # 1 PCP No primary care provider on file.       Reason for Consultation:  Fever    ASSESSMENT:    Antibiotics: Vancomycin, zosyn    # Acute fever with abdominal pain, leukocytosis, likely PD catheter associated SBP with GNR, R/o bacteremia   -FU body fluid cx  # H/o latent TB   -Previously was on rifampin 3/2024 but only completed two months, restarted early 2025  # ESRD on PD    PLAN:  -  Continue on vancomycin and zosyn.  -  FU cx. Check CT A/P.  -  Restart latent TB treatment.  -  DC airborne isolation.  -  Follow fever curve, wbc.  -  Reviewed labs, micro, imaging reports, available old records.  -  Case d/w patient using language line, RN.      History of Present Illness:  Shavon Stuart is a 54 year old , Hungarian speaking female with a history of ESRD on PD, NICM, currently on treatment with rifampin for latent TB since early April after having cardiac workup at [facility] (previously took rifampin 3/2024-2024 but treatment stopped due to medication interactions), who presented to [facility] ED on  with worsening abdominal pain. States that she had a fever on Monday night and then had ongoing abdominal pain. No diarrhea. On arrival, Tmax 101.2, wbc 7.4, lactate 4.2, hypotensive and started on pressors, CXR unremarkable, blood cx obtained, body fluid cx from PD fluid obtained, started on vancomycin and zosyn.     History:  Past Medical History[1]  Past Surgical History[2]  Family History[3]   reports that she has never smoked. She has never used smokeless tobacco. She reports that she does not drink alcohol and does not use drugs.    Allergies:  Allergies[4]    Medications:  Current Hospital Medications[5]    Review of Systems:  CONSTITUTIONAL:  No weight loss,  weakness or fatigue.  HEENT:  Eyes:  No visual loss, blurred vision, double vision or yellow sclerae. Ears, Nose, Throat:  No hearing loss, sneezing, congestion, runny nose or sore throat.  SKIN:  No rash or itching.  CARDIOVASCULAR:  No chest pain, chest pressure or chest discomfort  RESPIRATORY:  No shortness of breath, cough or sputum.  GASTROINTESTINAL:  +abdominal pain  GENITOURINARY:  No Burning on urination.   NEUROLOGICAL:  No headache, dizziness, syncope, paralysis, ataxia, numbness or tingling in the extremities.  MUSCULOSKELETAL:  No muscle, back pain, joint pain or stiffness.  HEMATOLOGIC:  No anemia, bleeding or bruising.  ALLERGIES:  No history of asthma, hives, eczema or rhinitis.    Physical Exam:  Vital signs: Blood pressure 105/52, pulse 97, temperature 97.8 °F (36.6 °C), temperature source Temporal, resp. rate 20, height 5' (1.524 m), weight 139 lb 5.3 oz (63.2 kg), SpO2 100%.    General: Awake, in bed  HEENT: Moist mucous membranes.   Neck: No lymphadenopathy.  Supple.  Cardiovascular: RRR  Respiratory: Clear to auscultation bilaterally.  No wheezes. No rhonchi.  Abdomen: Distended, mild diffuse TTP, PD catheter c/d/i  Musculoskeletal: No edema noted  Integument: No lesions. No erythema.  Lines: PIV+, R CVC+    Laboratory Data:  Recent Labs   Lab 04/23/25  0352   RBC 2.62*   HGB 8.5*   HCT 26.9*   .7*   MCH 32.4   MCHC 31.6   RDW 18.6*   NEPRELIM 12.69*   WBC 15.0*   .0*     Recent Labs   Lab 04/22/25  1252 04/23/25  0352   * 110*   BUN 32* 31*   CREATSERUM 10.29* 10.42*   CA 9.5 8.7   ALB 4.5 3.9   * 133*   K 3.9 4.9   CL 93* 100   CO2 25.0 18.0*   ALKPHO 263*  --    AST 31  --    ALT 25  --    BILT 0.4  --    TP 7.7  --        Microbiology: Reviewed in EMR    Radiology: Reviewed    Thank you for allowing us to participate in the care of this patient. Please do not hesitate to call if you have any questions.   We will continue to follow with you and will make further  recommendations based on his progress.    [facility]KESH[facility]  [phone]  4/23/2025           [1] History reviewed. No pertinent past medical history.  [2] History reviewed. No pertinent surgical history.  [3] No family history on file.  [4]   Allergies  Allergen Reactions    Morphine ITCHING   [5]   Current Facility-Administered Medications:     phenylephrine (Julien-Synephrine) 250 mg in sodium chloride 0.9% 250 mL infusion,  mcg/min, Intravenous, Continuous    piperacillin-tazobactam (Zosyn) 4.5 g in dextrose 5% 100 mL IVPB-ADDV, 4.5 g, Intravenous, Q12H    norepinephrine (Levophed) 4 mg/250mL infusion premix, 0.5-50 mcg/min, Intravenous, Continuous    sodium chloride 0.9% infusion, , Intravenous, Continuous    heparin (Porcine) 5000 UNIT/ML injection 5,000 Units, 5,000 Units, Subcutaneous, 2 times per day    acetaminophen (Tylenol Extra Strength) tab 500 mg, 500 mg, Oral, Q4H PRN    HYDROmorphone (Dilaudid) 1 MG/ML injection 0.2 mg, 0.2 mg, Intravenous, Q2H PRN **OR** HYDROmorphone (Dilaudid) 1 MG/ML injection 0.4 mg, 0.4 mg, Intravenous, Q2H PRN **OR** HYDROmorphone (Dilaudid) 1 MG/ML injection 0.8 mg, 0.8 mg, Intravenous, Q2H PRN    ondansetron (Zofran) 4 MG/2ML injection 4 mg, 4 mg, Intravenous, Q6H PRN    prochlorperazine (Compazine) 10 MG/2ML injection 5 mg, 5 mg, Intravenous, Q8H PRN    sodium chloride 0.9% infusion, , Intravenous, Continuous    Vancomycin: PHARMACY DOSING, 1 each, Intravenous, See Admin Instructions (RX holding)    vasopressin (Vasostrict) 20 Units in sodium chloride 0.9% 100 mL infusion for septic shock, 0.01-0.03 Units/min, Intravenous, Continuous

## 2025-04-23 NOTE — PAYOR COMM NOTE
--------------  ADMISSION REVIEW   4/22-4/23  Payor: [facility]  Subscriber #:  [id number]  Authorization Number: [id number]    Admit date: 4/22/25  Admit time:  6:18 PM       REVIEW DOCUMENTATION:  ED Provider Notes signed by Davey Malik MD at 4/23/2025 10:56 AM    Patient Seen in: [facility] Emergency Department    Chief Complaint   Patient presents with    Abdomen/Flank Pain     Stated Complaint: Hypotensive/abd pain      ED Triage Vitals   BP 04/22/25 1246 (!) 71/51   Pulse 04/22/25 1246 101   Resp 04/22/25 1246 (!) 28   Temp 04/22/25 1246 98.7 °F (37.1 °C)   Temp src 04/22/25 1246 Temporal   SpO2 04/22/25 1330 100 %   O2 Device 04/22/25 1255 None (Room air)       Current Vitals:   Vital Signs  BP: 116/72  Pulse: 81  Resp: 23  Temp: 98.7 °F (37.1 °C)  Temp src: Temporal  MAP (mmHg): 86    Oxygen Therapy  SpO2: 100 %  O2 Device: None (Room air)    ED Course     Labs Reviewed   CBC WITH DIFFERENTIAL WITH PLATELET - Abnormal; Notable for the following components:       Result Value    RBC 2.93 (*)     HGB 9.4 (*)     HCT 28.9 (*)     RDW-SD 61.7 (*)     RDW 18.9 (*)     .0 (*)     All other components within normal limits   COMP METABOLIC PANEL (14) - Abnormal; Notable for the following components:    Glucose 103 (*)     Sodium 133 (*)     Chloride 93 (*)     BUN 32 (*)     Creatinine 10.29 (*)     BUN/CREA Ratio 3.1 (*)     eGFR-Cr 4 (*)     Alkaline Phosphatase 263 (*)     All other components within normal limits   LACTIC ACID, PLASMA - Abnormal; Notable for the following components:    Lactic Acid 4.2 (*)     All other components within normal limits   POCT GLUCOSE - Abnormal; Notable for the following components:    POC Glucose  118 (*)     All other components within normal limits   PROTHROMBIN TIME (PT) - Normal   PTT, ACTIVATED - Normal   URINALYSIS WITH CULTURE REFLEX   LDH PERITONEAL FLUID   GLUCOSE PERITONEAL FLUID   TOTAL PROTEIN PERITONEAL FLUID    AMYLASE PERITONEAL FLUID   LACTIC ACID REFLEX POST POSTIVE   RAINBOW DRAW LAVENDER   RAINBOW DRAW LIGHT GREEN   RAINBOW DRAW BLUE   BLOOD CULTURE   BLOOD CULTURE   BODY FLUID CULT,AEROBIC AND ANAEROBIC   EKG    Rate, intervals and axes as noted on EKG Report.  Rate: 101  Rhythm: Sinus Rhythm  Reading: Normal sinus rhythm with lateral and inferior T wave inversions but no other acute signs of ischemia or abnormal intervals.  MDM      54-year-old female with a history of severe cardiomyopathy, end-stage renal disease on daily peritoneal dialysis, and who is currently under treatment for latent TB presents today for abdominal pain, fever, and shortness of breath.  Symptoms developing over the last 1 to 2 days.  Last peritoneal dialysis was yesterday.    On exam, initially hypotensive 70s over 50s, tachypneic, pale and ill-appearing.  Tenderness to palpation throughout the abdomen with mild fluid wave.  Lungs clear.  Conjunctive pale.  No scleral icterus.    Differential: Septic shock with consideration for spontaneous bacterial peritonitis, worsening heart failure, electrolyte abnormality,    Point-of-care ultrasound performed and interpreted by me-  -No pericardial effusion  -moderately reduced LV EF (likely increased from previously documented  -RV<LV  -Flat IVC  -Normal caliber aortic outflow tract    POCUS abdomen with moderate ascites    I performed a bedside diagnostic paracentesis with pinkish clear fluid returned and sent for analysis.    Patient treated with empiric antibiotics and IV fluids per sepsis protocol and given morphine with Benadryl for pain.    Patient started on norepinephrine for her cardiomyopathy, septic shock, and hypotension with response in her blood pressure.      I consulted cardiology for her cardiomyopathy without immediate recommendations.    I consulted nephrology due to her renal failure without immediate recommendations.    I consulted infectious disease due to her septic shock and  latent TB without immediate recommendations.    I consulted pulmonary critical care for ICU admission.    I spent a total of 50 minutes of critical care time in obtaining history, performing a physical exam, bedside monitoring of interventions, collecting and interpreting tests and discussion with consultants but not including time spent performing procedures.      [facility]  Procedure Note  Shavon Stuart Patient Status:  Inpatient    1970 MRN [id number]   Location [facility] 2/ Attending Contreras Alex MD   Hosp Day # 1 PCP No primary care provider on file.     Date of Service:  2025    Procedure: Paracentesis    Indications:     Initial Exam,  Diagnostic  Suspected peritonitis    Anesthesia:   Anesthesia:  Local infiltration  Local anesthetic:  lidocaine 1% without epinephrine  Anesthetic total (mL):  5ml    Sedation:    Patient Sedated?  no      Procedure Details:    Patient was prepped and draped in usual sterile fashion.    Needle Gauge:  20  Ultrasound guided:  Yes  Puncture site:  Right lower quadrant  Fluid removed (ml):  20ml  Fluid appearance:  Clear  Dressing:  4x4 sterile gauze    Patient tolerance:  Patient tolerated the procedure well with no immediate complications.    Comments:      Admission disposition: 2025  2:11 PM      Disposition and Plan     Clinical Impression:  1. Abdominal pain, acute    2. SBP (spontaneous bacterial peritonitis) (HCC)    3. Septic shock (HCC)    4. Cardiomyopathy, unspecified type (HCC)         Disposition:  Admit  2025  2:11 pm    Sepsis Reassessment Note    /72   Pulse 81   Temp 98.7 °F (37.1 °C) (Temporal)   Resp 23   Ht 152.4 cm (5')   Wt 60.8 kg   SpO2 100%   BMI 26.17 kg/m²      I completed the sepsis reassessment at 1430    Hospital Problems       Present on Admission           ICD-10-CM Noted POA    Septic shock (HCC) A41.9, R65.21 2025 Unknown              Sepsis dx documented at 2025  2:11  PM           04/22/2025     HISTORY AND PHYSICAL EXAMINATION     CHIEF COMPLAINT:  Hypotension, possible sepsis, rule out spontaneous bacterial peritonitis.     HISTORY OF PRESENT ILLNESS:  The patient is a 54-year-old  female, looks much older than her stated age.  Currently on peritoneal dialysis for end-stage renal disease.  Came in today to the emergency department for evaluation of abdominal pain for the last 2 to 3 days.  Upon arrival to the emergency room noted to have a low systolic blood pressure in the mid 60s, was given IV fluids, and lactic acid was obtained which was 4.2.  Chemistry showed potassium 3.9, BUN and creatinine are 32 and 10.29, sodium 133.  CBC, no leukocytosis, platelet count 117.  Blood cultures were obtained.  Started empirically on IV antibiotics, and she will be admitted to the hospital for further management.  Also, diagnostic dialysis fluid was sent for culture and analysis.  Chest x-ray was unremarkable.     PAST MEDICAL HISTORY:  History of left renal mass status post left nephrectomy and she had atrophic right kidney, eventually developed end-stage renal disease, and started on peritoneal dialysis.  She has anemia of chronic kidney disease and secondary hyperparathyroidism and generalized osteoarthritis, nonischemic cardiomyopathy, ejection fraction 20% to 30% with severe mitral regurgitation.  Being evaluated for kidney and liver transplant at [facility].  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  Started on rifampin, supposed to be on it for 4 months, start dose was April 1.     PAST SURGICAL HISTORY:  Peritoneal dialysis catheter, left and right heart catheterization, right ankle open reduction and internal fixation.      REVIEW OF SYSTEMS:  Abdominal pain and fatigue since yesterday.  The patient cannot tell me if she had fever or chills.  Other 12-point review of systems is negative.        ASSESSMENT AND PLAN:    1.        Acute sepsis, rule out spontaneous bacterial peritonitis.  Dialysate fluid and blood cultures were obtained.  2.       End-stage renal disease, on peritoneal dialysis.  3.       Latent tuberculosis.  Currently on rifampin.     The patient was started on IV Levophed in the emergency room.  We will continue IV fluids, trend lactic acid, and monitor hemodynamic status.  Follow up on cultures.  Obtain nephrology, infectious disease, and cardiology consult.  Continue empiric IV vancomycin and Zosyn.            4/22 Nephrology  Reason for Consultation:   ESRD/hypotensive     History of Present Illness:   Patient is a 54 year old female who was admitted to the hospital for <principal problem not specified>:     53 y/o F with h/o ESRD on nightly PD , NICM,HFrEF  being treated for latent TB presented with abdominal pain , fever, sob x few days  In ER hypotensive and tachypneac.  Started on levo  Denies any sob  Last pd was yest      GI: ++ abd pain       Impression:  ESRD. On PD. Will hold off tonight  Abd pain/ likely peritonitis. Pc fluid cell count and cx, unfortunately abx given . IV abx  Anemia, check iron panel  [facility]  Cardiomyopathy  Latent TB  Sepsis/hypotension, IVF, cx  and abx, pressors        Plan:  Hold pd tonight  Cellcount, cx   abx  Iron panel  Pressors  IVF        4/22 Pulm    Reason for Consultation:   Hypotension, abdominal pain     History of Present Illness:   Patient is a 54-year-old female with past medical history significant for end-stage renal disease on peritoneal dialysis, cardiomyopathy, latent tuberculosis who presents with chief complaint of some abdominal discomfort with fatigue for the last 1 to 2 days.  Hypotensive during ED course.  Started on pressor support with norepinephrine.  Denies significant dyspnea.  Afebrile.    Constitutional: Fatigue, malaise     + abdominal pain       Constitutional: Mild distress     Assessment   1.  Septic shock  2.  Lactic acidosis  3.  Anemia  4.   Thrombocytopenia  5.  Cardiomyopathy  6.  End-stage renal disease  7.  Left kidney mass  8.  Latent tuberculosis     Plan   -Patient with evidence of fatigue malaise abdominal pain hypotension.  Concern for septic shock with possible sources which include SBP.  Await paracentesis results.  - Empiric antibiotic therapy in the meantime blood and peritoneal cultures pending.  - Chest x-ray with no significant abnormality seen  - Prior history of latent tuberculosis diagnosed March 2024 started on rifampin which she did not complete due to interaction with other medications.  We started on rifampin 600 mg daily recently which we will continue at this time.  - Attempt to wean pressor support with norepinephrine  - Continue trending lactic acidosis  - Transfuse for hemoglobin below 7  -Monitor thrombocytopenia  - History of end-stage renal disease on peritoneal dialysis.  Further recommendations per nephrology  - Prior history of left kidney mass status post left radical nephrectomy  - Reviewed vitals, labs and imaging        4/22 Cardiology    Severe sepsis with shock  L kidney mass s/p nephrectomy  ESRD on PD  Nonischemic cardiomyopathy     -presented with worsening abdominal pain, fevers  -found to be hypotensive with elevated lactate - started on pressor support  -etiology concerning for peritonitis, especially given she is on daily PD; shock secondary to sepsis; during exam pt with wide pulse pressure and warm extremities - unlikely cardiogenic   -aggressive supportive care with pressor support, gentle IVF and Abx                  -strict I/Os  -last TTE with EF 30% at OSH  -closely monitor hemodynamics and tele         4/23 Pulm/critical care    Some ongoing abdominal pain nausea vomiting this morning.       Constitutional: Mild distress     GI: abdomen soft, generalized tenderness to palpation       Component Value Date     WBC 15.0 04/23/2025     HGB 8.5 04/23/2025     HCT 26.9 04/23/2025     .0 04/23/2025      CREATSERUM 10.42 04/23/2025     BUN 31 04/23/2025      04/23/2025     K 4.9 04/23/2025      04/23/2025     CO2 18.0 04/23/2025      04/23/2025     CA 8.7 04/23/2025     ALB 3.9 04/23/2025     ALKPHO 263 04/22/2025     BILT 0.4 04/22/2025     TP 7.7 04/22/2025     AST 31 04/22/2025     ALT 25 04/22/2025     PTT 28.1 04/22/2025     INR 1.03 04/22/2025     PHOS 4.5 04/23/2025     Assessment   1.  Septic shock  2.  Lactic acidosis  3.  Anemia  4.  Thrombocytopenia  5.  Cardiomyopathy  6.  End-stage renal disease  7.  Left kidney mass  8.  Latent tuberculosis      Plan   -Patient with evidence of fatigue malaise abdominal pain hypotension.  Concern for septic shock with possible sources which include SBP.  Await paracentesis results.  - Empiric antibiotic therapy in the meantime blood and peritoneal cultures pending.  Started on Zosyn and vancomycin  - Chest x-ray with no significant abnormality seen  - Prior history of latent tuberculosis diagnosed March 2024 started on rifampin which she did not complete due to interaction with other medications.  We started on rifampin 600 mg daily recently.  Will discuss with ID in regards to restarting.  - Worsening pressor support now on Julien-Synephrine and vasopressin.  Wean as tolerated.  - Transfuse for hemoglobin below 7  -Monitor thrombocytopenia  - History of end-stage renal disease on peritoneal dialysis.  Further recommendations per nephrology  - Prior history of left kidney mass status post left radical nephrectomy  - DVT prophylaxis: Heparin  - Patient is complex and high risk for further deterioration    MEDICATIONS ADMINISTERED IN LAST 1 DAY:  acetaminophen (Tylenol Extra Strength) tab 500 mg       Date Action Dose Route User    4/22/2025 1848 Given 500 mg Oral Rebekah Fulton RN          albumin human (Albumin) 25% injection 25 g       Date Action Dose Route User    4/22/2025 2113 New Bag 25 g Intravenous Stefany Gutierres RN           diphenhydrAMINE (Benadryl) 50 mg/mL  injection 25 mg       Date Action Dose Route User    4/22/2025 1330 Given 25 mg Intravenous Elida Leyva RN          diphenhydrAMINE (Benadryl) 50 mg/mL  injection 25 mg       Date Action Dose Route User    4/22/2025 1609 Given 25 mg Intravenous Elida Leyva RN          heparin (Porcine) 5000 UNIT/ML injection 5,000 Units       Date Action Dose Route User    4/23/2025 0847 Given 5,000 Units Subcutaneous (Left Lower Abdomen) Christa Reynoso RN    4/22/2025 2119 Given 5,000 Units Subcutaneous (Right Lower Abdomen) Stefany Gutierres RN          HYDROmorphone (Dilaudid) 1 MG/ML injection 0.4 mg       Date Action Dose Route User    4/23/2025 0847 Given 0.4 mg Intravenous Christa Reynoso RN    4/23/2025 0337 Given 0.4 mg Intravenous Stefany Gutierres RN          lidocaine PF (Xylocaine-MPF) 1 % injection       Date Action Dose Route User    4/22/2025 1312 Given by Other (none) Infiltration Elida Leyva RN          lidocaine PF (Xylocaine-MPF) 1% injection       Date Action Dose Route User    4/22/2025 1312 Given by Other (none) Infiltration Elida Leyva RN          morphINE PF 4 MG/ML injection 4 mg       Date Action Dose Route User    4/22/2025 1327 Given 4 mg Intravenous Elida Leyva RN          morphINE PF 4 MG/ML injection 4 mg       Date Action Dose Route User    4/22/2025 1609 Given 4 mg Intravenous Elida Leyva RN          norepinephrine (Levophed) 4 mg/250mL infusion premix       Date Action Dose Route User    4/22/2025 2316 Rate/Dose Change 2 mcg/min Intravenous Stefany Gutierres RN    4/22/2025 2257 Rate/Dose Change 4 mcg/min Intravenous Stefany Gutierres RN    4/22/2025 2245 Rate/Dose Change 8 mcg/min Intravenous Stefany Gutierres RN    4/22/2025 2237 Rate/Dose Change 10 mcg/min Intravenous Stefany Gutierres RN    4/22/2025 2131 New Bag 12 mcg/min Intravenous Stefany Gutierres RN    4/22/2025 2034 Rate/Dose Change 9 mcg/min Intravenous Stefany Gutierres RN    4/22/2025 2007  Rate/Dose Change 8 mcg/min Intravenous Stefany Gutierres RN    4/22/2025 1514 Rate/Dose Change 5 mcg/min Intravenous Elida Leyva RN    4/22/2025 1310 New Bag 10 mcg/min Intravenous Elida Leyva RN          ondansetron (Zofran) 4 MG/2ML injection 4 mg       Date Action Dose Route User    4/23/2025 0846 Given 4 mg Intravenous Christa Reynoso RN          phenylephrine (Julien-Synephrine) 50 mg in sodium chloride 0.9% 250 mL infusion       Date Action Dose Route User    4/23/2025 1030 Rate/Dose Change 200 mcg/min Intravenous Christa Reynoso, EDANN    4/23/2025 0831 Rate/Dose Change 210 mcg/min Intravenous Christa Reynoso RN    4/23/2025 0713 New Bag 220 mcg/min Intravenous Stefany Gutierres, RN    4/23/2025 0341 New Bag 220 mcg/min Intravenous Stfeany Gutierres, RN    4/23/2025 0048 Rate/Dose Change 230 mcg/min Intravenous Stefany Gutierres, RN    4/23/2025 0031 Rate/Dose Change 200 mcg/min Intravenous Stefany Gutierres, RN    4/23/2025 0012 Rate/Dose Change 150 mcg/min Intravenous Stefany Gutierres, DEANN    4/22/2025 2257 Rate/Dose Change 100 mcg/min Intravenous Stefany Gutierres, RN    4/22/2025 2236 New Bag 50 mcg/min Intravenous Stefany Gutierres RN          phenylephrine (Julien-Synephrine) 250 mg in sodium chloride 0.9% 250 mL infusion       Date Action Dose Route User    4/23/2025 1104 Rate/Dose Change 175 mcg/min Intravenous Christa Reynoso, DEANN    4/23/2025 1038 New Bag 190 mcg/min Intravenous Christa Reynoso RN          piperacillin-tazobactam (Zosyn) 3.375 g in dextrose 5% 100 mL IVPB-ADDV       Date Action Dose Route User    4/23/2025 0847 New Bag 3.375 g Intravenous Christa Reynoso, DEANN    4/22/2025 2008 New Bag 3.375 g Intravenous Stefany Gutierres RN          piperacillin-tazobactam (Zosyn) 4.5 g in dextrose 5% 100 mL IVPB-ADDV       Date Action Dose Route User    4/22/2025 1314 New Bag 4.5 g Intravenous Elida Leyva, DEANN          sodium chloride 0.9% infusion       Date Action Dose Route User    4/23/2025 1104 Rate/Dose Verify (none) Intravenous  Christa Reynoso RN    4/22/2025 1844 New Bag (none) Intravenous Rebekah Fulton RN          sodium chloride 0.9 % IV bolus 1,824 mL       Date Action Dose Route User    4/22/2025 1313 New Bag 1,824 mL Intravenous Elida Leyva RN          sodium chloride 0.9 % IV bolus 250 mL       Date Action Dose Route User    4/22/2025 2008 New Bag 250 mL Intravenous Stefany Gutierres RN          vancomycin (Vancocin) 1,000 mg in sodium chloride 0.9% 250 mL IVPB-ADDV       Date Action Dose Route User    4/22/2025 2009 New Bag 1,000 mg Intravenous Stefany Gutierres RN          vasopressin (Vasostrict) 20 Units in sodium chloride 0.9% 100 mL infusion for septic shock       Date Action Dose Route User    4/23/2025 1104 Rate/Dose Verify 0.03 Units/min Intravenous Christa Reynoso RN    4/23/2025 0832 New Bag 0.03 Units/min Intravenous Christa Reynoso RN    4/22/2025 2236 New Bag 0.03 Units/min Intravenous Stefany Gutierres RN            Vitals (last day)       Date/Time Temp Pulse Resp BP SpO2 Weight O2 Device O2 Flow Rate (L/min) [facility]    04/23/25 1100 -- 93 18 116/72 100 % -- Nasal cannula 2 L/min MT    04/23/25 1030 -- 95 26 115/67 100 % -- Nasal cannula 2 L/min MT    04/23/25 1000 -- 97 20 105/52 100 % -- Nasal cannula 2 L/min MT    04/23/25 0915 -- 99 20 107/74 100 % -- Nasal cannula 2 L/min MT    04/23/25 0833 -- -- -- -- -- 139 lb 5.3 oz (63.2 kg) -- -- MT    04/23/25 0830 -- 51 27 110/52 100 % -- Nasal cannula 2 L/min MT    04/23/25 0800 97.8 °F (36.6 °C) 52 30 98/59 100 % -- Nasal cannula 2 L/min MT    04/23/25 0730 -- 53 23 92/61 100 % -- Nasal cannula 2 L/min MT    04/23/25 0600 -- 98 23 104/75 100 % -- Nasal cannula 2 L/min KF    04/23/25 0500 -- 100 19 91/57 100 % -- Nasal cannula 2 L/min     04/23/25 0400 98.3 °F (36.8 °C) 103 14 94/75 100 % -- Nasal cannula 2 L/min     04/23/25 0345 -- 109 22 102/73 100 % -- -- --     04/23/25 0330 -- 106 -- 114/82 94 % -- -- --     04/23/25 0300 -- 106 26 90/71 100 % -- Nasal cannula  2 L/min KF    04/23/25 0200 -- 107 23 95/74 100 % -- Nasal cannula 2 L/min     04/23/25 0100 -- 110 28 99/79 100 % -- Nasal cannula 2 L/min     04/23/25 0000 98.2 °F (36.8 °C) 121 26 86/64 92 % -- None (Room air) -- KF    04/22/25 2300 -- 125 32 96/75 100 % -- None (Room air) -- KF    04/22/25 2245 -- 127 27 91/63 100 % -- None (Room air) -- KF    04/22/25 2200 -- 132 27 80/56 100 % -- None (Room air) --     04/22/25 2100 99.5 °F (37.5 °C) 133 33 83/56 96 % -- None (Room air) --     04/22/25 2000 -- 131 24 68/49 99 % -- None (Room air) --     04/22/25 1900 -- 122 28 99/62 98 % -- None (Room air) --     04/22/25 1835 101.2 °F (38.4 °C) 121 27 103/66 96 % 138 lb 7.2 oz (62.8 kg) None (Room air) -- JR    04/22/25 1812 -- 123 26 106/66 100 % -- -- -- AR    04/22/25 1807 -- 125 22 96/65 100 % -- -- -- AR    04/22/25 1802 -- 126 31 97/67 100 % -- -- -- AR    04/22/25 1757 -- 122 32 99/66 100 % -- -- -- AR    04/22/25 1752 -- 124 32 102/65 100 % -- -- -- AR    04/22/25 1745 -- 124 29 102/72 100 % -- None (Room air) -- AR    04/22/25 1730 -- 128 29 101/66 100 % -- None (Room air) -- AR    04/22/25 1715 -- 120 25 102/67 100 % -- None (Room air) -- AR    04/22/25 1700 -- 123 23 105/74 100 % -- None (Room air) -- AR    04/22/25 1615 -- 118 17 128/82 93 % -- None (Room air) -- AR    04/22/25 1600 -- 116 26 119/77 100 % -- None (Room air) -- AR    04/22/25 1509 -- 115 25 135/83 100 % -- None (Room air) -- AR    04/22/25 1504 -- 115 25 132/86 100 % -- -- -- AR    04/22/25 1459 -- 113 27 125/79 100 % -- -- -- AR    04/22/25 1454 -- 115 26 131/82 100 % -- -- -- AR    04/22/25 1449 -- 113 28 127/84 100 % -- -- -- AR    04/22/25 1444 -- 115 28 129/83 100 % -- -- -- AR    04/22/25 1439 -- 115 24 123/84 100 % -- -- -- AR    04/22/25 1434 -- 106 22 122/75 100 % -- -- -- AR    04/22/25 1429 99.1 °F (37.3 °C) 109 27 130/64 100 % -- -- -- AR    04/22/25 1424 -- 110 25 112/65 100 % -- -- -- AR    04/22/25 1419 -- 106 23 120/71  100 % -- -- -- AR    04/22/25 1414 -- 104 21 115/67 100 % -- -- -- AR    04/22/25 1404 -- 102 24 108/67 100 % -- -- -- AR    04/22/25 1359 -- 100 22 118/73 100 % -- -- -- AR    04/22/25 1354 -- 92 23 112/66 100 % -- -- -- AR    04/22/25 1349 -- 90 23 120/62 100 % -- -- -- AR    04/22/25 1344 -- 84 22 113/56 100 % -- -- -- AR    04/22/25 1341 -- 81 23 -- 100 % -- -- -- AR    04/22/25 1339 -- 74 25 118/67 100 % -- -- -- AR    04/22/25 1336 -- 85 26 116/72 100 % -- -- -- AR    04/22/25 1330 -- 83 29 97/82 100 % -- None (Room air) -- AR    04/22/25 1326 -- 89 26 113/76 -- -- -- -- AR    04/22/25 1321 -- 80 24 101/52 -- -- -- -- AR    04/22/25 1315 -- 82 21 109/73 -- -- -- -- AR    04/22/25 1300 -- 99 19 62/50 -- -- -- -- AR    04/22/25 1255 -- -- -- -- -- -- None (Room air) -- AR    04/22/25 1246 98.7 °F (37.1 °C) 101 28 71/51 -- 134 lb (60.8 kg) -- -- AR          CIWA Scores (since admission)       None

## 2025-04-23 NOTE — PROGRESS NOTES
[facility]  part of [facility]    Cardiology Progress Note    Shavon Stuart Patient Status:  Inpatient    1970 MRN [id number]   Location [facility] 2W/SW Attending Contreras Alex MD   Hosp Day # 1 PCP No primary care provider on file.     Interval History   On 3 pressors (vasopressin, phenylephrine and norepinephrine)  Denies any CP or SOB  States abdominal pain has improved somewhat    Paracentesis with GNR    Assessment and Plan:   Severe sepsis with shock  L kidney mass s/p nephrectomy  ESRD on PD  Nonischemic cardiomyopathy     -presented with worsening abdominal pain, fevers  -found to be hypotensive with elevated lactate - started on pressor support, currently on 3 pressors  -etiology secondary to peritonitis, especially given she is on daily PD, new abdominal pain and presented with fevers with GNR on paracentesis; shock secondary to sepsis   -no evidence of cardiogenic shock with wide pulse pressure  -aggressive supportive care with pressor support, gentle IVF and Abx                  -strict I/Os  -last TTE with EF 30% at OSH     -holding GDMT in setting of shock  -closely monitor hemodynamics and tele     Objective:   Patient Vitals for the past 24 hrs:   BP Temp Temp src Pulse Resp SpO2 Weight   25 1300 117/81 -- -- 89 19 100 % --   25 1230 126/87 -- -- 90 19 100 % --   25 1215 123/82 -- -- 90 18 100 % --   25 1200 -- -- Temporal -- -- -- --   25 1130 108/78 -- -- 92 21 100 % --   25 1100 116/72 -- -- 93 18 100 % --   25 1030 115/67 -- -- 95 26 100 % --   25 1000 105/52 -- -- 97 20 100 % --   25 0915 107/74 -- -- 99 20 100 % --   25 0833 -- -- -- -- -- -- 139 lb 5.3 oz (63.2 kg)   25 0830 110/52 -- -- 51 (!) 27 100 % --   25 0800 98/59 97.8 °F (36.6 °C) Temporal 52 (!) 30 100 % --   25 0730 92/61 -- -- 53 23 100 % --   25 0600 104/75 -- -- 98 23 100 % --   25 0500 91/57  -- -- 100 19 100 % --   04/23/25 0400 94/75 98.3 °F (36.8 °C) Temporal 103 14 100 % --   04/23/25 0345 102/73 -- -- 109 22 100 % --   04/23/25 0330 114/82 -- -- 106 -- 94 % --   04/23/25 0300 90/71 -- -- 106 26 100 % --   04/23/25 0200 95/74 -- -- 107 23 100 % --   04/23/25 0100 99/79 -- -- 110 (!) 28 100 % --   04/23/25 0000 (!) 86/64 98.2 °F (36.8 °C) Temporal (!) 121 26 92 % --   04/22/25 2300 96/75 -- -- (!) 125 (!) 32 100 % --   04/22/25 2245 91/63 -- -- (!) 127 (!) 27 100 % --   04/22/25 2200 (!) 80/56 -- -- (!) 132 (!) 27 100 % --   04/22/25 2100 (!) 83/56 99.5 °F (37.5 °C) Temporal (!) 133 (!) 33 96 % --   04/22/25 2000 (!) 68/49 -- -- (!) 131 24 99 % --   04/22/25 1900 99/62 -- -- (!) 122 (!) 28 98 % --   04/22/25 1835 103/66 (!) 101.2 °F (38.4 °C) Temporal (!) 121 (!) 27 96 % 138 lb 7.2 oz (62.8 kg)   04/22/25 1812 106/66 -- -- (!) 123 26 100 % --   04/22/25 1807 96/65 -- -- (!) 125 22 100 % --   04/22/25 1802 97/67 -- -- (!) 126 (!) 31 100 % --   04/22/25 1757 99/66 -- -- (!) 122 (!) 32 100 % --   04/22/25 1752 102/65 -- -- (!) 124 (!) 32 100 % --   04/22/25 1745 102/72 -- -- (!) 124 (!) 29 100 % --   04/22/25 1730 101/66 -- -- (!) 128 (!) 29 100 % --   04/22/25 1715 102/67 -- -- 120 25 100 % --   04/22/25 1700 105/74 -- -- (!) 123 23 100 % --   04/22/25 1615 128/82 -- -- 118 17 93 % --   04/22/25 1600 119/77 -- -- 116 26 100 % --   04/22/25 1509 135/83 -- -- 115 25 100 % --   04/22/25 1504 132/86 -- -- 115 25 100 % --   04/22/25 1459 125/79 -- -- 113 (!) 27 100 % --   04/22/25 1454 131/82 -- -- 115 26 100 % --   04/22/25 1449 127/84 -- -- 113 (!) 28 100 % --   04/22/25 1444 129/83 -- -- 115 (!) 28 100 % --   04/22/25 1439 123/84 -- -- 115 24 100 % --   04/22/25 1434 122/75 -- -- 106 22 100 % --   04/22/25 1429 130/64 99.1 °F (37.3 °C) Oral 109 (!) 27 100 % --   04/22/25 1424 112/65 -- -- 110 25 100 % --   04/22/25 1419 120/71 -- -- 106 23 100 % --   04/22/25 1414 115/67 -- -- 104 21 100 % --   04/22/25  1404 108/67 -- -- 102 24 100 % --   04/22/25 1359 118/73 -- -- 100 22 100 % --   04/22/25 1354 112/66 -- -- 92 23 100 % --   04/22/25 1349 120/62 -- -- 90 23 100 % --   04/22/25 1344 113/56 -- -- 84 22 100 % --   04/22/25 1341 -- -- -- 81 23 100 % --   04/22/25 1339 118/67 -- -- 74 25 100 % --   04/22/25 1336 116/72 -- -- 85 26 100 % --   04/22/25 1330 97/82 -- -- 83 (!) 29 100 % --   04/22/25 1326 113/76 -- -- 89 26 -- --   04/22/25 1321 101/52 -- -- 80 24 -- --   04/22/25 1315 109/73 -- -- 82 21 -- --       Intake/Output:   Last 3 shifts:   Intake/Output                   04/21/25 0700 - 04/22/25 0659 (Not Admitted) 04/22/25 0700 - 04/23/25 0659 04/23/25 0700 - 04/24/25 0659       Intake    P.O.  --  --  240    P.O. -- -- 240    I.V.  --  2474.8  1531.5    I.V. -- -- 86    Volume (mL) Phenylephrine -- -- 724.5    Volume (mL) Vasopressin -- -- 78    Volume (mL) Norepinephrine -- 286.8 --    Volume (mL) (sodium chloride 0.9% infusion) -- 114 643    Volume (mL) (sodium chloride 0.9 % IV bolus 250 mL) -- 250 --    Volume (mL) (sodium chloride 0.9 % IV bolus 1,824 mL) -- 1824 --    IV PIGGYBACK  --  550  --    Volume (mL) (piperacillin-tazobactam (Zosyn) 3.375 g in dextrose 5% 100 mL IVPB-ADDV) -- 100 --    Volume (mL) (vancomycin (Vancocin) 1,000 mg in sodium chloride 0.9% 250 mL IVPB-ADDV) -- 250 --    Volume (mL) (albumin human (Albumin) 25% injection 25 g) -- 100 --    Volume (mL) (piperacillin-tazobactam (Zosyn) 4.5 g in dextrose 5% 100 mL IVPB-ADDV) -- 100 --    Total Intake -- 3024.8 1771.5       Output    Urine  --  --  0    Urine -- -- 0    Emesis/NG output  --  --  0    Emesis -- -- 0    Total Output -- -- 0       Net I/O     -- 3024.8 1771.5             Vent Settings:      Hemodynamic parameters (last 24 hours):      Scheduled Meds: Scheduled Medications[1]    Continuous Infusions: Medication Infusions[2]    Results:     Lab Results   Component Value Date    WBC 15.0 (H) 04/23/2025    HGB 8.5 (L) 04/23/2025     HCT 26.9 (L) 04/23/2025    .0 (L) 04/23/2025    CREATSERUM 10.42 (H) 04/23/2025    BUN 31 (H) 04/23/2025     (L) 04/23/2025    K 4.9 04/23/2025     04/23/2025    CO2 18.0 (L) 04/23/2025     (H) 04/23/2025    CA 8.7 04/23/2025    ALB 3.9 04/23/2025    ALKPHO 263 (H) 04/22/2025    BILT 0.4 04/22/2025    TP 7.7 04/22/2025    AST 31 04/22/2025    ALT 25 04/22/2025    PTT 28.1 04/22/2025    INR 1.03 04/22/2025    PHOS 4.5 04/23/2025       Recent Labs   Lab 04/22/25  1252 04/23/25  0352   * 110*   BUN 32* 31*   CREATSERUM 10.29* 10.42*   CA 9.5 8.7   * 133*   K 3.9 4.9   CL 93* 100   CO2 25.0 18.0*     Recent Labs   Lab 04/22/25  1252 04/23/25  0352   RBC 2.93* 2.62*   HGB 9.4* 8.5*   HCT 28.9* 26.9*   MCV 98.6 102.7*   MCH 32.1 32.4   MCHC 32.5 31.6   RDW 18.9* 18.6*   NEPRELIM 5.12 12.69*   WBC 7.4 15.0*   .0* 124.0*       No results for input(s): \"BNPML\" in the last 168 hours.    No results for input(s): \"TROP\", \"CK\" in the last 168 hours.    XR CHEST AP PORTABLE  (CPT=71045)  Result Date: 4/23/2025  CONCLUSION:   Right subclavian central venous catheter is seen.  Tip is looped within the distal SVC with tip pointing superiorly.  Readjustment needed prior to use.  Trace right pleural effusion with right lower lobe airspace opacity is new which may be secondary to atelectasis or pneumonia      Dictated by (CST): Charli Garcia MD on 4/23/2025 at 7:48 AM     Finalized by (CST): Charli Garcia MD on 4/23/2025 at 7:49 AM          XR CHEST AP PORTABLE  (CPT=71045)  Result Date: 4/22/2025  CONCLUSION: No acute cardiopulmonary process.    Dictated by (CST): Devante Beckford MD on 4/22/2025 at 1:57 PM     Finalized by (CST): Devante Beckford MD on 4/22/2025 at 1:57 PM          EKG 12 Lead  Result Date: 4/23/2025  Sinus tachycardia Minimal voltage criteria for LVH, may be normal variant ( R in aVL ) Possible Anterior infarct , age undetermined T wave abnormality, consider  inferolateral ischemia Abnormal ECG No previous ECGs found in [facility]           Exam:     Physical Exam:  General: resting comfortably  HEENT: Normocephalic, anicteric sclera, neck supple, no thyromegaly or adenopathy.  Neck: No JVD, carotids 2+, no bruits.  Cardiac: Regular rate and rhythm.   Lungs: Diminished BS  Abdomen: Distended, +TTP  Extremities: No edema.    Neurologic:  No focal defects  Skin: cool    Wellington Tapia DO  [facility]         [1]    piperacillin-tazobactam  4.5 g Intravenous Q12H    heparin  5,000 Units Subcutaneous 2 times per day    Vancomycin IV  1 each Intravenous See Admin Instructions (RX holding)   [2]    phenylephrine 135 mcg/min (04/23/25 1303)    norepinephrine Stopped (04/22/25 2335)    sodium chloride      sodium chloride 75 mL/hr at 04/23/25 1104    vasopressin (Vasostrict) 20 Units in sodium chloride 0.9% 100 mL infusion for septic shock 0.03 Units/min (04/23/25 1104)

## 2025-04-23 NOTE — PROGRESS NOTES
[facility]  part of [facility]    Progress Note    Shavon Stuart Patient Status:  Inpatient    1970 MRN [id number]   Location [facility] 2W/SW Attending Contreras Alex MD   Hosp Day # 1 PCP No primary care provider on file.       Subjective:   Shavon Stuart is a(n) 54 year old female who I am seeing for ESRD    On 2 pressors  Complaining of pain  PD fluid unfortunately no cell count was done but culture growing gram-negative rods    Objective:   /81 (BP Location: Right arm)   Pulse 89   Temp 97.1 °F (36.2 °C) (Temporal)   Resp 19   Ht 5' (1.524 m)   Wt 139 lb 5.3 oz (63.2 kg)   SpO2 100%   BMI 27.21 kg/m²      Intake/Output Summary (Last 24 hours) at 2025 1352  Last data filed at 2025 1038  Gross per 24 hour   Intake 4696.3 ml   Output 0 ml   Net 4696.3 ml     Wt Readings from Last 1 Encounters:   25 139 lb 5.3 oz (63.2 kg)       Exam  Vital signs: Blood pressure 117/81, pulse 89, temperature 97.1 °F (36.2 °C), temperature source Temporal, resp. rate 19, height 5' (1.524 m), weight 139 lb 5.3 oz (63.2 kg), SpO2 100%.    General: Mild distress. Alert and oriented x 3.  HEENT: Moist mucous membranes. EOMI. PERRLA  Neck:  No JVD.   Respiratory: Clear to auscultation bilaterally.    Cardiovascular: S1, S2.  Regular rate and rhythm.   Abdomen: Tender to palpation  Neurologic: No focal neurological deficits.  Musculoskeletal: Full range of motion of all extremities.  No swelling noted.  Access: PD catheter    Results:     Recent Labs   Lab 25  1252 25  0352   RBC 2.93* 2.62*   HGB 9.4* 8.5*   HCT 28.9* 26.9*   MCV 98.6 102.7*   MCH 32.1 32.4   MCHC 32.5 31.6   RDW 18.9* 18.6*   NEPRELIM 5.12 12.69*   WBC 7.4 15.0*   .0* 124.0*         Recent Labs   Lab 25  1252 25  0352   * 110*   BUN 32* 31*   CREATSERUM 10.29* 10.42*   CA 9.5 8.7   * 133*   K 3.9 4.9   CL 93* 100   CO2 25.0 18.0*          XR CHEST  AP PORTABLE  (CPT=71045)  Result Date: 4/23/2025  CONCLUSION:   Right subclavian central venous catheter is seen.  Tip is looped within the distal SVC with tip pointing superiorly.  Readjustment needed prior to use.  Trace right pleural effusion with right lower lobe airspace opacity is new which may be secondary to atelectasis or pneumonia      Dictated by (CST): Charli Garcia MD on 4/23/2025 at 7:48 AM     Finalized by (CST): Charli Garcia MD on 4/23/2025 at 7:49 AM          XR CHEST AP PORTABLE  (CPT=71045)  Result Date: 4/22/2025  CONCLUSION: No acute cardiopulmonary process.    Dictated by (CST): Devante Beckford MD on 4/22/2025 at 1:57 PM     Finalized by (CST): Devante Beckford MD on 4/22/2025 at 1:57 PM            Assessment and Plan:     53 y/o F with h/o ESRD on nightly PD , NICM,HFrEF  being treated for latent TB presented with abdominal pain , fever, sob x few days  In ER hypotensive and tachypneac    Impression:    ESRD. On PD.  Plan cycler tonight  Abd pain/peritonitis.  Culture growing gram-negative richard, ID been consulted and an antibiotic  Anemia, check iron panel  Sec HPTH  Nonischemic cardiomyopathy with ejection fraction 30%  Latent TB, rifampin  Sepsis/hypotension, IVF, cx  and abx, pressors  History of left kidney mass status post left nephrectomy      Plan:    Plan PT tonight with low UF  IV antibiotics  Discussed with ID and recommend CT scan  CT cell count was not done and have ordered  IV iron once blood culture is  negative    Thank you very much for allowing me to participate in the care of your patient.  If you have any questions, please do not hesitate to contact me.     Latrice Altman MD  4/23/2025

## 2025-04-23 NOTE — PLAN OF CARE
PT hypotensive and tachycardic 130s on levo at start of shift. 250 bolus and albumin given, ultimately switched to grant and vaso which improved both HR and BP. Placed on 2L O2, SOB, shallow breathing. Attempted to wean down pressors, BP too labile >> CVC placement this AM, CXR pending. PRN dilaudid for pain,. No complaints at this time.    Problem: Patient Centered Care  Goal: Patient preferences are identified and integrated in the patient's plan of care  Description: Interventions:- What would you like us to know as we care for you? - Provide timely, complete, and accurate information to patient/family- Incorporate patient and family knowledge, values, beliefs, and cultural backgrounds into the planning and delivery of care- Encourage patient/family to participate in care and decision-making at the level they choose- Honor patient and family perspectives and choices  Outcome: Progressing     Problem: Patient/Family Goals  Goal: Patient/Family Long Term Goal  Description: Patient's Long Term Goal: Interventions:- - See additional Care Plan goals for specific interventions  Outcome: Progressing  Goal: Patient/Family Short Term Goal  Description: Patient's Short Term Goal: Interventions: - - See additional Care Plan goals for specific interventions  Outcome: Progressing     Problem: CARDIOVASCULAR - ADULT  Goal: Maintains optimal cardiac output and hemodynamic stability  Description: INTERVENTIONS:- Monitor vital signs, rhythm, and trends- Monitor for bleeding, hypotension and signs of decreased cardiac output- Evaluate effectiveness of vasoactive medications to optimize hemodynamic stability- Monitor arterial and/or venous puncture sites for bleeding and/or hematoma- Assess quality of pulses, skin color and temperature- Assess for signs of decreased coronary artery perfusion - ex. Angina- Evaluate fluid balance, assess for edema, trend weights  Outcome: Progressing     Problem: RESPIRATORY - ADULT  Goal: Achieves  optimal ventilation and oxygenation  Description: INTERVENTIONS:- Assess for changes in respiratory status- Assess for changes in mentation and behavior- Position to facilitate oxygenation and minimize respiratory effort- Oxygen supplementation based on oxygen saturation or ABGs- Provide Smoking Cessation handout, if applicable- Encourage broncho-pulmonary hygiene including cough, deep breathe, Incentive Spirometry- Assess the need for suctioning and perform as needed- Assess and instruct to report SOB or any respiratory difficulty- Respiratory Therapy support as indicated- Manage/alleviate anxiety- Monitor for signs/symptoms of CO2 retention  Outcome: Progressing     Problem: METABOLIC/FLUID AND ELECTROLYTES - ADULT  Goal: Glucose maintained within prescribed range  Description: INTERVENTIONS:- Monitor Blood Glucose as ordered- Assess for signs and symptoms of hyperglycemia and hypoglycemia- Administer ordered medications to maintain glucose within target range- Assess barriers to adequate nutritional intake and initiate nutrition consult as needed- Instruct patient on self management of diabetes  Outcome: Progressing  Goal: Electrolytes maintained within normal limits  Description: INTERVENTIONS:- Monitor labs and rhythm and assess patient for signs and symptoms of electrolyte imbalances- Administer electrolyte replacement as ordered- Monitor response to electrolyte replacements, including rhythm and repeat lab results as appropriate- Fluid restriction as ordered- Instruct patient on fluid and nutrition restrictions as appropriate  Outcome: Progressing  Goal: Hemodynamic stability and optimal renal function maintained  Description: INTERVENTIONS:- Monitor labs and assess for signs and symptoms of volume excess or deficit- Monitor intake, output and patient weight- Monitor urine specific gravity, serum osmolarity and serum sodium as indicated or ordered- Monitor response to interventions for patient's volume  status, including labs, urine output, blood pressure (other measures as available)- Encourage oral intake as appropriate- Instruct patient on fluid and nutrition restrictions as appropriate  Outcome: Progressing

## 2025-04-23 NOTE — PROGRESS NOTES
[facility]  part of [facility]     Progress Note    Shavon Stuart Patient Status:  Inpatient    1970 MRN [id number]   Location [facility] 2W/SW Attending Contreras Alex MD   Hosp Day # 1 PCP No primary care provider on file.       Subjective:   Patient seen and examined.  Some ongoing abdominal pain nausea vomiting this morning.    Objective:   Blood pressure 107/74, pulse 99, temperature 98.3 °F (36.8 °C), temperature source Temporal, resp. rate 20, height 5' (1.524 m), weight 138 lb 7.2 oz (62.8 kg), SpO2 100%.  Intake/Output:   Last 3 shifts: I/O last 3 completed shifts:  In: 3024.8 [I.V.:2474.8; IV PIGGYBACK:550]  Out: -    This shift: I/O this shift:  In: 1314 [I.V.:1314]  Out: -      Vent Settings:      Hemodynamic parameters (last 24 hours):      Scheduled Meds: Current Hospital Medications[1]    Continuous Infusions: Medication Infusions[2]    Physical Exam  Constitutional: Mild distress  Eyes: PERRL  ENT: nares pateint  Neck: supple, no JVD  Cardio: RRR, S1 S2  Respiratory: clear to auscultation bilaterally, no wheezing, rales, rhonchi, crackles  GI: abdomen soft, generalized tenderness to palpation  Extremities: no clubbing, cyanosis, edema  Neurologic: no gross motor deficits  Skin: warm, dry      Results:     Lab Results   Component Value Date    WBC 15.0 2025    HGB 8.5 2025    HCT 26.9 2025    .0 2025    CREATSERUM 10.42 2025    BUN 31 2025     2025    K 4.9 2025     2025    CO2 18.0 2025     2025    CA 8.7 2025    ALB 3.9 2025    ALKPHO 263 2025    BILT 0.4 2025    TP 7.7 2025    AST 31 2025    ALT 25 2025    PTT 28.1 2025    INR 1.03 2025    PHOS 4.5 2025       XR CHEST AP PORTABLE  (CPT=71045)  Result Date: 2025  CONCLUSION:   Right subclavian central venous catheter is seen.  Tip is looped within the  distal SVC with tip pointing superiorly.  Readjustment needed prior to use.  Trace right pleural effusion with right lower lobe airspace opacity is new which may be secondary to atelectasis or pneumonia      Dictated by (CST): Charli Garcia MD on 4/23/2025 at 7:48 AM     Finalized by (CST): Charli Garcia MD on 4/23/2025 at 7:49 AM          XR CHEST AP PORTABLE  (CPT=71045)  Result Date: 4/22/2025  CONCLUSION: No acute cardiopulmonary process.    Dictated by (CST): Devante Beckford MD on 4/22/2025 at 1:57 PM     Finalized by (CST): Devante Beckford MD on 4/22/2025 at 1:57 PM            EKG 12 Lead  Result Date: 4/23/2025  Sinus tachycardia Minimal voltage criteria for LVH, may be normal variant ( R in aVL ) Possible Anterior infarct , age undetermined T wave abnormality, consider inferolateral ischemia Abnormal ECG No previous ECGs found in [facility]      Assessment   1.  Septic shock  2.  Lactic acidosis  3.  Anemia  4.  Thrombocytopenia  5.  Cardiomyopathy  6.  End-stage renal disease  7.  Left kidney mass  8.  Latent tuberculosis     Plan   -Patient with evidence of fatigue malaise abdominal pain hypotension.  Concern for septic shock with possible sources which include SBP.  Await paracentesis results.  - Empiric antibiotic therapy in the meantime blood and peritoneal cultures pending.  Started on Zosyn and vancomycin  - Chest x-ray with no significant abnormality seen  - Prior history of latent tuberculosis diagnosed March 2024 started on rifampin which she did not complete due to interaction with other medications.  We started on rifampin 600 mg daily recently.  Will discuss with ID in regards to restarting.  - Worsening pressor support now on Julien-Synephrine and vasopressin.  Wean as tolerated.  - Transfuse for hemoglobin below 7  -Monitor thrombocytopenia  - History of end-stage renal disease on peritoneal dialysis.  Further recommendations per nephrology  - Prior history of left kidney mass status post left radical  nephrectomy  - DVT prophylaxis: Heparin  - Patient is complex and high risk for further deterioration    Upon my evaluation, this patient had a high probability of imminent or life-threatening deterioration due to shock, which required my direct attention, intervention, and personal management.    I have personally provided 30 minutes of critical care time, exclusive of time spent on separately billable procedures.  Time includes review of all pertinent laboratory/radiology results, discussion with consultants, and monitoring for potential decompensation.  Performed interventions included pressor drugs.        Jessica Clifford DO  Pulmonary Critical Care Medicine  [facility]        [1]   Current Facility-Administered Medications   Medication Dose Route Frequency    phenylephrine (Julien-Synephrine) 250 mg in sodium chloride 0.9% 250 mL infusion   mcg/min Intravenous Continuous    piperacillin-tazobactam (Zosyn) 4.5 g in dextrose 5% 100 mL IVPB-ADDV  4.5 g Intravenous Q12H    norepinephrine (Levophed) 4 mg/250mL infusion premix  0.5-50 mcg/min Intravenous Continuous    sodium chloride 0.9% infusion   Intravenous Continuous    heparin (Porcine) 5000 UNIT/ML injection 5,000 Units  5,000 Units Subcutaneous 2 times per day    acetaminophen (Tylenol Extra Strength) tab 500 mg  500 mg Oral Q4H PRN    HYDROmorphone (Dilaudid) 1 MG/ML injection 0.2 mg  0.2 mg Intravenous Q2H PRN    Or    HYDROmorphone (Dilaudid) 1 MG/ML injection 0.4 mg  0.4 mg Intravenous Q2H PRN    Or    HYDROmorphone (Dilaudid) 1 MG/ML injection 0.8 mg  0.8 mg Intravenous Q2H PRN    ondansetron (Zofran) 4 MG/2ML injection 4 mg  4 mg Intravenous Q6H PRN    prochlorperazine (Compazine) 10 MG/2ML injection 5 mg  5 mg Intravenous Q8H PRN    sodium chloride 0.9% infusion   Intravenous Continuous    Vancomycin: PHARMACY DOSING  1 each Intravenous See Admin Instructions (RX holding)    vasopressin (Vasostrict) 20 Units in sodium chloride 0.9% 100 mL  infusion for septic shock  0.01-0.03 Units/min Intravenous Continuous   [2]    phenylephrine      norepinephrine Stopped (04/22/25 9850)    sodium chloride      sodium chloride 75 mL/hr at 04/22/25 1844    vasopressin (Vasostrict) 20 Units in sodium chloride 0.9% 100 mL infusion for septic shock 0.03 Units/min (04/23/25 0832)

## 2025-04-23 NOTE — PROCEDURES
Called by RN, patient with poor IV access in need of multiple pressors.    Consents were obtained timeout was done right subclavian area prepped, cannulated second attempt, guidewire passed, dilated, triple-lumen catheter passed over guidewire, 3 ports flushed anchored to site Biopatch and Tegaderm applied.    Chest x-ray pending

## 2025-04-23 NOTE — CM/SW NOTE
04/23/25 1600   CM/SW Referral Data   Referral Source    Reason for Referral Discharge planning   Informant Patient   Medical Hx   Does patient have an established PCP? No   Patient Info   Patient's Current Mental Status at Time of Assessment Alert;Oriented   Patient Communication Issues Language barrier  (Romanian)   Patient's Home Environment Condo/Apt no elevator   Patient lives with Son   Patient Status Prior to Admission   Independent with ADLs and Mobility Yes   Services in place prior to admission Dialysis   Dialysis Clinic   ([facility] home PD)   Discharge Needs   Anticipated D/C needs To be determined     Pt discussed during nursing rounds. Dx r/o bacterial peritonitis, culture pending, ESRD on nightly PD, currently requiring pressor x 2. Home w/son, independent w/o walker prior to admission, Romanian speaking only. Current w'/[facility] daily home PD. PT/OT yannick requested for dc recommendation.    Plan: TBD    / to remain available for support and/or discharge planning.   SVETA Dunn    [phone]    [phone]

## 2025-04-23 NOTE — PROGRESS NOTES
[facility]  part of [facility]    Hospitalist Progress Note     Shavon Stuart Patient Status:  Inpatient    1970  54 year old CSN [id number]   Location 220/220-A Attending Contreras Alex MD   Hosp Day # 1 PCP No primary care provider on file.         Subjective:   ----------------------------------  Pt resting in bed in mild distress due to abdominal pain. +N/V.       Objective:   Chief Complaint:   Chief Complaint   Patient presents with    Abdomen/Flank Pain     ----------------------------------  Temp:  [97.8 °F (36.6 °C)-101.2 °F (38.4 °C)] 97.8 °F (36.6 °C)  Pulse:  [] 93  Resp:  [14-33] 18  BP: ()/(49-86) 116/72  SpO2:  [92 %-100 %] 100 %  Gen: A+Ox3.  No distress.   HEENT: NCAT, neck supple, no carotid bruit.  CV: RRR, S1S2, and intact distal pulses. No gallop, rub, murmur.  Pulm: Effort and breath sounds normal. No distress, wheezes, rales, rhonchi.  Abd: Soft, +TTP, BS normal, no mass, no HSM, no rebound/guarding.   Neuro: Normal reflexes, CN. Sensory/motor exams grossly normal deficit.   MS: No joint effusions.  No peripheral edema.  Skin: Skin is warm and dry. No rashes, erythema, diaphoresis.   Psych: Normal mood and affect. Calm, cooperative    Labs:  Lab Results   Component Value Date    HGB 8.5 (L) 2025    WBC 15.0 (H) 2025    .0 (L) 2025     (L) 2025    K 4.9 2025    CREATSERUM 10.42 (H) 2025    INR 1.03 2025    AST 31 2025    ALT 25 2025           Scheduled Medications[1]  PRN Medications[2]      Other problems      Supplementary Documentation:   DVT Mechanical Prophylaxis:        DVT Pharmacologic Prophylaxis   Medication    heparin (Porcine) 5000 UNIT/ML injection 5,000 Units                Code Status: Not on file  Cat: No urinary catheter in place  Cat Duration (in days):   Central line: central venous catheter in place  HEMALATHA:            I personally reviewed the available  laboratories, imaging including. I discussed/will discuss the case with consultants. I ordered laboratories and/or radiographic studies. I adjusted medications as detailed above.  Medical decision making high, risk is high.    Assessment & Plan:   ----------------------------------    Acute sepsis,   rule out spontaneous bacterial peritonitis.    Dialysate fluid culture pending  blood cultures pending  Pulm recs appreciated  Trend lactic acid, 3.5 > 3.8  Cont IV abx  Cont pressor support, wean as tolerated  ID consult, recs appreciated  Cardiology consult      End-stage renal disease,   on peritoneal dialysis.  Nephrology consult             Latent tuberculosis.    Currently on rifampin.  ID consulted     FULL CODE  DVT px: subcutaneous heparin    Contreras Alex MD  4/23/2025         [1]    piperacillin-tazobactam  4.5 g Intravenous Q12H    heparin  5,000 Units Subcutaneous 2 times per day    Vancomycin IV  1 each Intravenous See Admin Instructions (RX holding)   [2]   acetaminophen    HYDROmorphone **OR** HYDROmorphone **OR** HYDROmorphone    ondansetron    prochlorperazine

## 2025-04-24 LAB
ALBUMIN SERPL-MCNC: 3.3 G/DL (ref 3.2–4.8)
ANION GAP SERPL CALC-SCNC: 14 MMOL/L (ref 0–18)
ATRIAL RATE: 101 BPM
BUN BLD-MCNC: 39 MG/DL (ref 9–23)
BUN/CREAT SERPL: 4.1 (ref 10–20)
CALCIUM BLD-MCNC: 8 MG/DL (ref 8.7–10.4)
CHLORIDE SERPL-SCNC: 99 MMOL/L (ref 98–112)
CO2 SERPL-SCNC: 21 MMOL/L (ref 21–32)
CREAT BLD-MCNC: 9.58 MG/DL (ref 0.55–1.02)
EGFRCR SERPLBLD CKD-EPI 2021: 5 ML/MIN/1.73M2 (ref 60–?)
GLUCOSE BLD-MCNC: 128 MG/DL (ref 70–99)
OSMOLALITY SERPL CALC.SUM OF ELEC: 289 MOSM/KG (ref 275–295)
P AXIS: 9 DEGREES
P-R INTERVAL: 136 MS
PHOSPHATE SERPL-MCNC: 6.1 MG/DL (ref 2.4–5.1)
POTASSIUM SERPL-SCNC: 4.2 MMOL/L (ref 3.5–5.1)
Q-T INTERVAL: 354 MS
QRS DURATION: 90 MS
QTC CALCULATION (BEZET): 459 MS
R AXIS: -6 DEGREES
SODIUM SERPL-SCNC: 134 MMOL/L (ref 136–145)
T AXIS: 170 DEGREES
VENTRICULAR RATE: 101 BPM

## 2025-04-24 PROCEDURE — 3E1M39Z IRRIGATION OF PERITONEAL CAVITY USING DIALYSATE, PERCUTANEOUS APPROACH: ICD-10-PCS | Performed by: INTERNAL MEDICINE

## 2025-04-24 PROCEDURE — 99233 SBSQ HOSP IP/OBS HIGH 50: CPT | Performed by: INTERNAL MEDICINE

## 2025-04-24 PROCEDURE — 90945 DIALYSIS ONE EVALUATION: CPT | Performed by: INTERNAL MEDICINE

## 2025-04-24 RX ORDER — DEXTROSE MONOHYDRATE, SODIUM CHLORIDE, SODIUM LACTATE, CALCIUM CHLORIDE, MAGNESIUM CHLORIDE 1.5; 538; 448; 18.4; 5.08 G/100ML; MG/100ML; MG/100ML; MG/100ML; MG/100ML
5000 SOLUTION INTRAPERITONEAL
Status: DISCONTINUED | OUTPATIENT
Start: 2025-04-24 | End: 2025-04-26

## 2025-04-24 RX ORDER — RIFAMPIN 300 MG/1
600 CAPSULE ORAL
Status: DISCONTINUED | OUTPATIENT
Start: 2025-04-25 | End: 2025-04-30

## 2025-04-24 RX ORDER — ACETAMINOPHEN 500 MG
500 TABLET ORAL EVERY 4 HOURS PRN
Status: DISCONTINUED | OUTPATIENT
Start: 2025-04-24 | End: 2025-04-26

## 2025-04-24 NOTE — PROGRESS NOTES
[facility]  part of [facility]    Hospitalist Progress Note     Shavon SmartMercedeslolly Patient Status:  Inpatient    1970  54 year old CSN [id number]   Location 220/220-A Attending Contreras Alex MD   Hosp Day # 2 PCP No primary care provider on file.         Subjective:   ----------------------------------  Pt resting in bed in moderate distress due to abdominal pain. +N/V.       Objective:   Chief Complaint:   Chief Complaint   Patient presents with    Abdomen/Flank Pain     ----------------------------------  Temp:  [96.8 °F (36 °C)-98.7 °F (37.1 °C)] 97.3 °F (36.3 °C)  Pulse:  [] 86  Resp:  [12-32] 26  BP: ()/() 104/76  SpO2:  [88 %-100 %] 95 %  Gen: A+Ox3.  No distress.   HEENT: NCAT, neck supple, no carotid bruit.  CV: RRR, S1S2, and intact distal pulses. No gallop, rub, murmur.  Pulm: Effort and breath sounds normal. No distress, wheezes, rales, rhonchi.  Abd: Soft, +TTP, BS normal, no mass, no HSM, no rebound/guarding.   Neuro: Normal reflexes, CN. Sensory/motor exams grossly normal deficit.   MS: No joint effusions.  No peripheral edema.  Skin: Skin is warm and dry. No rashes, erythema, diaphoresis.   Psych: Normal mood and affect. Calm, cooperative    Labs:  Lab Results   Component Value Date    HGB 8.5 (L) 2025    WBC 15.0 (H) 2025    .0 (L) 2025     (L) 2025    K 4.2 2025    CREATSERUM 9.58 (H) 2025    INR 1.03 2025    AST 31 2025    ALT 25 2025           Scheduled Medications[1]  PRN Medications[2]      Other problems      Supplementary Documentation:   DVT Mechanical Prophylaxis:        DVT Pharmacologic Prophylaxis   Medication    heparin (Porcine) 5000 UNIT/ML injection 5,000 Units                Code Status: Not on file  Cat: No urinary catheter in place  Cat Duration (in days):   Central line: central venous catheter in place  HEMALATHA:            I personally reviewed the available  laboratories, imaging including. I discussed/will discuss the case with consultants. I ordered laboratories and/or radiographic studies. I adjusted medications as detailed above.  Medical decision making high, risk is high.    Assessment & Plan:   ----------------------------------    Acute sepsis,   Slowly improving  rule out spontaneous bacterial peritonitis.    Dialysate fluid culture pending  blood cultures pending  Pulm recs appreciated  Trend lactic acid, 3.5 > 3.8  Cont IV zosyn  Cont pressor support, wean as tolerated  ID consult, recs appreciated  Cardiology consult      End-stage renal disease,   on peritoneal dialysis.  Nephrology consult             Latent tuberculosis.    ID consulted, recs appreciated       FULL CODE  DVT px: subcutaneous heparin    Contreras Alex MD  4/24/2025         [1]    piperacillin-tazobactam  4.5 g Intravenous Q12H    dextrose 1.5%-calcium 2.5 mEq/L  5,000 mL Intraperitoneal Once in dialysis    heparin  5,000 Units Subcutaneous 2 times per day    Vancomycin IV  1 each Intravenous See Admin Instructions (RX holding)   [2]   acetaminophen    HYDROmorphone **OR** HYDROmorphone **OR** HYDROmorphone    ondansetron    prochlorperazine

## 2025-04-24 NOTE — PLAN OF CARE
Problem: Patient Centered Care  Goal: Patient preferences are identified and integrated in the patient's plan of care  Description: Interventions:- What would you like us to know as we care for you? - Provide timely, complete, and accurate information to patient/family- Incorporate patient and family knowledge, values, beliefs, and cultural backgrounds into the planning and delivery of care- Encourage patient/family to participate in care and decision-making at the level they choose- Honor patient and family perspectives and choices  Outcome: Progressing     Problem: CARDIOVASCULAR - ADULT  Goal: Maintains optimal cardiac output and hemodynamic stability  Description: INTERVENTIONS:- Monitor vital signs, rhythm, and trends- Monitor for bleeding, hypotension and signs of decreased cardiac output- Evaluate effectiveness of vasoactive medications to optimize hemodynamic stability- Monitor arterial and/or venous puncture sites for bleeding and/or hematoma- Assess quality of pulses, skin color and temperature- Assess for signs of decreased coronary artery perfusion - ex. Angina- Evaluate fluid balance, assess for edema, trend weights  Outcome: Progressing     Problem: RESPIRATORY - ADULT  Goal: Achieves optimal ventilation and oxygenation  Description: INTERVENTIONS:- Assess for changes in respiratory status- Assess for changes in mentation and behavior- Position to facilitate oxygenation and minimize respiratory effort- Oxygen supplementation based on oxygen saturation or ABGs- Provide Smoking Cessation handout, if applicable- Encourage broncho-pulmonary hygiene including cough, deep breathe, Incentive Spirometry- Assess the need for suctioning and perform as needed- Assess and instruct to report SOB or any respiratory difficulty- Respiratory Therapy support as indicated- Manage/alleviate anxiety- Monitor for signs/symptoms of CO2 retention  Outcome: Progressing     Problem: METABOLIC/FLUID AND ELECTROLYTES -  ADULT  Goal: Electrolytes maintained within normal limits  Description: INTERVENTIONS:- Monitor labs and rhythm and assess patient for signs and symptoms of electrolyte imbalances- Administer electrolyte replacement as ordered- Monitor response to electrolyte replacements, including rhythm and repeat lab results as appropriate- Fluid restriction as ordered- Instruct patient on fluid and nutrition restrictions as appropriate  Outcome: Progressing

## 2025-04-24 NOTE — PLAN OF CARE
Peritoneal Dialysis completed at 8:30AM per Jacobo dialysis RN.  Jacobo dialysis RN stated 1522 output; drainage  yellowish cloudy, no fibrin per dialysis RN

## 2025-04-24 NOTE — PROGRESS NOTES
[facility]  part of [facility]     Progress Note    Shavon Stuart Patient Status:  Inpatient    1970 MRN [id number]   Location [facility] 2W/SW Attending Contreras Alex MD   Hosp Day # 2 PCP No primary care provider on file.       Subjective:   Patient seen and examined.  Admits to improvement in nausea vomiting abdominal pain.    Objective:   Blood pressure 96/72, pulse 90, temperature 96.8 °F (36 °C), temperature source Temporal, resp. rate 23, height 5' (1.524 m), weight 147 lb 14.9 oz (67.1 kg), SpO2 96%.  Intake/Output:   Last 3 shifts: I/O last 3 completed shifts:  In: 4732.1 [P.O.:240; I.V.:3866.1; IV PIGGYBACK:626]  Out: 0    This shift: I/O this shift:  In: 100 [P.O.:100]  Out: 1522 [Other:1522]     Vent Settings:      Hemodynamic parameters (last 24 hours):      Scheduled Meds: Current Hospital Medications[1]    Continuous Infusions: Medication Infusions[2]    Physical Exam  Constitutional: Mild distress  Eyes: PERRL  ENT: nares pateint  Neck: supple, no JVD  Cardio: RRR, S1 S2  Respiratory: clear to auscultation bilaterally, no wheezing, rales, rhonchi, crackles  GI: abdomen soft, generalized tenderness to palpation  Extremities: no clubbing, cyanosis, edema  Neurologic: no gross motor deficits  Skin: warm, dry      Results:     Lab Results   Component Value Date    CREATSERUM 9.58 2025    BUN 39 2025     2025    K 4.2 2025    CL 99 2025    CO2 21.0 2025     2025    CA 8.0 2025    ALB 3.3 2025    PHOS 6.1 2025       CT ABDOMEN+PELVIS(CPT=74176)  Result Date: 2025  CONCLUSION:   1. Mild circumferential bowel wall thickening involving the jejunum, transverse colon, and descending colon, which is concerning for enterocolitis.  2. Featureless segment of the transverse colon, which is likely sequela of prior colitis. 3. Moderate volume of abdominopelvic ascites. 4. Peritoneal dialysis  catheter terminates in the pelvis. 5. Severe atrophy of the right kidney.  The left kidney is not clearly identified and may be surgically absent. 6. Mild splenomegaly. 7. Severe compression fracture deformity at L1 with associated 6 mm retropulsion of the posterior cortex and moderate canal stenosis.  This finding is likely chronic as it was mentioned in the prior CT abdomen and pelvis report from 04/06/2025 that is available in care everywhere. 8. Right greater than left lower lobe consolidations, which may reflect atelectasis with or without superimposed pneumonia. 9. Trace bilateral pleural effusions. 10. Lesser incidental findings described above.     Dictated by (CST): Jj Howell MD on 4/23/2025 at 4:14 PM     Finalized by (CST): Jj Howell MD on 4/23/2025 at 4:26 PM          XR CHEST AP PORTABLE  (CPT=71045)  Result Date: 4/23/2025  CONCLUSION:   Right subclavian central venous catheter is seen.  Tip is looped within the distal SVC with tip pointing superiorly.  Readjustment needed prior to use.  Trace right pleural effusion with right lower lobe airspace opacity is new which may be secondary to atelectasis or pneumonia      Dictated by (CST): Charli Garcia MD on 4/23/2025 at 7:48 AM     Finalized by (CST): Charli Garcia MD on 4/23/2025 at 7:49 AM          XR CHEST AP PORTABLE  (CPT=71045)  Result Date: 4/22/2025  CONCLUSION: No acute cardiopulmonary process.    Dictated by (CST): Devante Beckford MD on 4/22/2025 at 1:57 PM     Finalized by (CST): Devante Beckford MD on 4/22/2025 at 1:57 PM            EKG 12 Lead  Result Date: 4/24/2025  Sinus tachycardia Minimal voltage criteria for LVH, may be normal variant ( R in aVL ) Possible Anterior infarct , age undetermined T wave abnormality, consider inferolateral ischemia Abnormal ECG No previous ECGs found in [facility] Confirmed by SHEREEN TOURE ([id number]) on 4/24/2025 7:31:11 AM      Assessment   1.  Septic shock  2.  Lactic acidosis  3.  Anemia  4.   Thrombocytopenia  5.  Cardiomyopathy  6.  End-stage renal disease  7.  Left kidney mass  8.  Latent tuberculosis     Plan   -Patient with evidence of fatigue malaise abdominal pain hypotension.  Concern for septic shock with possible sources which include SBP.  Await paracentesis results.  - Empiric antibiotic therapy in the meantime blood and peritoneal cultures pending.  Peritoneal fluid with evidence of gram-negative rods.  Await final culture results.  Currently on vancomycin and Zosyn.  -CT abdomen pelvis on 4/23/2025 with some bowel wall thickening concerning for enterocolitis.  Moderate ascites seen.  - Chest x-ray with no significant abnormality seen  - Prior history of latent tuberculosis diagnosed March 2024 started on rifampin which she did not complete due to interaction with other medications.  Discussed with ID.  Rifampin will be resumed.  - Pressors requirements improving currently on Julien-Synephrine.  Wean.  - Transfuse for hemoglobin below 7  -Monitor thrombocytopenia  - History of end-stage renal disease on peritoneal dialysis.  Further recommendations per nephrology  - Prior history of left kidney mass status post left radical nephrectomy  - DVT prophylaxis: Heparin  - Patient is complex and high risk for further deterioration      Jessica Clifford,   Pulmonary Critical Care Medicine  [facility]          [1]   Current Facility-Administered Medications   Medication Dose Route Frequency    phenylephrine (Julien-Synephrine) 250 mg in sodium chloride 0.9% 250 mL infusion   mcg/min Intravenous Continuous    piperacillin-tazobactam (Zosyn) 4.5 g in dextrose 5% 100 mL IVPB-ADDV  4.5 g Intravenous Q12H    dextrose 1.5%-calcium 2.5 mEq/L peritoneal solution  5,000 mL Intraperitoneal Once in dialysis    norepinephrine (Levophed) 4 mg/250mL infusion premix  0.5-50 mcg/min Intravenous Continuous    sodium chloride 0.9% infusion   Intravenous Continuous    heparin (Porcine) 5000 UNIT/ML injection 5,000  Units  5,000 Units Subcutaneous 2 times per day    acetaminophen (Tylenol Extra Strength) tab 500 mg  500 mg Oral Q4H PRN    HYDROmorphone (Dilaudid) 1 MG/ML injection 0.2 mg  0.2 mg Intravenous Q2H PRN    Or    HYDROmorphone (Dilaudid) 1 MG/ML injection 0.4 mg  0.4 mg Intravenous Q2H PRN    Or    HYDROmorphone (Dilaudid) 1 MG/ML injection 0.8 mg  0.8 mg Intravenous Q2H PRN    ondansetron (Zofran) 4 MG/2ML injection 4 mg  4 mg Intravenous Q6H PRN    prochlorperazine (Compazine) 10 MG/2ML injection 5 mg  5 mg Intravenous Q8H PRN    Vancomycin: PHARMACY DOSING  1 each Intravenous See Admin Instructions (RX holding)    vasopressin (Vasostrict) 20 Units in sodium chloride 0.9% 100 mL infusion for septic shock  0.01-0.03 Units/min Intravenous Continuous   [2]    phenylephrine 50 mcg/min (04/24/25 0851)    norepinephrine Stopped (04/22/25 2335)    sodium chloride 50 mL/hr at 04/24/25 0211    vasopressin (Vasostrict) 20 Units in sodium chloride 0.9% 100 mL infusion for septic shock Stopped (04/23/25 7566)

## 2025-04-24 NOTE — PROGRESS NOTES
Progress Note  Shavon Stuart Patient Status:  Inpatient    1970 MRN [id number]   Location [facility] 2W/SW Attending Contreras Alex MD   Hosp Day # 2 PCP No primary care provider on file.     Subjective:  Doing well  No complaints    Objective:  /82 (BP Location: Right arm)   Pulse 86   Temp 97.3 °F (36.3 °C) (Temporal)   Resp 19   Ht 152.4 cm (5')   Wt 147 lb 14.9 oz (67.1 kg)   SpO2 99%   BMI 28.89 kg/m²     Telemetry: Reviewed      Intake/Output:    Intake/Output Summary (Last 24 hours) at 2025 1623  Last data filed at 2025 1518  Gross per 24 hour   Intake 1656.4 ml   Output 1522 ml   Net 134.4 ml       Last 3 Weights   25 1000 147 lb 14.9 oz (67.1 kg)   25 0833 139 lb 5.3 oz (63.2 kg)   25 1835 138 lb 7.2 oz (62.8 kg)   25 1246 134 lb (60.8 kg)       Labs:  Recent Labs   Lab 25  1252 25  0352 25  0454   * 110* 128*   BUN 32* 31* 39*   CREATSERUM 10.29* 10.42* 9.58*   EGFRCR 4* 4* 5*   CA 9.5 8.7 8.0*   * 133* 134*   K 3.9 4.9 4.2   CL 93* 100 99   CO2 25.0 18.0* 21.0     Recent Labs   Lab 25  1252 25  0352   RBC 2.93* 2.62*   HGB 9.4* 8.5*   HCT 28.9* 26.9*   MCV 98.6 102.7*   MCH 32.1 32.4   MCHC 32.5 31.6   RDW 18.9* 18.6*   NEPRELIM 5.12 12.69*   WBC 7.4 15.0*   .0* 124.0*         No results for input(s): \"TROP\", \"TROPHS\", \"CK\" in the last 168 hours.    Diagnostics:             Physical Exam:    Physical Exam  Vitals and nursing note reviewed.   Constitutional:       General: She is not in acute distress.     Appearance: Normal appearance. She is not ill-appearing or diaphoretic.   HENT:      Head: Normocephalic and atraumatic.      Mouth/Throat:      Mouth: Mucous membranes are dry.   Cardiovascular:      Rate and Rhythm: Normal rate and regular rhythm.      Heart sounds: No murmur heard.     No friction rub. No gallop.   Pulmonary:      Effort: Pulmonary effort is normal. No respiratory  distress.      Breath sounds: Normal breath sounds.   Abdominal:      Palpations: Abdomen is soft.   Musculoskeletal:         General: Normal range of motion.   Skin:     General: Skin is warm and dry.      Capillary Refill: Capillary refill takes less than 2 seconds.      Coloration: Skin is not pale.   Neurological:      General: No focal deficit present.      Mental Status: She is alert and oriented to person, place, and time.         Medications:  Scheduled Medications[1]  Medication Infusions[2]    Assessment/Plan:    Sepsis/shock   Nonischemic cardiomyopathy  ESRD    Patient is dry to euvolemic on exam  Warm to touch. AAO x 3  No evidence of cardiogenic shock  Recommend titrate pressers as sepsis resolve.   Eventual resumption of GDMT  Patient to follow up with OP cardiologist  Cardiology service will sign off. Please call with questions        Ewa Freire MD  4/24/2025  4:23 PM       [1]    dextrose 1.5%-calcium 2.5 mEq/L  5,000 mL Intraperitoneal Once in dialysis    epoetin gabriela  5,000 Units Subcutaneous Once per day on Monday Wednesday Friday    [START ON 4/25/2025] rifAMPin  600 mg Oral Daily @ 0700    piperacillin-tazobactam  4.5 g Intravenous Q12H    dextrose 1.5%-calcium 2.5 mEq/L  5,000 mL Intraperitoneal Once in dialysis    heparin  5,000 Units Subcutaneous 2 times per day   [2]    phenylephrine 50 mcg/min (04/24/25 6070)

## 2025-04-24 NOTE — PROGRESS NOTES
INFECTIOUS DISEASE PROGRESS NOTE  [facility]  part of [facility] ID PROGRESS NOTE    Shavon Stuart Patient Status:  Inpatient    1970 MRN [id number]   Location [facility] 2W/SW Attending Contreras Alex MD   Hosp Day # 2 PCP No primary care provider on file.     Subjective:  ROS reviewed. Nausea improved. Afebrile. Did eat yesterday. Undergoing PD currently.  On 2L NC. Weaning down grant.    ASSESSMENT:    Antibiotics: Zosyn  vancomycin     # Acute fever with abdominal pain, leukocytosis, likely PD catheter associated SBP with GNR   -CT A/P with enterocolitis   -Blood cx NGTD  # H/o latent TB               -Previously was on rifampin 3/2024 but only completed two months, restarted early 2025  # ESRD on PD     PLAN:  -  Continue on zosyn. Stop vancomycin.   -  Will restart latent TB treatment after confirming current medication regimen.  -  Follow fever curve, wbc.  -  Reviewed labs, micro, imaging reports, available old records.  -  Case d/w patient using language line, RN.     History of Present Illness:  Shavon Stuart is a 54 year old , English speaking female with a history of ESRD on PD, NICM, currently on treatment with rifampin for latent TB since early April after having cardiac workup at [facility] (previously took rifampin 3/2024-2024 but treatment stopped due to medication interactions), who presented to [facility] ED on  with worsening abdominal pain. States that she had a fever on Monday night and then had ongoing abdominal pain. No diarrhea. On arrival, Tmax 101.2, wbc 7.4, lactate 4.2, hypotensive and started on pressors, CXR unremarkable, blood cx obtained, body fluid cx from PD fluid obtained, started on vancomycin and zosyn.     Physical Exam:  BP 97/68   Pulse 90   Temp 96.8 °F (36 °C) (Temporal)   Resp 25   Ht 5' (1.524 m)   Wt 139 lb 5.3 oz (63.2 kg)   SpO2 100%   BMI 27.21 kg/m²     Gen:   Awake, in bed  HEENT:  EOMI, neck  supple  CV/lungs:  RRR, CTAB  Abdom:  Soft, distended, mild TTP, PD catheter c/d/i  Skin/extrem:  No rashes, no c/c/e  Lines:  R subclavian CVC+    Laboratory Data: Reviewed    Microbiology: Reviewed    Radiology: Reviewed      LAKESHIA [facility]  [phone]  4/24/2025

## 2025-04-24 NOTE — PROGRESS NOTES
[facility]  part of [facility]    Progress Note    Shavon Stuart Patient Status:  Inpatient    1970 MRN [id number]   Location [facility] 2W/SW Attending Contreras Alex MD   Hosp Day # 2 PCP No primary care provider on file.       Subjective:   Shavon Stuart is a(n) 54 year old female who I am seeing for ESRD    On 1 pressor now  Pain is slightly better  Tolerated PD last night    Objective:   /81   Pulse 84   Temp 97.3 °F (36.3 °C) (Temporal)   Resp 15   Ht 5' (1.524 m)   Wt 147 lb 14.9 oz (67.1 kg)   SpO2 96%   BMI 28.89 kg/m²      Intake/Output Summary (Last 24 hours) at 2025 1424  Last data filed at 2025 1400  Gross per 24 hour   Intake 2523.2 ml   Output 1522 ml   Net 1001.2 ml     Wt Readings from Last 1 Encounters:   25 147 lb 14.9 oz (67.1 kg)       Exam  Vital signs: Blood pressure 104/81, pulse 84, temperature 97.3 °F (36.3 °C), temperature source Temporal, resp. rate 15, height 5' (1.524 m), weight 147 lb 14.9 oz (67.1 kg), SpO2 96%.    General: Mild distress. Alert and oriented x 3.  HEENT: Moist mucous membranes. EOMI. PERRLA  Neck:  No JVD.   Respiratory: Clear to auscultation bilaterally.    Cardiovascular: S1, S2.  Regular rate and rhythm.   Abdomen: Tender to palpation  Neurologic: No focal neurological deficits.  Musculoskeletal: Full range of motion of all extremities.  No swelling noted.  Access: PD catheter    Results:     Recent Labs   Lab 25  1252 25  0352   RBC 2.93* 2.62*   HGB 9.4* 8.5*   HCT 28.9* 26.9*   MCV 98.6 102.7*   MCH 32.1 32.4   MCHC 32.5 31.6   RDW 18.9* 18.6*   NEPRELIM 5.12 12.69*   WBC 7.4 15.0*   .0* 124.0*         Recent Labs   Lab 25  1252 25  0352 25  0454   * 110* 128*   BUN 32* 31* 39*   CREATSERUM 10.29* 10.42* 9.58*   CA 9.5 8.7 8.0*   * 133* 134*   K 3.9 4.9 4.2   CL 93* 100 99   CO2 25.0 18.0* 21.0          CT  ABDOMEN+PELVIS(CPT=74176)  Result Date: 4/23/2025  CONCLUSION:   1. Mild circumferential bowel wall thickening involving the jejunum, transverse colon, and descending colon, which is concerning for enterocolitis.  2. Featureless segment of the transverse colon, which is likely sequela of prior colitis. 3. Moderate volume of abdominopelvic ascites. 4. Peritoneal dialysis catheter terminates in the pelvis. 5. Severe atrophy of the right kidney.  The left kidney is not clearly identified and may be surgically absent. 6. Mild splenomegaly. 7. Severe compression fracture deformity at L1 with associated 6 mm retropulsion of the posterior cortex and moderate canal stenosis.  This finding is likely chronic as it was mentioned in the prior CT abdomen and pelvis report from 04/06/2025 that is available in care everywhere. 8. Right greater than left lower lobe consolidations, which may reflect atelectasis with or without superimposed pneumonia. 9. Trace bilateral pleural effusions. 10. Lesser incidental findings described above.     Dictated by (CST): Jj Howell MD on 4/23/2025 at 4:14 PM     Finalized by (CST): Jj Howell MD on 4/23/2025 at 4:26 PM          XR CHEST AP PORTABLE  (CPT=71045)  Result Date: 4/23/2025  CONCLUSION:   Right subclavian central venous catheter is seen.  Tip is looped within the distal SVC with tip pointing superiorly.  Readjustment needed prior to use.  Trace right pleural effusion with right lower lobe airspace opacity is new which may be secondary to atelectasis or pneumonia      Dictated by (CST): Charli Garcia MD on 4/23/2025 at 7:48 AM     Finalized by (CST): Charli Garcia MD on 4/23/2025 at 7:49 AM            Assessment and Plan:     53 y/o F with h/o ESRD on nightly PD , NICM,HFrEF  being treated for latent TB presented with abdominal pain , fever, sob x few days  In ER hypotensive and tachypneac    Impression:    ESRD. On PD.  Plan cycler tonight  Abd pain/peritonitis.  Culture  growing gram-negative richard, ID been consulted and an antibiotic  Anemia, check iron panel  Sec HPTH, monitor phosphorus  Nonischemic cardiomyopathy with ejection fraction 30%  Latent TB, rifampin  Sepsis/hypotension, IVF, cx  and abx, pressors  History of left kidney mass status post left nephrectomy      Plan:    Plan PD tonight with low UF  IV antibiotics  CT scan with enterocolitis  Will start JOSELINE  Will repeat cell count tomorrow to see if improving white blood cell    Thank you very much for allowing me to participate in the care of your patient.  If you have any questions, please do not hesitate to contact me.     Latrice Altman MD  4/23/2025

## 2025-04-24 NOTE — PLAN OF CARE
Problem: CARDIOVASCULAR - ADULT  Goal: Maintains optimal cardiac output and hemodynamic stability  Description: INTERVENTIONS:- Monitor vital signs, rhythm, and trends- Monitor for bleeding, hypotension and signs of decreased cardiac output- Evaluate effectiveness of vasoactive medications to optimize hemodynamic stability- Monitor arterial and/or venous puncture sites for bleeding and/or hematoma- Assess quality of pulses, skin color and temperature- Assess for signs of decreased coronary artery perfusion - ex. Angina- Evaluate fluid balance, assess for edema, trend weights  Outcome: Progressing     Problem: RESPIRATORY - ADULT  Goal: Achieves optimal ventilation and oxygenation  Description: INTERVENTIONS:- Assess for changes in respiratory status- Assess for changes in mentation and behavior- Position to facilitate oxygenation and minimize respiratory effort- Oxygen supplementation based on oxygen saturation or ABGs- Provide Smoking Cessation handout, if applicable- Encourage broncho-pulmonary hygiene including cough, deep breathe, Incentive Spirometry- Assess the need for suctioning and perform as needed- Assess and instruct to report SOB or any respiratory difficulty- Respiratory Therapy support as indicated- Manage/alleviate anxiety- Monitor for signs/symptoms of CO2 retention  Outcome: Progressing     Problem: METABOLIC/FLUID AND ELECTROLYTES - ADULT  Goal: Glucose maintained within prescribed range  Description: INTERVENTIONS:- Monitor Blood Glucose as ordered- Assess for signs and symptoms of hyperglycemia and hypoglycemia- Administer ordered medications to maintain glucose within target range- Assess barriers to adequate nutritional intake and initiate nutrition consult as needed- Instruct patient on self management of diabetes  Outcome: Progressing  Goal: Electrolytes maintained within normal limits  Description: INTERVENTIONS:- Monitor labs and rhythm and assess patient for signs and symptoms of  electrolyte imbalances- Administer electrolyte replacement as ordered- Monitor response to electrolyte replacements, including rhythm and repeat lab results as appropriate- Fluid restriction as ordered- Instruct patient on fluid and nutrition restrictions as appropriate  Outcome: Progressing  Goal: Hemodynamic stability and optimal renal function maintained  Description: INTERVENTIONS:- Monitor labs and assess for signs and symptoms of volume excess or deficit- Monitor intake, output and patient weight- Monitor urine specific gravity, serum osmolarity and serum sodium as indicated or ordered- Monitor response to interventions for patient's volume status, including labs, urine output, blood pressure (other measures as available)- Encourage oral intake as appropriate- Instruct patient on fluid and nutrition restrictions as appropriate  Outcome: Progressing

## 2025-04-24 NOTE — PAYOR COMM NOTE
--------------  CONTINUED STAY REVIEW  4/24  Payor: [facility]  Subscriber #:  [id number]  Authorization Number: [id number]    Admit date: 4/22/25  Admit time:  6:18 PM    REVIEW DOCUMENTATION:  4/24       Pt resting in bed in moderate distress due to abdominal pain. +N/V.         Objective:  Chief Complaint:       Chief Complaint   Patient presents with    Abdomen/Flank Pain      ----------------------------------  Temp:  [96.8 °F (36 °C)-98.7 °F (37.1 °C)] 97.3 °F (36.3 °C)  Pulse:  [] 86  Resp:  [12-32] 26  BP: ()/() 104/76  SpO2:  [88 %-100 %] 95 %  Gen: A+Ox3.  No distress.   HEENT: NCAT, neck supple, no carotid bruit.  CV: RRR, S1S2, and intact distal pulses. No gallop, rub, murmur.  Pulm: Effort and breath sounds normal. No distress, wheezes, rales, rhonchi.  Abd: Soft, +TTP, BS normal, no mass, no HSM, no rebound/guarding.   Neuro:  Normal reflexes, CN. Sensory/motor exams grossly normal deficit.   MS: No joint effusions.  No peripheral edema.  Skin: Skin is warm and dry. No rashes, erythema, diaphoresis.   Psych:   Normal mood and affect. Calm, cooperative     Labs:        Lab Results   Component Value Date     HGB 8.5 (L) 04/23/2025     WBC 15.0 (H) 04/23/2025     .0 (L) 04/23/2025      (L) 04/24/2025     K 4.2 04/24/2025     CREATSERUM 9.58 (H) 04/24/2025     INR 1.03 04/22/2025     AST 31 04/22/2025     ALT 25 04/22/2025         [Scheduled Medications]   piperacillin-tazobactam  4.5 g Intravenous Q12H    dextrose 1.5%-calcium 2.5 mEq/L  5,000 mL Intraperitoneal Once in dialysis    heparin  5,000 Units Subcutaneous 2 times per day    Vancomycin IV  1 each Intravenous See Admin Instructions (RX holding)     I personally reviewed the available laboratories, imaging including. I discussed/will discuss the case with consultants. I ordered laboratories and/or radiographic studies. I adjusted medications as detailed above.  Medical decision making  high, risk is high.     Assessment & Plan:  ----------------------------------    Acute sepsis,   Slowly improving  rule out spontaneous bacterial peritonitis.    Dialysate fluid culture pending  blood cultures pending  Pulm recs appreciated  Trend lactic acid, 3.5 > 3.8  Cont IV zosyn  Cont pressor support, wean as tolerated  ID consult, recs appreciated  Cardiology consult       End-stage renal disease,   on peritoneal dialysis.  Nephrology consult             Latent tuberculosis.    ID consulted, recs appreciated        FULL CODE  DVT px: subcutaneous heparin       4/24 nephrology    On 1 pressor now  Pain is slightly better  Tolerated PD last night      Impression:     ESRD. On PD.  Plan cycler tonight  Abd pain/peritonitis.  Culture growing gram-negative richard, ID been consulted and an antibiotic  Anemia, check iron panel  Sec HPTH, monitor phosphorus  Nonischemic cardiomyopathy with ejection fraction 30%  Latent TB, rifampin  Sepsis/hypotension, IVF, cx  and abx, pressors  History of left kidney mass status post left nephrectomy        Plan:     Plan PD tonight with low UF  IV antibiotics  CT scan with enterocolitis  Will start JOSELINE  Will repeat cell count tomorrow to see if improving white blood cell        MEDICATIONS ADMINISTERED IN LAST 1 DAY:  acetaminophen (Tylenol Extra Strength) tab 500 mg       Date Action Dose Route User    4/24/2025 0958 Given 500 mg Oral Reva Bautista RN          heparin (Porcine) 5000 UNIT/ML injection 5,000 Units       Date Action Dose Route User    4/24/2025 0847 Given 5,000 Units Subcutaneous (Right Lower Abdomen) Reva Bautista RN    4/23/2025 2005 Given 5,000 Units Subcutaneous (Right Lower Abdomen) Bang Norton RN          HYDROmorphone (Dilaudid) 1 MG/ML injection 0.4 mg       Date Action Dose Route User    4/23/2025 1842 Given 0.4 mg Intravenous Christa Reynoso RN          HYDROmorphone (Dilaudid) 1 MG/ML injection 0.8 mg       Date Action Dose Route User     4/24/2025 1159 Given 0.8 mg Intravenous Reva Bautista RN    4/24/2025 0454 Given 0.8 mg Intravenous Bang Norton RN    4/23/2025 2303 Given 0.8 mg Intravenous Bang Norton RN          phenylephrine (Julien-Synephrine) 250 mg in sodium chloride 0.9% 250 mL infusion       Date Action Dose Route User    4/24/2025 1303 Rate/Dose Change 50 mcg/min Intravenous Reva Bautista RN    4/24/2025 1150 Rate/Dose Change 25 mcg/min Intravenous Reva Bautista RN    4/24/2025 0851 Rate/Dose Change 50 mcg/min Intravenous Reva Bautista RN    4/23/2025 1600 Rate/Dose Change 75 mcg/min Intravenous Christa Reynoso RN          piperacillin-tazobactam (Zosyn) 4.5 g in dextrose 5% 100 mL IVPB-ADDV       Date Action Dose Route User    4/24/2025 0839 New Bag 4.5 g Intravenous Reva Bautista RN    4/23/2025 2005 New Bag 4.5 g Intravenous Bang Norton RN          sodium chloride 0.9% infusion       Date Action Dose Route User    4/24/2025 0211 New Bag (none) Intravenous Bang Norton RN    4/23/2025 1600 Rate/Dose Verify (none) Intravenous Christa Reynsoo RN          vasopressin (Vasostrict) 20 Units in sodium chloride 0.9% 100 mL infusion for septic shock       Date Action Dose Route User    4/23/2025 1800 Rate/Dose Change 0.01 Units/min Intravenous Christa Reynoso RN    4/23/2025 1600 Rate/Dose Verify 0.02 Units/min Intravenous Christa Reynoso RN            Vitals (last day)       Date/Time Temp Pulse Resp BP SpO2 Weight O2 Device O2 Flow Rate (L/min) Who    04/24/25 1500 -- 85 19 114/82 94 % -- -- -- AV    04/24/25 1400 -- 84 15 104/81 96 % -- -- -- AV    04/24/25 1315 -- 67 22 96/72 96 % -- -- -- AV    04/24/25 1312 -- -- -- 94/69 -- -- -- -- AV 04/24/25 1300 -- 86 12 81/61 93 % -- -- -- AV 04/24/25 1245 -- 87 11 95/66 97 % -- -- -- AV 04/24/25 1200 97.3 °F (36.3 °C) 86 26 104/76 95 % -- None (Room air) -- AV 04/24/25 1145 -- 85 20 121/86 99 % -- -- -- AV 04/24/25 1100 -- 86 20 102/75 96 % -- --  --     04/24/25 1000 -- 90 23 96/72 96 % 147 lb 14.9 oz (67.1 kg) None (Room air) --     04/24/25 0900 -- 90 25 97/68 100 % -- -- --     04/24/25 0800 96.8 °F (36 °C) 86 22 125/86 100 % -- Nasal cannula 2 L/min     04/24/25 0700 -- 84 14 127/83 100 % -- -- --     04/24/25 0600 -- 85 15 93/70 100 % -- Nasal cannula 2 L/min     04/24/25 0530 -- 89 14 84/67 100 % -- -- --     04/24/25 0515 -- 90 15 115/80 98 % -- -- --     04/24/25 0500 -- 89 24 103/78 100 % -- Nasal cannula 2 L/min     04/24/25 0445 -- 86 13 118/80 100 % -- -- --     04/24/25 0430 -- 86 17 116/81 100 % -- -- --     04/24/25 0415 -- 86 12 119/81 100 % -- -- --     04/24/25 0400 97.4 °F (36.3 °C) 89 16 115/80 100 % -- Nasal cannula 2 L/min     04/24/25 0345 -- 89 15 120/84 100 % -- -- --     04/24/25 0330 -- 88 17 118/81 100 % -- -- --     04/24/25 0315 -- 89 21 117/82 100 % -- -- --     04/24/25 0300 -- 88 13 110/76 100 % -- Nasal cannula 2 L/min     04/24/25 0245 -- 88 14 119/81 100 % -- -- --     04/24/25 0230 -- 88 14 118/85 100 % -- -- --     04/24/25 0215 -- 93 25 139/90 100 % -- -- --     04/24/25 0200 -- 86 15 91/65 100 % -- Nasal cannula 2 L/min     04/24/25 0145 -- 87 12 112/76 100 % -- -- --     04/24/25 0130 -- 88 18 95/63 100 % -- -- --     04/24/25 0100 -- 89 22 115/74 100 % -- Nasal cannula 2 L/min     04/24/25 0045 -- 93 25 106/70 99 % -- -- --     04/24/25 0030 -- 93 22 114/76 98 % -- -- --     04/24/25 0015 98.7 °F (37.1 °C) 93 20 113/76 100 % -- -- --     04/23/25 2345 -- 95 16 99/65 100 % -- -- --     04/23/25 2330 -- 93 21 99/66 100 % -- -- --     04/23/25 2315 -- 92 17 101/68 99 % -- -- --     04/23/25 2300 -- 101 19 118/76 88 % -- None (Room air) --     04/23/25 2245 -- 95 24 118/71 93 % -- -- --     04/23/25 2230 -- 96 24 107/71 91 % -- -- --     04/23/25 2215 -- 97 23 123/76 95 % -- -- --     04/23/25 2200 -- 93 23 114/79 96 % -- None (Room air) --      04/23/25 2130 -- 94 22 108/82 96 % -- -- --     04/23/25 2115 -- 94 26 114/78 96 % -- -- --     04/23/25 2100 -- 94 32 108/81 96 % -- None (Room air) --     04/23/25 2045 -- 93 28 104/72 95 % -- -- --     04/23/25 2030 -- 95 24 104/76 94 % -- -- --     04/23/25 2015 -- 92 27 110/77 94 % -- -- --     04/23/25 2000 97.8 °F (36.6 °C) 93 25 109/78 96 % -- None (Room air) --     04/23/25 1945 -- 95 21 136/72 94 % -- -- --     04/23/25 1930 -- 96 19 103/71 92 % -- -- --     04/23/25 1900 -- 95 25 104/69 98 % -- Nasal cannula 2 L/min     04/23/25 1830 -- 102 17 125/76 94 % -- Nasal cannula 2 L/min MT    04/23/25 1800 -- 84 21 90/68 96 % -- Nasal cannula 2 L/min MT    04/23/25 1730 -- 86 24 103/75 98 % -- Nasal cannula 2 L/min MT    04/23/25 1700 -- 87 20 100/67 95 % -- Nasal cannula 2 L/min MT    04/23/25 1630 -- 85 23 109/76 98 % -- Nasal cannula 2 L/min MT    04/23/25 1600 97.4 °F (36.3 °C) 85 29 118/85 100 % -- Nasal cannula 2 L/min MT    04/23/25 1530 -- 83 15 81/70 100 % -- Nasal cannula 2 L/min MT    04/23/25 1500 -- 86 20 100/84 100 % -- [place]al cannula 2 L/min [facility] [place]30 -- 85 23 107/85 100 % -- Nasal cannula 2 L/min MT    04/23/25 1400 -- 88 22 128/101 100 % -- Nasal cannula 2 L/min [facility] [place] 1330 -- 85 21 124/88 100 % -- Nasal cannula 2 L/min MT    04/23/25 1300 -- 89 19 117/81 100 % -- Nasal cannula 2 L/min MT    04/23/25 1230 -- 90 19 126/87 100 % -- Nasal cannula 2 L/min [facility] [place] 1215 -- 90 18 123/82 100 % -- Nasal cannula 2 L/min [facility] [place] 1200 97.1 °F (36.2 °C) -- -- -- -- -- -- -- MT    04/23/25 1130 -- 92 21 108/78 100 % -- Nasal cannula 2 L/min [facility] [place] 1100 -- 93 18 116/72 100 % -- Nasal cannula 2 L/min [facility] [place] 1030 -- 95 26 115/67 100 % -- Nasal cannula 2 L/min [facility] [place] 1000 -- 97 20 105/52 100 % -- Nasal cannula 2 L/min [facility] [place] 0915 -- 99 20 107/74 100 % -- [facility] 2 L/[facility] -- -- -- -- -- 139 lb 5.3 oz (63.2  kg) -- -- MT    04/23/25 0830 -- 51 27 110/52 100 % -- Nasal cannula 2 L/min MT    04/23/25 0800 97.8 °F (36.6 °C) 52 30 98/59 100 % -- Nasal cannula 2 L/min MT    04/23/25 0730 -- 53 23 92/61 100 % -- Nasal cannula 2 L/min MT    04/23/25 0600 -- 98 23 104/75 100 % -- Nasal cannula 2 L/min     04/23/25 0500 -- 100 19 91/57 100 % -- Nasal cannula 2 L/min     04/23/25 0400 98.3 °F (36.8 °C) 103 14 94/75 100 % -- Nasal cannula 2 L/min     04/23/25 0345 -- 109 22 102/73 100 % -- -- --     04/23/25 0330 -- 106 -- 114/82 94 % -- -- --     04/23/25 0300 -- 106 26 90/71 100 % -- Nasal cannula 2 L/min     04/23/25 0200 -- 107 23 95/74 100 % -- Nasal cannula 2 L/min     04/23/25 0100 -- 110 28 99/79 100 % -- Nasal cannula 2 L/min     04/23/25 0000 98.2 °F (36.8 °C) 121 26 86/64 92 % -- None (Room air) -- KF          CIWA Scores (since admission)       None

## 2025-04-25 ENCOUNTER — APPOINTMENT (OUTPATIENT)
Dept: INTERVENTIONAL RADIOLOGY/VASCULAR | Facility: HOSPITAL | Age: 55
DRG: 907 | End: 2025-04-25
Attending: INTERNAL MEDICINE
Payer: MEDICAID

## 2025-04-25 PROBLEM — K65.9 PERITONITIS (HCC): Status: ACTIVE | Noted: 2025-04-25

## 2025-04-25 LAB
ANION GAP SERPL CALC-SCNC: 12 MMOL/L (ref 0–18)
ANTIBODY SCREEN: NEGATIVE
BASOPHILS # BLD: 0.07 X10(3) UL (ref 0–0.2)
BASOPHILS NFR BLD: 1 %
BASOPHILS NFR PRT: 0 %
BUN BLD-MCNC: 34 MG/DL (ref 9–23)
BUN/CREAT SERPL: 4.3 (ref 10–20)
CALCIUM BLD-MCNC: 7.9 MG/DL (ref 8.7–10.4)
CHLORIDE SERPL-SCNC: 96 MMOL/L (ref 98–112)
CO2 SERPL-SCNC: 24 MMOL/L (ref 21–32)
COLOR FLD: YELLOW
CREAT BLD-MCNC: 7.87 MG/DL (ref 0.55–1.02)
DEPRECATED RDW RBC AUTO: 56.1 FL (ref 35.1–46.3)
EGFRCR SERPLBLD CKD-EPI 2021: 6 ML/MIN/1.73M2 (ref 60–?)
EOSINOPHIL # BLD: 0.21 X10(3) UL (ref 0–0.7)
EOSINOPHIL NFR BLD: 3 %
EOSINOPHIL NFR PRT: 2 %
ERYTHROCYTE [DISTWIDTH] IN BLOOD BY AUTOMATED COUNT: 17.6 % (ref 11–15)
GLUCOSE BLD-MCNC: 138 MG/DL (ref 70–99)
HBV SURFACE AG SER-ACNC: <0.1 [IU]/L
HBV SURFACE AG SERPL QL IA: NONREACTIVE
HCT VFR BLD AUTO: 20.6 % (ref 35–48)
HGB BLD-MCNC: 7 G/DL (ref 12–16)
LYMPHOCYTES NFR BLD: 0.77 X10(3) UL (ref 1–4)
LYMPHOCYTES NFR BLD: 11 %
LYMPHOCYTES NFR PRT: 7 %
MCH RBC QN AUTO: 32.1 PG (ref 26–34)
MCHC RBC AUTO-ENTMCNC: 34 G/DL (ref 31–37)
MCV RBC AUTO: 94.5 FL (ref 80–100)
MONOCYTES # BLD: 0.35 X10(3) UL (ref 0.1–1)
MONOCYTES NFR BLD: 5 %
MONOS+MACROS NFR PRT: 18 %
NEUTROPHILS # BLD AUTO: 5.52 X10 (3) UL (ref 1.5–7.7)
NEUTROPHILS # PRT: 1726.45 /CUMM (ref ?–250)
NEUTROPHILS NFR BLD: 77 %
NEUTROPHILS NFR FLD: 73 %
NEUTS BAND NFR BLD: 3 %
NEUTS HYPERSEG # BLD: 5.6 X10(3) UL (ref 1.5–7.7)
OSMOLALITY SERPL CALC.SUM OF ELEC: 284 MOSM/KG (ref 275–295)
PLATELET # BLD AUTO: 98 10(3)UL (ref 150–450)
PLATELETS.RETICULATED NFR BLD AUTO: 8.5 % (ref 0–7)
POTASSIUM SERPL-SCNC: 3.4 MMOL/L (ref 3.5–5.1)
RBC # BLD AUTO: 2.18 X10(6)UL (ref 3.8–5.3)
RBC # FLD: 573 /CUMM (ref ?–1)
RH BLOOD TYPE: POSITIVE
RH BLOOD TYPE: POSITIVE
SODIUM SERPL-SCNC: 132 MMOL/L (ref 136–145)
TOTAL CELLS COUNTED BLD: 100
TOTAL CELLS COUNTED FLD: 100
TOTAL CELLS COUNTED PRT: 2365 /CUMM (ref ?–1)
WBC # BLD AUTO: 7 X10(3) UL (ref 4–11)
WBC # PRT: 2365 /CUMM

## 2025-04-25 PROCEDURE — 30233H1 TRANSFUSION OF NONAUTOLOGOUS WHOLE BLOOD INTO PERIPHERAL VEIN, PERCUTANEOUS APPROACH: ICD-10-PCS | Performed by: INTERNAL MEDICINE

## 2025-04-25 PROCEDURE — 5A1D70Z PERFORMANCE OF URINARY FILTRATION, INTERMITTENT, LESS THAN 6 HOURS PER DAY: ICD-10-PCS | Performed by: INTERNAL MEDICINE

## 2025-04-25 PROCEDURE — 30233N1 TRANSFUSION OF NONAUTOLOGOUS RED BLOOD CELLS INTO PERIPHERAL VEIN, PERCUTANEOUS APPROACH: ICD-10-PCS | Performed by: INTERNAL MEDICINE

## 2025-04-25 PROCEDURE — 99233 SBSQ HOSP IP/OBS HIGH 50: CPT | Performed by: INTERNAL MEDICINE

## 2025-04-25 PROCEDURE — 0JH63XZ INSERTION OF TUNNELED VASCULAR ACCESS DEVICE INTO CHEST SUBCUTANEOUS TISSUE AND FASCIA, PERCUTANEOUS APPROACH: ICD-10-PCS | Performed by: RADIOLOGY

## 2025-04-25 PROCEDURE — 02HV33Z INSERTION OF INFUSION DEVICE INTO SUPERIOR VENA CAVA, PERCUTANEOUS APPROACH: ICD-10-PCS | Performed by: RADIOLOGY

## 2025-04-25 RX ORDER — MIDAZOLAM HYDROCHLORIDE 1 MG/ML
INJECTION INTRAMUSCULAR; INTRAVENOUS
Status: DISCONTINUED
Start: 2025-04-25 | End: 2025-04-25 | Stop reason: WASHOUT

## 2025-04-25 RX ORDER — SODIUM CHLORIDE 9 MG/ML
INJECTION, SOLUTION INTRAVENOUS ONCE
Status: COMPLETED | OUTPATIENT
Start: 2025-04-25 | End: 2025-04-25

## 2025-04-25 RX ORDER — LIDOCAINE HYDROCHLORIDE 20 MG/ML
INJECTION, SOLUTION INFILTRATION; PERINEURAL
Status: COMPLETED
Start: 2025-04-25 | End: 2025-04-25

## 2025-04-25 RX ORDER — LIDOCAINE HYDROCHLORIDE 20 MG/ML
INJECTION, SOLUTION EPIDURAL; INFILTRATION; INTRACAUDAL; PERINEURAL
Status: COMPLETED
Start: 2025-04-25 | End: 2025-04-25

## 2025-04-25 RX ORDER — HEPARIN SODIUM 1000 [USP'U]/ML
1.5 INJECTION, SOLUTION INTRAVENOUS; SUBCUTANEOUS
Status: DISCONTINUED | OUTPATIENT
Start: 2025-04-25 | End: 2025-04-26

## 2025-04-25 RX ORDER — ALBUMIN (HUMAN) 12.5 G/50ML
25 SOLUTION INTRAVENOUS
Status: ACTIVE | OUTPATIENT
Start: 2025-04-25 | End: 2025-04-27

## 2025-04-25 RX ORDER — POTASSIUM CHLORIDE 1500 MG/1
20 TABLET, EXTENDED RELEASE ORAL ONCE
Status: COMPLETED | OUTPATIENT
Start: 2025-04-25 | End: 2025-04-25

## 2025-04-25 RX ORDER — HEPARIN SODIUM 1000 [USP'U]/ML
INJECTION, SOLUTION INTRAVENOUS; SUBCUTANEOUS
Status: COMPLETED
Start: 2025-04-25 | End: 2025-04-25

## 2025-04-25 NOTE — PROGRESS NOTES
[facility]  part of [facility]    Hospitalist Progress Note     Shavon Stuart Patient Status:  Inpatient    1970  54 year old CSN [id number]   Location 220/220-A Attending Contreras Alex MD   Hosp Day # 3 PCP No primary care provider on file.         Subjective:   ----------------------------------  Pt resting in bed in moderate distress due to abdominal pain. +N/V.  Will have permacath placed later today by IR.       Objective:   Chief Complaint:   Chief Complaint   Patient presents with    Abdomen/Flank Pain     ----------------------------------  Temp:  [97.1 °F (36.2 °C)-97.8 °F (36.6 °C)] 97.2 °F (36.2 °C)  Pulse:  [71-98] 86  Resp:  [10-31] 19  BP: ()/(64-94) 111/71  SpO2:  [92 %-100 %] 100 %  Gen: A+Ox3.  No distress.   HEENT: NCAT, neck supple, no carotid bruit.  CV: RRR, S1S2, and intact distal pulses. No gallop, rub, murmur.  Pulm: Effort and breath sounds normal. No distress, wheezes, rales, rhonchi.  Abd: Soft, +TTP, BS normal, no mass, no HSM, no rebound/guarding.   Neuro: Normal reflexes, CN. Sensory/motor exams grossly normal deficit.   MS: No joint effusions.  No peripheral edema.  Skin: Skin is warm and dry. No rashes, erythema, diaphoresis.   Psych: Normal mood and affect. Calm, cooperative    Labs:  Lab Results   Component Value Date    HGB 7.0 (L) 2025    WBC 7.0 2025    PLT 98.0 (L) 2025     (L) 2025    K 3.4 (L) 2025    CREATSERUM 7.87 (H) 2025    INR 1.03 2025    AST 31 2025    ALT 25 2025           Scheduled Medications[1]  PRN Medications[2]      Other problems      Supplementary Documentation:   DVT Mechanical Prophylaxis:        DVT Pharmacologic Prophylaxis   Medication    heparin (Porcine) 1000 UNIT/ML injection 1,500 Units    [Held by provider] heparin (Porcine) 5000 UNIT/ML injection 5,000 Units                Code Status: Not on file  Cat: No urinary catheter in place  Cat  Duration (in days):   Central line: central venous catheter in place  HEMALATHA:            I personally reviewed the available laboratories, imaging including. I discussed/will discuss the case with consultants. I ordered laboratories and/or radiographic studies. I adjusted medications as detailed above.  Medical decision making high, risk is high.    Assessment & Plan:   ----------------------------------    Acute sepsis,   Slowly improving  rule out spontaneous bacterial peritonitis.    Dialysate fluid culture pending  blood cultures pending  Pulm recs appreciated  Trend lactic acid, 3.5 > 3.8  Cont IV zosyn  Cont pressor support, wean as tolerated  ID consult, recs appreciated  Cardiology consult      End-stage renal disease,   on peritoneal dialysis.  Nephrology consult   IR to place permacath today      Anemia  Hgb of 7.0  Transfuse 1 unit prbc 4/25            Latent tuberculosis.    ID consulted, recs appreciated       FULL CODE  DVT px: subcutaneous heparin    Contreras Alex MD  4/25/2025         [1]    heparin  1.5 mL Intracatheter Once    meropenem  500 mg Intravenous Q24H    dextrose 1.5%-calcium 2.5 mEq/L  5,000 mL Intraperitoneal Once in dialysis    epoetin gabriela  5,000 Units Subcutaneous Once per day on Monday Wednesday Friday    rifAMPin  600 mg Oral Daily @ 0700    dextrose 1.5%-calcium 2.5 mEq/L  5,000 mL Intraperitoneal Once in dialysis    [Held by provider] heparin  5,000 Units Subcutaneous 2 times per day   [2]   sodium chloride **AND** albumin human    acetaminophen    HYDROmorphone **OR** HYDROmorphone **OR** HYDROmorphone    ondansetron    prochlorperazine

## 2025-04-25 NOTE — PROGRESS NOTES
[facility]  part of [facility]    Progress Note    Shavon Stuart Patient Status:  Inpatient    1970 MRN [id number]   Location [facility] 2W/SW Attending Contreras Alex MD   Hosp Day # 3 PCP No primary care provider on file.       Subjective:   Shavon Stuart is a(n) 54 year old female who I am seeing for ESRD    Remains in ICU  Off pressors but still complaining of significant abdominal pain  Tolerated PD last night    Objective:   BP 94/66   Pulse 83   Temp 97.1 °F (36.2 °C) (Temporal)   Resp 20   Ht 5' (1.524 m)   Wt 143 lb 8.3 oz (65.1 kg)   SpO2 99%   BMI 28.03 kg/m²      Intake/Output Summary (Last 24 hours) at 2025 1037  Last data filed at 2025 0821  Gross per 24 hour   Intake 969.3 ml   Output 444 ml   Net 525.3 ml     Wt Readings from Last 1 Encounters:   25 143 lb 8.3 oz (65.1 kg)       Exam  Vital signs: Blood pressure 94/66, pulse 83, temperature 97.1 °F (36.2 °C), temperature source Temporal, resp. rate 20, height 5' (1.524 m), weight 143 lb 8.3 oz (65.1 kg), SpO2 99%.    General: Mild distress. Alert and oriented x 3.  HEENT: Moist mucous membranes. EOMI. PERRLA  Neck:  No JVD.   Respiratory: Clear to auscultation bilaterally.    Cardiovascular: S1, S2.  Regular rate and rhythm.   Abdomen: Tender to palpation  Neurologic: No focal neurological deficits.  Musculoskeletal: Full range of motion of all extremities.  No swelling noted.  Access: PD catheter    Results:     Recent Labs   Lab 25  1252 25  0352 25  0449   RBC 2.93* 2.62* 2.18*   HGB 9.4* 8.5* 7.0*   HCT 28.9* 26.9* 20.6*   MCV 98.6 102.7* 94.5   MCH 32.1 32.4 32.1   MCHC 32.5 31.6 34.0   RDW 18.9* 18.6* 17.6*   NEPRELIM 5.12 12.69* 5.52   WBC 7.4 15.0* 7.0   .0* 124.0* 98.0*         Recent Labs   Lab 25  0352 25  0454 25  0449   * 128* 138*   BUN 31* 39* 34*   CREATSERUM 10.42* 9.58* 7.87*   CA 8.7 8.0* 7.9*   * 134*  132*   K 4.9 4.2 3.4*    99 96*   CO2 18.0* 21.0 24.0          CT ABDOMEN+PELVIS(CPT=74176)  Result Date: 4/23/2025  CONCLUSION:   1. Mild circumferential bowel wall thickening involving the jejunum, transverse colon, and descending colon, which is concerning for enterocolitis.  2. Featureless segment of the transverse colon, which is likely sequela of prior colitis. 3. Moderate volume of abdominopelvic ascites. 4. Peritoneal dialysis catheter terminates in the pelvis. 5. Severe atrophy of the right kidney.  The left kidney is not clearly identified and may be surgically absent. 6. Mild splenomegaly. 7. Severe compression fracture deformity at L1 with associated 6 mm retropulsion of the posterior cortex and moderate canal stenosis.  This finding is likely chronic as it was mentioned in the prior CT abdomen and pelvis report from 04/06/2025 that is available in care everywhere. 8. Right greater than left lower lobe consolidations, which may reflect atelectasis with or without superimposed pneumonia. 9. Trace bilateral pleural effusions. 10. Lesser incidental findings described above.     Dictated by (CST): Jj Howell MD on 4/23/2025 at 4:14 PM     Finalized by (CST): Jj Howell MD on 4/23/2025 at 4:26 PM            Assessment and Plan:     53 y/o F with h/o ESRD on nightly PD , NICM,HFrEF  being treated for latent TB presented with abdominal pain , fever, sob x few days  In ER hypotensive and tachypneac    Impression:    ESRD. On PD.  Plan cycler tonight  Abd pain/peritonitis.  Culture growing gram-negative richard, ID been consulted and an antibiotic  Anemia, check iron panel  Sec HPTH, monitor phosphorus  Nonischemic cardiomyopathy with ejection fraction 30%  Latent TB, rifampin  Sepsis/hypotension, IVF, cx  and abx, pressors.  CT scan with enterocolitis.  PD fluid positive for peritonitis and culture positive for Pseudomonas.  History of left kidney mass status post left nephrectomy      Plan:    PD  fluid culture came back positive for Pseudomonas so discussed with infectious disease and plan to remove PD catheter and to continue IV antibiotics.  Discussed at length with patient with the [place] language line and explained to her the need to remove PD catheter and put the new permanent catheter and switch to hemodialysis.  She understands and answered all her questions  IR consulted for PermCath  Surgery consult for PD catheter removal  IV antibiotics  Will start JOSELINE  Give a dose of IV iron  Replace K  Plan HD tomorrow      Discussed extensively with patient, critical care team,  infectious disease and nurse    Thank you very much for allowing me to participate in the care of your patient.  If you have any questions, please do not hesitate to contact me.     Latrice Altman MD

## 2025-04-25 NOTE — CONSULTS
[facility]  part of [facility]    Report of Consultation    Shavon Stuart Patient Status:  Inpatient    1970 MRN [id number]   Location [facility] 2W/SW Attending Contreras Alex MD   Hosp Day # 3 PCP No primary care provider on file.     Date of Admission:  2025  Date of Consult:  2025    Reason for Consultation:  Infected peritoneal dialysis catheter       History of Present Illness:  Shavon Stuart   is a 54 year old    female who presents infected peritoneal dialysis cath with sepsis.  Peritoneal fluid cultures reveal Pseudomonas.  Recommendation by ID for removal of peritoneal dialysis catheter.  Catheter was placed at [facility] 2 years ago per the patient.  All information was obtained through language line.  I am called for further evaluation.         History:  Past Medical History:    CKD (chronic kidney disease) stage V requiring chronic dialysis (HCC)     History reviewed. No pertinent surgical history.  No family history on file.   reports that she has never smoked. She has never used smokeless tobacco. She reports that she does not drink alcohol and does not use drugs.    Allergies:  Allergies   Allergen Reactions    Morphine ITCHING       Medications:  Current Hospital Medications[1]    Review of Systems:  Pertinent items are noted in HPI.    Physical Exam:  Blood pressure 104/79, pulse 84, temperature 98.7 °F (37.1 °C), temperature source Temporal, resp. rate 19, height 5' (1.524 m), weight 143 lb 8.3 oz (65.1 kg), SpO2 96%.    General appearance:  alert, appears stated age, cooperative, and no distress  Eyes: Sclera anicteric  Neck: Right subclavian vein central line and right IJ permacath  Pulmonary: Equal respiratory excursion, no labored breathing  Cardiovascular: regular rate and rhythm  Abdominal: Soft mildly distended there is mild left lower quadrant tenderness present.  Left lower quadrant PD catheter in place.  Low midline  incision from previous  section no ventral hernia present  Extremities: extremities normal, atraumatic, no cyanosis or edema  Skin: Skin color, texture, turgor normal. No rashes or lesions  Psychiatric: calm  concrete thoughts   memory intact    Results:  Lab Results   Component Value Date    WBC 7.0 2025    HGB 7.0 (L) 2025    HCT 20.6 (L) 2025    PLT 98.0 (L) 2025    CREATSERUM 7.87 (H) 2025    BUN 34 (H) 2025     (L) 2025    K 3.4 (L) 2025    CL 96 (L) 2025    CO2 24.0 2025     (H) 2025    CA 7.9 (L) 2025    ALB 3.3 2025    ALKPHO 263 (H) 2025    BILT 0.4 2025    TP 7.7 2025    AST 31 2025    ALT 25 2025    PTT 28.1 2025    INR 1.03 2025    PHOS 6.1 (H) 2025       No results found.          Impression and Plan:  Problem List[2]    Is a 54-year-old  female who has chronic kidney disease coronary artery disease being evaluated for cardiac transplant.  Patient with infected peritoneal dialysis cath with Pseudomonas requiring PD catheter removal per ID.  I had extensive discussion with the patient due to language line is etiology of the above.  Risk of procedure including bleeding, infection, postoperative hematoma, wound infection, nonhealing wound, scar formation, inability to replace catheter, need for lifelong hemodialysis, ongoing infection, as well as risk with anesthesia including myocardial infarction, respiratory failure, renal failure, pulm illness, DVT, and even death were all discussed in detail with the patient through language line understands consents was proceed with the operation.  Will plan removal of peritoneal dialysis catheter under general anesthetic tomorrow at [facility].    Time spent in direct patient contact and decision making as well as counseling/coordination of care:  40908- 80 min    SHEREEN ALBERT JR., MD. [facility]  GENERAL  SURGERY  [facility]    4/25/2025  6:00 PM             [1]   Current Facility-Administered Medications:     sodium chloride 0.9 % IV bolus 100 mL, 100 mL, Intravenous, Q30 Min PRN **AND** albumin human (Albumin) 25% injection 25 g, 25 g, Intravenous, PRN Dialysis    heparin (Porcine) 1000 UNIT/ML injection 1,500 Units, 1.5 mL, Intracatheter, Once    meropenem (Merrem) 500 mg in sodium chloride 0.9% 100mL IVPB-AIDA, 500 mg, Intravenous, Q24H    sodium chloride 0.9% infusion, , Intravenous, Once    heparin (Porcine) 100 Units/mL lock flush 150 Units, 1.5 mL, Intravenous, Once    acetaminophen (Tylenol Extra Strength) tab 500 mg, 500 mg, Oral, Q4H PRN    dextrose 1.5%-calcium 2.5 mEq/L peritoneal solution, 5,000 mL, Intraperitoneal, Once in dialysis    epoetin gabriela (Epogen, Procrit) 5,000 Units injection, 5,000 Units, Subcutaneous, Once per day on Monday Wednesday Friday    rifAMPin (Rifadin) cap 600 mg, 600 mg, Oral, Daily @ 0700    dextrose 1.5%-calcium 2.5 mEq/L peritoneal solution, 5,000 mL, Intraperitoneal, Once in dialysis    [Held by provider] heparin (Porcine) 5000 UNIT/ML injection 5,000 Units, 5,000 Units, Subcutaneous, 2 times per day    HYDROmorphone (Dilaudid) 1 MG/ML injection 0.2 mg, 0.2 mg, Intravenous, Q2H PRN **OR** HYDROmorphone (Dilaudid) 1 MG/ML injection 0.4 mg, 0.4 mg, Intravenous, Q2H PRN **OR** HYDROmorphone (Dilaudid) 1 MG/ML injection 0.8 mg, 0.8 mg, Intravenous, Q2H PRN    ondansetron (Zofran) 4 MG/2ML injection 4 mg, 4 mg, Intravenous, Q6H PRN    prochlorperazine (Compazine) 10 MG/2ML injection 5 mg, 5 mg, Intravenous, Q8H PRN  [2]   Patient Active Problem List  Diagnosis    Septic shock (HCC)    Abdominal pain, acute    ESRD (end stage renal disease) (HCC)    SBP (spontaneous bacterial peritonitis) (HCC)    Cardiomyopathy, unspecified type (HCC)    Hypotension    Peritonitis (HCC)

## 2025-04-25 NOTE — PROGRESS NOTES
[facility]  part of [facility]     Progress Note    Shavon Stuart Patient Status:  Inpatient    1970 MRN [id number]   Location [facility] 2W/SW Attending Contreras Alex MD   Hosp Day # 3 PCP No primary care provider on file.       Subjective:   Patient seen and examined.  Admits to improvement in nausea vomiting abdominal pain.  Weaned off pressor support    Objective:   Blood pressure 94/66, pulse 83, temperature 97.1 °F (36.2 °C), temperature source Temporal, resp. rate 20, height 5' (1.524 m), weight 143 lb 8.3 oz (65.1 kg), SpO2 99%.  Intake/Output:   Last 3 shifts: I/O last 3 completed shifts:  In: .7 [P.O.:160; I.V.:1546.7; IV PIGGYBACK:276]  Out:  [Other:]   This shift: I/O this shift:  In: 100 [IV PIGGYBACK:100]  Out: -      Vent Settings:      Hemodynamic parameters (last 24 hours):      Scheduled Meds: Current Hospital Medications[1]    Continuous Infusions: Medication Infusions[2]    Physical Exam  Constitutional: Mild distress  Eyes: PERRL  ENT: nares pateint  Neck: supple, no JVD  Cardio: RRR, S1 S2  Respiratory: clear to auscultation bilaterally, no wheezing, rales, rhonchi, crackles  GI: abdomen soft, generalized tenderness to palpation  Extremities: no clubbing, cyanosis, edema  Neurologic: no gross motor deficits  Skin: warm, dry      Results:     Lab Results   Component Value Date    WBC 7.0 2025    HGB 7.0 2025    HCT 20.6 2025    PLT 98.0 2025    CREATSERUM 7.87 2025    BUN 34 2025     2025    K 3.4 2025    CL 96 2025    CO2 24.0 2025     2025    CA 7.9 2025       CT ABDOMEN+PELVIS(CPT=74176)  Result Date: 2025  CONCLUSION:   1. Mild circumferential bowel wall thickening involving the jejunum, transverse colon, and descending colon, which is concerning for enterocolitis.  2. Featureless segment of the transverse colon, which is likely sequela of prior  colitis. 3. Moderate volume of abdominopelvic ascites. 4. Peritoneal dialysis catheter terminates in the pelvis. 5. Severe atrophy of the right kidney.  The left kidney is not clearly identified and may be surgically absent. 6. Mild splenomegaly. 7. Severe compression fracture deformity at L1 with associated 6 mm retropulsion of the posterior cortex and moderate canal stenosis.  This finding is likely chronic as it was mentioned in the prior CT abdomen and pelvis report from 04/06/2025 that is available in care everywhere. 8. Right greater than left lower lobe consolidations, which may reflect atelectasis with or without superimposed pneumonia. 9. Trace bilateral pleural effusions. 10. Lesser incidental findings described above.     Dictated by (CST): Jj Howell MD on 4/23/2025 at 4:14 PM     Finalized by (CST): Jj Howell MD on 4/23/2025 at 4:26 PM                    Assessment   1.  Septic shock  2.  SBP  3.  Anemia  4.  Thrombocytopenia  5.  Cardiomyopathy  6.  End-stage renal disease  7.  Left kidney mass  8.  Latent tuberculosis     Plan   -Patient with evidence of fatigue malaise abdominal pain hypotension.  Concern for septic shock with possible sources which include SBP.  Await paracentesis results.  - Empiric antibiotic therapy in the meantime blood and peritoneal cultures pending.  Peritoneal fluid with evidence of gram-negative rods.  Continue antibiotics per ID currently on Zosyn  -Plan for placement of tunneled dialysis catheter today.  Plan for removal of peritoneal dialysis tomorrow with general surgery.  -CT abdomen pelvis on 4/23/2025 with some bowel wall thickening concerning for enterocolitis.  Moderate ascites seen.  - Chest x-ray with no significant abnormality seen  - Prior history of latent tuberculosis diagnosed March 2024 started on rifampin which she did not complete due to interaction with other medications.  Discussed with ID.  Rifampin will be resumed.  - Weaned off pressor  support  - Transfuse for hemoglobin below 7  -Monitor thrombocytopenia  - Further recommendations per nephrology  - Prior history of left kidney mass status post left radical nephrectomy  - DVT prophylaxis: Heparin  - Patient is complex and high risk for further deterioration      Jessica Clifford DO  Pulmonary Critical Care Medicine  [facility]          [1]   Current Facility-Administered Medications   Medication Dose Route Frequency    acetaminophen (Tylenol Extra Strength) tab 500 mg  500 mg Oral Q4H PRN    dextrose 1.5%-calcium 2.5 mEq/L peritoneal solution  5,000 mL Intraperitoneal Once in dialysis    epoetin gabriela (Epogen, Procrit) 5,000 Units injection  5,000 Units Subcutaneous Once per day on Monday Wednesday Friday    rifAMPin (Rifadin) cap 600 mg  600 mg Oral Daily @ 0700    piperacillin-tazobactam (Zosyn) 4.5 g in dextrose 5% 100 mL IVPB-ADDV  4.5 g Intravenous Q12H    dextrose 1.5%-calcium 2.5 mEq/L peritoneal solution  5,000 mL Intraperitoneal Once in dialysis    [Held by provider] heparin (Porcine) 5000 UNIT/ML injection 5,000 Units  5,000 Units Subcutaneous 2 times per day    HYDROmorphone (Dilaudid) 1 MG/ML injection 0.2 mg  0.2 mg Intravenous Q2H PRN    Or    HYDROmorphone (Dilaudid) 1 MG/ML injection 0.4 mg  0.4 mg Intravenous Q2H PRN    Or    HYDROmorphone (Dilaudid) 1 MG/ML injection 0.8 mg  0.8 mg Intravenous Q2H PRN    ondansetron (Zofran) 4 MG/2ML injection 4 mg  4 mg Intravenous Q6H PRN    prochlorperazine (Compazine) 10 MG/2ML injection 5 mg  5 mg Intravenous Q8H PRN   [2]

## 2025-04-25 NOTE — PLAN OF CARE
Right jugular permacath placed by IR.   Patient with small amount emesis, nausea, and pain in abdomen.  PRN Compazine given for nausea/emesis, pt stating relief from symptoms.  PRN Dilaudid given for pain, patient currently denies pain.     HD recently started by Hemodialysis RN at bedside.  Plan to transfuse 1 unit PRBC per order, endorsed to next shift RN.          Problem: Patient Centered Care  Goal: Patient preferences are identified and integrated in the patient's plan of care  Description: Interventions:- What would you like us to know as we care for you? - Provide timely, complete, and accurate information to patient/family- Incorporate patient and family knowledge, values, beliefs, and cultural backgrounds into the planning and delivery of care- Encourage patient/family to participate in care and decision-making at the level they choose- Honor patient and family perspectives and choices  Outcome: Progressing     Problem: CARDIOVASCULAR - ADULT  Goal: Maintains optimal cardiac output and hemodynamic stability  Description: INTERVENTIONS:- Monitor vital signs, rhythm, and trends- Monitor for bleeding, hypotension and signs of decreased cardiac output- Evaluate effectiveness of vasoactive medications to optimize hemodynamic stability- Monitor arterial and/or venous puncture sites for bleeding and/or hematoma- Assess quality of pulses, skin color and temperature- Assess for signs of decreased coronary artery perfusion - ex. Angina- Evaluate fluid balance, assess for edema, trend weights  Outcome: Progressing     Problem: RESPIRATORY - ADULT  Goal: Achieves optimal ventilation and oxygenation  Description: INTERVENTIONS:- Assess for changes in respiratory status- Assess for changes in mentation and behavior- Position to facilitate oxygenation and minimize respiratory effort- Oxygen supplementation based on oxygen saturation or ABGs- Provide Smoking Cessation handout, if applicable- Encourage broncho-pulmonary  hygiene including cough, deep breathe, Incentive Spirometry- Assess the need for suctioning and perform as needed- Assess and instruct to report SOB or any respiratory difficulty- Respiratory Therapy support as indicated- Manage/alleviate anxiety- Monitor for signs/symptoms of CO2 retention  Outcome: Progressing

## 2025-04-25 NOTE — PROCEDURES
[facility]  part of [facility]  Procedure Note    Shavon Stuart Patient Status:  Inpatient    1970 MRN [id number]   Location [facility] 2W/SW Attending Contreras Alex MD   Hosp Day # 3 PCP No primary care provider on file.     Procedure: image guided right transjugular permcath    Pre-Procedure Diagnosis:  esrd    Post-Procedure Diagnosis: esrd    Anesthesia:  Local    Findings:  tip at cavo-atrial junction-- ready for immediate use         Blood Loss:  minimal       Complications:  None       Maged Wheat MD  2025

## 2025-04-25 NOTE — PROGRESS NOTES
INFECTIOUS DISEASE PROGRESS NOTE  [facility]  part of [facility] ID PROGRESS NOTE    Shavon Stuart Patient Status:  Inpatient    1970 MRN [id number]   Location [facility] 2W/SW Attending Contreras Alex MD   Hosp Day # 3 PCP No primary care provider on file.     Subjective:  ROS reviewed. Nausea improved. Off pressors.    ASSESSMENT:    Antibiotics: Meropenem, rifampin  Vancomycin, zosyn     # Acute fever with abdominal pain, leukocytosis, PD catheter associated SBP with PSAR   -CT A/P with enterocolitis   -Blood cx NGTD   -Patients reports two other episodes of peritonitis in past  # H/o latent TB               -Previously was on rifampin 3/2024 but only completed two months, restarted early 2025  # ESRD on PD     PLAN:  -  Stop zosyn and start meropenem.   -  PD catheter to be removed and to be started on HD.  -  Continue on rifampin.  -  Follow fever curve, wbc.  -  Reviewed labs, micro, imaging reports, available old records.  -  Case d/w patient using language line, RN.     History of Present Illness:  Shavon Stuart is a 54 year old , Swazi speaking female with a history of ESRD on PD, NICM, currently on treatment with rifampin for latent TB since early April after having cardiac workup at [facility] (previously took rifampin 3/2024-2024 but treatment stopped due to medication interactions), who presented to [facility] ED on  with worsening abdominal pain. States that she had a fever on Monday night and then had ongoing abdominal pain. No diarrhea. On arrival, Tmax 101.2, wbc 7.4, lactate 4.2, hypotensive and started on pressors, CXR unremarkable, blood cx obtained, body fluid cx from PD fluid obtained, started on vancomycin and zosyn.     Physical Exam:  /65 (BP Location: Right arm)   Pulse 85   Temp 97.2 °F (36.2 °C) (Temporal)   Resp 21   Ht 5' (1.524 m)   Wt 143 lb 8.3 oz (65.1 kg)   SpO2 100%   BMI 28.03 kg/m²     Gen:    Awake, in bed  HEENT:  EOMI, neck supple  CV/lungs:  RRR, CTAB  Abdom:  Soft, distended, mild TTP, PD catheter c/d/i  Skin/extrem:  No rashes, no c/c/e  Lines:  R subclavian CVC+    Laboratory Data: Reviewed    Microbiology: Reviewed    Radiology: Reviewed      LAKESHIA [facility]  [phone]  4/25/2025

## 2025-04-25 NOTE — PROGRESS NOTES
Tunneled hemodialysis catheter insertion today  Discussed procedure, risks, benefits with patient who agrees to proceed  Sinhala language line interpretor was used.

## 2025-04-26 ENCOUNTER — ANESTHESIA (OUTPATIENT)
Dept: SURGERY | Facility: HOSPITAL | Age: 55
End: 2025-04-26
Payer: MEDICAID

## 2025-04-26 ENCOUNTER — ANESTHESIA EVENT (OUTPATIENT)
Dept: SURGERY | Facility: HOSPITAL | Age: 55
End: 2025-04-26
Payer: MEDICAID

## 2025-04-26 LAB
ALBUMIN SERPL-MCNC: 2.8 G/DL (ref 3.2–4.8)
ALP LIVER SERPL-CCNC: 102 U/L (ref 41–108)
ALT SERPL-CCNC: 555 U/L (ref 10–49)
ANION GAP SERPL CALC-SCNC: 8 MMOL/L (ref 0–18)
AST SERPL-CCNC: 599 U/L (ref ?–34)
BASOPHILS # BLD AUTO: 0.03 X10(3) UL (ref 0–0.2)
BASOPHILS NFR BLD AUTO: 0.6 %
BILIRUB DIRECT SERPL-MCNC: 0.7 MG/DL (ref ?–0.3)
BILIRUB SERPL-MCNC: 1.1 MG/DL (ref 0.3–1.2)
BUN BLD-MCNC: 16 MG/DL (ref 9–23)
BUN/CREAT SERPL: 3.9 (ref 10–20)
CALCIUM BLD-MCNC: 8.1 MG/DL (ref 8.7–10.4)
CHLORIDE SERPL-SCNC: 102 MMOL/L (ref 98–112)
CO2 SERPL-SCNC: 29 MMOL/L (ref 21–32)
CREAT BLD-MCNC: 4.09 MG/DL (ref 0.55–1.02)
DEPRECATED RDW RBC AUTO: 53.5 FL (ref 35.1–46.3)
EGFRCR SERPLBLD CKD-EPI 2021: 13 ML/MIN/1.73M2 (ref 60–?)
EOSINOPHIL # BLD AUTO: 0.12 X10(3) UL (ref 0–0.7)
EOSINOPHIL NFR BLD AUTO: 2.5 %
ERYTHROCYTE [DISTWIDTH] IN BLOOD BY AUTOMATED COUNT: 16.9 % (ref 11–15)
GLUCOSE BLD-MCNC: 103 MG/DL (ref 70–99)
HCT VFR BLD AUTO: 23.8 % (ref 35–48)
HGB BLD-MCNC: 8 G/DL (ref 12–16)
IMM GRANULOCYTES # BLD AUTO: 0.02 X10(3) UL (ref 0–1)
IMM GRANULOCYTES NFR BLD: 0.4 %
LYMPHOCYTES # BLD AUTO: 0.35 X10(3) UL (ref 1–4)
LYMPHOCYTES NFR BLD AUTO: 7.3 %
MAGNESIUM SERPL-MCNC: 1.8 MG/DL (ref 1.6–2.6)
MCH RBC QN AUTO: 31.3 PG (ref 26–34)
MCHC RBC AUTO-ENTMCNC: 33.6 G/DL (ref 31–37)
MCV RBC AUTO: 93 FL (ref 80–100)
MONOCYTES # BLD AUTO: 0.39 X10(3) UL (ref 0.1–1)
MONOCYTES NFR BLD AUTO: 8.1 %
NEUTROPHILS # BLD AUTO: 3.88 X10 (3) UL (ref 1.5–7.7)
NEUTROPHILS # BLD AUTO: 3.88 X10(3) UL (ref 1.5–7.7)
NEUTROPHILS NFR BLD AUTO: 81.1 %
OSMOLALITY SERPL CALC.SUM OF ELEC: 289 MOSM/KG (ref 275–295)
PHOSPHATE SERPL-MCNC: 3.3 MG/DL (ref 2.4–5.1)
PLATELET # BLD AUTO: 84 10(3)UL (ref 150–450)
PLATELETS.RETICULATED NFR BLD AUTO: 5.9 % (ref 0–7)
POTASSIUM SERPL-SCNC: 3.2 MMOL/L (ref 3.5–5.1)
PROT SERPL-MCNC: 5.2 G/DL (ref 5.7–8.2)
RBC # BLD AUTO: 2.56 X10(6)UL (ref 3.8–5.3)
SODIUM SERPL-SCNC: 139 MMOL/L (ref 136–145)
WBC # BLD AUTO: 4.8 X10(3) UL (ref 4–11)

## 2025-04-26 PROCEDURE — 0WPG03Z REMOVAL OF INFUSION DEVICE FROM PERITONEAL CAVITY, OPEN APPROACH: ICD-10-PCS | Performed by: SURGERY

## 2025-04-26 PROCEDURE — 99233 SBSQ HOSP IP/OBS HIGH 50: CPT | Performed by: INTERNAL MEDICINE

## 2025-04-26 RX ORDER — NALOXONE HYDROCHLORIDE 0.4 MG/ML
0.08 INJECTION, SOLUTION INTRAMUSCULAR; INTRAVENOUS; SUBCUTANEOUS AS NEEDED
Status: DISCONTINUED | OUTPATIENT
Start: 2025-04-26 | End: 2025-04-26 | Stop reason: HOSPADM

## 2025-04-26 RX ORDER — ONDANSETRON 2 MG/ML
4 INJECTION INTRAMUSCULAR; INTRAVENOUS EVERY 6 HOURS PRN
Status: DISCONTINUED | OUTPATIENT
Start: 2025-04-26 | End: 2025-04-26 | Stop reason: HOSPADM

## 2025-04-26 RX ORDER — LIDOCAINE HYDROCHLORIDE 10 MG/ML
INJECTION, SOLUTION EPIDURAL; INFILTRATION; INTRACAUDAL; PERINEURAL AS NEEDED
Status: DISCONTINUED | OUTPATIENT
Start: 2025-04-26 | End: 2025-04-26 | Stop reason: SURG

## 2025-04-26 RX ORDER — SODIUM CHLORIDE 9 MG/ML
INJECTION, SOLUTION INTRAVENOUS CONTINUOUS PRN
Status: DISCONTINUED | OUTPATIENT
Start: 2025-04-26 | End: 2025-04-26 | Stop reason: SURG

## 2025-04-26 RX ORDER — EPHEDRINE SULFATE 50 MG/ML
INJECTION, SOLUTION INTRAVENOUS AS NEEDED
Status: DISCONTINUED | OUTPATIENT
Start: 2025-04-26 | End: 2025-04-26 | Stop reason: SURG

## 2025-04-26 RX ORDER — MIDAZOLAM HYDROCHLORIDE 1 MG/ML
INJECTION INTRAMUSCULAR; INTRAVENOUS AS NEEDED
Status: DISCONTINUED | OUTPATIENT
Start: 2025-04-26 | End: 2025-04-26 | Stop reason: SURG

## 2025-04-26 RX ORDER — ONDANSETRON 2 MG/ML
INJECTION INTRAMUSCULAR; INTRAVENOUS AS NEEDED
Status: DISCONTINUED | OUTPATIENT
Start: 2025-04-26 | End: 2025-04-26 | Stop reason: SURG

## 2025-04-26 RX ORDER — TRAMADOL HYDROCHLORIDE 50 MG/1
50 TABLET ORAL EVERY 6 HOURS PRN
Status: DISCONTINUED | OUTPATIENT
Start: 2025-04-26 | End: 2025-04-30

## 2025-04-26 RX ORDER — ROCURONIUM BROMIDE 10 MG/ML
INJECTION, SOLUTION INTRAVENOUS AS NEEDED
Status: DISCONTINUED | OUTPATIENT
Start: 2025-04-26 | End: 2025-04-26 | Stop reason: SURG

## 2025-04-26 RX ORDER — CALCIUM CARBONATE 500 MG/1
1000 TABLET, CHEWABLE ORAL
Status: DISCONTINUED | OUTPATIENT
Start: 2025-04-26 | End: 2025-04-30

## 2025-04-26 RX ORDER — DEXAMETHASONE SODIUM PHOSPHATE 4 MG/ML
VIAL (ML) INJECTION AS NEEDED
Status: DISCONTINUED | OUTPATIENT
Start: 2025-04-26 | End: 2025-04-26 | Stop reason: SURG

## 2025-04-26 RX ORDER — SODIUM CHLORIDE, SODIUM LACTATE, POTASSIUM CHLORIDE, CALCIUM CHLORIDE 600; 310; 30; 20 MG/100ML; MG/100ML; MG/100ML; MG/100ML
INJECTION, SOLUTION INTRAVENOUS CONTINUOUS
Status: DISCONTINUED | OUTPATIENT
Start: 2025-04-26 | End: 2025-04-26 | Stop reason: HOSPADM

## 2025-04-26 RX ORDER — HYDROMORPHONE HYDROCHLORIDE 1 MG/ML
0.2 INJECTION, SOLUTION INTRAMUSCULAR; INTRAVENOUS; SUBCUTANEOUS EVERY 5 MIN PRN
Status: DISCONTINUED | OUTPATIENT
Start: 2025-04-26 | End: 2025-04-26 | Stop reason: HOSPADM

## 2025-04-26 RX ORDER — HYDROMORPHONE HYDROCHLORIDE 1 MG/ML
0.4 INJECTION, SOLUTION INTRAMUSCULAR; INTRAVENOUS; SUBCUTANEOUS EVERY 5 MIN PRN
Status: DISCONTINUED | OUTPATIENT
Start: 2025-04-26 | End: 2025-04-26 | Stop reason: HOSPADM

## 2025-04-26 RX ORDER — HYDROMORPHONE HYDROCHLORIDE 1 MG/ML
0.6 INJECTION, SOLUTION INTRAMUSCULAR; INTRAVENOUS; SUBCUTANEOUS EVERY 5 MIN PRN
Status: DISCONTINUED | OUTPATIENT
Start: 2025-04-26 | End: 2025-04-26 | Stop reason: HOSPADM

## 2025-04-26 RX ORDER — ACETAMINOPHEN 500 MG
1000 TABLET ORAL EVERY 8 HOURS
Status: DISCONTINUED | OUTPATIENT
Start: 2025-04-26 | End: 2025-04-30

## 2025-04-26 RX ADMIN — DEXAMETHASONE SODIUM PHOSPHATE 8 MG: 4 MG/ML VIAL (ML) INJECTION at 09:36:00

## 2025-04-26 RX ADMIN — ROCURONIUM BROMIDE 10 MG: 10 INJECTION, SOLUTION INTRAVENOUS at 09:35:00

## 2025-04-26 RX ADMIN — EPHEDRINE SULFATE 10 MG: 50 INJECTION, SOLUTION INTRAVENOUS at 09:34:00

## 2025-04-26 RX ADMIN — EPHEDRINE SULFATE 5 MG: 50 INJECTION, SOLUTION INTRAVENOUS at 09:31:00

## 2025-04-26 RX ADMIN — ONDANSETRON 4 MG: 2 INJECTION INTRAMUSCULAR; INTRAVENOUS at 09:36:00

## 2025-04-26 RX ADMIN — ROCURONIUM BROMIDE 20 MG: 10 INJECTION, SOLUTION INTRAVENOUS at 09:45:00

## 2025-04-26 RX ADMIN — SODIUM CHLORIDE: 9 INJECTION, SOLUTION INTRAVENOUS at 09:23:00

## 2025-04-26 RX ADMIN — LIDOCAINE HYDROCHLORIDE 50 MG: 10 INJECTION, SOLUTION EPIDURAL; INFILTRATION; INTRACAUDAL; PERINEURAL at 09:26:00

## 2025-04-26 RX ADMIN — MIDAZOLAM HYDROCHLORIDE 2 MG: 1 INJECTION INTRAMUSCULAR; INTRAVENOUS at 09:25:00

## 2025-04-26 NOTE — OPERATIVE REPORT
[facility] OPERATING ROOMOPERATIVE REPORT    PATIENT NAME: Shavon Stuart  : 1970   MRN: [id number]  SITE: [facility]      DATE OF OPERATION:   25    PREOPERATIVE DIAGNOSIS:  nonfunctional peritoneal dialysis catheter end-stage chronic kidney disease      POSTOPERATIVE DIAGNOSIS:   nonfunctional peritoneal dialysis catheter end-stage chronic kidney disease     PROCEDURE PERFORMED:   Removal of intraperitoneal peritoneal dialysis catheter    SURGEON:  Reed Pollack MD    ASST:       (Assistant helped position patient and helped with positioning, retraction, suturing, closure, dressings etc.) Surgical Assistant.: Gabe Gold      ANESTHESIA: General endotracheal tube    ESTIMATED BLOOD LOSS:   2 ml    COMPLICATIONS: none    INDICATIONS:   This is a 54 year old female who presents with a history of end-stage chronic kidney disease.  Patient has a nonfunctional peritoneal dialysis catheter that needs to be removed.     Risk of procedure including bleeding, infection, postoperative hematoma, wound infection, perforated hollow viscus, vascular injury, inability to place peritoneal dialysis catheter in the future,need for lifelong hemodialysis, as well as risk with anesthesia including myocardial infarction, respiratory failure, renal failure, pulmonary embolus, DVT, CVA, and even death were all discussed in detail with the patient who understands consents and wishes to proceed with the operation.    OPERATIVE TECHNIQUE: The Patient was brought to the operating room, and placed on the operating table in supine position.  General anesthetic was administered via endotracheal tube by anesthesia.   The abdomen was prepped and draped in a sterile fashion.  A small incision was made in the  left  periumbilical region at the entry site into the peritoneum.  Dissection continued down through subcutaneous tissues electrocautery.  The peritoneal dialysis catheter was identified and  dissected free circumferentially.  The anterior rectus sheath was identified at the entrance site.  This was freed up with electrocautery.  The tear cuff was freed up circumferentially with electrocautery taking care to avoid any intra-abdominal contents.  Once the cuff was freed up completely catheter was removed.  At this time the outer cuff was freed up circumferentially with electrocautery.  The catheter was cut and the outer catheter was removed in its entirety.  Next the fascia was closed with 0 Vicryl simple interrupted sutures.  Wound was irrigated thoroughly with saline solution.  Hemostasis was obtained using electrocautery.      The catheter entrance site was closed with 2-0 Vicryl simple interrupted suture as well as 3-0 Vicryl running subcuticular suture.  Steri-Strips were applied to the wound.  The exit site was covered with 2 x 2 gauze and Tegaderm    Patient was transferred off the operative table to recovery room, extubated, in stable condition.  All counts were correct at the end of the case.        SHEREEN ALBERT JR., MD. [facility]  GENERAL SURGERY  [facility]    4/26/2025  10:13 AM  No primary care provider on file.

## 2025-04-26 NOTE — PROGRESS NOTES
[facility]  part of [facility]    Hospitalist Progress Note     Shavon Stuart Patient Status:  Inpatient    1970  54 year old CSN [id number]   Location 220/220-A Attending Contreras Alex MD   Hosp Day # 4 PCP No primary care provider on file.         Subjective:   ----------------------------------  Pt resting in bed in moderate distress due to abdominal pain. +N/V.  Had PD catheter removed today. C/o left sided abdominal pain from removal of catheter.      Objective:   Chief Complaint:   Chief Complaint   Patient presents with    Abdomen/Flank Pain     ----------------------------------  Temp:  [97.7 °F (36.5 °C)-98.9 °F (37.2 °C)] 98 °F (36.7 °C)  Pulse:  [73-93] 83  Resp:  [8-25] 21  BP: ()/(62-79) 140/76  SpO2:  [92 %-100 %] 95 %  Gen: A+Ox3.  No distress.   HEENT: NCAT, neck supple, no carotid bruit.  CV: RRR, S1S2, and intact distal pulses. No gallop, rub, murmur.  Pulm: Effort and breath sounds normal. No distress, wheezes, rales, rhonchi.  Abd: Soft, +TTP, BS normal, no mass, no HSM, no rebound/guarding.   Neuro: Normal reflexes, CN. Sensory/motor exams grossly normal deficit.   MS: No joint effusions.  No peripheral edema.  Skin: Skin is warm and dry. No rashes, erythema, diaphoresis.   Psych: Normal mood and affect. Calm, cooperative    Labs:  Lab Results   Component Value Date    HGB 8.0 (L) 2025    WBC 4.8 2025    PLT 84.0 (L) 2025     2025    K 3.2 (L) 2025    CREATSERUM 4.09 (H) 2025    INR 1.03 2025     (H) 2025     (H) 2025           Scheduled Medications[1]  PRN Medications[2]      Other problems      Supplementary Documentation:   DVT Mechanical Prophylaxis:        DVT Pharmacologic Prophylaxis   Medication    heparin (Porcine) 1000 UNIT/ML injection 1,500 Units    heparin (Porcine) 100 Units/mL lock flush 150 Units    heparin (Porcine) 5000 UNIT/ML injection 5,000 Units                 Code Status: Not on file  Cat: No urinary catheter in place  Cat Duration (in days):   Central line: central venous catheter in place  HEMALATHA:            I personally reviewed the available laboratories, imaging including. I discussed/will discuss the case with consultants. I ordered laboratories and/or radiographic studies. I adjusted medications as detailed above.  Medical decision making high, risk is high.    Assessment & Plan:   ----------------------------------    Acute sepsis,   Slowly improving  rule out spontaneous bacterial peritonitis.    Dialysate fluid culture reviewed  blood cultures reviewed  Pulm recs appreciated  Trend lactic acid, 3.5 > 3.8  Cont IV meropenem  Cont pressor support, wean as tolerated  ID consult, recs appreciated  Cardiology consult      End-stage renal disease,   on peritoneal dialysis.  Nephrology consult   IR to place permacath 4/25  Removal of PD catheter today with Dr. Pollack      Anemia  Hgb of 7.0  Transfuse 1 unit prbc 4/25            Latent tuberculosis.    No isolation needed  ID consulted, recs appreciated  Cont rifampin       FULL CODE  DVT px: subcutaneous heparin    Contreras Alex MD  4/26/2025         [1]    acetaminophen  1,000 mg Oral Q8H    heparin  1.5 mL Intracatheter Once    meropenem  500 mg Intravenous Q24H    heparin  1.5 mL Intravenous Once    epoetin gabriela  5,000 Units Subcutaneous Once per day on Monday Wednesday Friday    rifAMPin  600 mg Oral Daily @ 0700    heparin  5,000 Units Subcutaneous 2 times per day   [2]   calcium carbonate    traMADol    sodium chloride **AND** albumin human    HYDROmorphone **OR** HYDROmorphone **OR** HYDROmorphone    ondansetron    prochlorperazine

## 2025-04-26 NOTE — PROGRESS NOTES
[facility]  part of [facility]    Progress Note    Shavon Stuart Patient Status:  Inpatient    1970 MRN [id number]   Location [facility] 2W/SW Attending Contreras Alex MD   Hosp Day # 4 PCP No primary care provider on file.       Subjective:   Shavon Stuart is a(n) 54 year old female who I am seeing for ESRD    Patient sitting comfortably.  Significant improving abdominal pain.  Plan 2 OR today for PD catheter removal.  Tolerated HD well yesterday    Translation with the help of nurse    Objective:   /76 (BP Location: Right arm)   Pulse 78   Temp 98 °F (36.7 °C) (Temporal)   Resp 21   Ht 5' (1.524 m)   Wt 141 lb 5 oz (64.1 kg)   SpO2 97%   BMI 27.60 kg/m²      Intake/Output Summary (Last 24 hours) at 2025 1602  Last data filed at 2025 1100  Gross per 24 hour   Intake 470 ml   Output 2200 ml   Net -1730 ml     Wt Readings from Last 1 Encounters:   25 141 lb 5 oz (64.1 kg)       Exam  Vital signs: Blood pressure 125/76, pulse 78, temperature 98 °F (36.7 °C), temperature source Temporal, resp. rate 21, height 5' (1.524 m), weight 141 lb 5 oz (64.1 kg), SpO2 97%.    General: Mild distress. Alert and oriented x 3.  HEENT: Moist mucous membranes. EOMI. PERRLA  Neck:  No JVD.   Respiratory: Clear to auscultation bilaterally.    Cardiovascular: S1, S2.  Regular rate and rhythm.   Abdomen: Tender to palpation  Neurologic: No focal neurological deficits.  Musculoskeletal: Full range of motion of all extremities.  No swelling noted.  Access: PD catheter    Results:     Recent Labs   Lab 25  0352 25  0449 25  0400   RBC 2.62* 2.18* 2.56*   HGB 8.5* 7.0* 8.0*   HCT 26.9* 20.6* 23.8*   .7* 94.5 93.0   MCH 32.4 32.1 31.3   MCHC 31.6 34.0 33.6   RDW 18.6* 17.6* 16.9*   NEPRELIM 12.69* 5.52 3.88   WBC 15.0* 7.0 4.8   .0* 98.0* 84.0*         Recent Labs   Lab 25  0454 25  0449 25  0400   * 138* 103*    BUN 39* 34* 16   CREATSERUM 9.58* 7.87* 4.09*   CA 8.0* 7.9* 8.1*   * 132* 139   K 4.2 3.4* 3.2*   CL 99 96* 102   CO2 21.0 24.0 29.0          No results found.      Assessment and Plan:     53 y/o F with h/o ESRD on nightly PD , NICM,HFrEF  being treated for latent TB presented with abdominal pain , fever, sob x few days  In ER hypotensive and tachypneac    Impression:    ESRD.:  On PD PTA.  Plan for PD catheter removal today.  S/p tunneled dialysis catheter placement on 4/25 and first treatment.  Will repeat the dialysis on Monday  Abd pain/peritonitis. :  Fluid culture positive Pseudomonas.  On IV meropenem  Anemia: S/p 1 dose of IV iron  Sec HPTH, monitor phosphorus  Nonischemic cardiomyopathy with ejection fraction 30%  Latent TB, rifampin  Sepsis/hypotension, IVF, cx  and abx, pressors.  CT scan with enterocolitis.  PD fluid positive for peritonitis and culture positive for Pseudomonas.  History of left kidney mass status post left nephrectomy      Discussed with nursing and discussed with Dr.Martirano Price Mart MD

## 2025-04-26 NOTE — ANESTHESIA PREPROCEDURE EVALUATION
Anesthesia PreOp Note    HPI:     Shavon Stuart is a 54 year old female who presents for preoperative consultation requested by: Reed Pollack MD    Date of Surgery: 4/26/2025    Procedure(s):  Removal peritoneal dialysis catheter  Indication: Infected PD catheter    Relevant Problems   No relevant active problems       NPO:                         History Review:  Patient Active Problem List    Diagnosis Date Noted    Peritonitis (HCC) 04/25/2025    Septic shock (HCC) 04/22/2025    Abdominal pain, acute 04/22/2025    ESRD (end stage renal disease) (HCC) 04/22/2025    SBP (spontaneous bacterial peritonitis) (HCC) 04/22/2025    Cardiomyopathy, unspecified type (HCC) 04/22/2025    Hypotension 04/22/2025       Past Medical History[1]    Past Surgical History[2]    Prescriptions Prior to Admission[3]  Current Medications and Prescriptions Ordered in Epic[4]    Allergies[5]    Family History[6]  Social Hx on file[7]    Available pre-op labs reviewed.  Lab Results   Component Value Date    WBC 4.8 04/26/2025    RBC 2.56 (L) 04/26/2025    HGB 8.0 (L) 04/26/2025    HCT 23.8 (L) 04/26/2025    MCV 93.0 04/26/2025    MCH 31.3 04/26/2025    MCHC 33.6 04/26/2025    RDW 16.9 (H) 04/26/2025    PLT 84.0 (L) 04/26/2025     Lab Results   Component Value Date     04/26/2025    K 3.2 (L) 04/26/2025     04/26/2025    CO2 29.0 04/26/2025    BUN 16 04/26/2025    CREATSERUM 4.09 (H) 04/26/2025     (H) 04/26/2025    PGLU 118 (H) 04/22/2025    CA 8.1 (L) 04/26/2025     Lab Results   Component Value Date    INR 1.03 04/22/2025       Vital Signs:  Body mass index is 27.6 kg/m².   height is 1.524 m (5') and weight is 64.1 kg (141 lb 5 oz). Her temporal temperature is 97.8 °F (36.6 °C). Her blood pressure is 115/65 and her pulse is 81. Her respiration is 20 and oxygen saturation is 96%.   Vitals:    04/26/25 0300 04/26/25 0400 04/26/25 0500 04/26/25 0600   BP: 121/77 122/70 110/62 115/65   Pulse: 84 84 74 81    Resp: 21 18 15 20   Temp:  97.8 °F (36.6 °C)     TempSrc:  Temporal     SpO2: 93% 93% 96% 96%   Weight:    64.1 kg (141 lb 5 oz)   Height:            Anesthesia Evaluation     Patient summary reviewed and Nursing notes reviewed    Airway   Mallampati: II  TM distance: >3 FB  Neck ROM: full  Dental      Pulmonary - negative ROS and normal exam   Cardiovascular - normal exam  Exercise tolerance: good    ROS comment: Cardiomyopathy    Neuro/Psych - negative ROS     GI/Hepatic/Renal    (+) chronic renal disease ESRD and dialysis    Comments: H/O Spontaneous bacterial peritonitis    Endo/Other - negative ROS   Abdominal  - normal exam                 Anesthesia Plan:   ASA:  3  Plan:   General  Airway:  ETT  Post-op Pain Management: IV analgesics  Informed Consent Plan and Risks Discussed With:  Patient      I have informed Shavon Stuart and/or legal guardian or family member of the nature of the anesthetic plan, benefits, risks including possible dental damage if relevant, major complications, and any alternative forms of anesthetic management.   All of the patient's questions were answered to the best of my ability. The patient desires the anesthetic management as planned.  Hermelinda Nix MD  2025 7:49 AM  Present on Admission:  **None**           [1]   Past Medical History:   CKD (chronic kidney disease) stage V requiring chronic dialysis (HCC)   [2]   Past Surgical History:  Procedure Laterality Date    Hc catheter dialysis long-term or      Hc  section level i     [3]   Medications Prior to Admission   Medication Sig Dispense Refill Last Dose/Taking    Calcium Citrate-Vitamin D3 315-6.25 MG-MCG Oral Tab Take 1 tablet by mouth daily.   Taking    cinacalcet 30 MG Oral Tab Take 1 tablet (30 mg total) by mouth daily.   Taking    Lanthanum Carbonate 1000 MG Oral Chew Tab Chew 1 tablet (1,000 mg total) by mouth 3 (three) times daily with meals.   Taking    rifAMPin 300 MG Oral Cap Take 2 capsules (600  mg total) by mouth daily.   Taking   [4]   Current Facility-Administered Medications Ordered in Epic   Medication Dose Route Frequency Provider Last Rate Last Admin    calcium carbonate (Tums) chewable tab 1,000 mg  1,000 mg Oral Daily PRN Maria Teresa Lopez MD   1,000 mg at 04/26/25 0350    potassium chloride 10 mEq in sodium chloride 0.9% 250 mL IVPB  10 mEq Intravenous Once Latrice Altman MD   10 mEq at 04/26/25 0530    sodium chloride 0.9 % IV bolus 100 mL  100 mL Intravenous Q30 Min PRN Latrice Altman MD        And    albumin human (Albumin) 25% injection 25 g  25 g Intravenous PRN Dialysis Latrice Altman MD        heparin (Porcine) 1000 UNIT/ML injection 1,500 Units  1.5 mL Intracatheter Once Latrice Altman MD        meropenem (Merrem) 500 mg in sodium chloride 0.9% 100mL IVPB-AIDA  500 mg Intravenous Q24H Ben Ivy MD 33.3 mL/hr at 04/25/25 1149 500 mg at 04/25/25 1149    heparin (Porcine) 100 Units/mL lock flush 150 Units  1.5 mL Intravenous Once Maged Wheat MD        acetaminophen (Tylenol Extra Strength) tab 500 mg  500 mg Oral Q4H PRN Contreras Alex MD        dextrose 1.5%-calcium 2.5 mEq/L peritoneal solution  5,000 mL Intraperitoneal Once in dialysis Latrice Altman MD        epoetin gabriela (Epogen, Procrit) 5,000 Units injection  5,000 Units Subcutaneous Once per day on Monday Wednesday Friday Latrice Altman MD   5,000 Units at 04/25/25 2322    rifAMPin (Rifadin) cap 600 mg  600 mg Oral Daily @ 0700 Juanis Singleton PA-C   600 mg at 04/26/25 0700    dextrose 1.5%-calcium 2.5 mEq/L peritoneal solution  5,000 mL Intraperitoneal Once in dialysis Latrice Altman MD        heparin (Porcine) 5000 UNIT/ML injection 5,000 Units  5,000 Units Subcutaneous 2 times per day Heath Kamara MD   5,000 Units at 04/24/25 2009    HYDROmorphone (Dilaudid) 1 MG/ML injection 0.2 mg  0.2 mg Intravenous Q2H PRN Heath Kamara MD        Or    HYDROmorphone (Dilaudid) 1 MG/ML injection 0.4 mg  0.4 mg Intravenous Q2H PRN  Heath Kamara MD   0.4 mg at 04/25/25 0647    Or    HYDROmorphone (Dilaudid) 1 MG/ML injection 0.8 mg  0.8 mg Intravenous Q2H PRN Heath Kamara MD   0.8 mg at 04/25/25 1757    ondansetron (Zofran) 4 MG/2ML injection 4 mg  4 mg Intravenous Q6H PRN Heath Kamara MD   4 mg at 04/23/25 0846    prochlorperazine (Compazine) 10 MG/2ML injection 5 mg  5 mg Intravenous Q8H PRN Heath Kamara MD   5 mg at 04/25/25 1755     No current Epic-ordered outpatient medications on file.   [5]   Allergies  Allergen Reactions    Morphine ITCHING   [6] History reviewed. No pertinent family history.  [7]   Social History  Socioeconomic History    Marital status:    Tobacco Use    Smoking status: Never    Smokeless tobacco: Never   Substance and Sexual Activity    Alcohol use: Never    Drug use: Never

## 2025-04-26 NOTE — ANESTHESIA PROCEDURE NOTES
Airway  Date/Time: 4/26/2025 9:27 AM  Reason: elective    Airway not difficult    General Information and Staff   Patient location during procedure: OR  Anesthesiologist: Hermelinda Nix MD  Performed: anesthesiologist   Performed by: Hermelinda iNx MD  Authorized by: Hermelinda Nix MD        Indications and Patient Condition  Indications for airway management: anesthesia  Sedation level: deep      Preoxygenated: yesPatient position: sniffing  MILS not maintained throughout    Mask difficulty assessment: 0 - not attempted    Final Airway Details    Final airway type: endotracheal airway    Successful airway: ETT  Cuffed: yes   Successful intubation technique: direct laryngoscopy  Facilitating devices/methods: rapid sequence intubation  Endotracheal tube insertion site: oral  Blade: Brunilda  Blade size: #4  ETT size (mm): 7.0    Cormack-Lehane Classification: grade I - full view of glottis  Placement verified by: capnometry   Cuff volume (mL): 5  Measured from: lips  ETT to lips (cm): 22  Number of attempts at approach: 1  Number of other approaches attempted: 0

## 2025-04-26 NOTE — PLAN OF CARE
Pt A&OX4 on RA. [place] speaking only.     HD overnight, 2.2L removed.    Plan for PD cath removal @0915.     NPO at midnight. CHG bath done. Consent signed in chart.     Bed locked in lowest position, call light within reach. Safety maintained.   Problem: Patient Centered Care  Goal: Patient preferences are identified and integrated in the patient's plan of care  Description: Interventions:- What would you like us to know as we care for you? - Provide timely, complete, and accurate information to patient/family- Incorporate patient and family knowledge, values, beliefs, and cultural backgrounds into the planning and delivery of care- Encourage patient/family to participate in care and decision-making at the level they choose- Honor patient and family perspectives and choices  Outcome: Progressing     Problem: CARDIOVASCULAR - ADULT  Goal: Maintains optimal cardiac output and hemodynamic stability  Description: INTERVENTIONS:- Monitor vital signs, rhythm, and trends- Monitor for bleeding, hypotension and signs of decreased cardiac output- Evaluate effectiveness of vasoactive medications to optimize hemodynamic stability- Monitor arterial and/or venous puncture sites for bleeding and/or hematoma- Assess quality of pulses, skin color and temperature- Assess for signs of decreased coronary artery perfusion - ex. Angina- Evaluate fluid balance, assess for edema, trend weights  Outcome: Progressing     Problem: RESPIRATORY - ADULT  Goal: Achieves optimal ventilation and oxygenation  Description: INTERVENTIONS:- Assess for changes in respiratory status- Assess for changes in mentation and behavior- Position to facilitate oxygenation and minimize respiratory effort- Oxygen supplementation based on oxygen saturation or ABGs- Provide Smoking Cessation handout, if applicable- Encourage broncho-pulmonary hygiene including cough, deep breathe, Incentive Spirometry- Assess the need for suctioning and perform as needed- Assess  and instruct to report SOB or any respiratory difficulty- Respiratory Therapy support as indicated- Manage/alleviate anxiety- Monitor for signs/symptoms of CO2 retention  Outcome: Progressing     Problem: METABOLIC/FLUID AND ELECTROLYTES - ADULT  Goal: Glucose maintained within prescribed range  Description: INTERVENTIONS:- Monitor Blood Glucose as ordered- Assess for signs and symptoms of hyperglycemia and hypoglycemia- Administer ordered medications to maintain glucose within target range- Assess barriers to adequate nutritional intake and initiate nutrition consult as needed- Instruct patient on self management of diabetes  Outcome: Progressing  Goal: Electrolytes maintained within normal limits  Description: INTERVENTIONS:- Monitor labs and rhythm and assess patient for signs and symptoms of electrolyte imbalances- Administer electrolyte replacement as ordered- Monitor response to electrolyte replacements, including rhythm and repeat lab results as appropriate- Fluid restriction as ordered- Instruct patient on fluid and nutrition restrictions as appropriate  Outcome: Progressing  Goal: Hemodynamic stability and optimal renal function maintained  Description: INTERVENTIONS:- Monitor labs and assess for signs and symptoms of volume excess or deficit- Monitor intake, output and patient weight- Monitor urine specific gravity, serum osmolarity and serum sodium as indicated or ordered- Monitor response to interventions for patient's volume status, including labs, urine output, blood pressure (other measures as available)- Encourage oral intake as appropriate- Instruct patient on fluid and nutrition restrictions as appropriate  Outcome: Progressing

## 2025-04-26 NOTE — PROGRESS NOTES
Daily Pulmonary/ICU/Critical Care Progress Note          SUBJECTIVE:  On RA   afebrile  Some mild abd discomfort    ALLERGIES:  Allergies[1]      MEDS:  Home Medications:  Medications Taking[2]  Scheduled Medication:  Scheduled Medications[3]  Continuous Infusing Medication:  Medication Infusions[4]  PRN Medications:  PRN Medications[5]       PHYSICAL EXAM:  /65 (BP Location: Right arm)   Pulse 81   Temp 97.8 °F (36.6 °C) (Temporal)   Resp 20   Ht 5' (1.524 m)   Wt 141 lb 5 oz (64.1 kg)   SpO2 96%   BMI 27.60 kg/m²        CONSTITUTIONAL: alert, oriented, no apparent distress  HEENT: atraumatic normocephalic  MOUTH: mucous membranes are moist. No OP exudates  NECK/THROAT: no JVD. Trachea midline. No obvious thyromegaly  LUNG: clear upper b/l no wheezing, crackles. Chest symmetric with respiratory motion  HEART: regular rate and rhythm, no obvious murmers or gallops noted  ABD: soft non tender. + bowel sounds. No organomegaly noted. + PD cath  EXT: no clubbing, cyanosis, or edema noted  Right subclavian CVC and right HD cath      IMAGES:   Reviewed in EMR  CT abd/pelvis 4/23  CONCLUSION:   1. Mild circumferential bowel wall thickening involving the jejunum, transverse colon, and descending colon, which is concerning for enterocolitis.    2. Featureless segment of the transverse colon, which is likely sequela of prior colitis.   3. Moderate volume of abdominopelvic ascites.   4. Peritoneal dialysis catheter terminates in the pelvis.   5. Severe atrophy of the right kidney.  The left kidney is not clearly identified and may be surgically absent.   6. Mild splenomegaly.   7. Severe compression fracture deformity at L1 with associated 6 mm retropulsion of the posterior cortex and moderate canal stenosis.  This finding is likely chronic as it was mentioned in the prior CT abdomen and pelvis report from 04/06/2025 that is   available in care everywhere.   8. Right greater than left lower lobe consolidations,  which may reflect atelectasis with or without superimposed pneumonia.   9. Trace bilateral pleural effusions.   10. Lesser incidental findings described above.       LABS:  Recent Labs   Lab 04/23/25  0352 04/25/25  0449 04/26/25  0400   RBC 2.62* 2.18* 2.56*   HGB 8.5* 7.0* 8.0*   HCT 26.9* 20.6* 23.8*   .7* 94.5 93.0   MCH 32.4 32.1 31.3   MCHC 31.6 34.0 33.6   RDW 18.6* 17.6* 16.9*   NEPRELIM 12.69* 5.52 3.88   WBC 15.0* 7.0 4.8   .0* 98.0* 84.0*       Recent Labs   Lab 04/22/25  1252 04/23/25  0352 04/24/25  0454 04/25/25  0449 04/26/25  0400   * 110* 128* 138* 103*   BUN 32* 31* 39* 34* 16   CREATSERUM 10.29* 10.42* 9.58* 7.87* 4.09*   EGFRCR 4* 4* 5* 6* 13*   CA 9.5 8.7 8.0* 7.9* 8.1*   ALB 4.5 3.9 3.3  --  2.8*   * 133* 134* 132* 139   K 3.9 4.9 4.2 3.4* 3.2*   CL 93* 100 99 96* 102   CO2 25.0 18.0* 21.0 24.0 29.0   ALKPHO 263*  --   --   --  102   AST 31  --   --   --  599*   ALT 25  --   --   --  555*   BILT 0.4  --   --   --  1.1   TP 7.7  --   --   --  5.2*       ASSESSMENT/PLAN:  Septic shock (resolved)  -concern for SBP  -peritoneal fluid with pseudomonas  -abx as per ID with meropenem now (was on zosyn before)  -removal of PD cath today  -weaned off pressors    Hx of MTB  -on rifampin  -ID managing    Anemia and thrombocytopenia  -transfuse for Hg < 7    ESRD and hx of renal mass s/p nephrectomy  -HD placed on 4/25/25  -PD removal today  -renal following    CM  -supportive care    Proph  -DVT: hep subcutaneous being held for procedures/surgery    Dispo  -full code  -possible transfer out of ICU later today post surgery      Thank you for the opportunity to care for Shavon Teixeira DO, MPH  Pulmonary Critical Care Medicine  [facility]          [1]   Allergies  Allergen Reactions    Morphine ITCHING   [2]   Outpatient Medications Marked as Taking for the 4/22/25 encounter (Hospital Encounter)   Medication Sig  Dispense Refill    Calcium Citrate-Vitamin D3 315-6.25 MG-MCG Oral Tab Take 1 tablet by mouth daily.      cinacalcet 30 MG Oral Tab Take 1 tablet (30 mg total) by mouth daily.      Lanthanum Carbonate 1000 MG Oral Chew Tab Chew 1 tablet (1,000 mg total) by mouth 3 (three) times daily with meals.      rifAMPin 300 MG Oral Cap Take 2 capsules (600 mg total) by mouth daily.     [3]    potassium chloride  10 mEq Intravenous Once    heparin  1.5 mL Intracatheter Once    meropenem  500 mg Intravenous Q24H    heparin  1.5 mL Intravenous Once    dextrose 1.5%-calcium 2.5 mEq/L  5,000 mL Intraperitoneal Once in dialysis    epoetin gabriela  5,000 Units Subcutaneous Once per day on Monday Wednesday Friday    rifAMPin  600 mg Oral Daily @ 0700    dextrose 1.5%-calcium 2.5 mEq/L  5,000 mL Intraperitoneal Once in dialysis    heparin  5,000 Units Subcutaneous 2 times per day   [4] [5]   calcium carbonate    sodium chloride **AND** albumin human    acetaminophen    HYDROmorphone **OR** HYDROmorphone **OR** HYDROmorphone    ondansetron    prochlorperazine

## 2025-04-26 NOTE — BRIEF OP NOTE
Pre-Operative Diagnosis: Infected PD catheter     Post-Operative Diagnosis: Infected PD catheter      Procedure Performed:   Removal peritoneal dialysis catheter    Surgeons and Role:     * Reed Pollack MD - Primary    Assistant(s):  Surgical Assistant.: Gabe Gold     Surgical Findings: infected PD catheter     Specimen: PD catheter tip     Estimated Blood Loss: 2 mL    Dictation Number:      Reed Pollack MD  4/26/2025  10:12 AM

## 2025-04-26 NOTE — ANESTHESIA POSTPROCEDURE EVALUATION
Patient: Shavon Stuart    Procedure Summary       Date: 04/26/25 Room / Location: [facility] MAIN OR  / [facility] MAIN OR    Anesthesia Start: 0923 Anesthesia Stop: 1025    Procedure: Removal peritoneal dialysis catheter Diagnosis: (Infected PD catheter)    Surgeons: Reed Pollack MD Anesthesiologist: Hermelinda Nix MD    Anesthesia Type: general ASA Status: 3            Anesthesia Type: general    Vitals Value Taken Time   /76 04/26/25 10:53   Temp 98 °F (36.7 °C) 04/26/25 11:02   Pulse 80 04/26/25 11:44   Resp 21 04/26/25 11:44   SpO2 97 % 04/26/25 11:44   Vitals shown include unfiled device data.    [facility] AN Post Evaluation:   Patient Evaluated in PACU  Patient Participation: complete - patient participated  Level of Consciousness: awake and alert  Pain Score: 0  Pain Management: adequate  Airway Patency:patent  Yes    Nausea/Vomiting: none  Cardiovascular Status: acceptable  Respiratory Status: acceptable  Postoperative Hydration acceptable      Hermelinda Nix MD  4/26/2025 11:45 AM

## 2025-04-27 ENCOUNTER — APPOINTMENT (OUTPATIENT)
Dept: GENERAL RADIOLOGY | Facility: HOSPITAL | Age: 55
DRG: 907 | End: 2025-04-27
Attending: INTERNAL MEDICINE
Payer: MEDICAID

## 2025-04-27 PROBLEM — N18.6 ANEMIA DUE TO CHRONIC KIDNEY DISEASE, ON CHRONIC DIALYSIS (HCC): Status: ACTIVE | Noted: 2025-04-27

## 2025-04-27 PROBLEM — Z99.2 ANEMIA DUE TO CHRONIC KIDNEY DISEASE, ON CHRONIC DIALYSIS (HCC): Status: ACTIVE | Noted: 2025-04-27

## 2025-04-27 PROBLEM — D63.1 ANEMIA DUE TO CHRONIC KIDNEY DISEASE, ON CHRONIC DIALYSIS (HCC): Status: ACTIVE | Noted: 2025-04-27

## 2025-04-27 LAB
ANION GAP SERPL CALC-SCNC: 11 MMOL/L (ref 0–18)
BASOPHILS # BLD AUTO: 0.02 X10(3) UL (ref 0–0.2)
BASOPHILS NFR BLD AUTO: 0.4 %
BLOOD TYPE BARCODE: 5100
BUN BLD-MCNC: 31 MG/DL (ref 9–23)
BUN/CREAT SERPL: 5.3 (ref 10–20)
CALCIUM BLD-MCNC: 8 MG/DL (ref 8.7–10.4)
CHLORIDE SERPL-SCNC: 105 MMOL/L (ref 98–112)
CO2 SERPL-SCNC: 25 MMOL/L (ref 21–32)
CREAT BLD-MCNC: 5.87 MG/DL (ref 0.55–1.02)
DEPRECATED RDW RBC AUTO: 54.5 FL (ref 35.1–46.3)
EGFRCR SERPLBLD CKD-EPI 2021: 8 ML/MIN/1.73M2 (ref 60–?)
EOSINOPHIL # BLD AUTO: 0.26 X10(3) UL (ref 0–0.7)
EOSINOPHIL NFR BLD AUTO: 5.2 %
ERYTHROCYTE [DISTWIDTH] IN BLOOD BY AUTOMATED COUNT: 17.6 % (ref 11–15)
GLUCOSE BLD-MCNC: 95 MG/DL (ref 70–99)
GLUCOSE BLDC GLUCOMTR-MCNC: 116 MG/DL (ref 70–99)
HCT VFR BLD AUTO: 22 % (ref 35–48)
HGB BLD-MCNC: 7.5 G/DL (ref 12–16)
IMM GRANULOCYTES # BLD AUTO: 0.05 X10(3) UL (ref 0–1)
IMM GRANULOCYTES NFR BLD: 1 %
LYMPHOCYTES # BLD AUTO: 0.53 X10(3) UL (ref 1–4)
LYMPHOCYTES NFR BLD AUTO: 10.6 %
MCH RBC QN AUTO: 31.9 PG (ref 26–34)
MCHC RBC AUTO-ENTMCNC: 34.1 G/DL (ref 31–37)
MCV RBC AUTO: 93.6 FL (ref 80–100)
MONOCYTES # BLD AUTO: 0.51 X10(3) UL (ref 0.1–1)
MONOCYTES NFR BLD AUTO: 10.2 %
NEUTROPHILS # BLD AUTO: 3.62 X10 (3) UL (ref 1.5–7.7)
NEUTROPHILS # BLD AUTO: 3.62 X10(3) UL (ref 1.5–7.7)
NEUTROPHILS NFR BLD AUTO: 72.6 %
OSMOLALITY SERPL CALC.SUM OF ELEC: 298 MOSM/KG (ref 275–295)
PLATELET # BLD AUTO: 91 10(3)UL (ref 150–450)
PLATELETS.RETICULATED NFR BLD AUTO: 6.1 % (ref 0–7)
POTASSIUM SERPL-SCNC: 3.4 MMOL/L (ref 3.5–5.1)
RBC # BLD AUTO: 2.35 X10(6)UL (ref 3.8–5.3)
SODIUM SERPL-SCNC: 141 MMOL/L (ref 136–145)
UNIT VOLUME: 350 ML
WBC # BLD AUTO: 5 X10(3) UL (ref 4–11)

## 2025-04-27 PROCEDURE — 71045 X-RAY EXAM CHEST 1 VIEW: CPT | Performed by: INTERNAL MEDICINE

## 2025-04-27 PROCEDURE — 99233 SBSQ HOSP IP/OBS HIGH 50: CPT | Performed by: INTERNAL MEDICINE

## 2025-04-27 RX ORDER — DIPHENHYDRAMINE HYDROCHLORIDE, ZINC ACETATE 2; .1 G/100G; G/100G
1 CREAM TOPICAL 3 TIMES DAILY PRN
Status: DISCONTINUED | OUTPATIENT
Start: 2025-04-27 | End: 2025-04-30

## 2025-04-27 RX ORDER — HEPARIN SODIUM 1000 [USP'U]/ML
1.5 INJECTION, SOLUTION INTRAVENOUS; SUBCUTANEOUS
Status: COMPLETED | OUTPATIENT
Start: 2025-04-27 | End: 2025-04-27

## 2025-04-27 RX ORDER — ALBUMIN (HUMAN) 12.5 G/50ML
25 SOLUTION INTRAVENOUS
Status: ACTIVE | OUTPATIENT
Start: 2025-04-27 | End: 2025-04-29

## 2025-04-27 RX ORDER — POTASSIUM CHLORIDE 1.5 G/1.58G
20 POWDER, FOR SOLUTION ORAL 2 TIMES DAILY
Status: DISCONTINUED | OUTPATIENT
Start: 2025-04-27 | End: 2025-04-30

## 2025-04-27 NOTE — PROGRESS NOTES
[facility]  part of [facility]    Progress Note    Shavon Stuart Patient Status:  Inpatient    1970 MRN [id number]   Location [facility] 2W/SW Attending Contreras Alex MD   Hosp Day # 5 PCP No primary care provider on file.       Subjective:   Shavon Stuart is a(n) 54 year old female who I am seeing for ESRD    S/p removal of PD catheter on .  States abdominal pain has improved.  Denies any chest pain or shortness of breath    Objective:   /86 (BP Location: Right arm)   Pulse 70   Temp 97.9 °F (36.6 °C) (Oral)   Resp 18   Ht 5' (1.524 m)   Wt 140 lb 4.8 oz (63.6 kg)   SpO2 98%   BMI 27.40 kg/m²      Intake/Output Summary (Last 24 hours) at 2025 1244  Last data filed at 2025 1157  Gross per 24 hour   Intake 1076 ml   Output 0 ml   Net 1076 ml     Wt Readings from Last 1 Encounters:   25 140 lb 4.8 oz (63.6 kg)       Exam  Vital signs: Blood pressure 123/86, pulse 70, temperature 97.9 °F (36.6 °C), temperature source Oral, resp. rate 18, height 5' (1.524 m), weight 140 lb 4.8 oz (63.6 kg), SpO2 98%.    General: Mild distress. Alert and oriented x 3.  HEENT: Moist mucous membranes. EOMI. PERRLA  Neck:  No JVD.   Respiratory: Clear to auscultation bilaterally.    Cardiovascular: S1, S2.  Regular rate and rhythm.   Abdomen: Tender to palpation  Neurologic: No focal neurological deficits.  Musculoskeletal: Full range of motion of all extremities.  No swelling noted.  Access: PD catheter    Results:     Recent Labs   Lab 25  0449 25  0400 25  0520   RBC 2.18* 2.56* 2.35*   HGB 7.0* 8.0* 7.5*   HCT 20.6* 23.8* 22.0*   MCV 94.5 93.0 93.6   MCH 32.1 31.3 31.9   MCHC 34.0 33.6 34.1   RDW 17.6* 16.9* 17.6*   NEPRELIM 5.52 3.88 3.62   WBC 7.0 4.8 5.0   PLT 98.0* 84.0* 91.0*         Recent Labs   Lab 25  0449 25  0400 25  0520   * 103* 95   BUN 34* 16 31*   CREATSERUM 7.87* 4.09* 5.87*   CA 7.9* 8.1* 8.0*    * 139 141   K 3.4* 3.2* 3.4*   CL 96* 102 105   CO2 24.0 29.0 25.0          No results found.      Assessment and Plan:     55 y/o F with h/o ESRD on nightly PD , NICM,HFrEF  being treated for latent TB presented with abdominal pain , fever, sob x few days  In ER hypotensive and tachypneac    Impression:    ESRD.:  On PD PTA.  Plan for PD catheter removal on 4/26.  S/p tunneled dialysis catheter placement on 4/25 and first treatment.  Will repeat the dialysis on Monday  Abd pain/peritonitis. :  Fluid culture positive Pseudomonas.  On IV meropenem  Anemia: S/p 1 dose of IV iron  Sec HPTH, monitor phosphorus.  Phos within normal limit.  Nonischemic cardiomyopathy with ejection fraction 30%  Latent TB, rifampin  Sepsis/hypotension, IVF, cx  and abx, pressors.  CT scan with enterocolitis.  PD fluid positive for peritonitis and culture positive for Pseudomonas.  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      Discussed with nursing       Price Mart MD

## 2025-04-27 NOTE — PROGRESS NOTES
Daily Pulmonary/ICU/Critical Care Progress Note          SUBJECTIVE:  On RA   Afebrile  Reports difficulty breathing      ALLERGIES:  Allergies[1]      MEDS:  Home Medications:  Medications Taking[2]  Scheduled Medication:  Scheduled Medications[3]  Continuous Infusing Medication:  Medication Infusions[4]  PRN Medications:  PRN Medications[5]       PHYSICAL EXAM:  /78 (BP Location: Right arm)   Pulse 73   Temp 98 °F (36.7 °C) (Oral)   Resp 18   Ht 5' (1.524 m)   Wt 140 lb 4.8 oz (63.6 kg)   SpO2 98%   BMI 27.40 kg/m²        CONSTITUTIONAL: alert, oriented, no apparent distress  HEENT: atraumatic normocephalic  MOUTH: mucous membranes are moist. No OP exudates  NECK/THROAT: no JVD. Trachea midline. No obvious thyromegaly  LUNG: clear upper b/l no wheezing, + right base crackles. Chest symmetric with respiratory motion  HEART: regular rate and rhythm, no obvious murmers or gallops noted  ABD: soft non tender. + bowel sounds. No organomegaly noted.  Previous PD cath site dressed  EXT: no clubbing, cyanosis, or edema noted  Right HD cath      IMAGES:   Reviewed in EMR  CT abd/pelvis 4/23  CONCLUSION:   1. Mild circumferential bowel wall thickening involving the jejunum, transverse colon, and descending colon, which is concerning for enterocolitis.    2. Featureless segment of the transverse colon, which is likely sequela of prior colitis.   3. Moderate volume of abdominopelvic ascites.   4. Peritoneal dialysis catheter terminates in the pelvis.   5. Severe atrophy of the right kidney.  The left kidney is not clearly identified and may be surgically absent.   6. Mild splenomegaly.   7. Severe compression fracture deformity at L1 with associated 6 mm retropulsion of the posterior cortex and moderate canal stenosis.  This finding is likely chronic as it was mentioned in the prior CT abdomen and pelvis report from 04/06/2025 that is   available in care everywhere.   8. Right greater than left lower lobe  consolidations, which may reflect atelectasis with or without superimposed pneumonia.   9. Trace bilateral pleural effusions.   10. Lesser incidental findings described above.       LABS:  Recent Labs   Lab 04/25/25  0449 04/26/25  0400 04/27/25  0520   RBC 2.18* 2.56* 2.35*   HGB 7.0* 8.0* 7.5*   HCT 20.6* 23.8* 22.0*   MCV 94.5 93.0 93.6   MCH 32.1 31.3 31.9   MCHC 34.0 33.6 34.1   RDW 17.6* 16.9* 17.6*   NEPRELIM 5.52 3.88 3.62   WBC 7.0 4.8 5.0   PLT 98.0* 84.0* 91.0*       Recent Labs   Lab 04/22/25  1252 04/23/25  0352 04/24/25  0454 04/25/25  0449 04/26/25  0400 04/27/25  0520   * 110* 128* 138* 103* 95   BUN 32* 31* 39* 34* 16 31*   CREATSERUM 10.29* 10.42* 9.58* 7.87* 4.09* 5.87*   EGFRCR 4* 4* 5* 6* 13* 8*   CA 9.5 8.7 8.0* 7.9* 8.1* 8.0*   ALB 4.5 3.9 3.3  --  2.8*  --    * 133* 134* 132* 139 141   K 3.9 4.9 4.2 3.4* 3.2* 3.4*   CL 93* 100 99 96* 102 105   CO2 25.0 18.0* 21.0 24.0 29.0 25.0   ALKPHO 263*  --   --   --  102  --    AST 31  --   --   --  599*  --    ALT 25  --   --   --  555*  --    BILT 0.4  --   --   --  1.1  --    TP 7.7  --   --   --  5.2*  --        ASSESSMENT/PLAN:  Septic shock (resolved)  -concern for SBP  -peritoneal fluid with pseudomonas  -abx as per ID with meropenem now (was on zosyn before)  -removal of PD cath on 4/26/25  -weaned off pressors    Hx of MTB  -on rifampin  -ID managing    Anemia and thrombocytopenia  -transfuse for Hg < 7    ESRD and hx of renal mass s/p nephrectomy  -HD placed on 4/25/25  -PD removal 4/26/25  -renal following    SOB  -check CXR today     CM  -supportive care    Proph  -DVT: hep subcutaneous     Dispo  -full code      Thank you for the opportunity to care for Shavon Sade Teixeira DO, MPH  Pulmonary Critical Care Medicine  [facility]          [1]   Allergies  Allergen Reactions    Morphine ITCHING   [2]   Outpatient Medications Marked as Taking for the 4/22/25 encounter  (Hospital Encounter)   Medication Sig Dispense Refill    Calcium Citrate-Vitamin D3 315-6.25 MG-MCG Oral Tab Take 1 tablet by mouth daily.      cinacalcet 30 MG Oral Tab Take 1 tablet (30 mg total) by mouth daily.      Lanthanum Carbonate 1000 MG Oral Chew Tab Chew 1 tablet (1,000 mg total) by mouth 3 (three) times daily with meals.      rifAMPin 300 MG Oral Cap Take 2 capsules (600 mg total) by mouth daily.     [3]    potassium chloride  20 mEq Oral BID    acetaminophen  1,000 mg Oral Q8H    meropenem  500 mg Intravenous Q24H    epoetin gabriela  5,000 Units Subcutaneous Once per day on Monday Wednesday Friday    rifAMPin  600 mg Oral Daily @ 0700    heparin  5,000 Units Subcutaneous 2 times per day   [4] [5]   sodium chloride **AND** albumin human    diphenhydrAMINE-zinc    calcium carbonate    traMADol    HYDROmorphone **OR** HYDROmorphone **OR** HYDROmorphone    ondansetron    prochlorperazine

## 2025-04-27 NOTE — PROGRESS NOTES
[facility]  part of [facility]    Hospitalist Progress Note     Shavon Stuart Patient Status:  Inpatient    1970  54 year old CSN [id number]   Location 220/220-A Attending Contreras Alex MD   Hosp Day # 5 PCP No primary care provider on file.         Subjective:   ----------------------------------  Pt resting in bed in NAD. Doing much better today, abdominal pain has resolved.       Objective:   Chief Complaint:   Chief Complaint   Patient presents with    Abdomen/Flank Pain     ----------------------------------  Temp:  [97.9 °F (36.6 °C)-98.6 °F (37 °C)] 98 °F (36.7 °C)  Pulse:  [70-83] 73  Resp:  [15-18] 18  BP: (122-137)/(69-86) 137/78  SpO2:  [96 %-99 %] 98 %  Gen: A+Ox3.  No distress.   HEENT: NCAT, neck supple, no carotid bruit.  CV: RRR, S1S2, and intact distal pulses. No gallop, rub, murmur.  Pulm: Effort and breath sounds normal. No distress, wheezes, rales, rhonchi.  Abd: Soft, +TTP, BS normal, no mass, no HSM, no rebound/guarding.   Neuro: Normal reflexes, CN. Sensory/motor exams grossly normal deficit.   MS: No joint effusions.  No peripheral edema.  Skin: Skin is warm and dry. No rashes, erythema, diaphoresis.   Psych: Normal mood and affect. Calm, cooperative    Labs:  Lab Results   Component Value Date    HGB 7.5 (L) 2025    WBC 5.0 2025    PLT 91.0 (L) 2025     2025    K 3.4 (L) 2025    CREATSERUM 5.87 (H) 2025    INR 1.03 2025     (H) 2025     (H) 2025           Scheduled Medications[1]  PRN Medications[2]      Other problems      Supplementary Documentation:   DVT Mechanical Prophylaxis:        DVT Pharmacologic Prophylaxis   Medication    heparin (Porcine) 5000 UNIT/ML injection 5,000 Units                Code Status: Not on file  Cat: No urinary catheter in place  Cat Duration (in days):   Central line:    HEMALATHA:            I personally reviewed the available laboratories, imaging  including. I discussed/will discuss the case with consultants. I ordered laboratories and/or radiographic studies. I adjusted medications as detailed above.  Medical decision making high, risk is high.    Assessment & Plan:   ----------------------------------    Acute sepsis,   Improving  rule out spontaneous bacterial peritonitis.    Dialysate fluid culture reviewed  blood cultures reviewed  Pulm recs appreciated  Trend lactic acid, 3.5 > 3.8  Cont IV meropenem  Cont pressor support, wean as tolerated  ID consult, recs appreciated  Cardiology consult      End-stage renal disease,   on peritoneal dialysis.  Nephrology consult   IR to place permacath 4/25  Removal of PD catheter with Dr. Pollack 4/26  Cont HD as per Renal      Anemia  Hgb of 7.5  Transfuse 1 unit prbc 4/25            Latent tuberculosis.    No isolation needed  ID consulted, recs appreciated  Cont rifampin       FULL CODE  DVT px: subcutaneous heparin    Contreras Alex MD  4/27/2025         [1]    potassium chloride  20 mEq Oral BID    acetaminophen  1,000 mg Oral Q8H    meropenem  500 mg Intravenous Q24H    epoetin gabriela  5,000 Units Subcutaneous Once per day on Monday Wednesday Friday    rifAMPin  600 mg Oral Daily @ 0700    heparin  5,000 Units Subcutaneous 2 times per day   [2]   sodium chloride **AND** albumin human    diphenhydrAMINE-zinc    calcium carbonate    traMADol    HYDROmorphone **OR** HYDROmorphone **OR** HYDROmorphone    ondansetron    prochlorperazine

## 2025-04-27 NOTE — PLAN OF CARE
Problem: Patient Centered Care  Goal: Patient preferences are identified and integrated in the patient's plan of care  Description: Interventions:- What would you like us to know as we care for you? - Provide timely, complete, and accurate information to patient/family- Incorporate patient and family knowledge, values, beliefs, and cultural backgrounds into the planning and delivery of care- Encourage patient/family to participate in care and decision-making at the level they choose- Honor patient and family perspectives and choices  Outcome: Progressing     Problem: Patient/Family Goals  Goal: Patient/Family Long Term Goal  Description: Patient's Long Term Goal: Interventions:- - See additional Care Plan goals for specific interventions  Outcome: Progressing  Goal: Patient/Family Short Term Goal  Description: Patient's Short Term Goal: Interventions: - - See additional Care Plan goals for specific interventions  Outcome: Progressing     Problem: CARDIOVASCULAR - ADULT  Goal: Maintains optimal cardiac output and hemodynamic stability  Description: INTERVENTIONS:- Monitor vital signs, rhythm, and trends- Monitor for bleeding, hypotension and signs of decreased cardiac output- Evaluate effectiveness of vasoactive medications to optimize hemodynamic stability- Monitor arterial and/or venous puncture sites for bleeding and/or hematoma- Assess quality of pulses, skin color and temperature- Assess for signs of decreased coronary artery perfusion - ex. Angina- Evaluate fluid balance, assess for edema, trend weights  Outcome: Progressing     Problem: METABOLIC/FLUID AND ELECTROLYTES - ADULT  Goal: Glucose maintained within prescribed range  Description: INTERVENTIONS:- Monitor Blood Glucose as ordered- Assess for signs and symptoms of hyperglycemia and hypoglycemia- Administer ordered medications to maintain glucose within target range- Assess barriers to adequate nutritional intake and initiate nutrition consult as  needed- Instruct patient on self management of diabetes  Outcome: Progressing  Goal: Electrolytes maintained within normal limits  Description: INTERVENTIONS:- Monitor labs and rhythm and assess patient for signs and symptoms of electrolyte imbalances- Administer electrolyte replacement as ordered- Monitor response to electrolyte replacements, including rhythm and repeat lab results as appropriate- Fluid restriction as ordered- Instruct patient on fluid and nutrition restrictions as appropriate  Outcome: Progressing  Goal: Hemodynamic stability and optimal renal function maintained  Description: INTERVENTIONS:- Monitor labs and assess for signs and symptoms of volume excess or deficit- Monitor intake, output and patient weight- Monitor urine specific gravity, serum osmolarity and serum sodium as indicated or ordered- Monitor response to interventions for patient's volume status, including labs, urine output, blood pressure (other measures as available)- Encourage oral intake as appropriate- Instruct patient on fluid and nutrition restrictions as appropriate  Outcome: Progressing     Problem: RESPIRATORY - ADULT  Goal: Achieves optimal ventilation and oxygenation  Description: INTERVENTIONS:- Assess for changes in respiratory status- Assess for changes in mentation and behavior- Position to facilitate oxygenation and minimize respiratory effort- Oxygen supplementation based on oxygen saturation or ABGs- Provide Smoking Cessation handout, if applicable- Encourage broncho-pulmonary hygiene including cough, deep breathe, Incentive Spirometry- Assess the need for suctioning and perform as needed- Assess and instruct to report SOB or any respiratory difficulty- Respiratory Therapy support as indicated- Manage/alleviate anxiety- Monitor for signs/symptoms of CO2 retention  Outcome: Not Progressing

## 2025-04-27 NOTE — PLAN OF CARE
Problem: Patient Centered Care  Goal: Patient preferences are identified and integrated in the patient's plan of care  Description: Interventions:- What would you like us to know as we care for you? - Provide timely, complete, and accurate information to patient/family- Incorporate patient and family knowledge, values, beliefs, and cultural backgrounds into the planning and delivery of care- Encourage patient/family to participate in care and decision-making at the level they choose- Honor patient and family perspectives and choices  Outcome: Progressing     Problem: Patient/Family Goals  Goal: Patient/Family Long Term Goal  Description: Patient's Long Term Goal:   Interventions:-  - See additional Care Plan goals for specific interventions  Outcome: Progressing  Goal: Patient/Family Short Term Goal  Description: Patient's Short Term Goal:   Interventions:   - See additional Care Plan goals for specific interventions  Outcome: Progressing     Problem: CARDIOVASCULAR - ADULT  Goal: Maintains optimal cardiac output and hemodynamic stability  Description: INTERVENTIONS:- Monitor vital signs, rhythm, and trends- Monitor for bleeding, hypotension and signs of decreased cardiac output- Evaluate effectiveness of vasoactive medications to optimize hemodynamic stability- Monitor arterial and/or venous puncture sites for bleeding and/or hematoma- Assess quality of pulses, skin color and temperature- Assess for signs of decreased coronary artery perfusion - ex. Angina- Evaluate fluid balance, assess for edema, trend weights  Outcome: Progressing     Problem: RESPIRATORY - ADULT  Goal: Achieves optimal ventilation and oxygenation  Description: INTERVENTIONS:- Assess for changes in respiratory status- Assess for changes in mentation and behavior- Position to facilitate oxygenation and minimize respiratory effort- Oxygen supplementation based on oxygen saturation or ABGs- Provide Smoking Cessation handout, if applicable-  Encourage broncho-pulmonary hygiene including cough, deep breathe, Incentive Spirometry- Assess the need for suctioning and perform as needed- Assess and instruct to report SOB or any respiratory difficulty- Respiratory Therapy support as indicated- Manage/alleviate anxiety- Monitor for signs/symptoms of CO2 retention  Outcome: Progressing     Problem: METABOLIC/FLUID AND ELECTROLYTES - ADULT  Goal: Glucose maintained within prescribed range  Description: INTERVENTIONS:- Monitor Blood Glucose as ordered- Assess for signs and symptoms of hyperglycemia and hypoglycemia- Administer ordered medications to maintain glucose within target range- Assess barriers to adequate nutritional intake and initiate nutrition consult as needed- Instruct patient on self management of diabetes  Outcome: Progressing  Goal: Electrolytes maintained within normal limits  Description: INTERVENTIONS:- Monitor labs and rhythm and assess patient for signs and symptoms of electrolyte imbalances- Administer electrolyte replacement as ordered- Monitor response to electrolyte replacements, including rhythm and repeat lab results as appropriate- Fluid restriction as ordered- Instruct patient on fluid and nutrition restrictions as appropriate  Outcome: Progressing  Goal: Hemodynamic stability and optimal renal function maintained  Description: INTERVENTIONS:- Monitor labs and assess for signs and symptoms of volume excess or deficit- Monitor intake, output and patient weight- Monitor urine specific gravity, serum osmolarity and serum sodium as indicated or ordered- Monitor response to interventions for patient's volume status, including labs, urine output, blood pressure (other measures as available)- Encourage oral intake as appropriate- Instruct patient on fluid and nutrition restrictions as appropriate  Outcome: Progressing

## 2025-04-28 LAB
ANION GAP SERPL CALC-SCNC: 10 MMOL/L (ref 0–18)
BASOPHILS # BLD AUTO: 0.04 X10(3) UL (ref 0–0.2)
BASOPHILS NFR BLD AUTO: 0.8 %
BUN BLD-MCNC: 41 MG/DL (ref 9–23)
BUN/CREAT SERPL: 5.5 (ref 10–20)
CALCIUM BLD-MCNC: 8 MG/DL (ref 8.7–10.4)
CHLORIDE SERPL-SCNC: 103 MMOL/L (ref 98–112)
CO2 SERPL-SCNC: 24 MMOL/L (ref 21–32)
CREAT BLD-MCNC: 7.48 MG/DL (ref 0.55–1.02)
DEPRECATED RDW RBC AUTO: 60.5 FL (ref 35.1–46.3)
EGFRCR SERPLBLD CKD-EPI 2021: 6 ML/MIN/1.73M2 (ref 60–?)
EOSINOPHIL # BLD AUTO: 0.37 X10(3) UL (ref 0–0.7)
EOSINOPHIL NFR BLD AUTO: 7 %
ERYTHROCYTE [DISTWIDTH] IN BLOOD BY AUTOMATED COUNT: 17.9 % (ref 11–15)
GLUCOSE BLD-MCNC: 101 MG/DL (ref 70–99)
HCT VFR BLD AUTO: 25.4 % (ref 35–48)
HGB BLD-MCNC: 8.2 G/DL (ref 12–16)
IMM GRANULOCYTES # BLD AUTO: 0.07 X10(3) UL (ref 0–1)
IMM GRANULOCYTES NFR BLD: 1.3 %
LYMPHOCYTES # BLD AUTO: 0.78 X10(3) UL (ref 1–4)
LYMPHOCYTES NFR BLD AUTO: 14.8 %
MCH RBC QN AUTO: 31.7 PG (ref 26–34)
MCHC RBC AUTO-ENTMCNC: 32.3 G/DL (ref 31–37)
MCV RBC AUTO: 98.1 FL (ref 80–100)
MONOCYTES # BLD AUTO: 0.52 X10(3) UL (ref 0.1–1)
MONOCYTES NFR BLD AUTO: 9.8 %
NEUTROPHILS # BLD AUTO: 3.5 X10 (3) UL (ref 1.5–7.7)
NEUTROPHILS # BLD AUTO: 3.5 X10(3) UL (ref 1.5–7.7)
NEUTROPHILS NFR BLD AUTO: 66.3 %
OSMOLALITY SERPL CALC.SUM OF ELEC: 294 MOSM/KG (ref 275–295)
PLATELET # BLD AUTO: 122 10(3)UL (ref 150–450)
PLATELETS.RETICULATED NFR BLD AUTO: 4.6 % (ref 0–7)
POTASSIUM SERPL-SCNC: 4.2 MMOL/L (ref 3.5–5.1)
RBC # BLD AUTO: 2.59 X10(6)UL (ref 3.8–5.3)
SODIUM SERPL-SCNC: 137 MMOL/L (ref 136–145)
WBC # BLD AUTO: 5.3 X10(3) UL (ref 4–11)

## 2025-04-28 PROCEDURE — 99233 SBSQ HOSP IP/OBS HIGH 50: CPT | Performed by: INTERNAL MEDICINE

## 2025-04-28 PROCEDURE — 99232 SBSQ HOSP IP/OBS MODERATE 35: CPT | Performed by: PHYSICIAN ASSISTANT

## 2025-04-28 RX ORDER — HEPARIN SODIUM 1000 [USP'U]/ML
1.5 INJECTION, SOLUTION INTRAVENOUS; SUBCUTANEOUS
Status: COMPLETED | OUTPATIENT
Start: 2025-04-28 | End: 2025-04-28

## 2025-04-28 RX ORDER — CEFTAZIDIME 2 G/1
2 INJECTION, POWDER, FOR SOLUTION INTRAVENOUS SEE ADMIN INSTRUCTIONS
Qty: 1 EACH | Refills: 0 | Status: SHIPPED | OUTPATIENT
Start: 2025-04-28 | End: 2025-04-30

## 2025-04-28 NOTE — PLAN OF CARE
Problem: Patient Centered Care  Goal: Patient preferences are identified and integrated in the patient's plan of care  Description: Interventions:- What would you like us to know as we care for you? When I'll be going home?    Problem: Patient/Family Goals  Goal: Patient/Family Long Term Goal  Description: Patient's Long Term Goal: To be able to function independently.   - Monitor vital signs   - Monitor appropriate labs   - Pain management   - Administer medications per order   -  Monitor blood glucose levels   - Follow MD orders   - Diagnostics per order   - Update/inform patient and family on plan of care   - Discharge planning     Interventions:  - Monitor vital signs   - Monitor appropriate labs   - Pain management   - Administer medications per order   -  Monitor blood glucose levels   - Follow MD orders   - Diagnostics per order   - Update/inform patient and family on plan of care   - Discharge planning     Outcome: Progressing  Goal: Patient/Family Short Term Goal  Description: Patient's Short Term Goal: to be able to understand what happening to me in the hospital.   - Monitor vital signs   - Monitor appropriate labs   - Pain management   - Administer medications per order   -  Monitor blood glucose levels   - Follow MD orders   - Diagnostics per order   - Update/inform patient and family on plan of care   - Discharge planning       Outcome: Progressing   - Provide timely, complete, and accurate information to patient/family- Incorporate patient and family knowledge, values, beliefs, and cultural backgrounds into the planning and delivery of care- Encourage patient/family to participate in care and decision-making at the level they choose- Honor patient and family perspectives and choices  Outcome: Progressing      Problem: RESPIRATORY - ADULT  Goal: Achieves optimal ventilation and oxygenation  Description: INTERVENTIONS:- Assess for changes in respiratory status- Assess for changes in mentation and  behavior- Position to facilitate oxygenation and minimize respiratory effort- Oxygen supplementation based on oxygen saturation or ABGs- Provide Smoking Cessation handout, if applicable- Encourage broncho-pulmonary hygiene including cough, deep breathe, Incentive Spirometry- Assess the need for suctioning and perform as needed- Assess and instruct to report SOB or any respiratory difficulty- Respiratory Therapy support as indicated- Manage/alleviate anxiety- Monitor for signs/symptoms of CO2 retention  Outcome: Progressing     Problem: METABOLIC/FLUID AND ELECTROLYTES - ADULT  Goal: Glucose maintained within prescribed range  Description: INTERVENTIONS:- Monitor Blood Glucose as ordered- Assess for signs and symptoms of hyperglycemia and hypoglycemia- Administer ordered medications to maintain glucose within target range- Assess barriers to adequate nutritional intake and initiate nutrition consult as needed- Instruct patient on self management of diabetes  Outcome: Progressing  Goal: Electrolytes maintained within normal limits  Description: INTERVENTIONS:- Monitor labs and rhythm and assess patient for signs and symptoms of electrolyte imbalances- Administer electrolyte replacement as ordered- Monitor response to electrolyte replacements, including rhythm and repeat lab results as appropriate- Fluid restriction as ordered- Instruct patient on fluid and nutrition restrictions as appropriate  Outcome: Progressing  Goal: Hemodynamic stability and optimal renal function maintained  Description: INTERVENTIONS:- Monitor labs and assess for signs and symptoms of volume excess or deficit- Monitor intake, output and patient weight- Monitor urine specific gravity, serum osmolarity and serum sodium as indicated or ordered- Monitor response to interventions for patient's volume status, including labs, urine output, blood pressure (other measures as available)- Encourage oral intake as appropriate- Instruct patient on fluid  and nutrition restrictions as appropriate  Outcome: Progressing    Monitoring vital signs, stable at this time. No acute changes at this moment.  Monitoring blood glucose levels.  Pain medication provided as needed. Fall precautions in place- bed alarm on, bed locked in lowest position, call light within reach. Frequent rounding by nursing staff.

## 2025-04-28 NOTE — PROGRESS NOTES
INFECTIOUS DISEASE PROGRESS NOTE  [facility]  part of [facility] ID PROGRESS NOTE    Shavon Stuart Patient Status:  Inpatient    1970 MRN [id number]   Location [facility] 2W/SW Attending Contreras Alex MD   Hosp Day # 6 PCP No primary care provider on file.     Subjective:  ROS reviewed. Seen on HD. Abdominal pain improving.    ASSESSMENT:    Antibiotics: Meropenem, rifampin  Vancomycin, zosyn     # Acute fever with abdominal pain, leukocytosis, PD catheter associated SBP with PSAR   -CT A/P with enterocolitis   -Blood cx NGTD   -Patients reports two other episodes of peritonitis in past   -S/p PD catheter removal   # H/o latent TB               -Previously was on rifampin 3/2024 but only completed two months, restarted early 2025  # ESRD on PD     PLAN:  -  Continue on meropenem but will plan to DC on ceftazidime post-HD until 25.  -  Continue on rifampin.  -  Follow fever curve, wbc.  -  Reviewed labs, micro, imaging reports, available old records.  -  Case d/w patient using language line, social work, RN.     History of Present Illness:  Shavon Stuart is a 54 year old , [place] speaking female with a history of ESRD on PD, NICM, currently on treatment with rifampin for latent TB since early April after having cardiac workup at [facility] (previously took rifampin 3/2024-2024 but treatment stopped due to medication interactions), who presented to [facility] ED on  with worsening abdominal pain. States that she had a fever on Monday night and then had ongoing abdominal pain. No diarrhea. On arrival, Tmax 101.2, wbc 7.4, lactate 4.2, hypotensive and started on pressors, CXR unremarkable, blood cx obtained, body fluid cx from PD fluid obtained, started on vancomycin and zosyn.     Physical Exam:  /85 (BP Location: Right arm)   Pulse 88   Temp 98.4 °F (36.9 °C) (Oral)   Resp 20   Ht 5' (1.524 m)   Wt 133 lb 8 oz (60.6 kg)   SpO2  100%   BMI 26.07 kg/m²     Gen:   Awake, in bed  HEENT:  EOMI, neck supple  CV/lungs:  RRR, CTAB  Abdom:  Soft, mild TTP  Skin/extrem:  No rashes, no c/c/e  Lines:  HD catheter+    Laboratory Data: Reviewed    Microbiology: Reviewed    Radiology: Reviewed      [facility]ESH[facility]  [phone]  4/28/2025

## 2025-04-28 NOTE — PAYOR COMM NOTE
--------------  CONTINUED STAY REVIEW    Payor: [facility]  Subscriber #:  [id number]  Authorization Number: [id number]    Admit date: 4/22/25  Admit time:  6:18 PM    4/25/25       Subjective:   ----------------------------------  Pt resting in bed in moderate distress due to abdominal pain. +N/V.  Will have permacath placed later today by IR.       Temp:  [97.1 °F (36.2 °C)-97.8 °F (36.6 °C)] 97.2 °F (36.2 °C)  Pulse:  [71-98] 86  Resp:  [10-31] 19  BP: ()/(64-94) 111/71  SpO2:  [92 %-100 %] 100 %  Gen: A+Ox3.    HEENT: NCAT, neck supple, no carotid bruit.  CV: RRR, S1S2, and intact distal pulses. No gallop, rub, murmur.  Pulm: Effort and breath sounds normal. No distress, wheezes, rales, rhonchi.  Abd: Soft, +TTP, BS normal, no mass, no HSM, no rebound/guarding.   Neuro:  Normal reflexes, CN. Sensory/motor exams grossly normal deficit.   MS: No joint effusions.  No peripheral edema.  Skin: Skin is warm and dry. No rashes, erythema, diaphoresis.   Psych:   Normal mood and affect. Calm, cooperative     Lab Results   Component Value Date     HGB 7.0 (L) 04/25/2025     WBC 7.0 04/25/2025     PLT 98.0 (L) 04/25/2025      (L) 04/25/2025     K 3.4 (L) 04/25/2025     CREATSERUM 7.87 (H) 04/25/2025     [Scheduled Medications]   heparin  1.5 mL Intracatheter Once    meropenem  500 mg Intravenous Q24H    dextrose 1.5%-calcium 2.5 mEq/L  5,000 mL Intraperitoneal Once in dialysis    epoetin gabriela  5,000 Units Subcutaneous Once per day on Monday Wednesday Friday    rifAMPin  600 mg Oral Daily @ 0700    dextrose 1.5%-calcium 2.5 mEq/L  5,000 mL Intraperitoneal Once in dialysis    [Held by provider] heparin  5,000 Units Subcutaneous 2 times per day   Assessment & Plan:   ----------------------------------    Acute sepsis,   Slowly improving  rule out spontaneous bacterial peritonitis.    Dialysate fluid culture pending  blood cultures pending  Pulm recs appreciated  Trend lactic acid,  3.5 > 3.8  Cont IV zosyn  Cont pressor support, wean as tolerated  ID consult, recs appreciated  Cardiology consult       End-stage renal disease,   on peritoneal dialysis.  Nephrology consult   IR to place permacath today       Anemia  Hgb of 7.0  Transfuse 1 unit prbc 4/25            Latent tuberculosis.    ID consulted, recs appreciated                       4/26/25     Subjective:   ----------------------------------  Pt resting in bed in moderate distress due to abdominal pain. +N/V.  Had PD catheter removed today. C/o left sided abdominal pain from removal of catheter.     Temp:  [97.7 °F (36.5 °C)-98.9 °F (37.2 °C)] 98 °F (36.7 °C)  Pulse:  [73-93] 83  Resp:  [8-25] 21  BP: ()/(62-79) 140/76  SpO2:  [92 %-100 %] 95 %  Gen: A+Ox3.    HEENT: NCAT, neck supple, no carotid bruit.  CV: RRR, S1S2, and intact distal pulses. No gallop, rub, murmur.  Pulm: Effort and breath sounds normal. No distress, wheezes, rales, rhonchi.  Abd: Soft, +TTP, BS normal, no mass, no HSM, no rebound/guarding.   Neuro:  Normal reflexes, CN. Sensory/motor exams grossly normal deficit.   MS: No joint effusions.  No peripheral edema.  Skin: Skin is warm and dry. No rashes, erythema, diaphoresis.   Psych:   Normal mood and affect. Calm, cooperative     Lab Results   Component Value Date     HGB 8.0 (L) 04/26/2025     WBC 4.8 04/26/2025     PLT 84.0 (L) 04/26/2025      04/26/2025     K 3.2 (L) 04/26/2025     CREATSERUM 4.09 (H) 04/26/2025     INR 1.03 04/22/2025      (H) 04/26/2025      (H) 04/26/2025       [Scheduled Medications]   acetaminophen  1,000 mg Oral Q8H    heparin  1.5 mL Intracatheter Once    meropenem  500 mg Intravenous Q24H    heparin  1.5 mL Intravenous Once    epoetin gabriela  5,000 Units Subcutaneous Once per day on Monday Wednesday Friday    rifAMPin  600 mg Oral Daily @ 0700    heparin  5,000 Units Subcutaneous 2 times per day       Assessment & Plan:   ----------------------------------    Acute  sepsis,   Slowly improving  rule out spontaneous bacterial peritonitis.    Dialysate fluid culture reviewed  blood cultures reviewed  Pulm recs appreciated  Trend lactic acid, 3.5 > 3.8  Cont IV meropenem  Cont pressor support, wean as tolerated  ID consult, recs appreciated  Cardiology consult       End-stage renal disease,   on peritoneal dialysis.  Nephrology consult   IR to place permacath 4/25  Removal of PD catheter today with Dr. Pollack       Anemia  Hgb of 7.0  Transfuse 1 unit prbc 4/25            Latent tuberculosis.    No isolation needed  ID consulted, recs appreciated  Cont rifampin                       4/27/25     Subjective:   ----------------------------------   Doing much better today, abdominal pain has resolved.        HEENT: NCAT, neck supple, no carotid bruit.  CV: RRR, S1S2, and intact distal pulses. No gallop, rub, murmur.  Pulm: Effort and breath sounds normal. No distress, wheezes, rales, rhonchi.  Abd: Soft, +TTP, BS normal, no mass, no HSM, no rebound/guarding.   Neuro:  Normal reflexes, CN. Sensory/motor exams grossly normal deficit.   MS: No joint effusions.  No peripheral edema.  Skin: Skin is warm and dry. No rashes, erythema, diaphoresis.   Psych:   Normal mood and affect. Calm, cooperative     Lab Results   Component Value Date     HGB 7.5 (L) 04/27/2025     WBC 5.0 04/27/2025     PLT 91.0 (L) 04/27/2025      04/27/2025     K 3.4 (L) 04/27/2025     CREATSERUM 5.87 (H) 04/27/2025     INR 1.03 04/22/2025      (H) 04/26/2025      (H) 04/26/2025       [Scheduled Medications]   potassium chloride  20 mEq Oral BID    acetaminophen  1,000 mg Oral Q8H    meropenem  500 mg Intravenous Q24H    epoetin gabriela  5,000 Units Subcutaneous Once per day on Monday Wednesday Friday    rifAMPin  600 mg Oral Daily @ 0700    heparin  5,000 Units Subcutaneous 2 times per day     Assessment & Plan:   ----------------------------------    Acute sepsis,   Improving  rule out spontaneous  bacterial peritonitis.    Dialysate fluid culture reviewed  blood cultures reviewed  Pulm recs appreciated  Trend lactic acid, 3.5 > 3.8  Cont IV meropenem  Cont pressor support, wean as tolerated  ID consult, recs appreciated  Cardiology consult       End-stage renal disease,   on peritoneal dialysis.  Nephrology consult   IR to place permacath 4/25  Removal of PD catheter with Dr. Pollack 4/26  Cont HD as per Renal       Anemia  Hgb of 7.5  Transfuse 1 unit prbc 4/25            Latent tuberculosis.    No isolation needed  ID consulted, recs appreciated  Cont rifampin                        NEPHROLOGY  Impression:     ESRD.:  On PD PTA.  Plan for PD catheter removal on 4/26.  S/p tunneled dialysis catheter placement on 4/25 and first treatment.  Will repeat the dialysis on Monday  Abd pain/peritonitis. :  Fluid culture positive Pseudomonas.  On IV meropenem  Anemia: S/p 1 dose of IV iron  Sec HPTH, monitor phosphorus.  Phos within normal limit.  Nonischemic cardiomyopathy with ejection fraction 30%  Latent TB, rifampin  Sepsis/hypotension, IVF, cx  and abx, pressors.  CT scan with enterocolitis.  PD fluid positive for peritonitis and culture positive for Pseudomonas.  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      MEDICATIONS ADMINISTERED IN LAST 1 DAY:  diphenhydrAMINE-zinc (Benadryl-Zinc) 2-0.1 % cream 1 Application       Date Action Dose Route User    4/27/2025 2102 Given 1 Application Topical Maxim Barros RN    4/27/2025 1147 Given 1 Application Topical Paz Wilcox RN          heparin (Porcine) 1000 UNIT/ML injection 1,500 Units       Date Action Dose Route User    4/27/2025 0930 Given 1,500 Units Intracatheter Paz Wilcox RN          heparin (Porcine) 5000 UNIT/ML injection 5,000 Units       Date Action Dose Route User    4/27/2025 2102 Given 5,000 Units Subcutaneous (Left Upper Arm) Maxim Barros RN     4/27/2025 0936 Given 5,000 Units Subcutaneous (Left Lower Abdomen) Paz Wilcox, DEANN          meropenem (Merrem) 500 mg in sodium chloride 0.9% 100mL IVPB-AIDA       Date Action Dose Route User    4/27/2025 1147 New Bag 500 mg Intravenous Paz Wilcox RN          potassium chloride (Klor-Con) 20 MEQ oral powder 20 mEq       Date Action Dose Route User    4/27/2025 2102 Given 20 mEq Oral Maxim Barros RN    4/27/2025 0936 Given 20 mEq Oral Paz Wilcox RN          rifAMPin (Rifadin) cap 600 mg       Date Action Dose Route User    4/28/2025 0634 Given 600 mg Oral Maxim Barros RN          traMADol (Ultram) tab 50 mg       Date Action Dose Route User    4/27/2025 2103 Given 50 mg Oral Maxim Barros RN            Vitals (last day)       Date/Time Temp Pulse Resp BP SpO2 Weight O2 Device O2 Flow Rate (L/min) [facility]    04/28/25 0600 -- -- -- -- -- 133 lb 8 oz (60.6 kg) -- -- AS    04/28/25 0431 98.5 °F (36.9 °C) 73 17 124/78 99 % -- None (Room air) --     04/28/25 0100 98.5 °F (36.9 °C) 79 18 124/80 98 % -- None (Room air) --     04/27/25 2056 98.2 °F (36.8 °C) 86 19 128/76 98 % -- None (Room air) --     04/27/25 1627 97.6 °F (36.4 °C) 85 22 137/83 99 % -- None (Room air) -- SW    04/27/25 0529 98 °F (36.7 °C) 75 18 130/72 98 % -- None (Room air) -- NA    04/27/25 0004 98 °F (36.7 °C) 72 18 129/73 97 % -- None (Room air) -- NA       Blood Transfusion Record       Product Unit Status Volume Start End            Transfuse RBC       25  [id number]  [id number] Completed 04/25/25 2315 320 mL 04/25/25 2115 04/25/25 2200

## 2025-04-28 NOTE — PROGRESS NOTES
[facility]  part of [facility]    Progress Note    Shavon Stuart Patient Status:  Inpatient    1970 MRN [id number]   Location [facility]5W Attending Contreras Alex MD   Hosp Day # 6 PCP No primary care provider on file.       Subjective:   Shavon Stuart is a(n) 54 year old female     ROS:     Constitutional: Good tolerating dialysis well  ENMT:  Negative for ear drainage, hearing loss and nasal drainage  Eyes:  Negative for eye discharge and vision loss  Cardiovascular:  Negative for chest pain, sob, irregular heartbeat/palpitations  Respiratory:  Negative for cough, dyspnea and wheezing  Gastrointestinal:  Negative for abdominal pain, constipation, decreased appetite, diarrhea and vomiting no tenderness where PD catheter was removed  Genitourinary:  Negative for dysuria and hematuria  Endocrine:  Negative for abnormal sleep patterns, increased activity, polydipsia and polyphagia  Hema/Lymph:  Negative for easy bleeding and easy bruising  Integumentary:  Negative for pruritus and rash  Musculoskeletal:  Negative for bone/joint symptoms  Neurological:  Negative for gait disturbance  Psychiatric:  Negative for inappropriate interaction and psychiatric symptoms      Vitals:    25 1551   BP: 134/90   Pulse: 81   Resp: 20   Temp: 98.7 °F (37.1 °C)           PHYSICAL EXAM:   Constitutional: appears well hydrated alert and responsive no acute distress noted  Head/Face: normocephalic  Eyes/Vision: normal extraocular motion is intact  Nose/Mouth/Throat:mucous membranes are moist and no oral lesions are noted  Neck/Thyroid: neck is supple without adenopathy  Lymphatic: no abnormal cervical, supraclavicular adenopathy is noted  Respiratory:  lungs are clear to auscultation bilaterally, normal respiratory effort  Cardiovascular: regular rate and rhythm no murmurs, gallups, or rubs  Abdomen: soft, non-tender, non-distended, BS normal  Vascular: well perfused femoral, and  pedal pulses normal  Skin/Hair: no unusual rashes present, no abnormal bruising noted  Back/Spine: no abnormalities noted  Musculoskeletal: full ROM all extremities good strength  no deformities  Extremities: no edema, cyanosis  Neurological:  Grossly normal    Results:     Laboratory Data:  Lab Results   Component Value Date    WBC 5.3 04/28/2025    HGB 8.2 (L) 04/28/2025    HCT 25.4 (L) 04/28/2025    .0 (L) 04/28/2025    CREATSERUM 7.48 (H) 04/28/2025    BUN 41 (H) 04/28/2025     04/28/2025    K 4.2 04/28/2025     04/28/2025    CO2 24.0 04/28/2025     (H) 04/28/2025    CA 8.0 (L) 04/28/2025    ALB 2.8 (L) 04/26/2025    ALKPHO 102 04/26/2025    BILT 1.1 04/26/2025    TP 5.2 (L) 04/26/2025     (H) 04/26/2025     (H) 04/26/2025    PTT 28.1 04/22/2025    INR 1.03 04/22/2025    MG 1.8 04/26/2025    PHOS 3.3 04/26/2025       Imaging:  [unfilled]   XR CHEST AP PORTABLE  (CPT=71045)  Result Date: 4/28/2025  CONCLUSION:   Hazy right basilar opacities have increased since examination from 4 days prior.  Consider worsening atelectasis or developing pneumonia/aspiration.  Stable small right pleural effusion.  Radiographic follow-up to resolution is advised.  Mild cardiomegaly.  Interval placement of a dual lumen right central venous catheter, the tip of which projects at the cavoatrial junction.   elm-remote  Dictated by (CST): Henrik Linda MD on 4/28/2025 at 7:16 AM     Finalized by (CST): Henrik Linda MD on 4/28/2025 at 7:19 AM              ASSESSMENT/PLAN:   Assessment       1 ESRD now on hemodialysis after PD catheter removed    2.  Peritonitis on meropenem but will switch to ceftazidime per ID also on rifampin for previous TB treatment  Pseudomonas in the urine sensitive to ceftazidime and meropenem  #3 anemia on Epogen  Discussed with   4/28/2025  Reed Fernández MD

## 2025-04-28 NOTE — PROGRESS NOTES
[facility]  part of [facility]    Progress Note    Shavon Stuart Patient Status:  Inpatient    1970 MRN [id number]   Location [facility]5W Attending Contreras Alex MD   Hosp Day # 6 PCP No primary care provider on file.     Subjective:   Seen and examined while resting in bed. Admits to shortness of breath x2 days. No cough or wheezing. No fever or chills. On room air.    Objective:   Blood pressure 127/85, pulse 88, temperature 98.4 °F (36.9 °C), temperature source Oral, resp. rate 20, height 5' (1.524 m), weight 133 lb 8 oz (60.6 kg), SpO2 100%.  Physical Exam  Vitals and nursing note reviewed.   Constitutional:       General: She is not in acute distress.     Appearance: She is not ill-appearing.   HENT:      Head: Normocephalic and atraumatic.   Cardiovascular:      Rate and Rhythm: Normal rate and regular rhythm.   Pulmonary:      Effort: Pulmonary effort is normal. No respiratory distress.      Breath sounds: Normal breath sounds. No wheezing, rhonchi or rales.   Abdominal:      General: Bowel sounds are normal. There is no distension.      Palpations: Abdomen is soft.      Tenderness: There is no abdominal tenderness.   Musculoskeletal:      Cervical back: Normal range of motion and neck supple.      Right lower leg: No edema.      Left lower leg: No edema.   Skin:     General: Skin is warm and dry.   Neurological:      General: No focal deficit present.      Mental Status: She is alert and oriented to person, place, and time.   Psychiatric:         Mood and Affect: Mood normal.       Results:   Lab Results   Component Value Date    WBC 5.3 2025    HGB 8.2 (L) 2025    HCT 25.4 (L) 2025    .0 (L) 2025    CREATSERUM 7.48 (H) 2025    BUN 41 (H) 2025     2025    K 4.2 2025     2025    CO2 24.0 2025     (H) 2025    CA 8.0 (L) 2025    ALB 2.8 (L) 2025    CHAD Mulligan  04/26/2025    BILT 1.1 04/26/2025    TP 5.2 (L) 04/26/2025     (H) 04/26/2025     (H) 04/26/2025    PTT 28.1 04/22/2025    INR 1.03 04/22/2025    MG 1.8 04/26/2025    PHOS 3.3 04/26/2025       XR CHEST AP PORTABLE  (CPT=71045)  Result Date: 4/28/2025  CONCLUSION:   Hazy right basilar opacities have increased since examination from 4 days prior.  Consider worsening atelectasis or developing pneumonia/aspiration.  Stable small right pleural effusion.  Radiographic follow-up to resolution is advised.  Mild cardiomegaly.  Interval placement of a dual lumen right central venous catheter, the tip of which projects at the cavoatrial junction.   elm-remote  Dictated by (CST): Henrik Linda MD on 4/28/2025 at 7:16 AM     Finalized by (CST): Henrik Linda MD on 4/28/2025 at 7:19 AM                Assessment & Plan:   Septic shock - resolved  Concern for SBP associated with PD catheter  Peritoneal fluid with Pseudomonas  PD catheter removed 4/26/25  IV Zosyn -> IV meropenem  Shock resolved and off pressors  Hemodynamically stable  Plan:  -IV meropenem as per ID    Dyspnea  CXR 4/27/25 with increasing hazy right basilar opacities and small stable right pleural effusion  Clinically no signs of pneumonia  Already on IV meropenem for SBP  No hypoxia  Plan:  -Incentive spirometry  -Out of bed to chair and increase ambulation  -F/u CXR tomorrow    ESRD  H/o renal mass s/p left nephrectomy  HD catheter placed 4/25/25  PD catheter removed 4/26/25  Plan:  -HD as per renal    Latent TB  ID managing  Plan:  -Rifampin as per ID    DVT prophylaxis: Subcutaneous heparin    Sea May PA-C  Pulmonary Medicine  4/28/2025

## 2025-04-28 NOTE — PLAN OF CARE
Patient is alert and oriented x4 with stable vitals on room air. Patient denies pain for this shift. Patient is Uzbek speaking only. Patient is ambulatory in the room independently. Patient underwent hemodialysis during this shift with 2000ml of output. Patient is progressing in plan of care towards discharge. Bed is currently locked and in lowest position. Call light and belongings are within reach.     Problem: Patient Centered Care  Goal: Patient preferences are identified and integrated in the patient's plan of care  Description: Interventions:- What would you like us to know as we care for you? Patient is from home - Provide timely, complete, and accurate information to patient/family- Incorporate patient and family knowledge, values, beliefs, and cultural backgrounds into the planning and delivery of care- Encourage patient/family to participate in care and decision-making at the level they choose- Honor patient and family perspectives and choices  Outcome: Progressing     Problem: Patient/Family Goals  Goal: Patient/Family Long Term Goal  Description: Patient's Long Term Goal: Discharge to home Interventions:- Assist patient with all procedures as they are ordered- See additional Care Plan goals for specific interventions  Outcome: Progressing  Goal: Patient/Family Short Term Goal  Description: Patient's Short Term Goal: Maintain adequate pain control Interventions: - Administer pain medications as they are ordered- See additional Care Plan goals for specific interventions  Outcome: Progressing     Problem: CARDIOVASCULAR - ADULT  Goal: Maintains optimal cardiac output and hemodynamic stability  Description: INTERVENTIONS:- Monitor vital signs, rhythm, and trends- Monitor for bleeding, hypotension and signs of decreased cardiac output- Evaluate effectiveness of vasoactive medications to optimize hemodynamic stability- Monitor arterial and/or venous puncture sites for bleeding and/or hematoma- Assess quality  of pulses, skin color and temperature- Assess for signs of decreased coronary artery perfusion - ex. Angina- Evaluate fluid balance, assess for edema, trend weights  Outcome: Progressing     Problem: RESPIRATORY - ADULT  Goal: Achieves optimal ventilation and oxygenation  Description: INTERVENTIONS:- Assess for changes in respiratory status- Assess for changes in mentation and behavior- Position to facilitate oxygenation and minimize respiratory effort- Oxygen supplementation based on oxygen saturation or ABGs- Provide Smoking Cessation handout, if applicable- Encourage broncho-pulmonary hygiene including cough, deep breathe, Incentive Spirometry- Assess the need for suctioning and perform as needed- Assess and instruct to report SOB or any respiratory difficulty- Respiratory Therapy support as indicated- Manage/alleviate anxiety- Monitor for signs/symptoms of CO2 retention  Outcome: Progressing     Problem: METABOLIC/FLUID AND ELECTROLYTES - ADULT  Goal: Glucose maintained within prescribed range  Description: INTERVENTIONS:- Monitor Blood Glucose as ordered- Assess for signs and symptoms of hyperglycemia and hypoglycemia- Administer ordered medications to maintain glucose within target range- Assess barriers to adequate nutritional intake and initiate nutrition consult as needed- Instruct patient on self management of diabetes  Outcome: Progressing  Goal: Electrolytes maintained within normal limits  Description: INTERVENTIONS:- Monitor labs and rhythm and assess patient for signs and symptoms of electrolyte imbalances- Administer electrolyte replacement as ordered- Monitor response to electrolyte replacements, including rhythm and repeat lab results as appropriate- Fluid restriction as ordered- Instruct patient on fluid and nutrition restrictions as appropriate  Outcome: Progressing  Goal: Hemodynamic stability and optimal renal function maintained  Description: INTERVENTIONS:- Monitor labs and assess for signs  and symptoms of volume excess or deficit- Monitor intake, output and patient weight- Monitor urine specific gravity, serum osmolarity and serum sodium as indicated or ordered- Monitor response to interventions for patient's volume status, including labs, urine output, blood pressure (other measures as available)- Encourage oral intake as appropriate- Instruct patient on fluid and nutrition restrictions as appropriate  Outcome: Progressing

## 2025-04-28 NOTE — CM/SW NOTE
04/28/25 0800   Discharge Needs   Anticipated D/C needs Outpatient dialysis     SW received MD order for outpatient HD.    Pt formerly on PD-  PD catheter removed and tunneled cath placed.    Per EMR, pt has hx with [facility] TTS 2nd shift.    SW sent Aidin referral to [facility].    Chest XR/Hep B/orders uploaded.  MEGHAN scanned 4/28 flowsheets to referral.    Update 1145    LAURA Olivarez with ID entered IV abx script for post- HD.      Update 3999    MEGHAN spoke to [facility]/ Renal admissions [phone]- she stated admissions has not received referral yet and to call back tomorrow if SW does not hear from them.    PLAN: DC home w/outpatient HD at [facility] w/IV abx pending acceptance/chair time    / to remain available for support and/or discharge planning.     Dinorah ARROYO, LSW  Discharge Planner

## 2025-04-28 NOTE — PROGRESS NOTES
[facility]  part of [facility]    Hospitalist Progress Note     Shavon Stuart Patient Status:  Inpatient    1970  54 year old CSN [id number]   Location 220/220-A Attending Contreras Alex MD   Hosp Day # 6 PCP No primary care provider on file.         Subjective:   ----------------------------------  Pt seen during HD today. Doing much better today, abdominal pain has resolved.       Objective:   Chief Complaint:   Chief Complaint   Patient presents with    Abdomen/Flank Pain     ----------------------------------  Temp:  [97.6 °F (36.4 °C)-98.5 °F (36.9 °C)] 98.4 °F (36.9 °C)  Pulse:  [73-88] 88  Resp:  [17-22] 20  BP: (124-137)/(76-85) 127/85  SpO2:  [98 %-100 %] 100 %  Gen: A+Ox3.  No distress.   HEENT: NCAT, neck supple, no carotid bruit.  CV: RRR, S1S2, and intact distal pulses. No gallop, rub, murmur.  Pulm: Effort and breath sounds normal. No distress, wheezes, rales, rhonchi.  Abd: Soft, NTND, BS normal, no mass, no HSM, no rebound/guarding.   Neuro: Normal reflexes, CN. Sensory/motor exams grossly normal deficit.   MS: No joint effusions.  No peripheral edema.  Skin: Skin is warm and dry. No rashes, erythema, diaphoresis.   Psych: Normal mood and affect. Calm, cooperative    Labs:  Lab Results   Component Value Date    HGB 8.2 (L) 2025    WBC 5.3 2025    .0 (L) 2025     2025    K 4.2 2025    CREATSERUM 7.48 (H) 2025    INR 1.03 2025     (H) 2025     (H) 2025           Scheduled Medications[1]  PRN Medications[2]      Other problems      Supplementary Documentation:   DVT Mechanical Prophylaxis:        DVT Pharmacologic Prophylaxis   Medication    heparin (Porcine) 5000 UNIT/ML injection 5,000 Units                Code Status: Not on file  Cat: No urinary catheter in place  Cat Duration (in days):   Central line:    HEMALATHA:        I personally reviewed the available laboratories, imaging  including. I discussed/will discuss the case with consultants. I ordered laboratories and/or radiographic studies. I adjusted medications as detailed above.  Medical decision making high, risk is high.    Assessment & Plan:   ----------------------------------    Acute sepsis,   Improving  +Spontaneous bacterial peritonitis.    Dialysate fluid culture reviewed  blood cultures reviewed  Pulm recs appreciated  Trend lactic acid, 3.5 > 3.8  Cont IV meropenem  Cont pressor support, wean as tolerated  ID consult, recs appreciated  Cardiology consult      End-stage renal disease,   on peritoneal dialysis.  Nephrology consult   IR to place permacath 4/25  Removal of PD catheter with Dr. Pollack 4/26  Cont HD as per Renal      Anemia  Hgb of 8.2  Transfuse 1 unit prbc 4/25            Latent tuberculosis.    No isolation needed  ID consulted, recs appreciated  Cont rifampin       FULL CODE  DVT px: subcutaneous heparin    Contreras Alex MD  4/28/2025         [1]    potassium chloride  20 mEq Oral BID    acetaminophen  1,000 mg Oral Q8H    meropenem  500 mg Intravenous Q24H    epoetin gabriela  5,000 Units Subcutaneous Once per day on Monday Wednesday Friday    rifAMPin  600 mg Oral Daily @ 0700    heparin  5,000 Units Subcutaneous 2 times per day   [2]   sodium chloride **AND** albumin human    diphenhydrAMINE-zinc    calcium carbonate    traMADol    HYDROmorphone **OR** HYDROmorphone **OR** HYDROmorphone    ondansetron    prochlorperazine

## 2025-04-29 ENCOUNTER — APPOINTMENT (OUTPATIENT)
Dept: GENERAL RADIOLOGY | Facility: HOSPITAL | Age: 55
DRG: 907 | End: 2025-04-29
Attending: PHYSICIAN ASSISTANT
Payer: MEDICAID

## 2025-04-29 PROCEDURE — 71045 X-RAY EXAM CHEST 1 VIEW: CPT | Performed by: PHYSICIAN ASSISTANT

## 2025-04-29 PROCEDURE — 99233 SBSQ HOSP IP/OBS HIGH 50: CPT | Performed by: INTERNAL MEDICINE

## 2025-04-29 PROCEDURE — 99232 SBSQ HOSP IP/OBS MODERATE 35: CPT | Performed by: PHYSICIAN ASSISTANT

## 2025-04-29 PROCEDURE — 99232 SBSQ HOSP IP/OBS MODERATE 35: CPT | Performed by: INTERNAL MEDICINE

## 2025-04-29 RX ORDER — DIPHENHYDRAMINE HYDROCHLORIDE 50 MG/ML
25 INJECTION, SOLUTION INTRAMUSCULAR; INTRAVENOUS EVERY 4 HOURS PRN
Status: DISCONTINUED | OUTPATIENT
Start: 2025-04-29 | End: 2025-04-30

## 2025-04-29 RX ORDER — HEPARIN SODIUM 1000 [USP'U]/ML
1.5 INJECTION, SOLUTION INTRAVENOUS; SUBCUTANEOUS
Status: DISCONTINUED | OUTPATIENT
Start: 2025-04-29 | End: 2025-04-30

## 2025-04-29 RX ORDER — ALBUMIN (HUMAN) 12.5 G/50ML
25 SOLUTION INTRAVENOUS
Status: DISCONTINUED | OUTPATIENT
Start: 2025-04-29 | End: 2025-04-30

## 2025-04-29 NOTE — SPIRITUAL CARE NOTE
Spiritual Care Visit Note    Patient Name: Shavon Stuart Date of Spiritual Care Visit: 25   : 1970 Primary Dx: Abdominal pain, acute       Referred By: Referral From:     Spiritual Care Taxonomy:    Intended Effects: Establish rapport and connectedness    Methods: Collaborate with care team member, Offer support    Interventions: Active listening, Ask guided questions, Silent prayer    Visit Type/Summary:     - Spiritual Care: Consulted with RN prior to visit. Offered empathic listening and emotional support. Patient declined a  visit at this time.  remains available as needed for follow up.     Dieudonne Villeda, MAT CAMII   [id number]     Spiritual Care support can be requested via an Epic consult. For urgent/immediate needs, please contact the On Call  at: [place]: ext [phone]

## 2025-04-29 NOTE — PROGRESS NOTES
[facility]  part of [facility]    Progress Note    Shavon Stuart Patient Status:  Inpatient    1970 MRN [id number]   Location [facility]5W Attending Contreras Alex MD   Hosp Day # 7 PCP No primary care provider on file.     Subjective:   Seen and examined while resting in bed. Dyspnea resolved. No cough or wheezing. No fever or chills. On room air. Did well with ambulation and incentive spirometry yesterday. Complains of nausea and loose stool. No vomiting or abdominal pain.    Objective:   Blood pressure 126/86, pulse 77, temperature 98 °F (36.7 °C), temperature source Oral, resp. rate 20, height 5' (1.524 m), weight 138 lb 3.2 oz (62.7 kg), SpO2 99%.  Physical Exam  Vitals and nursing note reviewed.   Constitutional:       General: She is not in acute distress.     Appearance: She is not ill-appearing.   HENT:      Head: Normocephalic and atraumatic.   Cardiovascular:      Rate and Rhythm: Normal rate and regular rhythm.   Pulmonary:      Effort: Pulmonary effort is normal. No respiratory distress.      Breath sounds: Normal breath sounds. No wheezing, rhonchi or rales.   Abdominal:      General: Bowel sounds are normal. There is no distension.      Palpations: Abdomen is soft.      Tenderness: There is no abdominal tenderness.   Musculoskeletal:      Cervical back: Normal range of motion and neck supple.      Right lower leg: No edema.      Left lower leg: No edema.   Skin:     General: Skin is warm and dry.   Neurological:      General: No focal deficit present.      Mental Status: She is alert and oriented to person, place, and time.   Psychiatric:         Mood and Affect: Mood normal.       Results:   Lab Results   Component Value Date    WBC 5.3 2025    HGB 8.2 (L) 2025    HCT 25.4 (L) 2025    .0 (L) 2025    CREATSERUM 7.48 (H) 2025    BUN 41 (H) 2025     2025    K 4.2 2025     2025    CO2  24.0 04/28/2025     (H) 04/28/2025    CA 8.0 (L) 04/28/2025    ALB 2.8 (L) 04/26/2025    ALKPHO 102 04/26/2025    BILT 1.1 04/26/2025    TP 5.2 (L) 04/26/2025     (H) 04/26/2025     (H) 04/26/2025    PTT 28.1 04/22/2025    INR 1.03 04/22/2025    MG 1.8 04/26/2025    PHOS 3.3 04/26/2025       XR CHEST AP PORTABLE  (CPT=71045)  Result Date: 4/29/2025  CONCLUSION:   Improved aeration right lung base.    Dictated by (CST): Jimmy Alex MD on 4/29/2025 at 7:21 AM     Finalized by (CST): Jimmy Alex MD on 4/29/2025 at 7:22 AM          XR CHEST AP PORTABLE  (CPT=71045)  Result Date: 4/28/2025  CONCLUSION:   Hazy right basilar opacities have increased since examination from 4 days prior.  Consider worsening atelectasis or developing pneumonia/aspiration.  Stable small right pleural effusion.  Radiographic follow-up to resolution is advised.  Mild cardiomegaly.  Interval placement of a dual lumen right central venous catheter, the tip of which projects at the cavoatrial junction.   elm-remote  Dictated by (CST): Henrik Linda MD on 4/28/2025 at 7:16 AM     Finalized by (CST): Henrik Linda MD on 4/28/2025 at 7:19 AM                Assessment & Plan:   Septic shock - resolved  Concern for SBP associated with PD catheter  Peritoneal fluid with Pseudomonas  PD catheter removed 4/26/25  IV Zosyn -> IV meropenem  Shock resolved and off pressors  Hemodynamically stable  Plan:  -IV meropenem as per ID  -Plan to discharge on ceftazidime through 5/16/25 per ID    Dyspnea  CXR 4/27/25 with increasing hazy right basilar opacities and small stable right pleural effusion  CXR 4/29/25 with improved aeration right lung base  Clinically no signs of pneumonia  Already on IV meropenem for SBP  No hypoxia and dyspnea resolved  Plan:  -Incentive spirometry  -Out of bed to chair and increase ambulation    ESRD  H/o renal mass s/p left nephrectomy  HD catheter placed 4/25/25  PD catheter removed  4/26/25  Plan:  -HD as per renal    Latent TB  ID managing  Plan:  -Rifampin as per ID    DVT prophylaxis: Subcutaneous heparin    Pulmonary/critical care issues have resolved. Will sign off. Please call if questions/concerns.    Sea May PA-C  Pulmonary Medicine  4/29/2025

## 2025-04-29 NOTE — PLAN OF CARE
Monitoring vital signs, stable at this time. No acute changes at this moment. Pain medication provided as needed. Benadryl medication provided as needed for itchiness.  Fall precautions in place- bed alarm on, bed locked in lowest position, call light within reach. Frequent rounding by nursing staff.     Problem: Patient Centered Care  Goal: Patient preferences are identified and integrated in the patient's plan of care  Description: Interventions:- What would you like us to know as we care for you?- Provide timely, complete, and accurate information to patient/family- Incorporate patient and family knowledge, values, beliefs, and cultural backgrounds into the planning and delivery of care- Encourage patient/family to participate in care and decision-making at the level they choose- Honor patient and family perspectives and choices  Outcome: Progressing     Problem: Patient/Family Goals  Goal: Patient/Family Long Term Goal  Description: Patient's Long Term Goal: Interventions:- - See additional Care Plan goals for specific interventions  Outcome: Progressing  Goal: Patient/Family Short Term Goal  Description: Patient's Short Term Goal: Interventions: - - See additional Care Plan goals for specific interventions  Outcome: Progressing     Problem: CARDIOVASCULAR - ADULT  Goal: Maintains optimal cardiac output and hemodynamic stability  Description: INTERVENTIONS:- Monitor vital signs, rhythm, and trends- Monitor for bleeding, hypotension and signs of decreased cardiac output- Evaluate effectiveness of vasoactive medications to optimize hemodynamic stability- Monitor arterial and/or venous puncture sites for bleeding and/or hematoma- Assess quality of pulses, skin color and temperature- Assess for signs of decreased coronary artery perfusion - ex. Angina- Evaluate fluid balance, assess for edema, trend weights  Outcome: Progressing     Problem: RESPIRATORY - ADULT  Goal: Achieves optimal ventilation and  oxygenation  Description: INTERVENTIONS:- Assess for changes in respiratory status- Assess for changes in mentation and behavior- Position to facilitate oxygenation and minimize respiratory effort- Oxygen supplementation based on oxygen saturation or ABGs- Provide Smoking Cessation handout, if applicable- Encourage broncho-pulmonary hygiene including cough, deep breathe, Incentive Spirometry- Assess the need for suctioning and perform as needed- Assess and instruct to report SOB or any respiratory difficulty- Respiratory Therapy support as indicated- Manage/alleviate anxiety- Monitor for signs/symptoms of CO2 retention  Outcome: Progressing     Problem: METABOLIC/FLUID AND ELECTROLYTES - ADULT  Goal: Glucose maintained within prescribed range  Description: INTERVENTIONS:- Monitor Blood Glucose as ordered- Assess for signs and symptoms of hyperglycemia and hypoglycemia- Administer ordered medications to maintain glucose within target range- Assess barriers to adequate nutritional intake and initiate nutrition consult as needed- Instruct patient on self management of diabetes  Outcome: Progressing  Goal: Electrolytes maintained within normal limits  Description: INTERVENTIONS:- Monitor labs and rhythm and assess patient for signs and symptoms of electrolyte imbalances- Administer electrolyte replacement as ordered- Monitor response to electrolyte replacements, including rhythm and repeat lab results as appropriate- Fluid restriction as ordered- Instruct patient on fluid and nutrition restrictions as appropriate  Outcome: Progressing  Goal: Hemodynamic stability and optimal renal function maintained  Description: INTERVENTIONS:- Monitor labs and assess for signs and symptoms of volume excess or deficit- Monitor intake, output and patient weight- Monitor urine specific gravity, serum osmolarity and serum sodium as indicated or ordered- Monitor response to interventions for patient's volume status, including labs, urine  output, blood pressure (other measures as available)- Encourage oral intake as appropriate- Instruct patient on fluid and nutrition restrictions as appropriate  Outcome: Progressing

## 2025-04-29 NOTE — CM/SW NOTE
CM faxed over additional clinic this am to [facility] admissions dept for Shavon to receive HD.    Also included her ABX order for dc.    [facility] did confirm that Dr. Delaney is her primary nephrologist and he goes to [facility] in [place].    [facility] notified of attending nephrologist provider and location via phone.    [facility] aware that Shavon has been cleared for dc today and that we are awaiting her dialysis seat schedule.    Etelvina Ritchie MBA BSN RN CRRAGUILAR BOOKER  RN Case Manager  [phone]

## 2025-04-29 NOTE — PROGRESS NOTES
INFECTIOUS DISEASE PROGRESS NOTE  [facility]  part of [facility] ID PROGRESS NOTE    Shavon Stuart Patient Status:  Inpatient    1970 MRN [id number]   Location [facility] 2W/SW Attending Contreras Alex MD   Hosp Day # 7 PCP No primary care provider on file.     Subjective:  ROS reviewed. Feels better. Tolerating diet but still with some nausea.    ASSESSMENT:    Antibiotics: Meropenem, rifampin  Vancomycin, zosyn     # Acute fever with abdominal pain, leukocytosis, PD catheter associated SBP with PSAR   -CT A/P with enterocolitis   -Blood cx NGTD   -Patients reports two other episodes of peritonitis in past   -S/p PD catheter removal   # H/o latent TB               -Previously was on rifampin 3/2024 but only completed two months, restarted early 2025  # ESRD on PD     PLAN:  -  Continue on meropenem but will plan to DC on ceftazidime post-HD until 27.  -  Continue on rifampin.  -  Follow fever curve, wbc.  -  Reviewed labs, micro, imaging reports, available old records.  -  Case d/w patient, pulmonary PAFLORA, RN.     History of Present Illness:  Shavon Stuart is a 54 year old , Tajik speaking female with a history of ESRD on PD, NICM, currently on treatment with rifampin for latent TB since early April after having cardiac workup at [facility] (previously took rifampin 3/2024-2024 but treatment stopped due to medication interactions), who presented to [facility] ED on  with worsening abdominal pain. States that she had a fever on Monday night and then had ongoing abdominal pain. No diarrhea. On arrival, Tmax 101.2, wbc 7.4, lactate 4.2, hypotensive and started on pressors, CXR unremarkable, blood cx obtained, body fluid cx from PD fluid obtained, started on vancomycin and zosyn.     Physical Exam:  /84 (BP Location: Right arm)   Pulse 81   Temp 98.4 °F (36.9 °C) (Oral)   Resp 18   Ht 5' (1.524 m)   Wt 138 lb 3.2 oz (62.7 kg)    SpO2 98%   BMI 26.99 kg/m²     Gen:   Awake, in bed  HEENT:  EOMI, neck supple  CV/lungs:  RRR, CTAB  Abdom:  Soft, mild TTP, previous PD catheter site c/d/i  Skin/extrem:  No rashes, no c/c/e  Lines:  HD catheter+    Laboratory Data: Reviewed    Microbiology: Reviewed    Radiology: Reviewed      [facility]KESH[facility]  [phone]  4/29/2025

## 2025-04-29 NOTE — PLAN OF CARE
Patient is alert and oriented x4 with stable vitals on room air. Pain is controlled utilizing prescribed regimen. Patient is independently ambulatory in the room with no device. Patient is progressing in plan of care towards discharge. Bed is currently locked and in lowest position. Call light and belongings are within reach.     Problem: Patient Centered Care  Goal: Patient preferences are identified and integrated in the patient's plan of care  Description: Interventions:- What would you like us to know as we care for you? Patient is from home- Provide timely, complete, and accurate information to patient/family- Incorporate patient and family knowledge, values, beliefs, and cultural backgrounds into the planning and delivery of care- Encourage patient/family to participate in care and decision-making at the level they choose- Honor patient and family perspectives and choices  Outcome: Progressing     Problem: Patient/Family Goals  Goal: Patient/Family Long Term Goal  Description: Patient's Long Term Goal: Discharge to home Interventions:- Assist patient with management of hemodialysis- See additional Care Plan goals for specific interventions  Outcome: Progressing  Goal: Patient/Family Short Term Goal  Description: Patient's Short Term Goal: continue to manage pain Interventions: - Administer medications as they are ordered- See additional Care Plan goals for specific interventions  Outcome: Progressing     Problem: CARDIOVASCULAR - ADULT  Goal: Maintains optimal cardiac output and hemodynamic stability  Description: INTERVENTIONS:- Monitor vital signs, rhythm, and trends- Monitor for bleeding, hypotension and signs of decreased cardiac output- Evaluate effectiveness of vasoactive medications to optimize hemodynamic stability- Monitor arterial and/or venous puncture sites for bleeding and/or hematoma- Assess quality of pulses, skin color and temperature- Assess for signs of decreased coronary artery perfusion -  ex. Angina- Evaluate fluid balance, assess for edema, trend weights  Outcome: Progressing     Problem: RESPIRATORY - ADULT  Goal: Achieves optimal ventilation and oxygenation  Description: INTERVENTIONS:- Assess for changes in respiratory status- Assess for changes in mentation and behavior- Position to facilitate oxygenation and minimize respiratory effort- Oxygen supplementation based on oxygen saturation or ABGs- Provide Smoking Cessation handout, if applicable- Encourage broncho-pulmonary hygiene including cough, deep breathe, Incentive Spirometry- Assess the need for suctioning and perform as needed- Assess and instruct to report SOB or any respiratory difficulty- Respiratory Therapy support as indicated- Manage/alleviate anxiety- Monitor for signs/symptoms of CO2 retention  Outcome: Progressing     Problem: METABOLIC/FLUID AND ELECTROLYTES - ADULT  Goal: Glucose maintained within prescribed range  Description: INTERVENTIONS:- Monitor Blood Glucose as ordered- Assess for signs and symptoms of hyperglycemia and hypoglycemia- Administer ordered medications to maintain glucose within target range- Assess barriers to adequate nutritional intake and initiate nutrition consult as needed- Instruct patient on self management of diabetes  Outcome: Progressing  Goal: Electrolytes maintained within normal limits  Description: INTERVENTIONS:- Monitor labs and rhythm and assess patient for signs and symptoms of electrolyte imbalances- Administer electrolyte replacement as ordered- Monitor response to electrolyte replacements, including rhythm and repeat lab results as appropriate- Fluid restriction as ordered- Instruct patient on fluid and nutrition restrictions as appropriate  Outcome: Progressing  Goal: Hemodynamic stability and optimal renal function maintained  Description: INTERVENTIONS:- Monitor labs and assess for signs and symptoms of volume excess or deficit- Monitor intake, output and patient weight- Monitor urine  specific gravity, serum osmolarity and serum sodium as indicated or ordered- Monitor response to interventions for patient's volume status, including labs, urine output, blood pressure (other measures as available)- Encourage oral intake as appropriate- Instruct patient on fluid and nutrition restrictions as appropriate  Outcome: Progressing

## 2025-04-29 NOTE — PROGRESS NOTES
[facility]  part of [facility]    Progress Note    Shavon Stuart Patient Status:  Inpatient    1970 MRN [id number]   Location [facility]5W Attending Contreras Alex MD   Hosp Day # 7 PCP No primary care provider on file.       Subjective:   Shavon Stuart is a(n) 54 year old female     ROS:     Constitutional:  feels f ine  ENMT:  Negative for ear drainage, hearing loss and nasal drainage  Eyes:  Negative for eye discharge and vision loss  Cardiovascular:  Negative for chest pain, sob, irregular heartbeat/palpitations  Respiratory:  Negative for cough, dyspnea and wheezing  Gastrointestinal:  Negative for abdominal pain, constipation, decreased appetite, diarrhea and vomiting  Genitourinary:  Negative for dysuria and hematuria  Endocrine:  Negative for abnormal sleep patterns, increased activity, polydipsia and polyphagia  Hema/Lymph:  Negative for easy bleeding and easy bruising  Integumentary:  Negative for pruritus and rash  Musculoskeletal:  Negative for bone/joint symptoms  Neurological:  Negative for gait disturbance  Psychiatric:  Negative for inappropriate interaction and psychiatric symptoms      Vitals:    25 1712   BP: 127/76   Pulse: 79   Resp: 18   Temp: 97.8 °F (36.6 °C)           PHYSICAL EXAM:   Constitutional: appears well hydrated alert and responsive no acute distress noted  Head/Face: normocephalic  Eyes/Vision: normal extraocular motion is intact  Nose/Mouth/Throat:mucous membranes are moist and no oral lesions are noted  Neck/Thyroid: neck is supple without adenopathy  Lymphatic: no abnormal cervical, supraclavicular adenopathy is noted  Respiratory:  lungs are clear to auscultation bilaterally, normal respiratory effort  Cardiovascular: regular rate and rhythm no murmurs, gallups, or rubs  Abdomen: soft, non-tender, non-distended, BS normal  Vascular: well perfused femoral, and pedal pulses normal  Skin/Hair: no unusual rashes present, no  abnormal bruising noted  Back/Spine: no abnormalities noted  Musculoskeletal: full ROM all extremities good strength  no deformities  Extremities: no edema, cyanosis  Neurological:  Grossly normal    Results:     Laboratory Data:  Lab Results   Component Value Date    WBC 5.3 04/28/2025    HGB 8.2 (L) 04/28/2025    HCT 25.4 (L) 04/28/2025    .0 (L) 04/28/2025    CREATSERUM 7.48 (H) 04/28/2025    BUN 41 (H) 04/28/2025     04/28/2025    K 4.2 04/28/2025     04/28/2025    CO2 24.0 04/28/2025     (H) 04/28/2025    CA 8.0 (L) 04/28/2025    ALB 2.8 (L) 04/26/2025    ALKPHO 102 04/26/2025    BILT 1.1 04/26/2025    TP 5.2 (L) 04/26/2025     (H) 04/26/2025     (H) 04/26/2025    PTT 28.1 04/22/2025    INR 1.03 04/22/2025    MG 1.8 04/26/2025    PHOS 3.3 04/26/2025       Imaging:  [unfilled]   XR CHEST AP PORTABLE  (CPT=71045)  Result Date: 4/29/2025  CONCLUSION:   Improved aeration right lung base.    Dictated by (CST): Jimmy Alex MD on 4/29/2025 at 7:21 AM     Finalized by (CST): Jimmy Alex MD on 4/29/2025 at 7:22 AM              ASSESSMENT/PLAN:   Assessment       1 ESRD on hemodialysis plan dialysis tomorrow  For peritonitis on meropenem  To go over to [facility] when she gets outpatient dialysis until May 16     Appreciate ID input patient afebrile continue rifampin for completely not treated tuberculosis    On Epogen for anemia okay from my standpoint for discharge tomorrow  4/29/2025  Reed Fernández MD

## 2025-04-30 VITALS
RESPIRATION RATE: 18 BRPM | TEMPERATURE: 97 F | OXYGEN SATURATION: 98 % | SYSTOLIC BLOOD PRESSURE: 124 MMHG | BODY MASS INDEX: 29.32 KG/M2 | DIASTOLIC BLOOD PRESSURE: 82 MMHG | WEIGHT: 149.31 LBS | HEART RATE: 83 BPM | HEIGHT: 60 IN

## 2025-04-30 LAB
ALBUMIN SERPL-MCNC: 3.1 G/DL (ref 3.2–4.8)
ALBUMIN/GLOB SERPL: 1.2 {RATIO} (ref 1–2)
ALP LIVER SERPL-CCNC: 252 U/L (ref 41–108)
ALT SERPL-CCNC: 109 U/L (ref 10–49)
ANION GAP SERPL CALC-SCNC: 7 MMOL/L (ref 0–18)
AST SERPL-CCNC: 57 U/L (ref ?–34)
BASOPHILS # BLD AUTO: 0.03 X10(3) UL (ref 0–0.2)
BASOPHILS NFR BLD AUTO: 0.8 %
BILIRUB SERPL-MCNC: 0.9 MG/DL (ref 0.3–1.2)
BUN BLD-MCNC: 15 MG/DL (ref 9–23)
BUN/CREAT SERPL: 4.7 (ref 10–20)
CALCIUM BLD-MCNC: 7.9 MG/DL (ref 8.7–10.4)
CHLORIDE SERPL-SCNC: 99 MMOL/L (ref 98–112)
CO2 SERPL-SCNC: 32 MMOL/L (ref 21–32)
CREAT BLD-MCNC: 3.17 MG/DL (ref 0.55–1.02)
DEPRECATED RDW RBC AUTO: 61.5 FL (ref 35.1–46.3)
EGFRCR SERPLBLD CKD-EPI 2021: 17 ML/MIN/1.73M2 (ref 60–?)
EOSINOPHIL # BLD AUTO: 0.21 X10(3) UL (ref 0–0.7)
EOSINOPHIL NFR BLD AUTO: 5.6 %
ERYTHROCYTE [DISTWIDTH] IN BLOOD BY AUTOMATED COUNT: 18.1 % (ref 11–15)
GLOBULIN PLAS-MCNC: 2.6 G/DL (ref 2–3.5)
GLUCOSE BLD-MCNC: 89 MG/DL (ref 70–99)
HCT VFR BLD AUTO: 28.1 % (ref 35–48)
HGB BLD-MCNC: 9.1 G/DL (ref 12–16)
IMM GRANULOCYTES # BLD AUTO: 0.16 X10(3) UL (ref 0–1)
IMM GRANULOCYTES NFR BLD: 4.3 %
LYMPHOCYTES # BLD AUTO: 0.61 X10(3) UL (ref 1–4)
LYMPHOCYTES NFR BLD AUTO: 16.4 %
MAGNESIUM SERPL-MCNC: 1.8 MG/DL (ref 1.6–2.6)
MCH RBC QN AUTO: 31.2 PG (ref 26–34)
MCHC RBC AUTO-ENTMCNC: 32.4 G/DL (ref 31–37)
MCV RBC AUTO: 96.2 FL (ref 80–100)
MONOCYTES # BLD AUTO: 0.32 X10(3) UL (ref 0.1–1)
MONOCYTES NFR BLD AUTO: 8.6 %
NEUTROPHILS # BLD AUTO: 2.4 X10 (3) UL (ref 1.5–7.7)
NEUTROPHILS # BLD AUTO: 2.4 X10(3) UL (ref 1.5–7.7)
NEUTROPHILS NFR BLD AUTO: 64.3 %
OSMOLALITY SERPL CALC.SUM OF ELEC: 286 MOSM/KG (ref 275–295)
PHOSPHATE SERPL-MCNC: 2.2 MG/DL (ref 2.4–5.1)
PLATELET # BLD AUTO: 149 10(3)UL (ref 150–450)
POTASSIUM SERPL-SCNC: 4.8 MMOL/L (ref 3.5–5.1)
PROT SERPL-MCNC: 5.7 G/DL (ref 5.7–8.2)
RBC # BLD AUTO: 2.92 X10(6)UL (ref 3.8–5.3)
SODIUM SERPL-SCNC: 138 MMOL/L (ref 136–145)
WBC # BLD AUTO: 3.7 X10(3) UL (ref 4–11)

## 2025-04-30 PROCEDURE — 90935 HEMODIALYSIS ONE EVALUATION: CPT | Performed by: INTERNAL MEDICINE

## 2025-04-30 RX ORDER — CEFTAZIDIME 2 G/1
2 INJECTION, POWDER, FOR SOLUTION INTRAVENOUS SEE ADMIN INSTRUCTIONS
Qty: 1 EACH | Refills: 0 | Status: SHIPPED | OUTPATIENT
Start: 2025-04-30

## 2025-04-30 NOTE — CM/SW NOTE
MOSES spoke with Dr. Fernández and informed him that patient has a Tues, Thursday and Saturday dialysis schedule.  Patient has received HD on Monday and Wed so far this week.  Per Dr. Fernández, patient can have her Thursday schedule as set up at  Renal located in [place].  Per Dr. Fernández, she can then skip her Saturday session on 5/2.  The following week, she is to stay on the course of Tues, Thurs, Sat as the seat has been arranged.     [facility] In [place] called- spoke to Karri Zeng made aware of modification to schedule since HD days are changing mid-week.  Also informed Karri of the need for abx after HD treatment.  MOSES sent the abx order directly to  [facility] today.  Yesterday the abx order was sent to the Main  Renal admissions team.    Etelvina Ritchie MBA BSN RN CRRAGUILAR BOOKER  RN Case Manager  [phone]

## 2025-04-30 NOTE — PLAN OF CARE
Monitoring vital signs, stable at this time. No acute changes at this moment. Antihistamine medication provided as needed. Fall precautions in place- bed alarm on, bed locked in lowest position, call light within reach. Frequent rounding by nursing staff.         Problem: Patient Centered Care  Goal: Patient preferences are identified and integrated in the patient's plan of care  Description: Interventions:- What would you like us to know as we care for you? - Provide timely, complete, and accurate information to patient/family- Incorporate patient and family knowledge, values, beliefs, and cultural backgrounds into the planning and delivery of care- Encourage patient/family to participate in care and decision-making at the level they choose- Honor patient and family perspectives and choices  Outcome: Progressing     Problem: Patient/Family Goals  Goal: Patient/Family Long Term Goal  Description: Patient's Long Term Goal: Interventions:- - See additional Care Plan goals for specific interventions  Outcome: Progressing  Goal: Patient/Family Short Term Goal  Description: Patient's Short Term Goal: Interventions: - - See additional Care Plan goals for specific interventions  Outcome: Progressing     Problem: CARDIOVASCULAR - ADULT  Goal: Maintains optimal cardiac output and hemodynamic stability  Description: INTERVENTIONS:- Monitor vital signs, rhythm, and trends- Monitor for bleeding, hypotension and signs of decreased cardiac output- Evaluate effectiveness of vasoactive medications to optimize hemodynamic stability- Monitor arterial and/or venous puncture sites for bleeding and/or hematoma- Assess quality of pulses, skin color and temperature- Assess for signs of decreased coronary artery perfusion - ex. Angina- Evaluate fluid balance, assess for edema, trend weights  Outcome: Progressing     Problem: RESPIRATORY - ADULT  Goal: Achieves optimal ventilation and oxygenation  Description: INTERVENTIONS:- Assess for  changes in respiratory status- Assess for changes in mentation and behavior- Position to facilitate oxygenation and minimize respiratory effort- Oxygen supplementation based on oxygen saturation or ABGs- Provide Smoking Cessation handout, if applicable- Encourage broncho-pulmonary hygiene including cough, deep breathe, Incentive Spirometry- Assess the need for suctioning and perform as needed- Assess and instruct to report SOB or any respiratory difficulty- Respiratory Therapy support as indicated- Manage/alleviate anxiety- Monitor for signs/symptoms of CO2 retention  Outcome: Progressing     Problem: METABOLIC/FLUID AND ELECTROLYTES - ADULT  Goal: Glucose maintained within prescribed range  Description: INTERVENTIONS:- Monitor Blood Glucose as ordered- Assess for signs and symptoms of hyperglycemia and hypoglycemia- Administer ordered medications to maintain glucose within target range- Assess barriers to adequate nutritional intake and initiate nutrition consult as needed- Instruct patient on self management of diabetes  Outcome: Progressing  Goal: Electrolytes maintained within normal limits  Description: INTERVENTIONS:- Monitor labs and rhythm and assess patient for signs and symptoms of electrolyte imbalances- Administer electrolyte replacement as ordered- Monitor response to electrolyte replacements, including rhythm and repeat lab results as appropriate- Fluid restriction as ordered- Instruct patient on fluid and nutrition restrictions as appropriate  Outcome: Progressing  Goal: Hemodynamic stability and optimal renal function maintained  Description: INTERVENTIONS:- Monitor labs and assess for signs and symptoms of volume excess or deficit- Monitor intake, output and patient weight- Monitor urine specific gravity, serum osmolarity and serum sodium as indicated or ordered- Monitor response to interventions for patient's volume status, including labs, urine output, blood pressure (other measures as available)-  Encourage oral intake as appropriate- Instruct patient on fluid and nutrition restrictions as appropriate  Outcome: Progressing

## 2025-04-30 NOTE — DISCHARGE PLANNING
Discharge order in per MD. Discharges instructions given to patient and family . PIV removed. Patient and family  educated on important follow ups and medication regimen. Patient discharged home  via family and private vehicle with outpatient dialysis. Patient updated on dialysis dates and plan

## 2025-04-30 NOTE — PROGRESS NOTES
INFECTIOUS DISEASE PROGRESS NOTE  [facility]  part of [facility] ID PROGRESS NOTE    Shavon Stuart Patient Status:  Inpatient    1970 MRN [id number]   Location [facility] 2W/SW Attending Contreras Alex MD   Hosp Day # 8 PCP No primary care provider on file.     Subjective:  ROS reviewed. Seen on HD. No complaints of pain.    ASSESSMENT:    Antibiotics: Meropenem, rifampin  Vancomycin, zosyn     # Acute fever with abdominal pain, leukocytosis, PD catheter associated SBP with PSAR   -CT A/P with enterocolitis   -Blood cx NGTD   -Patients reports two other episodes of peritonitis in past   -S/p PD catheter removal   # H/o latent TB               -Previously was on rifampin 3/2024 but only completed two months, restarted early 2025  # ESRD on PD     PLAN:  -  Continue on meropenem but will plan to DC on ceftazidime post-HD until 25.  -  Continue on rifampin.  -  Follow fever curve, wbc.  -  Reviewed labs, micro, imaging reports, available old records.  -  Case d/w patient, social work, RN.     History of Present Illness:  Shavon Stuart is a 54 year old , Irish speaking female with a history of ESRD on PD, NICM, currently on treatment with rifampin for latent TB since early April after having cardiac workup at [facility] (previously took rifampin 3/2024-2024 but treatment stopped due to medication interactions), who presented to [facility] ED on  with worsening abdominal pain. States that she had a fever on Monday night and then had ongoing abdominal pain. No diarrhea. On arrival, Tmax 101.2, wbc 7.4, lactate 4.2, hypotensive and started on pressors, CXR unremarkable, blood cx obtained, body fluid cx from PD fluid obtained, started on vancomycin and zosyn.     Physical Exam:  /82 (BP Location: Right arm)   Pulse 83   Temp 97.3 °F (36.3 °C) (Oral)   Resp 18   Ht 5' (1.524 m)   Wt 149 lb 4.8 oz (67.7 kg)   SpO2 98%   BMI 29.16 kg/m²      Gen:   Awake, in bed  HEENT:  EOMI, neck supple  CV/lungs:  Regular rate and rhythm, CTAB  Abdom:  Soft, mild TTP, previous PD catheter site c/d/i  Skin/extrem:  No rashes, no c/c/e  Lines:  HD catheter+    Laboratory Data: Reviewed    Microbiology: Reviewed    Radiology: Reviewed      LAKESHIA [facility]  [phone]  4/30/2025

## 2025-04-30 NOTE — DISCHARGE INSTRUCTIONS
Your dialysis seat is in the instructions I gave you.    You are scheduled Tuesday, Thursday, Sat at the  Renal site located in [place].  Your time is set for 0630.  Please arrive 30 minutes early for your first session on Thursday 5/1.  This will be your third dialysis session this week.  According to Dr. Fernández you will skip your Saturday session 5/3 and then resume your sessions on Tues, Thurs, Sat the following week.  You will receive antibiotics at [facility], please give a copy of the antibiotic prescription to your HD provider.    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

## 2025-04-30 NOTE — PROGRESS NOTES
[facility]  part of [facility]    Hospitalist Progress Note     Shavon Stuart Patient Status:  Inpatient    1970  54 year old CSN [id number]   Location 220/220-A Attending Contreras Alex MD   Hosp Day # 7 PCP No primary care provider on file.         Subjective:   ----------------------------------  Pt seen resting in bed today. Doing much better, tolerating HD, abdominal pain has resolved.       Objective:   Chief Complaint:   Chief Complaint   Patient presents with    Abdomen/Flank Pain     ----------------------------------  Temp:  [97.8 °F (36.6 °C)-98.5 °F (36.9 °C)] 98.4 °F (36.9 °C)  Pulse:  [] 103  Resp:  [18-20] 18  BP: (117-134)/(62-91) 134/91  SpO2:  [98 %-100 %] 99 %  Gen: A+Ox3.  No distress.   HEENT: NCAT, neck supple, no carotid bruit.  CV: RRR, S1S2, and intact distal pulses. No gallop, rub, murmur.  Pulm: Effort and breath sounds normal. No distress, wheezes, rales, rhonchi.  Abd: Soft, NTND, BS normal, no mass, no HSM, no rebound/guarding.   Neuro: Normal reflexes, CN. Sensory/motor exams grossly normal deficit.   MS: No joint effusions.  No peripheral edema.  Skin: Skin is warm and dry. No rashes, erythema, diaphoresis.   Psych: Normal mood and affect. Calm, cooperative    Labs:  Lab Results   Component Value Date    HGB 8.2 (L) 2025    WBC 5.3 2025    .0 (L) 2025     2025    K 4.2 2025    CREATSERUM 7.48 (H) 2025    INR 1.03 2025     (H) 2025     (H) 2025           Scheduled Medications[1]  PRN Medications[2]      Other problems      Supplementary Documentation:   DVT Mechanical Prophylaxis:        DVT Pharmacologic Prophylaxis   Medication    heparin (Porcine) 1000 UNIT/ML injection 1,500 Units    heparin (Porcine) 5000 UNIT/ML injection 5,000 Units                Code Status: Not on file  Cat: No urinary catheter in place  Cat Duration (in days):   Central line:     HEMALATHA: 4/30/2025       I personally reviewed the available laboratories, imaging including. I discussed/will discuss the case with consultants. I ordered laboratories and/or radiographic studies. I adjusted medications as detailed above.  Medical decision making high, risk is high.    Assessment & Plan:   ----------------------------------    Acute sepsis   Resolved  +Spontaneous bacterial peritonitis.    Dialysate fluid culture reviewed  blood cultures reviewed  Pulm recs appreciated  Trend lactic acid, 3.5 > 3.8  Cont IV meropenem  Cont pressor support, wean as tolerated  ID consult, recs appreciated  Cardiology consult      End-stage renal disease,   on peritoneal dialysis.  Nephrology consult   IR to place permacath 4/25  Removal of PD catheter with Dr. Pollack 4/26  Cont HD as per Renal   Will cont HD outpatient on M,W,F      Anemia  Hgb of 8.2  Transfuse 1 unit prbc 4/25            Latent tuberculosis.    No isolation needed  ID consulted, recs appreciated  Cont rifampin       FULL CODE  DVT px: subcutaneous heparin    Dispo: awaiting HD chair, DC home tomorrow once insurance approves site    Contreras Alex MD  4/29/2025         [1]    potassium chloride  20 mEq Oral BID    acetaminophen  1,000 mg Oral Q8H    meropenem  500 mg Intravenous Q24H    epoetin gabriela  5,000 Units Subcutaneous Once per day on Monday Wednesday Friday    rifAMPin  600 mg Oral Daily @ 0700    heparin  5,000 Units Subcutaneous 2 times per day   [2]   diphenhydrAMINE    heparin    sodium chloride **AND** albumin human    diphenhydrAMINE-zinc    calcium carbonate    traMADol    HYDROmorphone **OR** HYDROmorphone **OR** HYDROmorphone    ondansetron    prochlorperazine

## 2025-04-30 NOTE — PLAN OF CARE
Problem: Patient Centered Care  Goal: Patient preferences are identified and integrated in the patient's plan of care  Description: Interventions:- What would you like us to know as we care for you? - Provide timely, complete, and accurate information to patient/family- Incorporate patient and family knowledge, values, beliefs, and cultural backgrounds into the planning and delivery of care- Encourage patient/family to participate in care and decision-making at the level they choose- Honor patient and family perspectives and choices  Outcome: Adequate for Discharge     Problem: Patient/Family Goals  Goal: Patient/Family Long Term Goal  Description: Patient's Long Term Goal: Interventions:- - See additional Care Plan goals for specific interventions  Outcome: Adequate for Discharge  Goal: Patient/Family Short Term Goal  Description: Patient's Short Term Goal: Interventions: - - See additional Care Plan goals for specific interventions  Outcome: Adequate for Discharge     Problem: CARDIOVASCULAR - ADULT  Goal: Maintains optimal cardiac output and hemodynamic stability  Description: INTERVENTIONS:- Monitor vital signs, rhythm, and trends- Monitor for bleeding, hypotension and signs of decreased cardiac output- Evaluate effectiveness of vasoactive medications to optimize hemodynamic stability- Monitor arterial and/or venous puncture sites for bleeding and/or hematoma- Assess quality of pulses, skin color and temperature- Assess for signs of decreased coronary artery perfusion - ex. Angina- Evaluate fluid balance, assess for edema, trend weights  Outcome: Adequate for Discharge     Problem: RESPIRATORY - ADULT  Goal: Achieves optimal ventilation and oxygenation  Description: INTERVENTIONS:- Assess for changes in respiratory status- Assess for changes in mentation and behavior- Position to facilitate oxygenation and minimize respiratory effort- Oxygen supplementation based on oxygen saturation or ABGs- Provide Smoking  Cessation handout, if applicable- Encourage broncho-pulmonary hygiene including cough, deep breathe, Incentive Spirometry- Assess the need for suctioning and perform as needed- Assess and instruct to report SOB or any respiratory difficulty- Respiratory Therapy support as indicated- Manage/alleviate anxiety- Monitor for signs/symptoms of CO2 retention  Outcome: Adequate for Discharge     Problem: METABOLIC/FLUID AND ELECTROLYTES - ADULT  Goal: Glucose maintained within prescribed range  Description: INTERVENTIONS:- Monitor Blood Glucose as ordered- Assess for signs and symptoms of hyperglycemia and hypoglycemia- Administer ordered medications to maintain glucose within target range- Assess barriers to adequate nutritional intake and initiate nutrition consult as needed- Instruct patient on self management of diabetes  Outcome: Adequate for Discharge  Goal: Electrolytes maintained within normal limits  Description: INTERVENTIONS:- Monitor labs and rhythm and assess patient for signs and symptoms of electrolyte imbalances- Administer electrolyte replacement as ordered- Monitor response to electrolyte replacements, including rhythm and repeat lab results as appropriate- Fluid restriction as ordered- Instruct patient on fluid and nutrition restrictions as appropriate  Outcome: Adequate for Discharge  Goal: Hemodynamic stability and optimal renal function maintained  Description: INTERVENTIONS:- Monitor labs and assess for signs and symptoms of volume excess or deficit- Monitor intake, output and patient weight- Monitor urine specific gravity, serum osmolarity and serum sodium as indicated or ordered- Monitor response to interventions for patient's volume status, including labs, urine output, blood pressure (other measures as available)- Encourage oral intake as appropriate- Instruct patient on fluid and nutrition restrictions as appropriate  Outcome: Adequate for Discharge

## 2025-05-01 ENCOUNTER — PATIENT OUTREACH (OUTPATIENT)
Dept: CASE MANAGEMENT | Age: 55
End: 2025-05-01

## 2025-05-01 NOTE — PAYOR COMM NOTE
--------------  DISCHARGE REVIEW    Payor: [facility]  Subscriber #:  [id number]  Authorization Number: [id number]    Admit date: 4/22/25  Admit time:   6:18 PM  Discharge Date: 4/30/2025  3:27 PM     Admitting Physician: Heath Kamara MD  Attending Physician:  No att. providers found  Primary Care Physician: No primary care provider on file.         Discharge order in per MD. Discharges instructions given to patient and family . PIV removed. Patient and family  educated on important follow ups and medication regimen. Patient discharged home  via family and private vehicle with outpatient dialysis. Patient updated on dialysis dates and plan                Electronically signed by Renee Ortega RN at 4/30/2025  1:16 PM

## 2025-05-01 NOTE — PROGRESS NOTES
TCM Request   Hospital Follow up for PCP (Discharge 4/30elm)    PCP   No pcp on file    Attempt #1:  Left message on voicemail for patient to call transitions specialist back to schedule follow up appointments. Provided Transitions specialist scheduling phone number [phone].

## 2025-05-02 NOTE — PROGRESS NOTES
TCM assist.    PCP request.    Attempt #2:  Left message on voicemail for patient to call transitions specialist back to schedule follow up appointments. Provided Transitions specialist scheduling phone number [phone].

## 2025-05-05 NOTE — PROGRESS NOTES
TCM assist.    PCP request.    Attempt #3:  Left message on voicemail for patient to call transitions specialist back to schedule follow up appointments. Provided Transitions specialist scheduling phone number [phone]. Closing encounter. Will re-open if patient returns call.

## 2025-05-12 ENCOUNTER — APPOINTMENT (OUTPATIENT)
Dept: CT IMAGING | Age: 55
DRG: 177 | End: 2025-05-12
Attending: EMERGENCY MEDICINE

## 2025-05-12 ENCOUNTER — HOSPITAL ENCOUNTER (INPATIENT)
Age: 55
Discharge: STILL ADMITTED OR ADMINISTRATIVE | DRG: 177 | End: 2025-05-12
Attending: EMERGENCY MEDICINE | Admitting: INTERNAL MEDICINE

## 2025-05-12 ENCOUNTER — APPOINTMENT (OUTPATIENT)
Dept: GENERAL RADIOLOGY | Age: 55
DRG: 177 | End: 2025-05-12
Attending: INTERNAL MEDICINE

## 2025-05-12 ENCOUNTER — APPOINTMENT (OUTPATIENT)
Dept: GENERAL RADIOLOGY | Age: 55
DRG: 177 | End: 2025-05-12
Attending: EMERGENCY MEDICINE

## 2025-05-12 DIAGNOSIS — I42.0 DILATED CARDIOMYOPATHY  (CMD): ICD-10-CM

## 2025-05-12 DIAGNOSIS — N18.6 END-STAGE RENAL DISEASE ON HEMODIALYSIS  (CMD): ICD-10-CM

## 2025-05-12 DIAGNOSIS — S22.43XA CLOSED FRACTURE OF MULTIPLE RIBS OF BOTH SIDES, INITIAL ENCOUNTER: ICD-10-CM

## 2025-05-12 DIAGNOSIS — I46.9 CARDIAC ARREST (CMD): ICD-10-CM

## 2025-05-12 DIAGNOSIS — Z95.810 ICD (IMPLANTABLE CARDIOVERTER-DEFIBRILLATOR) IN PLACE: Primary | ICD-10-CM

## 2025-05-12 DIAGNOSIS — S27.0XXA TRAUMATIC PNEUMOTHORAX, INITIAL ENCOUNTER: ICD-10-CM

## 2025-05-12 DIAGNOSIS — D63.1 ANEMIA ASSOCIATED WITH CHRONIC RENAL FAILURE: ICD-10-CM

## 2025-05-12 DIAGNOSIS — I42.8 NONISCHEMIC CARDIOMYOPATHY  (CMD): ICD-10-CM

## 2025-05-12 DIAGNOSIS — N18.9 ANEMIA ASSOCIATED WITH CHRONIC RENAL FAILURE: ICD-10-CM

## 2025-05-12 DIAGNOSIS — Z99.2 ESRD (END STAGE RENAL DISEASE) ON DIALYSIS  (CMD): ICD-10-CM

## 2025-05-12 DIAGNOSIS — I47.20 VENTRICULAR TACHYARRHYTHMIA  (CMD): ICD-10-CM

## 2025-05-12 DIAGNOSIS — N18.6 ESRD (END STAGE RENAL DISEASE) ON DIALYSIS  (CMD): ICD-10-CM

## 2025-05-12 DIAGNOSIS — Z99.2 END-STAGE RENAL DISEASE ON HEMODIALYSIS  (CMD): ICD-10-CM

## 2025-05-12 DIAGNOSIS — I46.9 CARDIOPULMONARY ARREST WITH SUCCESSFUL RESUSCITATION  (CMD): ICD-10-CM

## 2025-05-12 LAB
ALBUMIN SERPL-MCNC: 2.1 G/DL (ref 3.4–5)
ALBUMIN/GLOB SERPL: 0.6 {RATIO} (ref 1–2.4)
ALP SERPL-CCNC: 203 UNITS/L (ref 45–117)
ALT SERPL-CCNC: 26 UNITS/L
ANION GAP SERPL CALC-SCNC: 18 MMOL/L (ref 7–19)
AST SERPL-CCNC: 77 UNITS/L
BASE EXCESS / DEFICIT, ARTERIAL - RESPIRATORY: 8 MMOL/L (ref -2–3)
BASOPHILS # BLD: 0.2 K/MCL (ref 0–0.3)
BASOPHILS NFR BLD: 1 %
BDY SITE: ABNORMAL
BILIRUB SERPL-MCNC: 0.6 MG/DL (ref 0.2–1)
BODY TEMPERATURE: 37 DEGREES C
BUN SERPL-MCNC: 7 MG/DL (ref 6–20)
BUN/CREAT SERPL: 2 (ref 7–25)
CA-I BLD-SCNC: 1.21 MMOL/L (ref 1.15–1.29)
CALCIUM SERPL-MCNC: 9.7 MG/DL (ref 8.4–10.2)
CHLORIDE BLD-SCNC: 93 MMOL/L (ref 97–110)
CHLORIDE SERPL-SCNC: 93 MMOL/L (ref 97–110)
CO2 SERPL-SCNC: 26 MMOL/L (ref 21–32)
COHGB MFR BLDV: 1.6 %
CONDITION: ABNORMAL
CREAT SERPL-MCNC: 3.3 MG/DL (ref 0.51–0.95)
DEPRECATED RDW RBC: 58.4 FL (ref 39–50)
EGFRCR SERPLBLD CKD-EPI 2021: 16 ML/MIN/{1.73_M2}
EOSINOPHIL # BLD: 1.1 K/MCL (ref 0–0.5)
EOSINOPHIL NFR BLD: 7 %
ERYTHROCYTE [DISTWIDTH] IN BLOOD: 15.6 % (ref 11–15)
FASTING DURATION TIME PATIENT: ABNORMAL H
FIO2 ON VENT: 50 %
FLUAV RNA RESP QL NAA+PROBE: NOT DETECTED
FLUBV RNA RESP QL NAA+PROBE: NOT DETECTED
GLOBULIN SER-MCNC: 3.8 G/DL (ref 2–4)
GLUCOSE BLD-MCNC: 161 MG/DL (ref 65–99)
GLUCOSE BLDC GLUCOMTR-MCNC: 118 MG/DL (ref 70–99)
GLUCOSE BLDC GLUCOMTR-MCNC: 121 MG/DL (ref 70–99)
GLUCOSE BLDC GLUCOMTR-MCNC: 126 MG/DL (ref 70–99)
GLUCOSE SERPL-MCNC: 224 MG/DL (ref 70–99)
HCO3 BLDA-SCNC: 31 MMOL/L (ref 22–28)
HCT VFR BLD CALC: 25 % (ref 36–46.5)
HCT VFR BLD CALC: 26 % (ref 36–46.5)
HGB BLD-MCNC: 7.9 G/DL (ref 12–15.5)
HGB BLD-MCNC: 8.4 G/DL (ref 12–15.5)
HOROWITZ INDEX BLDA+IHG-RTO: 258 MM[HG] (ref 300–500)
IMM GRANULOCYTES # BLD AUTO: 0.3 K/MCL (ref 0–0.2)
IMM GRANULOCYTES # BLD: 2 %
LYMPHOCYTES # BLD: 4.5 K/MCL (ref 1–4)
LYMPHOCYTES NFR BLD: 32 %
MCH RBC QN AUTO: 31.3 PG (ref 26–34)
MCHC RBC AUTO-ENTMCNC: 30.4 G/DL (ref 32–36.5)
MCV RBC AUTO: 103.2 FL (ref 78–100)
METHGB MFR BLDMV: 0.8 %
MONOCYTES # BLD: 0.8 K/MCL (ref 0.3–0.9)
MONOCYTES NFR BLD: 5 %
NEUTROPHILS # BLD: 7.6 K/MCL (ref 1.8–7.7)
NEUTROPHILS NFR BLD: 53 %
NRBC BLD MANUAL-RTO: 0 /100 WBC
NT-PROBNP SERPL-MCNC: ABNORMAL PG/ML
OXYHGB MFR BLDA: 97.4 % (ref 94–98)
PCO2 BLDA: 36 MM HG (ref 32–45)
PH BLDA: 7.54 UNITS (ref 7.35–7.45)
PLATELET # BLD AUTO: 284 K/MCL (ref 140–450)
PO2 BLDA: 129 MM HG (ref 83–108)
POTASSIUM BLD-SCNC: 2.7 MMOL/L (ref 3.4–5.1)
POTASSIUM SERPL-SCNC: 3 MMOL/L (ref 3.4–5.1)
PROT SERPL-MCNC: 5.9 G/DL (ref 6.4–8.2)
RBC # BLD: 2.52 MIL/MCL (ref 4–5.2)
RSV AG NPH QL IA.RAPID: NOT DETECTED
SAO2 % BLDA: 100 % (ref 95–99)
SAO2 % BLDA: 12 % (ref 15–23)
SARS-COV-2 RNA RESP QL NAA+PROBE: NOT DETECTED
SERVICE CMNT-IMP: NORMAL
SERVICE CMNT-IMP: NORMAL
SODIUM BLD-SCNC: 132 MMOL/L (ref 135–145)
SODIUM SERPL-SCNC: 134 MMOL/L (ref 135–145)
TROPONIN I SERPL DL<=0.01 NG/ML-MCNC: 157 NG/L
WBC # BLD: 14.3 K/MCL (ref 4.2–11)

## 2025-05-12 PROCEDURE — 0BH17EZ INSERTION OF ENDOTRACHEAL AIRWAY INTO TRACHEA, VIA NATURAL OR ARTIFICIAL OPENING: ICD-10-PCS | Performed by: EMERGENCY MEDICINE

## 2025-05-12 PROCEDURE — 10002800 HB RX 250 W HCPCS: Performed by: NURSE PRACTITIONER

## 2025-05-12 PROCEDURE — 31500 INSERT EMERGENCY AIRWAY: CPT | Performed by: EMERGENCY MEDICINE

## 2025-05-12 PROCEDURE — 99291 CRITICAL CARE FIRST HOUR: CPT | Performed by: EMERGENCY MEDICINE

## 2025-05-12 PROCEDURE — 96374 THER/PROPH/DIAG INJ IV PUSH: CPT

## 2025-05-12 PROCEDURE — 0241U COVID/FLU/RSV PANEL: CPT | Performed by: EMERGENCY MEDICINE

## 2025-05-12 PROCEDURE — 93010 ELECTROCARDIOGRAM REPORT: CPT | Performed by: INTERNAL MEDICINE

## 2025-05-12 PROCEDURE — 85025 COMPLETE CBC W/AUTO DIFF WBC: CPT | Performed by: EMERGENCY MEDICINE

## 2025-05-12 PROCEDURE — 82947 ASSAY GLUCOSE BLOOD QUANT: CPT

## 2025-05-12 PROCEDURE — 93005 ELECTROCARDIOGRAM TRACING: CPT | Performed by: EMERGENCY MEDICINE

## 2025-05-12 PROCEDURE — 99292 CRITICAL CARE ADDL 30 MIN: CPT | Performed by: INTERNAL MEDICINE

## 2025-05-12 PROCEDURE — 84132 ASSAY OF SERUM POTASSIUM: CPT

## 2025-05-12 PROCEDURE — 10002801 HB RX 250 W/O HCPCS

## 2025-05-12 PROCEDURE — 10002800 HB RX 250 W HCPCS: Performed by: EMERGENCY MEDICINE

## 2025-05-12 PROCEDURE — 36600 WITHDRAWAL OF ARTERIAL BLOOD: CPT

## 2025-05-12 PROCEDURE — 71045 X-RAY EXAM CHEST 1 VIEW: CPT

## 2025-05-12 PROCEDURE — 74174 CTA ABD&PLVS W/CONTRAST: CPT

## 2025-05-12 PROCEDURE — 71275 CT ANGIOGRAPHY CHEST: CPT

## 2025-05-12 PROCEDURE — 10002803 HB RX 637: Performed by: INTERNAL MEDICINE

## 2025-05-12 PROCEDURE — 99291 CRITICAL CARE FIRST HOUR: CPT

## 2025-05-12 PROCEDURE — 10002805 HB CONTRAST AGENT: Performed by: EMERGENCY MEDICINE

## 2025-05-12 PROCEDURE — 10002807 HB RX 258: Performed by: EMERGENCY MEDICINE

## 2025-05-12 PROCEDURE — 87340 HEPATITIS B SURFACE AG IA: CPT | Performed by: INTERNAL MEDICINE

## 2025-05-12 PROCEDURE — 31500 INSERT EMERGENCY AIRWAY: CPT | Performed by: INTERNAL MEDICINE

## 2025-05-12 PROCEDURE — 83880 ASSAY OF NATRIURETIC PEPTIDE: CPT | Performed by: EMERGENCY MEDICINE

## 2025-05-12 PROCEDURE — 10002800 HB RX 250 W HCPCS

## 2025-05-12 PROCEDURE — 10002801 HB RX 250 W/O HCPCS: Performed by: EMERGENCY MEDICINE

## 2025-05-12 PROCEDURE — 82375 ASSAY CARBOXYHB QUANT: CPT

## 2025-05-12 PROCEDURE — 96375 TX/PRO/DX INJ NEW DRUG ADDON: CPT

## 2025-05-12 PROCEDURE — 0B21XEZ CHANGE ENDOTRACHEAL AIRWAY IN TRACHEA, EXTERNAL APPROACH: ICD-10-PCS | Performed by: INTERNAL MEDICINE

## 2025-05-12 PROCEDURE — 94002 VENT MGMT INPAT INIT DAY: CPT

## 2025-05-12 PROCEDURE — 10002803 HB RX 637: Performed by: NURSE PRACTITIONER

## 2025-05-12 PROCEDURE — 92950 HEART/LUNG RESUSCITATION CPR: CPT

## 2025-05-12 PROCEDURE — 10002800 HB RX 250 W HCPCS: Performed by: INTERNAL MEDICINE

## 2025-05-12 PROCEDURE — 10000008 HB ROOM CHARGE ICU OR CCU

## 2025-05-12 PROCEDURE — 10004651 HB RX, NO CHARGE ITEM: Performed by: NURSE PRACTITIONER

## 2025-05-12 PROCEDURE — 82962 GLUCOSE BLOOD TEST: CPT

## 2025-05-12 PROCEDURE — 82330 ASSAY OF CALCIUM: CPT

## 2025-05-12 PROCEDURE — 5A12012 PERFORMANCE OF CARDIAC OUTPUT, SINGLE, MANUAL: ICD-10-PCS | Performed by: EMERGENCY MEDICINE

## 2025-05-12 PROCEDURE — 85018 HEMOGLOBIN: CPT

## 2025-05-12 PROCEDURE — 84484 ASSAY OF TROPONIN QUANT: CPT | Performed by: EMERGENCY MEDICINE

## 2025-05-12 PROCEDURE — 96372 THER/PROPH/DIAG INJ SC/IM: CPT | Performed by: NURSE PRACTITIONER

## 2025-05-12 PROCEDURE — 10002803 HB RX 637: Performed by: EMERGENCY MEDICINE

## 2025-05-12 PROCEDURE — 80053 COMPREHEN METABOLIC PANEL: CPT | Performed by: EMERGENCY MEDICINE

## 2025-05-12 PROCEDURE — 99291 CRITICAL CARE FIRST HOUR: CPT | Performed by: INTERNAL MEDICINE

## 2025-05-12 PROCEDURE — 70450 CT HEAD/BRAIN W/O DYE: CPT

## 2025-05-12 PROCEDURE — 10004180 HB COUNTER-TRANSPORT

## 2025-05-12 PROCEDURE — 5A1935Z RESPIRATORY VENTILATION, LESS THAN 24 CONSECUTIVE HOURS: ICD-10-PCS | Performed by: EMERGENCY MEDICINE

## 2025-05-12 RX ORDER — ACETAMINOPHEN 325 MG/1
650 TABLET ORAL EVERY 4 HOURS PRN
Status: DISCONTINUED | OUTPATIENT
Start: 2025-05-12 | End: 2025-05-13

## 2025-05-12 RX ORDER — POTASSIUM CHLORIDE 29.8 MG/ML
20 INJECTION INTRAVENOUS ONCE
Status: COMPLETED | OUTPATIENT
Start: 2025-05-12 | End: 2025-05-12

## 2025-05-12 RX ORDER — CALCIUM CHLORIDE 100 MG/ML
1 INJECTION INTRAVENOUS; INTRAVENTRICULAR ONCE
Status: COMPLETED | OUTPATIENT
Start: 2025-05-12 | End: 2025-05-12

## 2025-05-12 RX ORDER — CARBOXYMETHYLCELLULOSE SODIUM 5 MG/ML
1 SOLUTION/ DROPS OPHTHALMIC
Status: DISCONTINUED | OUTPATIENT
Start: 2025-05-13 | End: 2025-05-13

## 2025-05-12 RX ORDER — INDOMETHACIN 25 MG/1
50 CAPSULE ORAL PRN
Status: COMPLETED | OUTPATIENT
Start: 2025-05-12 | End: 2025-05-12

## 2025-05-12 RX ORDER — FAMOTIDINE 10 MG/ML
20 INJECTION, SOLUTION INTRAVENOUS DAILY
Status: DISCONTINUED | OUTPATIENT
Start: 2025-05-13 | End: 2025-05-13

## 2025-05-12 RX ORDER — ETOMIDATE 2 MG/ML
20 INJECTION INTRAVENOUS ONCE
Status: COMPLETED | OUTPATIENT
Start: 2025-05-12 | End: 2025-05-12

## 2025-05-12 RX ORDER — ACETAMINOPHEN 650 MG/1
650 SUPPOSITORY RECTAL EVERY 4 HOURS PRN
Status: DISCONTINUED | OUTPATIENT
Start: 2025-05-12 | End: 2025-05-13

## 2025-05-12 RX ORDER — NOREPINEPHRINE BITARTRATE/D5W 8 MG/250ML
PLASTIC BAG, INJECTION (ML) INTRAVENOUS
Status: COMPLETED
Start: 2025-05-12 | End: 2025-05-12

## 2025-05-12 RX ORDER — CARBOXYMETHYLCELLULOSE SODIUM 5 MG/ML
1 SOLUTION/ DROPS OPHTHALMIC PRN
Status: DISCONTINUED | OUTPATIENT
Start: 2025-05-12 | End: 2025-05-13

## 2025-05-12 RX ORDER — CHLORHEXIDINE GLUCONATE ORAL RINSE 1.2 MG/ML
15 SOLUTION DENTAL EVERY 12 HOURS SCHEDULED
Status: DISCONTINUED | OUTPATIENT
Start: 2025-05-12 | End: 2025-05-13

## 2025-05-12 RX ORDER — SODIUM CHLORIDE 9 MG/ML
INJECTION, SOLUTION INTRAVENOUS CONTINUOUS PRN
Status: DISCONTINUED | OUTPATIENT
Start: 2025-05-12 | End: 2025-05-13

## 2025-05-12 RX ORDER — POTASSIUM CHLORIDE 29.8 MG/ML
20 INJECTION INTRAVENOUS ONCE
Status: COMPLETED | OUTPATIENT
Start: 2025-05-12 | End: 2025-05-13

## 2025-05-12 RX ORDER — 0.9 % SODIUM CHLORIDE 0.9 %
2 VIAL (ML) INJECTION EVERY 12 HOURS SCHEDULED
Status: DISCONTINUED | OUTPATIENT
Start: 2025-05-12 | End: 2025-05-24 | Stop reason: HOSPADM

## 2025-05-12 RX ORDER — POTASSIUM CHLORIDE 1.5 G/1.58G
20 POWDER, FOR SOLUTION ORAL ONCE
Status: COMPLETED | OUTPATIENT
Start: 2025-05-12 | End: 2025-05-12

## 2025-05-12 RX ORDER — NOREPINEPHRINE BITARTRATE/D5W 8 MG/250ML
0-30 PLASTIC BAG, INJECTION (ML) INTRAVENOUS CONTINUOUS
Status: DISCONTINUED | OUTPATIENT
Start: 2025-05-12 | End: 2025-05-14

## 2025-05-12 RX ORDER — PROPOFOL 10 MG/ML
1 INJECTION, EMULSION INTRAVENOUS ONCE
Status: COMPLETED | OUTPATIENT
Start: 2025-05-12 | End: 2025-05-12

## 2025-05-12 RX ORDER — 0.9 % SODIUM CHLORIDE 0.9 %
10 VIAL (ML) INJECTION PRN
Status: DISCONTINUED | OUTPATIENT
Start: 2025-05-12 | End: 2025-05-24 | Stop reason: HOSPADM

## 2025-05-12 RX ORDER — MAGNESIUM SULFATE HEPTAHYDRATE 500 MG/ML
2 INJECTION, SOLUTION INTRAMUSCULAR; INTRAVENOUS ONCE
Status: COMPLETED | OUTPATIENT
Start: 2025-05-12 | End: 2025-05-12

## 2025-05-12 RX ORDER — 0.9 % SODIUM CHLORIDE 0.9 %
2 VIAL (ML) INJECTION EVERY 12 HOURS SCHEDULED
Status: DISCONTINUED | OUTPATIENT
Start: 2025-05-12 | End: 2025-05-12

## 2025-05-12 RX ORDER — ASPIRIN 300 MG/1
600 SUPPOSITORY RECTAL ONCE
Status: COMPLETED | OUTPATIENT
Start: 2025-05-12 | End: 2025-05-12

## 2025-05-12 RX ORDER — AMIODARONE HYDROCHLORIDE 150 MG/3ML
150 INJECTION, SOLUTION INTRAVENOUS ONCE
Status: COMPLETED | OUTPATIENT
Start: 2025-05-12 | End: 2025-05-12

## 2025-05-12 RX ORDER — ASPIRIN 81 MG/1
324 TABLET, CHEWABLE ORAL ONCE
Status: DISCONTINUED | OUTPATIENT
Start: 2025-05-12 | End: 2025-05-12

## 2025-05-12 RX ORDER — HEPARIN SODIUM 5000 [USP'U]/ML
5000 INJECTION, SOLUTION INTRAVENOUS; SUBCUTANEOUS EVERY 8 HOURS SCHEDULED
Status: DISCONTINUED | OUTPATIENT
Start: 2025-05-12 | End: 2025-05-24 | Stop reason: HOSPADM

## 2025-05-12 RX ORDER — AMIODARONE HYDROCHLORIDE 150 MG/3ML
300 INJECTION, SOLUTION INTRAVENOUS ONCE
Status: COMPLETED | OUTPATIENT
Start: 2025-05-12 | End: 2025-05-12

## 2025-05-12 RX ORDER — ROCURONIUM BROMIDE 10 MG/ML
60 INJECTION, SOLUTION INTRAVENOUS ONCE
Status: COMPLETED | OUTPATIENT
Start: 2025-05-12 | End: 2025-05-12

## 2025-05-12 RX ORDER — 0.9 % SODIUM CHLORIDE 0.9 %
10 VIAL (ML) INJECTION PRN
Status: DISCONTINUED | OUTPATIENT
Start: 2025-05-12 | End: 2025-05-12

## 2025-05-12 RX ADMIN — AMIODARONE HYDROCHLORIDE 300 MG: 50 INJECTION, SOLUTION INTRAVENOUS at 16:06

## 2025-05-12 RX ADMIN — EPINEPHRINE 1 MG: 0.1 INJECTION INTRAVENOUS at 16:10

## 2025-05-12 RX ADMIN — FENTANYL CITRATE 100 MCG: 50 INJECTION INTRAMUSCULAR; INTRAVENOUS at 16:31

## 2025-05-12 RX ADMIN — EPINEPHRINE 1 MG: 0.1 INJECTION INTRAVENOUS at 16:05

## 2025-05-12 RX ADMIN — Medication 5 MCG/MIN: at 16:28

## 2025-05-12 RX ADMIN — LIDOCAINE HYDROCHLORIDE 100 MG: 20 INJECTION INTRAVENOUS at 16:02

## 2025-05-12 RX ADMIN — PROPOFOL INJECTABLE EMULSION 50 MCG/KG/MIN: 10 INJECTION, EMULSION INTRAVENOUS at 23:09

## 2025-05-12 RX ADMIN — AMIODARONE HYDROCHLORIDE 150 MG: 50 INJECTION, SOLUTION INTRAVENOUS at 16:13

## 2025-05-12 RX ADMIN — AMIODARONE HYDROCHLORIDE 1 MG/MIN: 1.8 INJECTION, SOLUTION INTRAVENOUS at 21:14

## 2025-05-12 RX ADMIN — CHLORHEXIDINE GLUCONATE 15 ML: 1.2 RINSE ORAL at 21:17

## 2025-05-12 RX ADMIN — EPINEPHRINE 1 MG: 0.1 INJECTION INTRAVENOUS at 16:07

## 2025-05-12 RX ADMIN — SODIUM CHLORIDE, PRESERVATIVE FREE 2 ML: 5 INJECTION INTRAVENOUS at 21:30

## 2025-05-12 RX ADMIN — ASPIRIN 600 MG: 300 SUPPOSITORY RECTAL at 17:52

## 2025-05-12 RX ADMIN — SODIUM BICARBONATE 50 MEQ: 84 INJECTION INTRAVENOUS at 16:03

## 2025-05-12 RX ADMIN — ETOMIDATE 20 MG: 2 INJECTION INTRAVENOUS at 16:33

## 2025-05-12 RX ADMIN — SODIUM CHLORIDE 500 ML: 9 INJECTION, SOLUTION INTRAVENOUS at 16:13

## 2025-05-12 RX ADMIN — MAGNESIUM SULFATE HEPTAHYDRATE 2 G: 500 INJECTION, SOLUTION INTRAMUSCULAR; INTRAVENOUS at 16:07

## 2025-05-12 RX ADMIN — Medication 50 MCG/HR: at 16:38

## 2025-05-12 RX ADMIN — NOREPINEPHRINE BITARTRATE 5 MCG/MIN: 8 INJECTION, SOLUTION INTRAVENOUS at 16:28

## 2025-05-12 RX ADMIN — IOHEXOL 75 ML: 350 INJECTION, SOLUTION INTRAVENOUS at 16:57

## 2025-05-12 RX ADMIN — EPINEPHRINE 1 MG: 0.1 INJECTION INTRAVENOUS at 16:02

## 2025-05-12 RX ADMIN — PROPOFOL 60 MG: 10 INJECTION, EMULSION INTRAVENOUS at 16:18

## 2025-05-12 RX ADMIN — POTASSIUM CHLORIDE 20 MEQ: 29.8 INJECTION, SOLUTION INTRAVENOUS at 21:27

## 2025-05-12 RX ADMIN — SODIUM BICARBONATE 50 MEQ: 84 INJECTION INTRAVENOUS at 16:09

## 2025-05-12 RX ADMIN — HEPARIN SODIUM 5000 UNITS: 5000 INJECTION INTRAVENOUS; SUBCUTANEOUS at 21:17

## 2025-05-12 RX ADMIN — POTASSIUM CHLORIDE 20 MEQ: 29.8 INJECTION, SOLUTION INTRAVENOUS at 22:38

## 2025-05-12 RX ADMIN — CALCIUM CHLORIDE 1 G: 100 INJECTION INTRAVENOUS; INTRAVENTRICULAR at 16:04

## 2025-05-12 RX ADMIN — POTASSIUM CHLORIDE 20 MEQ: 1.5 POWDER, FOR SOLUTION ORAL at 18:50

## 2025-05-12 RX ADMIN — LIDOCAINE HYDROCHLORIDE 100 MG: 20 INJECTION INTRAVENOUS at 16:09

## 2025-05-12 RX ADMIN — ROCURONIUM BROMIDE 60 MG: 10 INJECTION, SOLUTION INTRAVENOUS at 16:34

## 2025-05-12 RX ADMIN — AMIODARONE HYDROCHLORIDE 1 MG/MIN: 1.8 INJECTION, SOLUTION INTRAVENOUS at 16:17

## 2025-05-12 RX ADMIN — PROPOFOL INJECTABLE EMULSION 15 MCG/KG/MIN: 10 INJECTION, EMULSION INTRAVENOUS at 16:19

## 2025-05-12 ASSESSMENT — PAIN SCALES - BEHAVIORAL PAIN SCALE (BPS): BPS_SCORE: 3

## 2025-05-13 ENCOUNTER — APPOINTMENT (OUTPATIENT)
Dept: CARDIOLOGY | Age: 55
DRG: 177 | End: 2025-05-13
Attending: NURSE PRACTITIONER

## 2025-05-13 PROBLEM — R65.21 SHOCK, SEPTIC  (CMD): Status: ACTIVE | Noted: 2025-04-22

## 2025-05-13 PROBLEM — Z51.5 PALLIATIVE CARE ENCOUNTER: Status: ACTIVE | Noted: 2025-05-13

## 2025-05-13 PROBLEM — I95.9 HYPOTENSION: Status: ACTIVE | Noted: 2025-04-22

## 2025-05-13 PROBLEM — I42.8 OTHER CARDIOMYOPATHIES (CMD): Chronic | Status: ACTIVE | Noted: 2024-10-07

## 2025-05-13 PROBLEM — Z99.2 ANEMIA DUE TO CHRONIC KIDNEY DISEASE, ON CHRONIC DIALYSIS  (CMD): Status: ACTIVE | Noted: 2025-04-27

## 2025-05-13 PROBLEM — I27.20 PULMONARY HYPERTENSION  (CMD): Chronic | Status: ACTIVE | Noted: 2024-09-04

## 2025-05-13 PROBLEM — I51.89 SEVERE LEFT VENTRICULAR SYSTOLIC DYSFUNCTION (LVSD): Status: ACTIVE | Noted: 2024-09-04

## 2025-05-13 PROBLEM — K65.2 SBP (SPONTANEOUS BACTERIAL PERITONITIS)  (CMD): Status: ACTIVE | Noted: 2025-04-22

## 2025-05-13 PROBLEM — A41.9 SHOCK, SEPTIC  (CMD): Status: ACTIVE | Noted: 2025-04-22

## 2025-05-13 PROBLEM — K59.00 ACUTE CONSTIPATION: Status: ACTIVE | Noted: 2025-05-13

## 2025-05-13 PROBLEM — I34.0 SEVERE MITRAL REGURGITATION: Chronic | Status: ACTIVE | Noted: 2024-08-15

## 2025-05-13 PROBLEM — D63.1 ANEMIA DUE TO CHRONIC KIDNEY DISEASE, ON CHRONIC DIALYSIS  (CMD): Status: ACTIVE | Noted: 2025-04-27

## 2025-05-13 PROBLEM — Z99.2 DEPENDENCE ON RENAL DIALYSIS (CMD): Chronic | Status: ACTIVE | Noted: 2024-11-13

## 2025-05-13 PROBLEM — N18.6 ANEMIA DUE TO CHRONIC KIDNEY DISEASE, ON CHRONIC DIALYSIS  (CMD): Status: ACTIVE | Noted: 2025-04-27

## 2025-05-13 PROBLEM — Z86.15 HISTORY OF LATENT TUBERCULOSIS: Status: ACTIVE | Noted: 2025-05-13

## 2025-05-13 PROBLEM — Z71.89: Status: ACTIVE | Noted: 2024-05-03

## 2025-05-13 PROBLEM — I50.42 CHRONIC COMBINED SYSTOLIC AND DIASTOLIC HEART FAILURE  (CMD): Chronic | Status: ACTIVE | Noted: 2024-08-15

## 2025-05-13 PROBLEM — I07.1 SEVERE TRICUSPID REGURGITATION: Chronic | Status: ACTIVE | Noted: 2024-08-15

## 2025-05-13 LAB
ALBUMIN SERPL-MCNC: 2.2 G/DL (ref 3.4–5)
ALBUMIN SERPL-MCNC: 2.4 G/DL (ref 3.4–5)
ALBUMIN/GLOB SERPL: 0.5 {RATIO} (ref 1–2.4)
ALBUMIN/GLOB SERPL: 0.6 {RATIO} (ref 1–2.4)
ALP SERPL-CCNC: 223 UNITS/L (ref 45–117)
ALP SERPL-CCNC: 232 UNITS/L (ref 45–117)
ALT SERPL-CCNC: 26 UNITS/L
ALT SERPL-CCNC: 27 UNITS/L
ANION GAP SERPL CALC-SCNC: 12 MMOL/L (ref 7–19)
ANION GAP SERPL CALC-SCNC: 6 MMOL/L (ref 7–19)
AORTIC VALVE AREA (AVA): 0.6
APTT PPP: 34 SEC (ref 22–32)
ASCENDING AORTA (AAD): 3
AST SERPL-CCNC: 68 UNITS/L
AST SERPL-CCNC: 74 UNITS/L
ATRIAL RATE (BPM): 118
AV STENOSIS SEVERITY TEXT: NORMAL
BASE EXCESS / DEFICIT, ARTERIAL - RESPIRATORY: 14 MMOL/L (ref -2–3)
BASOPHILS # BLD: 0.1 K/MCL (ref 0–0.3)
BASOPHILS NFR BLD: 1 %
BDY SITE: ABNORMAL
BILIRUB SERPL-MCNC: 0.4 MG/DL (ref 0.2–1)
BILIRUB SERPL-MCNC: 0.5 MG/DL (ref 0.2–1)
BODY TEMPERATURE: 37 DEGREES C
BUN SERPL-MCNC: 10 MG/DL (ref 6–20)
BUN SERPL-MCNC: <5 MG/DL (ref 6–20)
BUN/CREAT SERPL: 2 (ref 7–25)
BUN/CREAT SERPL: ABNORMAL
CALCIUM SERPL-MCNC: 8.7 MG/DL (ref 8.4–10.2)
CALCIUM SERPL-MCNC: 8.9 MG/DL (ref 8.4–10.2)
CHLORIDE SERPL-SCNC: 100 MMOL/L (ref 97–110)
CHLORIDE SERPL-SCNC: 91 MMOL/L (ref 97–110)
CO2 SERPL-SCNC: 31 MMOL/L (ref 21–32)
CO2 SERPL-SCNC: 32 MMOL/L (ref 21–32)
CONDITION: ABNORMAL
CREAT SERPL-MCNC: 1.93 MG/DL (ref 0.51–0.95)
CREAT SERPL-MCNC: 4.42 MG/DL (ref 0.51–0.95)
DEPRECATED RDW RBC: 55.8 FL (ref 39–50)
DEPRECATED RDW RBC: 58.3 FL (ref 39–50)
E WAVE DECELARATION TIME (MDT): 11.82
EGFRCR SERPLBLD CKD-EPI 2021: 11 ML/MIN/{1.73_M2}
EGFRCR SERPLBLD CKD-EPI 2021: 31 ML/MIN/{1.73_M2}
EOSINOPHIL # BLD: 0.2 K/MCL (ref 0–0.5)
EOSINOPHIL NFR BLD: 3 %
ERYTHROCYTE [DISTWIDTH] IN BLOOD: 15.9 % (ref 11–15)
ERYTHROCYTE [DISTWIDTH] IN BLOOD: 16.1 % (ref 11–15)
FASTING DURATION TIME PATIENT: ABNORMAL H
FASTING DURATION TIME PATIENT: ABNORMAL H
FIO2 ON VENT: 30 %
GLOBULIN SER-MCNC: 4.1 G/DL (ref 2–4)
GLOBULIN SER-MCNC: 4.2 G/DL (ref 2–4)
GLUCOSE SERPL-MCNC: 110 MG/DL (ref 70–99)
GLUCOSE SERPL-MCNC: 113 MG/DL (ref 70–99)
HBV CORE IGG+IGM SER QL: NEGATIVE
HBV SURFACE AG SER QL: NEGATIVE
HCO3 BLDA-SCNC: 36 MMOL/L (ref 22–28)
HCT VFR BLD CALC: 24.8 % (ref 36–46.5)
HCT VFR BLD CALC: 25.5 % (ref 36–46.5)
HGB BLD-MCNC: 8 G/DL (ref 12–15.5)
HGB BLD-MCNC: 8.1 G/DL (ref 12–15.5)
HOROWITZ INDEX BLDA+IHG-RTO: 413 MM[HG] (ref 300–500)
IMM GRANULOCYTES # BLD AUTO: 0 K/MCL (ref 0–0.2)
IMM GRANULOCYTES # BLD: 1 %
INR PPP: 1.1
INTERVENTRICULAR SEPTUM IN END DIASTOLE (IVSD): 2.12
LACTATE BLDV-SCNC: 0.5 MMOL/L (ref 0–2)
LEFT VENTRICULAR POSTERIOR WALL IN END DIASTOLE (LVPW): 6.7
LV EF 2D ECHO EST: NORMAL %
LYMPHOCYTES # BLD: 0.5 K/MCL (ref 1–4)
LYMPHOCYTES NFR BLD: 8 %
MAGNESIUM SERPL-MCNC: 2.7 MG/DL (ref 1.7–2.4)
MCH RBC QN AUTO: 31.3 PG (ref 26–34)
MCH RBC QN AUTO: 31.8 PG (ref 26–34)
MCHC RBC AUTO-ENTMCNC: 31.4 G/DL (ref 32–36.5)
MCHC RBC AUTO-ENTMCNC: 32.7 G/DL (ref 32–36.5)
MCV RBC AUTO: 97.3 FL (ref 78–100)
MCV RBC AUTO: 99.6 FL (ref 78–100)
MONOCYTES # BLD: 0.5 K/MCL (ref 0.3–0.9)
MONOCYTES NFR BLD: 7 %
MV E TISSUE VEL MED (MESV): 5.77
MV E WAVE VEL/E TISSUE VEL MED(MSR): 5.11
MV PEAK A VELOCITY (MVPAV): 151
MV PEAK E VELOCITY (MVPEV): 0.74
NEUTROPHILS # BLD: 5 K/MCL (ref 1.8–7.7)
NEUTROPHILS NFR BLD: 80 %
NRBC BLD MANUAL-RTO: 0 /100 WBC
NRBC BLD MANUAL-RTO: 0 /100 WBC
P AXIS (DEGREES): 26
PCO2 BLDA: 33 MM HG (ref 32–45)
PH BLDA: 7.64 UNITS (ref 7.35–7.45)
PHOSPHATE SERPL-MCNC: 1.5 MG/DL (ref 2.4–4.7)
PLATELET # BLD AUTO: 147 K/MCL (ref 140–450)
PLATELET # BLD AUTO: 225 K/MCL (ref 140–450)
PO2 BLDA: 124 MM HG (ref 83–108)
POTASSIUM SERPL-SCNC: 3.4 MMOL/L (ref 3.4–5.1)
POTASSIUM SERPL-SCNC: 5.4 MMOL/L (ref 3.4–5.1)
PR-INTERVAL (MSEC): 154
PROT SERPL-MCNC: 6.3 G/DL (ref 6.4–8.2)
PROT SERPL-MCNC: 6.6 G/DL (ref 6.4–8.2)
PROTHROMBIN TIME: 11.6 SEC (ref 9.7–11.8)
QRS-INTERVAL (MSEC): 92
QT-INTERVAL (MSEC): 304
QTC: 426
R AXIS (DEGREES): 18
RAINBOW EXTRA TUBES HOLD SPECIMEN: NORMAL
RBC # BLD: 2.55 MIL/MCL (ref 4–5.2)
RBC # BLD: 2.56 MIL/MCL (ref 4–5.2)
REPORT TEXT: NORMAL
SAO2 % BLDA: 99 % (ref 95–99)
SODIUM SERPL-SCNC: 129 MMOL/L (ref 135–145)
SODIUM SERPL-SCNC: 135 MMOL/L (ref 135–145)
T AXIS (DEGREES): -157
TRICUSPID VALVE ANNULAR PEAK VELOCITY (TVAPV): 50
TRIGL SERPL-MCNC: 327 MG/DL
TROPONIN I SERPL DL<=0.01 NG/ML-MCNC: 1572 NG/L
TV ESTIMATED RIGHT ARTERIAL PRESSURE (RAP): 11
VENTRICULAR RATE EKG/MIN (BPM): 118
WBC # BLD: 6.2 K/MCL (ref 4.2–11)
WBC # BLD: 8.8 K/MCL (ref 4.2–11)

## 2025-05-13 PROCEDURE — 84478 ASSAY OF TRIGLYCERIDES: CPT | Performed by: INTERNAL MEDICINE

## 2025-05-13 PROCEDURE — 96372 THER/PROPH/DIAG INJ SC/IM: CPT | Performed by: NURSE PRACTITIONER

## 2025-05-13 PROCEDURE — 36600 WITHDRAWAL OF ARTERIAL BLOOD: CPT

## 2025-05-13 PROCEDURE — 10002803 HB RX 637: Performed by: INTERNAL MEDICINE

## 2025-05-13 PROCEDURE — 10002805 HB CONTRAST AGENT: Performed by: INTERNAL MEDICINE

## 2025-05-13 PROCEDURE — 85025 COMPLETE CBC W/AUTO DIFF WBC: CPT | Performed by: INTERNAL MEDICINE

## 2025-05-13 PROCEDURE — 94003 VENT MGMT INPAT SUBQ DAY: CPT

## 2025-05-13 PROCEDURE — 80053 COMPREHEN METABOLIC PANEL: CPT | Performed by: INTERNAL MEDICINE

## 2025-05-13 PROCEDURE — 83605 ASSAY OF LACTIC ACID: CPT | Performed by: INTERNAL MEDICINE

## 2025-05-13 PROCEDURE — 10002800 HB RX 250 W HCPCS: Performed by: INTERNAL MEDICINE

## 2025-05-13 PROCEDURE — 10002800 HB RX 250 W HCPCS: Performed by: NURSE PRACTITIONER

## 2025-05-13 PROCEDURE — 86706 HEP B SURFACE ANTIBODY: CPT | Performed by: INTERNAL MEDICINE

## 2025-05-13 PROCEDURE — 80053 COMPREHEN METABOLIC PANEL: CPT | Performed by: NURSE PRACTITIONER

## 2025-05-13 PROCEDURE — 99291 CRITICAL CARE FIRST HOUR: CPT | Performed by: INTERNAL MEDICINE

## 2025-05-13 PROCEDURE — 84100 ASSAY OF PHOSPHORUS: CPT | Performed by: NURSE PRACTITIONER

## 2025-05-13 PROCEDURE — 5A1D70Z PERFORMANCE OF URINARY FILTRATION, INTERMITTENT, LESS THAN 6 HOURS PER DAY: ICD-10-PCS | Performed by: INTERNAL MEDICINE

## 2025-05-13 PROCEDURE — 10004180 HB COUNTER-TRANSPORT

## 2025-05-13 PROCEDURE — 93306 TTE W/DOPPLER COMPLETE: CPT

## 2025-05-13 PROCEDURE — 85730 THROMBOPLASTIN TIME PARTIAL: CPT | Performed by: INTERNAL MEDICINE

## 2025-05-13 PROCEDURE — 10002800 HB RX 250 W HCPCS: Performed by: EMERGENCY MEDICINE

## 2025-05-13 PROCEDURE — 10004651 HB RX, NO CHARGE ITEM: Performed by: NURSE PRACTITIONER

## 2025-05-13 PROCEDURE — 36415 COLL VENOUS BLD VENIPUNCTURE: CPT | Performed by: NURSE PRACTITIONER

## 2025-05-13 PROCEDURE — 86704 HEP B CORE ANTIBODY TOTAL: CPT | Performed by: INTERNAL MEDICINE

## 2025-05-13 PROCEDURE — 10002807 HB RX 258: Performed by: INTERNAL MEDICINE

## 2025-05-13 PROCEDURE — 84484 ASSAY OF TROPONIN QUANT: CPT | Performed by: NURSE PRACTITIONER

## 2025-05-13 PROCEDURE — 85610 PROTHROMBIN TIME: CPT | Performed by: INTERNAL MEDICINE

## 2025-05-13 PROCEDURE — 82803 BLOOD GASES ANY COMBINATION: CPT

## 2025-05-13 PROCEDURE — 10002801 HB RX 250 W/O HCPCS: Performed by: INTERNAL MEDICINE

## 2025-05-13 PROCEDURE — 83735 ASSAY OF MAGNESIUM: CPT | Performed by: NURSE PRACTITIONER

## 2025-05-13 PROCEDURE — 93306 TTE W/DOPPLER COMPLETE: CPT | Performed by: INTERNAL MEDICINE

## 2025-05-13 PROCEDURE — 10003585 HB ROOM CHARGE INTERMEDIATE

## 2025-05-13 PROCEDURE — 85027 COMPLETE CBC AUTOMATED: CPT | Performed by: NURSE PRACTITIONER

## 2025-05-13 PROCEDURE — 90935 HEMODIALYSIS ONE EVALUATION: CPT

## 2025-05-13 RX ORDER — CINACALCET 30 MG/1
30 TABLET, FILM COATED ORAL DAILY
Status: ON HOLD | COMMUNITY
End: 2025-05-30

## 2025-05-13 RX ORDER — LANTHANUM CARBONATE 1000 MG/1
1000 TABLET, CHEWABLE ORAL
Status: ON HOLD | COMMUNITY

## 2025-05-13 RX ORDER — SODIUM CITRATE 4 % (5 ML)
3 SYRINGE (ML) MISCELLANEOUS PRN
Status: DISCONTINUED | OUTPATIENT
Start: 2025-05-13 | End: 2025-05-24 | Stop reason: HOSPADM

## 2025-05-13 RX ORDER — KETOROLAC TROMETHAMINE 15 MG/ML
15 INJECTION, SOLUTION INTRAMUSCULAR; INTRAVENOUS EVERY 6 HOURS PRN
Status: DISPENSED | OUTPATIENT
Start: 2025-05-13 | End: 2025-05-16

## 2025-05-13 RX ORDER — RIFAMPIN 300 MG/1
600 CAPSULE ORAL DAILY
Status: DISCONTINUED | OUTPATIENT
Start: 2025-05-13 | End: 2025-05-24 | Stop reason: HOSPADM

## 2025-05-13 RX ORDER — RIFAMPIN 300 MG/1
300 CAPSULE ORAL 2 TIMES DAILY
Status: ON HOLD | COMMUNITY

## 2025-05-13 RX ADMIN — AMIODARONE HYDROCHLORIDE 1 MG/MIN: 1.8 INJECTION, SOLUTION INTRAVENOUS at 08:49

## 2025-05-13 RX ADMIN — HEPARIN SODIUM 5000 UNITS: 5000 INJECTION INTRAVENOUS; SUBCUTANEOUS at 21:05

## 2025-05-13 RX ADMIN — CARBOXYMETHYLCELLULOSE SODIUM 1 DROP: 0.5 SOLUTION/ DROPS OPHTHALMIC at 00:04

## 2025-05-13 RX ADMIN — HEPARIN SODIUM 5000 UNITS: 5000 INJECTION INTRAVENOUS; SUBCUTANEOUS at 05:25

## 2025-05-13 RX ADMIN — SODIUM CHLORIDE, PRESERVATIVE FREE 2 ML: 5 INJECTION INTRAVENOUS at 08:49

## 2025-05-13 RX ADMIN — RIFAMPIN 600 MG: 300 CAPSULE ORAL at 17:13

## 2025-05-13 RX ADMIN — AMIODARONE HYDROCHLORIDE 1 MG/MIN: 1.8 INJECTION, SOLUTION INTRAVENOUS at 02:31

## 2025-05-13 RX ADMIN — HEPARIN SODIUM 5000 UNITS: 5000 INJECTION INTRAVENOUS; SUBCUTANEOUS at 14:27

## 2025-05-13 RX ADMIN — CEFTAZIDIME 2000 MG: 2 INJECTION, POWDER, FOR SOLUTION INTRAVENOUS at 17:12

## 2025-05-13 RX ADMIN — PROPOFOL INJECTABLE EMULSION 30 MCG/KG/MIN: 10 INJECTION, EMULSION INTRAVENOUS at 05:27

## 2025-05-13 RX ADMIN — CARBOXYMETHYLCELLULOSE SODIUM 1 DROP: 0.5 SOLUTION/ DROPS OPHTHALMIC at 04:05

## 2025-05-13 RX ADMIN — SODIUM CHLORIDE, PRESERVATIVE FREE 2 ML: 5 INJECTION INTRAVENOUS at 21:05

## 2025-05-13 RX ADMIN — KETOROLAC TROMETHAMINE 15 MG: 15 INJECTION, SOLUTION INTRAMUSCULAR; INTRAVENOUS at 16:49

## 2025-05-13 RX ADMIN — PERFLUTREN 2 ML: 6.52 INJECTION, SUSPENSION INTRAVENOUS at 10:08

## 2025-05-13 RX ADMIN — KETOROLAC TROMETHAMINE 15 MG: 15 INJECTION, SOLUTION INTRAMUSCULAR; INTRAVENOUS at 09:14

## 2025-05-13 RX ADMIN — SODIUM PHOSPHATE, MONOBASIC, MONOHYDRATE AND SODIUM PHOSPHATE, DIBASIC, ANHYDROUS 45 MMOL: 276; 142 INJECTION, SOLUTION INTRAVENOUS at 09:25

## 2025-05-13 SDOH — ECONOMIC STABILITY: HOUSING INSECURITY: WHAT IS YOUR LIVING SITUATION TODAY?: I HAVE A STEADY PLACE TO LIVE

## 2025-05-13 ASSESSMENT — ENCOUNTER SYMPTOMS
VOMITING: 0
CHEST TIGHTNESS: 1
DIARRHEA: 0
ACTIVITY CHANGE: 0
EYES NEGATIVE: 1
COUGH: 0
COLOR CHANGE: 0
PSYCHIATRIC NEGATIVE: 1
UNEXPECTED WEIGHT CHANGE: 0
NEUROLOGICAL NEGATIVE: 1
SINUS PRESSURE: 0
HEADACHES: 0
ABDOMINAL PAIN: 0
SINUS PAIN: 0
LIGHT-HEADEDNESS: 0
ENDOCRINE NEGATIVE: 1
FEVER: 0
ABDOMINAL DISTENTION: 0
DIZZINESS: 0
SHORTNESS OF BREATH: 0
NAUSEA: 0
CONSTIPATION: 0
HEMATOLOGIC/LYMPHATIC NEGATIVE: 1
BACK PAIN: 0
SORE THROAT: 0
ALLERGIC/IMMUNOLOGIC NEGATIVE: 1
CONSTITUTIONAL NEGATIVE: 1
WOUND: 0
CHILLS: 0
FATIGUE: 0

## 2025-05-13 ASSESSMENT — PAIN SCALES - GENERAL
PAINLEVEL_OUTOF10: 7
PAINLEVEL_OUTOF10: 0
PAINLEVEL_OUTOF10: 7
PAINLEVEL_OUTOF10: 2
PAINLEVEL_OUTOF10: 3
PAINLEVEL_OUTOF10: 2
PAINLEVEL_OUTOF10: 3
PAINLEVEL_OUTOF10: 6
PAINLEVEL_OUTOF10: 2

## 2025-05-13 ASSESSMENT — PAIN SCALES - BEHAVIORAL PAIN SCALE (BPS)
BPS_SCORE: 3
BPS_SCORE: 3

## 2025-05-13 ASSESSMENT — PAIN SCALES - WONG BAKER: WONGBAKER_NUMERICALRESPONSE: 4

## 2025-05-14 LAB
ALBUMIN SERPL-MCNC: 2.1 G/DL (ref 3.4–5)
ALBUMIN/GLOB SERPL: 0.5 {RATIO} (ref 1–2.4)
ALP SERPL-CCNC: 210 UNITS/L (ref 45–117)
ALT SERPL-CCNC: 21 UNITS/L
ANION GAP SERPL CALC-SCNC: 12 MMOL/L (ref 7–19)
AST SERPL-CCNC: 51 UNITS/L
BILIRUB SERPL-MCNC: 0.9 MG/DL (ref 0.2–1)
BUN SERPL-MCNC: 7 MG/DL (ref 6–20)
BUN/CREAT SERPL: 2 (ref 7–25)
CALCIUM SERPL-MCNC: 8.1 MG/DL (ref 8.4–10.2)
CHLORIDE SERPL-SCNC: 99 MMOL/L (ref 97–110)
CO2 SERPL-SCNC: 29 MMOL/L (ref 21–32)
CREAT SERPL-MCNC: 3.51 MG/DL (ref 0.51–0.95)
DEPRECATED RDW RBC: 59.4 FL (ref 39–50)
EGFRCR SERPLBLD CKD-EPI 2021: 15 ML/MIN/{1.73_M2}
ERYTHROCYTE [DISTWIDTH] IN BLOOD: 16 % (ref 11–15)
FASTING DURATION TIME PATIENT: ABNORMAL H
GLOBULIN SER-MCNC: 4.2 G/DL (ref 2–4)
GLUCOSE SERPL-MCNC: 99 MG/DL (ref 70–99)
HBV SURFACE AB SER-ACNC: 25.32 MUNITS/ML
HCT VFR BLD CALC: 24.7 % (ref 36–46.5)
HGB BLD-MCNC: 7.5 G/DL (ref 12–15.5)
MAGNESIUM SERPL-MCNC: 2.2 MG/DL (ref 1.7–2.4)
MCH RBC QN AUTO: 30.6 PG (ref 26–34)
MCHC RBC AUTO-ENTMCNC: 30.4 G/DL (ref 32–36.5)
MCV RBC AUTO: 100.8 FL (ref 78–100)
NRBC BLD MANUAL-RTO: 0 /100 WBC
PHOSPHATE SERPL-MCNC: 5.6 MG/DL (ref 2.4–4.7)
PLATELET # BLD AUTO: 134 K/MCL (ref 140–450)
POTASSIUM SERPL-SCNC: 3.6 MMOL/L (ref 3.4–5.1)
PROT SERPL-MCNC: 6.3 G/DL (ref 6.4–8.2)
RBC # BLD: 2.45 MIL/MCL (ref 4–5.2)
SODIUM SERPL-SCNC: 136 MMOL/L (ref 135–145)
WBC # BLD: 4.7 K/MCL (ref 4.2–11)

## 2025-05-14 PROCEDURE — 83735 ASSAY OF MAGNESIUM: CPT | Performed by: NURSE PRACTITIONER

## 2025-05-14 PROCEDURE — 10002803 HB RX 637: Performed by: INTERNAL MEDICINE

## 2025-05-14 PROCEDURE — 97165 OT EVAL LOW COMPLEX 30 MIN: CPT

## 2025-05-14 PROCEDURE — 97161 PT EVAL LOW COMPLEX 20 MIN: CPT

## 2025-05-14 PROCEDURE — 10002807 HB RX 258: Performed by: INTERNAL MEDICINE

## 2025-05-14 PROCEDURE — 97116 GAIT TRAINING THERAPY: CPT

## 2025-05-14 PROCEDURE — 10002800 HB RX 250 W HCPCS: Performed by: INTERNAL MEDICINE

## 2025-05-14 PROCEDURE — 84100 ASSAY OF PHOSPHORUS: CPT | Performed by: NURSE PRACTITIONER

## 2025-05-14 PROCEDURE — 99255 IP/OBS CONSLTJ NEW/EST HI 80: CPT | Performed by: INTERNAL MEDICINE

## 2025-05-14 PROCEDURE — 10004651 HB RX, NO CHARGE ITEM: Performed by: NURSE PRACTITIONER

## 2025-05-14 PROCEDURE — 80053 COMPREHEN METABOLIC PANEL: CPT | Performed by: NURSE PRACTITIONER

## 2025-05-14 PROCEDURE — 36415 COLL VENOUS BLD VENIPUNCTURE: CPT | Performed by: NURSE PRACTITIONER

## 2025-05-14 PROCEDURE — 99233 SBSQ HOSP IP/OBS HIGH 50: CPT | Performed by: INTERNAL MEDICINE

## 2025-05-14 PROCEDURE — 10003585 HB ROOM CHARGE INTERMEDIATE

## 2025-05-14 PROCEDURE — 85027 COMPLETE CBC AUTOMATED: CPT | Performed by: NURSE PRACTITIONER

## 2025-05-14 PROCEDURE — 10002800 HB RX 250 W HCPCS: Performed by: NURSE PRACTITIONER

## 2025-05-14 PROCEDURE — 96372 THER/PROPH/DIAG INJ SC/IM: CPT | Performed by: NURSE PRACTITIONER

## 2025-05-14 PROCEDURE — 10002803 HB RX 637: Performed by: HOSPITALIST

## 2025-05-14 PROCEDURE — 99291 CRITICAL CARE FIRST HOUR: CPT | Performed by: INTERNAL MEDICINE

## 2025-05-14 RX ORDER — HYDROCODONE BITARTRATE AND ACETAMINOPHEN 5; 325 MG/1; MG/1
1 TABLET ORAL EVERY 4 HOURS PRN
Status: DISCONTINUED | OUTPATIENT
Start: 2025-05-14 | End: 2025-05-24 | Stop reason: HOSPADM

## 2025-05-14 RX ORDER — AMIODARONE HYDROCHLORIDE 200 MG/1
200 TABLET ORAL DAILY
Status: DISCONTINUED | OUTPATIENT
Start: 2025-05-14 | End: 2025-05-19

## 2025-05-14 RX ADMIN — RIFAMPIN 600 MG: 300 CAPSULE ORAL at 09:51

## 2025-05-14 RX ADMIN — CEFTAZIDIME 2000 MG: 2 INJECTION, POWDER, FOR SOLUTION INTRAVENOUS at 19:01

## 2025-05-14 RX ADMIN — HYDROCODONE BITARTRATE AND ACETAMINOPHEN 1 TABLET: 5; 325 TABLET ORAL at 16:12

## 2025-05-14 RX ADMIN — SODIUM CHLORIDE, PRESERVATIVE FREE 2 ML: 5 INJECTION INTRAVENOUS at 21:01

## 2025-05-14 RX ADMIN — HYDROCODONE BITARTRATE AND ACETAMINOPHEN 1 TABLET: 5; 325 TABLET ORAL at 11:08

## 2025-05-14 RX ADMIN — HYDROCODONE BITARTRATE AND ACETAMINOPHEN 1 TABLET: 5; 325 TABLET ORAL at 20:55

## 2025-05-14 RX ADMIN — HYDROCODONE BITARTRATE AND ACETAMINOPHEN 1 TABLET: 5; 325 TABLET ORAL at 06:22

## 2025-05-14 RX ADMIN — HEPARIN SODIUM 5000 UNITS: 5000 INJECTION INTRAVENOUS; SUBCUTANEOUS at 06:21

## 2025-05-14 RX ADMIN — KETOROLAC TROMETHAMINE 15 MG: 15 INJECTION, SOLUTION INTRAMUSCULAR; INTRAVENOUS at 15:22

## 2025-05-14 RX ADMIN — HEPARIN SODIUM 5000 UNITS: 5000 INJECTION INTRAVENOUS; SUBCUTANEOUS at 15:22

## 2025-05-14 RX ADMIN — KETOROLAC TROMETHAMINE 15 MG: 15 INJECTION, SOLUTION INTRAMUSCULAR; INTRAVENOUS at 00:08

## 2025-05-14 RX ADMIN — AMIODARONE HYDROCHLORIDE 200 MG: 200 TABLET ORAL at 11:08

## 2025-05-14 RX ADMIN — KETOROLAC TROMETHAMINE 15 MG: 15 INJECTION, SOLUTION INTRAMUSCULAR; INTRAVENOUS at 06:21

## 2025-05-14 RX ADMIN — SODIUM CHLORIDE, PRESERVATIVE FREE 2 ML: 5 INJECTION INTRAVENOUS at 09:51

## 2025-05-14 RX ADMIN — HEPARIN SODIUM 5000 UNITS: 5000 INJECTION INTRAVENOUS; SUBCUTANEOUS at 20:55

## 2025-05-14 SDOH — ECONOMIC STABILITY: HOUSING INSECURITY: WHAT IS YOUR LIVING SITUATION TODAY?: I HAVE A STEADY PLACE TO LIVE

## 2025-05-14 SDOH — HEALTH STABILITY: PHYSICAL HEALTH: DO YOU HAVE DIFFICULTY DRESSING OR BATHING?: YES

## 2025-05-14 SDOH — HEALTH STABILITY: PHYSICAL HEALTH: DO YOU HAVE SERIOUS DIFFICULTY WALKING OR CLIMBING STAIRS?: YES

## 2025-05-14 SDOH — ECONOMIC STABILITY: INCOME INSECURITY: IN THE PAST 12 MONTHS, HAS THE ELECTRIC, GAS, OIL, OR WATER COMPANY THREATENED TO SHUT OFF SERVICE IN YOUR HOME?: NO

## 2025-05-14 SDOH — HEALTH STABILITY: GENERAL: BECAUSE OF A PHYSICAL, MENTAL, OR EMOTIONAL CONDITION, DO YOU HAVE DIFFICULTY DOING ERRANDS ALONE?: YES

## 2025-05-14 SDOH — ECONOMIC STABILITY: HOUSING INSECURITY: WHAT IS YOUR LIVING SITUATION TODAY?: ADULT CHILDREN

## 2025-05-14 SDOH — SOCIAL STABILITY: SOCIAL INSECURITY: HOW OFTEN DOES ANYONE, INCLUDING FAMILY AND FRIENDS, PHYSICALLY HURT YOU?: NEVER

## 2025-05-14 SDOH — SOCIAL STABILITY: SOCIAL INSECURITY: HOW OFTEN DOES ANYONE, INCLUDING FAMILY AND FRIENDS, SCREAM OR CURSE AT YOU?: NEVER

## 2025-05-14 SDOH — SOCIAL STABILITY: SOCIAL INSECURITY: HOW OFTEN DOES ANYONE, INCLUDING FAMILY AND FRIENDS, INSULT OR TALK DOWN TO YOU?: NEVER

## 2025-05-14 SDOH — ECONOMIC STABILITY: HOUSING INSECURITY: DO YOU HAVE PROBLEMS WITH ANY OF THE FOLLOWING?: NONE OF THE ABOVE

## 2025-05-14 SDOH — ECONOMIC STABILITY: FOOD INSECURITY: WITHIN THE PAST 12 MONTHS, THE FOOD YOU BOUGHT JUST DIDN'T LAST AND YOU DIDN'T HAVE MONEY TO GET MORE.: NEVER TRUE

## 2025-05-14 SDOH — ECONOMIC STABILITY: HOUSING INSECURITY: WHAT IS YOUR LIVING SITUATION TODAY?: APARTMENT

## 2025-05-14 SDOH — SOCIAL STABILITY: SOCIAL INSECURITY: HOW OFTEN DOES ANYONE, INCLUDING FAMILY AND FRIENDS, THREATEN YOU WITH HARM?: NEVER

## 2025-05-14 SDOH — ECONOMIC STABILITY: GENERAL

## 2025-05-14 SDOH — SOCIAL STABILITY: SOCIAL NETWORK: SUPPORT SYSTEMS: FAMILY MEMBERS;FRIENDS

## 2025-05-14 ASSESSMENT — ACTIVITIES OF DAILY LIVING (ADL)
RECENT_DECLINE_ADL: NO
ADL_SHORT_OF_BREATH: YES
TOILETING: NEEDS ASSISTANCE
ADL_SCORE: 21
BATHING: NEEDS ASSISTANCE
PRIOR_ADL: INDEPENDENT
FEEDING: INDEPENDENT
PRIOR_ADL: INDEPENDENT
ADL_BEFORE_ADMISSION: INDEPENDENT
DRESSING: INDEPENDENT

## 2025-05-14 ASSESSMENT — PAIN SCALES - GENERAL
PAINLEVEL_OUTOF10: 0
PAINLEVEL_OUTOF10: 4
PAINLEVEL_OUTOF10: 4
PAINLEVEL_OUTOF10: 0
PAINLEVEL_OUTOF10: 6
PAINLEVEL_OUTOF10: 7
PAINLEVEL_OUTOF10: 8
PAINLEVEL_OUTOF10: 9
PAINLEVEL_OUTOF10: 9
PAINLEVEL_OUTOF10: 2
PAINLEVEL_OUTOF10: 2
PAINLEVEL_OUTOF10: 6

## 2025-05-14 ASSESSMENT — COGNITIVE AND FUNCTIONAL STATUS - GENERAL
HELP NEEDED FOR BATHING: A LITTLE
BASIC_MOBILITY_CONVERTED_SCORE: 41.05
DAILY_ACTIVITY_CONVERTED_SCORE: 44.27
DAILY_ACTIVITY_RAW_SCORE: 21
BASIC_MOBILITY_RAW_SCORE: 18
HELP NEEDED FOR TOILETING: A LITTLE
HELP NEEDED DRESSING REGULAR LOWER BODY CLOTHING: A LITTLE

## 2025-05-14 ASSESSMENT — ENCOUNTER SYMPTOMS: PAIN SEVERITY NOW: 8

## 2025-05-15 ENCOUNTER — APPOINTMENT (OUTPATIENT)
Dept: DIALYSIS | Age: 55
DRG: 177 | End: 2025-05-15
Attending: INTERNAL MEDICINE

## 2025-05-15 ENCOUNTER — APPOINTMENT (OUTPATIENT)
Dept: ULTRASOUND IMAGING | Age: 55
DRG: 177 | End: 2025-05-15
Attending: SURGERY

## 2025-05-15 LAB
ALBUMIN SERPL-MCNC: 2.1 G/DL (ref 3.4–5)
ALBUMIN/GLOB SERPL: 0.5 {RATIO} (ref 1–2.4)
ALP SERPL-CCNC: 202 UNITS/L (ref 45–117)
ALT SERPL-CCNC: 18 UNITS/L
ANION GAP SERPL CALC-SCNC: 13 MMOL/L (ref 7–19)
AST SERPL-CCNC: 35 UNITS/L
BILIRUB SERPL-MCNC: 0.8 MG/DL (ref 0.2–1)
BUN SERPL-MCNC: 15 MG/DL (ref 6–20)
BUN/CREAT SERPL: 3 (ref 7–25)
CALCIUM SERPL-MCNC: 8.3 MG/DL (ref 8.4–10.2)
CHLORIDE SERPL-SCNC: 97 MMOL/L (ref 97–110)
CO2 SERPL-SCNC: 28 MMOL/L (ref 21–32)
CREAT SERPL-MCNC: 5.52 MG/DL (ref 0.51–0.95)
DEPRECATED RDW RBC: 58.3 FL (ref 39–50)
EGFRCR SERPLBLD CKD-EPI 2021: 9 ML/MIN/{1.73_M2}
ERYTHROCYTE [DISTWIDTH] IN BLOOD: 16 % (ref 11–15)
FASTING DURATION TIME PATIENT: ABNORMAL H
GLOBULIN SER-MCNC: 4.1 G/DL (ref 2–4)
GLUCOSE SERPL-MCNC: 97 MG/DL (ref 70–99)
HCT VFR BLD CALC: 23 % (ref 36–46.5)
HGB BLD-MCNC: 7.2 G/DL (ref 12–15.5)
MAGNESIUM SERPL-MCNC: 2.2 MG/DL (ref 1.7–2.4)
MCH RBC QN AUTO: 31.2 PG (ref 26–34)
MCHC RBC AUTO-ENTMCNC: 31.3 G/DL (ref 32–36.5)
MCV RBC AUTO: 99.6 FL (ref 78–100)
NRBC BLD MANUAL-RTO: 0 /100 WBC
PHOSPHATE SERPL-MCNC: 6 MG/DL (ref 2.4–4.7)
PLATELET # BLD AUTO: 113 K/MCL (ref 140–450)
POTASSIUM SERPL-SCNC: 4 MMOL/L (ref 3.4–5.1)
PROT SERPL-MCNC: 6.2 G/DL (ref 6.4–8.2)
RBC # BLD: 2.31 MIL/MCL (ref 4–5.2)
SODIUM SERPL-SCNC: 134 MMOL/L (ref 135–145)
WBC # BLD: 5.3 K/MCL (ref 4.2–11)

## 2025-05-15 PROCEDURE — 10006031 HB ROOM CHARGE TELEMETRY

## 2025-05-15 PROCEDURE — 10002803 HB RX 637: Performed by: INTERNAL MEDICINE

## 2025-05-15 PROCEDURE — 36415 COLL VENOUS BLD VENIPUNCTURE: CPT | Performed by: NURSE PRACTITIONER

## 2025-05-15 PROCEDURE — 99233 SBSQ HOSP IP/OBS HIGH 50: CPT | Performed by: INTERNAL MEDICINE

## 2025-05-15 PROCEDURE — 83735 ASSAY OF MAGNESIUM: CPT | Performed by: NURSE PRACTITIONER

## 2025-05-15 PROCEDURE — 80053 COMPREHEN METABOLIC PANEL: CPT | Performed by: NURSE PRACTITIONER

## 2025-05-15 PROCEDURE — 96372 THER/PROPH/DIAG INJ SC/IM: CPT | Performed by: INTERNAL MEDICINE

## 2025-05-15 PROCEDURE — 13001086 HB INCENTIVE SPIROMETER W INSTRUCT

## 2025-05-15 PROCEDURE — 94667 MNPJ CHEST WALL 1ST: CPT

## 2025-05-15 PROCEDURE — 10004180 HB COUNTER-TRANSPORT

## 2025-05-15 PROCEDURE — 96372 THER/PROPH/DIAG INJ SC/IM: CPT | Performed by: NURSE PRACTITIONER

## 2025-05-15 PROCEDURE — 85027 COMPLETE CBC AUTOMATED: CPT | Performed by: NURSE PRACTITIONER

## 2025-05-15 PROCEDURE — 10002800 HB RX 250 W HCPCS: Performed by: NURSE PRACTITIONER

## 2025-05-15 PROCEDURE — 10002803 HB RX 637: Performed by: STUDENT IN AN ORGANIZED HEALTH CARE EDUCATION/TRAINING PROGRAM

## 2025-05-15 PROCEDURE — 10004651 HB RX, NO CHARGE ITEM: Performed by: NURSE PRACTITIONER

## 2025-05-15 PROCEDURE — 10002800 HB RX 250 W HCPCS: Performed by: INTERNAL MEDICINE

## 2025-05-15 PROCEDURE — 10002803 HB RX 637: Performed by: HOSPITALIST

## 2025-05-15 PROCEDURE — 10002807 HB RX 258: Performed by: INTERNAL MEDICINE

## 2025-05-15 PROCEDURE — 84100 ASSAY OF PHOSPHORUS: CPT | Performed by: NURSE PRACTITIONER

## 2025-05-15 PROCEDURE — 93970 EXTREMITY STUDY: CPT

## 2025-05-15 PROCEDURE — 99254 IP/OBS CNSLTJ NEW/EST MOD 60: CPT | Performed by: SURGERY

## 2025-05-15 PROCEDURE — 10002016 HB COUNTER INCENTIVE SPIROMETRY

## 2025-05-15 PROCEDURE — 90935 HEMODIALYSIS ONE EVALUATION: CPT

## 2025-05-15 RX ORDER — LIDOCAINE 4 G/G
2 PATCH TOPICAL DAILY
Status: DISCONTINUED | OUTPATIENT
Start: 2025-05-15 | End: 2025-05-24 | Stop reason: HOSPADM

## 2025-05-15 RX ADMIN — HYDROCODONE BITARTRATE AND ACETAMINOPHEN 1 TABLET: 5; 325 TABLET ORAL at 08:46

## 2025-05-15 RX ADMIN — HEPARIN SODIUM 5000 UNITS: 5000 INJECTION INTRAVENOUS; SUBCUTANEOUS at 14:36

## 2025-05-15 RX ADMIN — LIDOCAINE 2 PATCH: 4 PATCH TOPICAL at 08:47

## 2025-05-15 RX ADMIN — RIFAMPIN 600 MG: 300 CAPSULE ORAL at 08:45

## 2025-05-15 RX ADMIN — EPOETIN ALFA-EPBX 10000 UNITS: 10000 INJECTION, SOLUTION INTRAVENOUS; SUBCUTANEOUS at 14:37

## 2025-05-15 RX ADMIN — HYDROCODONE BITARTRATE AND ACETAMINOPHEN 1 TABLET: 5; 325 TABLET ORAL at 03:09

## 2025-05-15 RX ADMIN — SODIUM CHLORIDE, PRESERVATIVE FREE 2 ML: 5 INJECTION INTRAVENOUS at 21:26

## 2025-05-15 RX ADMIN — AMIODARONE HYDROCHLORIDE 200 MG: 200 TABLET ORAL at 08:45

## 2025-05-15 RX ADMIN — SODIUM CHLORIDE, PRESERVATIVE FREE 2 ML: 5 INJECTION INTRAVENOUS at 08:50

## 2025-05-15 RX ADMIN — HYDROCODONE BITARTRATE AND ACETAMINOPHEN 1 TABLET: 5; 325 TABLET ORAL at 14:33

## 2025-05-15 RX ADMIN — HYDROCODONE BITARTRATE AND ACETAMINOPHEN 1 TABLET: 5; 325 TABLET ORAL at 18:43

## 2025-05-15 RX ADMIN — HYDROCODONE BITARTRATE AND ACETAMINOPHEN 1 TABLET: 5; 325 TABLET ORAL at 22:53

## 2025-05-15 RX ADMIN — HEPARIN SODIUM 5000 UNITS: 5000 INJECTION INTRAVENOUS; SUBCUTANEOUS at 21:27

## 2025-05-15 RX ADMIN — CEFTAZIDIME 2000 MG: 2 INJECTION, POWDER, FOR SOLUTION INTRAVENOUS at 18:46

## 2025-05-15 ASSESSMENT — PAIN SCALES - GENERAL
PAINLEVEL_OUTOF10: 10
PAINLEVEL_OUTOF10: 3
PAINLEVEL_OUTOF10: 9
PAINLEVEL_OUTOF10: 9
PAINLEVEL_OUTOF10: 7
PAINLEVEL_OUTOF10: 2
PAINLEVEL_OUTOF10: 2
PAINLEVEL_OUTOF10: 6
PAINLEVEL_OUTOF10: 6
PAINLEVEL_OUTOF10: 9
PAINLEVEL_OUTOF10: 8
PAINLEVEL_OUTOF10: 0
PAINLEVEL_OUTOF10: 3
PAINLEVEL_OUTOF10: 0

## 2025-05-15 ASSESSMENT — COLUMBIA-SUICIDE SEVERITY RATING SCALE - C-SSRS
6. HAVE YOU EVER DONE ANYTHING, STARTED TO DO ANYTHING, OR PREPARED TO DO ANYTHING TO END YOUR LIFE?: NO
IS THE PATIENT ABLE TO COMPLETE C-SSRS: YES
2. HAVE YOU ACTUALLY HAD ANY THOUGHTS OF KILLING YOURSELF?: NO
1. WITHIN THE PAST MONTH, HAVE YOU WISHED YOU WERE DEAD OR WISHED YOU COULD GO TO SLEEP AND NOT WAKE UP?: NO

## 2025-05-15 ASSESSMENT — PATIENT HEALTH QUESTIONNAIRE - PHQ9
2. FEELING DOWN, DEPRESSED OR HOPELESS: NOT AT ALL
SUM OF ALL RESPONSES TO PHQ9 QUESTIONS 1 AND 2: 0
1. LITTLE INTEREST OR PLEASURE IN DOING THINGS: NOT AT ALL
CLINICAL INTERPRETATION OF PHQ2 SCORE: NO FURTHER SCREENING NEEDED
SUM OF ALL RESPONSES TO PHQ9 QUESTIONS 1 AND 2: 0
IS PATIENT ABLE TO COMPLETE PHQ2 OR PHQ9: YES

## 2025-05-16 ENCOUNTER — APPOINTMENT (OUTPATIENT)
Dept: GENERAL RADIOLOGY | Age: 55
DRG: 177 | End: 2025-05-16
Attending: INTERNAL MEDICINE

## 2025-05-16 ENCOUNTER — ANESTHESIA (OUTPATIENT)
Dept: CARDIOLOGY | Age: 55
End: 2025-05-16

## 2025-05-16 ENCOUNTER — ANESTHESIA EVENT (OUTPATIENT)
Dept: CARDIOLOGY | Age: 55
End: 2025-05-16

## 2025-05-16 LAB
ABO + RH BLD: NORMAL
ALBUMIN SERPL-MCNC: 2.1 G/DL (ref 3.4–5)
ALBUMIN/GLOB SERPL: 0.5 {RATIO} (ref 1–2.4)
ALP SERPL-CCNC: 206 UNITS/L (ref 45–117)
ALT SERPL-CCNC: 15 UNITS/L
ANION GAP SERPL CALC-SCNC: 9 MMOL/L (ref 7–19)
AST SERPL-CCNC: 25 UNITS/L
BILIRUB SERPL-MCNC: 0.5 MG/DL (ref 0.2–1)
BLD GP AB SCN SERPL QL GEL: NEGATIVE
BUN SERPL-MCNC: 11 MG/DL (ref 6–20)
BUN/CREAT SERPL: 3 (ref 7–25)
CALCIUM SERPL-MCNC: 9 MG/DL (ref 8.4–10.2)
CHLORIDE SERPL-SCNC: 101 MMOL/L (ref 97–110)
CO2 SERPL-SCNC: 30 MMOL/L (ref 21–32)
CREAT SERPL-MCNC: 3.97 MG/DL (ref 0.51–0.95)
DEPRECATED RDW RBC: 58.4 FL (ref 39–50)
EGFRCR SERPLBLD CKD-EPI 2021: 13 ML/MIN/{1.73_M2}
ERYTHROCYTE [DISTWIDTH] IN BLOOD: 16.2 % (ref 11–15)
FASTING DURATION TIME PATIENT: ABNORMAL H
GLOBULIN SER-MCNC: 4.3 G/DL (ref 2–4)
GLUCOSE BLDC GLUCOMTR-MCNC: 83 MG/DL (ref 70–99)
GLUCOSE SERPL-MCNC: 90 MG/DL (ref 70–99)
HCT VFR BLD CALC: 23.6 % (ref 36–46.5)
HGB BLD-MCNC: 7.4 G/DL (ref 12–15.5)
MAGNESIUM SERPL-MCNC: 2.2 MG/DL (ref 1.7–2.4)
MCH RBC QN AUTO: 31.4 PG (ref 26–34)
MCHC RBC AUTO-ENTMCNC: 31.4 G/DL (ref 32–36.5)
MCV RBC AUTO: 100 FL (ref 78–100)
NRBC BLD MANUAL-RTO: 0 /100 WBC
PHOSPHATE SERPL-MCNC: 3.7 MG/DL (ref 2.4–4.7)
PLATELET # BLD AUTO: 116 K/MCL (ref 140–450)
POTASSIUM SERPL-SCNC: 4.2 MMOL/L (ref 3.4–5.1)
PROT SERPL-MCNC: 6.4 G/DL (ref 6.4–8.2)
RBC # BLD: 2.36 MIL/MCL (ref 4–5.2)
SODIUM SERPL-SCNC: 136 MMOL/L (ref 135–145)
TYPE AND SCREEN EXPIRATION DATE: NORMAL
WBC # BLD: 4.3 K/MCL (ref 4.2–11)

## 2025-05-16 PROCEDURE — 10006031 HB ROOM CHARGE TELEMETRY

## 2025-05-16 PROCEDURE — C1896 LEAD, AICD, NON SING/DUAL: HCPCS | Performed by: INTERNAL MEDICINE

## 2025-05-16 PROCEDURE — 10002801 HB RX 250 W/O HCPCS

## 2025-05-16 PROCEDURE — 10002803 HB RX 637: Performed by: STUDENT IN AN ORGANIZED HEALTH CARE EDUCATION/TRAINING PROGRAM

## 2025-05-16 PROCEDURE — 10002803 HB RX 637: Performed by: HOSPITALIST

## 2025-05-16 PROCEDURE — 36415 COLL VENOUS BLD VENIPUNCTURE: CPT | Performed by: NURSE PRACTITIONER

## 2025-05-16 PROCEDURE — 10004452 HB PACU ADDL 30 MINUTES: Performed by: INTERNAL MEDICINE

## 2025-05-16 PROCEDURE — 10002800 HB RX 250 W HCPCS

## 2025-05-16 PROCEDURE — 86850 RBC ANTIBODY SCREEN: CPT | Performed by: INTERNAL MEDICINE

## 2025-05-16 PROCEDURE — 10004651 HB RX, NO CHARGE ITEM: Performed by: NURSE PRACTITIONER

## 2025-05-16 PROCEDURE — 85027 COMPLETE CBC AUTOMATED: CPT | Performed by: NURSE PRACTITIONER

## 2025-05-16 PROCEDURE — 83735 ASSAY OF MAGNESIUM: CPT | Performed by: NURSE PRACTITIONER

## 2025-05-16 PROCEDURE — 10002807 HB RX 258: Performed by: INTERNAL MEDICINE

## 2025-05-16 PROCEDURE — 10002803 HB RX 637: Performed by: INTERNAL MEDICINE

## 2025-05-16 PROCEDURE — 33270 INS/REP SUBQ DEFIBRILLATOR: CPT | Performed by: INTERNAL MEDICINE

## 2025-05-16 PROCEDURE — 10004651 HB RX, NO CHARGE ITEM: Performed by: CLINICAL NURSE SPECIALIST

## 2025-05-16 PROCEDURE — 10004451 HB PACU RECOVERY 1ST 30 MINUTES: Performed by: INTERNAL MEDICINE

## 2025-05-16 PROCEDURE — 10006023 HB SUPPLY 272: Performed by: INTERNAL MEDICINE

## 2025-05-16 PROCEDURE — 96372 THER/PROPH/DIAG INJ SC/IM: CPT | Performed by: NURSE PRACTITIONER

## 2025-05-16 PROCEDURE — 10002800 HB RX 250 W HCPCS: Performed by: INTERNAL MEDICINE

## 2025-05-16 PROCEDURE — 0JH60FZ INSERTION OF SUBCUTANEOUS DEFIBRILLATOR LEAD INTO CHEST SUBCUTANEOUS TISSUE AND FASCIA, OPEN APPROACH: ICD-10-PCS | Performed by: INTERNAL MEDICINE

## 2025-05-16 PROCEDURE — 10006025 HB SUPPLY 275: Performed by: INTERNAL MEDICINE

## 2025-05-16 PROCEDURE — 80053 COMPREHEN METABOLIC PANEL: CPT | Performed by: NURSE PRACTITIONER

## 2025-05-16 PROCEDURE — 10002800 HB RX 250 W HCPCS: Performed by: NURSE PRACTITIONER

## 2025-05-16 PROCEDURE — 13000004 HB  ANESTHESIA  GENERAL OUTSIDE OR: Performed by: INTERNAL MEDICINE

## 2025-05-16 PROCEDURE — 10002807 HB RX 258

## 2025-05-16 PROCEDURE — 10002801 HB RX 250 W/O HCPCS: Performed by: INTERNAL MEDICINE

## 2025-05-16 PROCEDURE — 93641 EP EVL 1/2CHMB PAC CVDFB TST: CPT | Performed by: INTERNAL MEDICINE

## 2025-05-16 PROCEDURE — 10004651 HB RX, NO CHARGE ITEM: Performed by: INTERNAL MEDICINE

## 2025-05-16 PROCEDURE — 71046 X-RAY EXAM CHEST 2 VIEWS: CPT

## 2025-05-16 PROCEDURE — 99233 SBSQ HOSP IP/OBS HIGH 50: CPT | Performed by: INTERNAL MEDICINE

## 2025-05-16 PROCEDURE — C1722 AICD, SINGLE CHAMBER: HCPCS | Performed by: INTERNAL MEDICINE

## 2025-05-16 PROCEDURE — 96372 THER/PROPH/DIAG INJ SC/IM: CPT | Performed by: INTERNAL MEDICINE

## 2025-05-16 PROCEDURE — 84100 ASSAY OF PHOSPHORUS: CPT | Performed by: NURSE PRACTITIONER

## 2025-05-16 DEVICE — SUBCUTANEOUS ELECTRODE
Type: IMPLANTABLE DEVICE | Site: CHEST | Status: FUNCTIONAL
Brand: EMBLEM™ S-ICD

## 2025-05-16 DEVICE — SUBCUTANEOUS IMPLANTABLE CARDIOVERTER DEFIBRILLATOR
Type: IMPLANTABLE DEVICE | Site: CHEST | Status: FUNCTIONAL
Brand: EMBLEM™ MRI S-ICD

## 2025-05-16 RX ORDER — ONDANSETRON 2 MG/ML
4 INJECTION INTRAMUSCULAR; INTRAVENOUS
Status: ACTIVE | OUTPATIENT
Start: 2025-05-16 | End: 2025-05-16

## 2025-05-16 RX ORDER — SODIUM CHLORIDE 9 MG/ML
INJECTION, SOLUTION INTRAVENOUS CONTINUOUS PRN
Status: DISCONTINUED | OUTPATIENT
Start: 2025-05-16 | End: 2025-05-16

## 2025-05-16 RX ORDER — LIDOCAINE HYDROCHLORIDE 20 MG/ML
INJECTION, SOLUTION INFILTRATION; PERINEURAL PRN
Status: DISCONTINUED | OUTPATIENT
Start: 2025-05-16 | End: 2025-05-16

## 2025-05-16 RX ORDER — ROCURONIUM BROMIDE 10 MG/ML
INJECTION, SOLUTION INTRAVENOUS PRN
Status: DISCONTINUED | OUTPATIENT
Start: 2025-05-16 | End: 2025-05-16

## 2025-05-16 RX ORDER — DROPERIDOL 2.5 MG/ML
0.62 INJECTION, SOLUTION INTRAMUSCULAR; INTRAVENOUS
Status: ACTIVE | OUTPATIENT
Start: 2025-05-16 | End: 2025-05-16

## 2025-05-16 RX ORDER — ONDANSETRON 4 MG/1
4 TABLET, ORALLY DISINTEGRATING ORAL
Status: ACTIVE | OUTPATIENT
Start: 2025-05-16 | End: 2025-05-16

## 2025-05-16 RX ORDER — ONDANSETRON 2 MG/ML
4 INJECTION INTRAMUSCULAR; INTRAVENOUS 2 TIMES DAILY PRN
Status: DISCONTINUED | OUTPATIENT
Start: 2025-05-16 | End: 2025-05-16 | Stop reason: HOSPADM

## 2025-05-16 RX ORDER — DEXAMETHASONE SODIUM PHOSPHATE 4 MG/ML
4 INJECTION, SOLUTION INTRA-ARTICULAR; INTRALESIONAL; INTRAMUSCULAR; INTRAVENOUS; SOFT TISSUE
Status: DISCONTINUED | OUTPATIENT
Start: 2025-05-16 | End: 2025-05-16 | Stop reason: HOSPADM

## 2025-05-16 RX ORDER — EPHEDRINE SULFATE/0.9% NACL/PF 50 MG/10ML
SYRINGE (ML) INTRAVENOUS PRN
Status: DISCONTINUED | OUTPATIENT
Start: 2025-05-16 | End: 2025-05-16

## 2025-05-16 RX ORDER — HYDRALAZINE HYDROCHLORIDE 20 MG/ML
5 INJECTION INTRAMUSCULAR; INTRAVENOUS EVERY 10 MIN PRN
Status: DISCONTINUED | OUTPATIENT
Start: 2025-05-16 | End: 2025-05-16 | Stop reason: HOSPADM

## 2025-05-16 RX ORDER — PROCHLORPERAZINE MALEATE 5 MG/1
5 TABLET ORAL
Status: ACTIVE | OUTPATIENT
Start: 2025-05-16 | End: 2025-05-16

## 2025-05-16 RX ORDER — 0.9 % SODIUM CHLORIDE 0.9 %
2 VIAL (ML) INJECTION EVERY 12 HOURS SCHEDULED
Status: DISCONTINUED | OUTPATIENT
Start: 2025-05-16 | End: 2025-05-16 | Stop reason: HOSPADM

## 2025-05-16 RX ORDER — ETOMIDATE 2 MG/ML
INJECTION INTRAVENOUS PRN
Status: DISCONTINUED | OUTPATIENT
Start: 2025-05-16 | End: 2025-05-16

## 2025-05-16 RX ORDER — DROPERIDOL 2.5 MG/ML
0.62 INJECTION, SOLUTION INTRAMUSCULAR; INTRAVENOUS
Status: DISCONTINUED | OUTPATIENT
Start: 2025-05-16 | End: 2025-05-16 | Stop reason: HOSPADM

## 2025-05-16 RX ORDER — ONDANSETRON 2 MG/ML
INJECTION INTRAMUSCULAR; INTRAVENOUS PRN
Status: DISCONTINUED | OUTPATIENT
Start: 2025-05-16 | End: 2025-05-16

## 2025-05-16 RX ORDER — NICOTINE POLACRILEX 4 MG
30 LOZENGE BUCCAL
Status: DISCONTINUED | OUTPATIENT
Start: 2025-05-16 | End: 2025-05-16 | Stop reason: HOSPADM

## 2025-05-16 RX ORDER — DEXAMETHASONE SODIUM PHOSPHATE 4 MG/ML
INJECTION, SOLUTION INTRA-ARTICULAR; INTRALESIONAL; INTRAMUSCULAR; INTRAVENOUS; SOFT TISSUE PRN
Status: DISCONTINUED | OUTPATIENT
Start: 2025-05-16 | End: 2025-05-16

## 2025-05-16 RX ORDER — LIDOCAINE HYDROCHLORIDE 20 MG/ML
INJECTION, SOLUTION EPIDURAL; INFILTRATION; INTRACAUDAL; PERINEURAL PRN
Status: DISCONTINUED | OUTPATIENT
Start: 2025-05-16 | End: 2025-05-16 | Stop reason: HOSPADM

## 2025-05-16 RX ORDER — PROMETHAZINE HYDROCHLORIDE 25 MG/1
25 TABLET ORAL EVERY 6 HOURS PRN
Status: DISCONTINUED | OUTPATIENT
Start: 2025-05-16 | End: 2025-05-16 | Stop reason: HOSPADM

## 2025-05-16 RX ORDER — METOCLOPRAMIDE HYDROCHLORIDE 5 MG/ML
5 INJECTION INTRAMUSCULAR; INTRAVENOUS EVERY 6 HOURS PRN
Status: DISCONTINUED | OUTPATIENT
Start: 2025-05-16 | End: 2025-05-16 | Stop reason: HOSPADM

## 2025-05-16 RX ORDER — DEXTROSE MONOHYDRATE 25 G/50ML
25 INJECTION, SOLUTION INTRAVENOUS PRN
Status: DISCONTINUED | OUTPATIENT
Start: 2025-05-16 | End: 2025-05-16 | Stop reason: HOSPADM

## 2025-05-16 RX ORDER — NALOXONE HCL 0.4 MG/ML
0.2 VIAL (ML) INJECTION EVERY 5 MIN PRN
Status: DISCONTINUED | OUTPATIENT
Start: 2025-05-16 | End: 2025-05-16 | Stop reason: HOSPADM

## 2025-05-16 RX ORDER — PROCHLORPERAZINE EDISYLATE 5 MG/ML
5 INJECTION INTRAMUSCULAR; INTRAVENOUS EVERY 4 HOURS PRN
Status: DISCONTINUED | OUTPATIENT
Start: 2025-05-16 | End: 2025-05-16 | Stop reason: HOSPADM

## 2025-05-16 RX ORDER — 0.9 % SODIUM CHLORIDE 0.9 %
10 VIAL (ML) INJECTION PRN
Status: DISCONTINUED | OUTPATIENT
Start: 2025-05-16 | End: 2025-05-16 | Stop reason: HOSPADM

## 2025-05-16 RX ADMIN — FENTANYL CITRATE 50 MCG: 50 INJECTION INTRAMUSCULAR; INTRAVENOUS at 15:07

## 2025-05-16 RX ADMIN — WATER 2000 MG: 1 INJECTION INTRAMUSCULAR; INTRAVENOUS; SUBCUTANEOUS at 13:50

## 2025-05-16 RX ADMIN — HYDROCODONE BITARTRATE AND ACETAMINOPHEN 1 TABLET: 5; 325 TABLET ORAL at 21:29

## 2025-05-16 RX ADMIN — SODIUM CHLORIDE, PRESERVATIVE FREE 2 ML: 5 INJECTION INTRAVENOUS at 20:30

## 2025-05-16 RX ADMIN — FENTANYL CITRATE 50 MCG: 50 INJECTION INTRAMUSCULAR; INTRAVENOUS at 13:12

## 2025-05-16 RX ADMIN — HYDROCODONE BITARTRATE AND ACETAMINOPHEN 1 TABLET: 5; 325 TABLET ORAL at 10:15

## 2025-05-16 RX ADMIN — SODIUM CHLORIDE, PRESERVATIVE FREE 2 ML: 5 INJECTION INTRAVENOUS at 09:59

## 2025-05-16 RX ADMIN — FENTANYL CITRATE 25 MCG: 50 INJECTION INTRAMUSCULAR; INTRAVENOUS at 15:51

## 2025-05-16 RX ADMIN — ETOMIDATE 16 MG: 2 INJECTION, SOLUTION INTRAVENOUS at 13:18

## 2025-05-16 RX ADMIN — SODIUM CHLORIDE, PRESERVATIVE FREE 2 ML: 5 INJECTION INTRAVENOUS at 10:06

## 2025-05-16 RX ADMIN — FENTANYL CITRATE 25 MCG: 50 INJECTION INTRAMUSCULAR; INTRAVENOUS at 15:46

## 2025-05-16 RX ADMIN — HEPARIN SODIUM 5000 UNITS: 5000 INJECTION INTRAVENOUS; SUBCUTANEOUS at 05:10

## 2025-05-16 RX ADMIN — EPHEDRINE SULFATE 10 MG: 5 INJECTION INTRAVENOUS at 13:32

## 2025-05-16 RX ADMIN — ONDANSETRON 4 MG: 2 INJECTION INTRAMUSCULAR; INTRAVENOUS at 14:53

## 2025-05-16 RX ADMIN — ROCURONIUM BROMIDE 50 MG: 10 INJECTION INTRAVENOUS at 13:18

## 2025-05-16 RX ADMIN — DEXAMETHASONE SODIUM PHOSPHATE 4 MG: 4 INJECTION INTRA-ARTICULAR; INTRALESIONAL; INTRAMUSCULAR; INTRAVENOUS; SOFT TISSUE at 13:22

## 2025-05-16 RX ADMIN — RIFAMPIN 600 MG: 300 CAPSULE ORAL at 09:58

## 2025-05-16 RX ADMIN — AMIODARONE HYDROCHLORIDE 200 MG: 200 TABLET ORAL at 09:59

## 2025-05-16 RX ADMIN — HYDROCODONE BITARTRATE AND ACETAMINOPHEN 1 TABLET: 5; 325 TABLET ORAL at 17:25

## 2025-05-16 RX ADMIN — CEFTAZIDIME 2000 MG: 2 INJECTION, POWDER, FOR SOLUTION INTRAVENOUS at 20:39

## 2025-05-16 RX ADMIN — SUGAMMADEX 200 MG: 100 INJECTION, SOLUTION INTRAVENOUS at 14:50

## 2025-05-16 RX ADMIN — SODIUM CHLORIDE: 9 INJECTION, SOLUTION INTRAVENOUS at 13:06

## 2025-05-16 RX ADMIN — LIDOCAINE HYDROCHLORIDE 4 ML: 20 INJECTION, SOLUTION INFILTRATION; PERINEURAL at 13:18

## 2025-05-16 RX ADMIN — LIDOCAINE 2 PATCH: 4 PATCH TOPICAL at 09:58

## 2025-05-16 RX ADMIN — HYDROCODONE BITARTRATE AND ACETAMINOPHEN 1 TABLET: 5; 325 TABLET ORAL at 06:11

## 2025-05-16 RX ADMIN — HEPARIN SODIUM 5000 UNITS: 5000 INJECTION INTRAVENOUS; SUBCUTANEOUS at 20:30

## 2025-05-16 ASSESSMENT — PAIN SCALES - GENERAL
PAINLEVEL_OUTOF10: 1
PAINLEVEL_OUTOF10: 7
PAINLEVEL_OUTOF10: 8
PAINLEVEL_OUTOF10: 5
PAINLEVEL_OUTOF10: 0
PAINLEVEL_OUTOF10: 0
PAINLEVEL_OUTOF10: 4
PAINLEVEL_OUTOF10: 7
PAINLEVEL_OUTOF10: 0
PAINLEVEL_OUTOF10: 2
PAINLEVEL_OUTOF10: 0
PAINLEVEL_OUTOF10: 7
PAINLEVEL_OUTOF10: 8
PAINLEVEL_OUTOF10: 0
PAINLEVEL_OUTOF10: 5
PAINLEVEL_OUTOF10: 0
PAINLEVEL_OUTOF10: 0
PAINLEVEL_OUTOF10: 3

## 2025-05-16 ASSESSMENT — PAIN SCALES - WONG BAKER
WONGBAKER_NUMERICALRESPONSE: 8
WONGBAKER_NUMERICALRESPONSE: 3
WONGBAKER_NUMERICALRESPONSE: 1
WONGBAKER_NUMERICALRESPONSE: 0
WONGBAKER_NUMERICALRESPONSE: 1
WONGBAKER_NUMERICALRESPONSE: 1
WONGBAKER_NUMERICALRESPONSE: 0

## 2025-05-17 ENCOUNTER — APPOINTMENT (OUTPATIENT)
Dept: DIALYSIS | Age: 55
DRG: 177 | End: 2025-05-17
Attending: INTERNAL MEDICINE

## 2025-05-17 LAB
ALBUMIN SERPL-MCNC: 2.1 G/DL (ref 3.4–5)
ALBUMIN/GLOB SERPL: 0.5 {RATIO} (ref 1–2.4)
ALP SERPL-CCNC: 203 UNITS/L (ref 45–117)
ALT SERPL-CCNC: 9 UNITS/L
ANION GAP SERPL CALC-SCNC: 14 MMOL/L (ref 7–19)
AST SERPL-CCNC: 24 UNITS/L
BILIRUB SERPL-MCNC: 0.5 MG/DL (ref 0.2–1)
BLOOD EXPIRATION DATE: NORMAL
BUN SERPL-MCNC: 24 MG/DL (ref 6–20)
BUN/CREAT SERPL: 4 (ref 7–25)
CALCIUM SERPL-MCNC: 8.7 MG/DL (ref 8.4–10.2)
CHLORIDE SERPL-SCNC: 98 MMOL/L (ref 97–110)
CO2 SERPL-SCNC: 25 MMOL/L (ref 21–32)
CREAT SERPL-MCNC: 6.05 MG/DL (ref 0.51–0.95)
CROSSMATCH RESULT: NORMAL
DEPRECATED RDW RBC: 60.3 FL (ref 39–50)
DISPENSE STATUS: NORMAL
EGFRCR SERPLBLD CKD-EPI 2021: 8 ML/MIN/{1.73_M2}
ERYTHROCYTE [DISTWIDTH] IN BLOOD: 16.7 % (ref 11–15)
FASTING DURATION TIME PATIENT: ABNORMAL H
GLOBULIN SER-MCNC: 4.3 G/DL (ref 2–4)
GLUCOSE SERPL-MCNC: 100 MG/DL (ref 70–99)
HCT VFR BLD CALC: 22 % (ref 36–46.5)
HGB BLD-MCNC: 6.7 G/DL (ref 12–15.5)
HGB BLD-MCNC: 7.9 G/DL (ref 12–15.5)
ISBT BLOOD TYPE: 5100
ISSUE DATE/TIME: NORMAL
MAGNESIUM SERPL-MCNC: 2.3 MG/DL (ref 1.7–2.4)
MCH RBC QN AUTO: 30.9 PG (ref 26–34)
MCHC RBC AUTO-ENTMCNC: 30.5 G/DL (ref 32–36.5)
MCV RBC AUTO: 101.4 FL (ref 78–100)
NRBC BLD MANUAL-RTO: 0 /100 WBC
PHOSPHATE SERPL-MCNC: 5.1 MG/DL (ref 2.4–4.7)
PLATELET # BLD AUTO: 127 K/MCL (ref 140–450)
POTASSIUM SERPL-SCNC: 4.5 MMOL/L (ref 3.4–5.1)
PRODUCT CODE: NORMAL
PRODUCT DESCRIPTION: NORMAL
PRODUCT ID: NORMAL
PROT SERPL-MCNC: 6.4 G/DL (ref 6.4–8.2)
RBC # BLD: 2.17 MIL/MCL (ref 4–5.2)
SODIUM SERPL-SCNC: 132 MMOL/L (ref 135–145)
UNIT BLOOD TYPE: NORMAL
UNIT NUMBER: NORMAL
WBC # BLD: 5.5 K/MCL (ref 4.2–11)

## 2025-05-17 PROCEDURE — 10002803 HB RX 637: Performed by: INTERNAL MEDICINE

## 2025-05-17 PROCEDURE — 10002803 HB RX 637: Performed by: HOSPITALIST

## 2025-05-17 PROCEDURE — 10004651 HB RX, NO CHARGE ITEM: Performed by: INTERNAL MEDICINE

## 2025-05-17 PROCEDURE — 83735 ASSAY OF MAGNESIUM: CPT | Performed by: INTERNAL MEDICINE

## 2025-05-17 PROCEDURE — 85027 COMPLETE CBC AUTOMATED: CPT | Performed by: INTERNAL MEDICINE

## 2025-05-17 PROCEDURE — 99233 SBSQ HOSP IP/OBS HIGH 50: CPT | Performed by: INTERNAL MEDICINE

## 2025-05-17 PROCEDURE — 86923 COMPATIBILITY TEST ELECTRIC: CPT

## 2025-05-17 PROCEDURE — 36415 COLL VENOUS BLD VENIPUNCTURE: CPT | Performed by: INTERNAL MEDICINE

## 2025-05-17 PROCEDURE — 96372 THER/PROPH/DIAG INJ SC/IM: CPT | Performed by: INTERNAL MEDICINE

## 2025-05-17 PROCEDURE — 84100 ASSAY OF PHOSPHORUS: CPT | Performed by: INTERNAL MEDICINE

## 2025-05-17 PROCEDURE — 99232 SBSQ HOSP IP/OBS MODERATE 35: CPT | Performed by: INTERNAL MEDICINE

## 2025-05-17 PROCEDURE — 10002800 HB RX 250 W HCPCS: Performed by: INTERNAL MEDICINE

## 2025-05-17 PROCEDURE — 87070 CULTURE OTHR SPECIMN AEROBIC: CPT | Performed by: INTERNAL MEDICINE

## 2025-05-17 PROCEDURE — 85018 HEMOGLOBIN: CPT | Performed by: INTERNAL MEDICINE

## 2025-05-17 PROCEDURE — 80053 COMPREHEN METABOLIC PANEL: CPT | Performed by: INTERNAL MEDICINE

## 2025-05-17 PROCEDURE — 90935 HEMODIALYSIS ONE EVALUATION: CPT

## 2025-05-17 PROCEDURE — 10002803 HB RX 637: Performed by: STUDENT IN AN ORGANIZED HEALTH CARE EDUCATION/TRAINING PROGRAM

## 2025-05-17 PROCEDURE — 10006031 HB ROOM CHARGE TELEMETRY

## 2025-05-17 RX ORDER — SODIUM CHLORIDE 9 MG/ML
INJECTION, SOLUTION INTRAVENOUS CONTINUOUS PRN
Status: ACTIVE | OUTPATIENT
Start: 2025-05-17 | End: 2025-05-17

## 2025-05-17 RX ADMIN — SODIUM CHLORIDE, PRESERVATIVE FREE 2 ML: 5 INJECTION INTRAVENOUS at 09:01

## 2025-05-17 RX ADMIN — RIFAMPIN 600 MG: 300 CAPSULE ORAL at 09:01

## 2025-05-17 RX ADMIN — AMIODARONE HYDROCHLORIDE 200 MG: 200 TABLET ORAL at 09:01

## 2025-05-17 RX ADMIN — HYDROCODONE BITARTRATE AND ACETAMINOPHEN 1 TABLET: 5; 325 TABLET ORAL at 14:25

## 2025-05-17 RX ADMIN — HEPARIN SODIUM 5000 UNITS: 5000 INJECTION INTRAVENOUS; SUBCUTANEOUS at 21:00

## 2025-05-17 RX ADMIN — HYDROCODONE BITARTRATE AND ACETAMINOPHEN 1 TABLET: 5; 325 TABLET ORAL at 20:55

## 2025-05-17 RX ADMIN — HEPARIN SODIUM 5000 UNITS: 5000 INJECTION INTRAVENOUS; SUBCUTANEOUS at 14:25

## 2025-05-17 RX ADMIN — LIDOCAINE 2 PATCH: 4 PATCH TOPICAL at 09:01

## 2025-05-17 RX ADMIN — HYDROCODONE BITARTRATE AND ACETAMINOPHEN 1 TABLET: 5; 325 TABLET ORAL at 06:47

## 2025-05-17 RX ADMIN — SODIUM CHLORIDE, PRESERVATIVE FREE 2 ML: 5 INJECTION INTRAVENOUS at 20:54

## 2025-05-17 RX ADMIN — HYDROCODONE BITARTRATE AND ACETAMINOPHEN 1 TABLET: 5; 325 TABLET ORAL at 03:11

## 2025-05-17 ASSESSMENT — PAIN SCALES - GENERAL
PAINLEVEL_OUTOF10: 9
PAINLEVEL_OUTOF10: 3
PAINLEVEL_OUTOF10: 7
PAINLEVEL_OUTOF10: 1
PAINLEVEL_OUTOF10: 1
PAINLEVEL_OUTOF10: 8
PAINLEVEL_OUTOF10: 8
PAINLEVEL_OUTOF10: 4
PAINLEVEL_OUTOF10: 3

## 2025-05-17 ASSESSMENT — PAIN SCALES - WONG BAKER
WONGBAKER_NUMERICALRESPONSE: 8
WONGBAKER_NUMERICALRESPONSE: 1
WONGBAKER_NUMERICALRESPONSE: 7
WONGBAKER_NUMERICALRESPONSE: 8
WONGBAKER_NUMERICALRESPONSE: 0
WONGBAKER_NUMERICALRESPONSE: 1
WONGBAKER_NUMERICALRESPONSE: 3
WONGBAKER_NUMERICALRESPONSE: 0

## 2025-05-18 VITALS
BODY MASS INDEX: 19.61 KG/M2 | WEIGHT: 110.67 LBS | HEIGHT: 63 IN | HEART RATE: 87 BPM | TEMPERATURE: 98.1 F | OXYGEN SATURATION: 99 % | SYSTOLIC BLOOD PRESSURE: 132 MMHG | RESPIRATION RATE: 16 BRPM | DIASTOLIC BLOOD PRESSURE: 80 MMHG

## 2025-05-18 LAB
ALBUMIN SERPL-MCNC: 2 G/DL (ref 3.4–5)
ALBUMIN/GLOB SERPL: 0.5 {RATIO} (ref 1–2.4)
ALP SERPL-CCNC: 202 UNITS/L (ref 45–117)
ALT SERPL-CCNC: <7 UNITS/L
ANION GAP SERPL CALC-SCNC: 10 MMOL/L (ref 7–19)
AST SERPL-CCNC: 27 UNITS/L
BACTERIA SPEC ANAEROBE+AEROBE CULT: NORMAL
BASOPHILS # BLD: 0.1 K/MCL (ref 0–0.3)
BASOPHILS NFR BLD: 1 %
BILIRUB SERPL-MCNC: 0.5 MG/DL (ref 0.2–1)
BLOOD EXPIRATION DATE: NORMAL
BUN SERPL-MCNC: 14 MG/DL (ref 6–20)
BUN/CREAT SERPL: 3 (ref 7–25)
CALCIUM SERPL-MCNC: 8.8 MG/DL (ref 8.4–10.2)
CHLORIDE SERPL-SCNC: 103 MMOL/L (ref 97–110)
CO2 SERPL-SCNC: 26 MMOL/L (ref 21–32)
CREAT SERPL-MCNC: 4.35 MG/DL (ref 0.51–0.95)
CROSSMATCH RESULT: NORMAL
DEPRECATED RDW RBC: 67.2 FL (ref 39–50)
DISPENSE STATUS: NORMAL
EGFRCR SERPLBLD CKD-EPI 2021: 12 ML/MIN/{1.73_M2}
EOSINOPHIL # BLD: 0.7 K/MCL (ref 0–0.5)
EOSINOPHIL NFR BLD: 10 %
ERYTHROCYTE [DISTWIDTH] IN BLOOD: 19 % (ref 11–15)
FASTING DURATION TIME PATIENT: ABNORMAL H
GLOBULIN SER-MCNC: 4.2 G/DL (ref 2–4)
GLUCOSE SERPL-MCNC: 99 MG/DL (ref 70–99)
GRAM STN SPEC: NORMAL
HCT VFR BLD CALC: 22.4 % (ref 36–46.5)
HGB BLD-MCNC: 7.2 G/DL (ref 12–15.5)
HGB BLD-MCNC: 8.7 G/DL (ref 12–15.5)
IMM GRANULOCYTES # BLD AUTO: 0.2 K/MCL (ref 0–0.2)
IMM GRANULOCYTES # BLD: 3 %
ISBT BLOOD TYPE: 5100
ISSUE DATE/TIME: NORMAL
LYMPHOCYTES # BLD: 0.7 K/MCL (ref 1–4)
LYMPHOCYTES NFR BLD: 11 %
MAGNESIUM SERPL-MCNC: 2.2 MG/DL (ref 1.7–2.4)
MCH RBC QN AUTO: 30.9 PG (ref 26–34)
MCHC RBC AUTO-ENTMCNC: 32.1 G/DL (ref 32–36.5)
MCV RBC AUTO: 96.1 FL (ref 78–100)
MONOCYTES # BLD: 0.7 K/MCL (ref 0.3–0.9)
MONOCYTES NFR BLD: 11 %
NEUTROPHILS # BLD: 4.1 K/MCL (ref 1.8–7.7)
NEUTROPHILS NFR BLD: 64 %
NRBC BLD MANUAL-RTO: 0 /100 WBC
PHOSPHATE SERPL-MCNC: 2.5 MG/DL (ref 2.4–4.7)
PLATELET # BLD AUTO: 122 K/MCL (ref 140–450)
POTASSIUM SERPL-SCNC: 4.2 MMOL/L (ref 3.4–5.1)
PRODUCT CODE: NORMAL
PRODUCT DESCRIPTION: NORMAL
PRODUCT ID: NORMAL
PROT SERPL-MCNC: 6.2 G/DL (ref 6.4–8.2)
RBC # BLD: 2.33 MIL/MCL (ref 4–5.2)
SODIUM SERPL-SCNC: 135 MMOL/L (ref 135–145)
UNIT BLOOD TYPE: NORMAL
UNIT NUMBER: NORMAL
WBC # BLD: 6.4 K/MCL (ref 4.2–11)

## 2025-05-18 PROCEDURE — 10002803 HB RX 637: Performed by: INTERNAL MEDICINE

## 2025-05-18 PROCEDURE — 10002800 HB RX 250 W HCPCS: Performed by: INTERNAL MEDICINE

## 2025-05-18 PROCEDURE — 80053 COMPREHEN METABOLIC PANEL: CPT | Performed by: INTERNAL MEDICINE

## 2025-05-18 PROCEDURE — 84100 ASSAY OF PHOSPHORUS: CPT | Performed by: INTERNAL MEDICINE

## 2025-05-18 PROCEDURE — 85018 HEMOGLOBIN: CPT | Performed by: INTERNAL MEDICINE

## 2025-05-18 PROCEDURE — 85025 COMPLETE CBC W/AUTO DIFF WBC: CPT | Performed by: INTERNAL MEDICINE

## 2025-05-18 PROCEDURE — 10004651 HB RX, NO CHARGE ITEM: Performed by: INTERNAL MEDICINE

## 2025-05-18 PROCEDURE — 10002803 HB RX 637: Performed by: HOSPITALIST

## 2025-05-18 PROCEDURE — 10002803 HB RX 637: Performed by: STUDENT IN AN ORGANIZED HEALTH CARE EDUCATION/TRAINING PROGRAM

## 2025-05-18 PROCEDURE — 83735 ASSAY OF MAGNESIUM: CPT | Performed by: INTERNAL MEDICINE

## 2025-05-18 PROCEDURE — 10006031 HB ROOM CHARGE TELEMETRY

## 2025-05-18 PROCEDURE — 96372 THER/PROPH/DIAG INJ SC/IM: CPT | Performed by: INTERNAL MEDICINE

## 2025-05-18 PROCEDURE — 86923 COMPATIBILITY TEST ELECTRIC: CPT

## 2025-05-18 PROCEDURE — 36415 COLL VENOUS BLD VENIPUNCTURE: CPT | Performed by: INTERNAL MEDICINE

## 2025-05-18 PROCEDURE — 99233 SBSQ HOSP IP/OBS HIGH 50: CPT | Performed by: INTERNAL MEDICINE

## 2025-05-18 PROCEDURE — 99232 SBSQ HOSP IP/OBS MODERATE 35: CPT | Performed by: INTERNAL MEDICINE

## 2025-05-18 RX ORDER — SODIUM CHLORIDE 9 MG/ML
INJECTION, SOLUTION INTRAVENOUS CONTINUOUS PRN
Status: ACTIVE | OUTPATIENT
Start: 2025-05-18 | End: 2025-05-18

## 2025-05-18 RX ADMIN — HYDROCODONE BITARTRATE AND ACETAMINOPHEN 1 TABLET: 5; 325 TABLET ORAL at 02:55

## 2025-05-18 RX ADMIN — HEPARIN SODIUM 5000 UNITS: 5000 INJECTION INTRAVENOUS; SUBCUTANEOUS at 21:18

## 2025-05-18 RX ADMIN — SODIUM CHLORIDE, PRESERVATIVE FREE 2 ML: 5 INJECTION INTRAVENOUS at 20:48

## 2025-05-18 RX ADMIN — HYDROCODONE BITARTRATE AND ACETAMINOPHEN 1 TABLET: 5; 325 TABLET ORAL at 20:47

## 2025-05-18 RX ADMIN — RIFAMPIN 600 MG: 300 CAPSULE ORAL at 08:47

## 2025-05-18 RX ADMIN — AMIODARONE HYDROCHLORIDE 200 MG: 200 TABLET ORAL at 08:47

## 2025-05-18 RX ADMIN — SODIUM CHLORIDE, PRESERVATIVE FREE 2 ML: 5 INJECTION INTRAVENOUS at 08:48

## 2025-05-18 RX ADMIN — EPOETIN ALFA-EPBX 10000 UNITS: 10000 INJECTION, SOLUTION INTRAVENOUS; SUBCUTANEOUS at 13:30

## 2025-05-18 RX ADMIN — HEPARIN SODIUM 5000 UNITS: 5000 INJECTION INTRAVENOUS; SUBCUTANEOUS at 15:51

## 2025-05-18 RX ADMIN — HEPARIN SODIUM 5000 UNITS: 5000 INJECTION INTRAVENOUS; SUBCUTANEOUS at 05:54

## 2025-05-18 RX ADMIN — LIDOCAINE 2 PATCH: 4 PATCH TOPICAL at 08:47

## 2025-05-18 RX ADMIN — HYDROCODONE BITARTRATE AND ACETAMINOPHEN 1 TABLET: 5; 325 TABLET ORAL at 07:10

## 2025-05-18 ASSESSMENT — PAIN SCALES - GENERAL
PAINLEVEL_OUTOF10: 8
PAINLEVEL_OUTOF10: 2
PAINLEVEL_OUTOF10: 8
PAINLEVEL_OUTOF10: 1
PAINLEVEL_OUTOF10: 7
PAINLEVEL_OUTOF10: 3
PAINLEVEL_OUTOF10: 0
PAINLEVEL_OUTOF10: 3

## 2025-05-18 ASSESSMENT — PAIN SCALES - WONG BAKER
WONGBAKER_NUMERICALRESPONSE: 1
WONGBAKER_NUMERICALRESPONSE: 7
WONGBAKER_NUMERICALRESPONSE: 8
WONGBAKER_NUMERICALRESPONSE: 0
WONGBAKER_NUMERICALRESPONSE: 2
WONGBAKER_NUMERICALRESPONSE: 8
WONGBAKER_NUMERICALRESPONSE: 0

## 2025-05-19 ENCOUNTER — APPOINTMENT (OUTPATIENT)
Dept: DIALYSIS | Age: 55
DRG: 177 | End: 2025-05-19
Attending: INTERNAL MEDICINE

## 2025-05-19 ENCOUNTER — RESULTS FOLLOW-UP (OUTPATIENT)
Dept: CARDIOLOGY | Age: 55
End: 2025-05-19

## 2025-05-19 LAB
ALBUMIN SERPL-MCNC: 2 G/DL (ref 3.4–5)
ALBUMIN/GLOB SERPL: 0.5 {RATIO} (ref 1–2.4)
ALP SERPL-CCNC: 223 UNITS/L (ref 45–117)
ALT SERPL-CCNC: <7 UNITS/L
ANION GAP SERPL CALC-SCNC: 12 MMOL/L (ref 7–19)
AST SERPL-CCNC: 27 UNITS/L
ATRIAL RATE (BPM): 91
BASOPHILS # BLD: 0.1 K/MCL (ref 0–0.3)
BASOPHILS NFR BLD: 2 %
BILIRUB SERPL-MCNC: 0.4 MG/DL (ref 0.2–1)
BUN SERPL-MCNC: 31 MG/DL (ref 6–20)
BUN/CREAT SERPL: 5 (ref 7–25)
CALCIUM SERPL-MCNC: 8.7 MG/DL (ref 8.4–10.2)
CHLORIDE SERPL-SCNC: 102 MMOL/L (ref 97–110)
CO2 SERPL-SCNC: 26 MMOL/L (ref 21–32)
CREAT SERPL-MCNC: 6.16 MG/DL (ref 0.51–0.95)
DEPRECATED RDW RBC: 66.2 FL (ref 39–50)
EGFRCR SERPLBLD CKD-EPI 2021: 8 ML/MIN/{1.73_M2}
EOSINOPHIL # BLD: 0.6 K/MCL (ref 0–0.5)
EOSINOPHIL NFR BLD: 10 %
ERYTHROCYTE [DISTWIDTH] IN BLOOD: 19.7 % (ref 11–15)
FASTING DURATION TIME PATIENT: ABNORMAL H
GLOBULIN SER-MCNC: 4.2 G/DL (ref 2–4)
GLUCOSE SERPL-MCNC: 111 MG/DL (ref 70–99)
HCT VFR BLD CALC: 26.7 % (ref 36–46.5)
HGB BLD-MCNC: 8.4 G/DL (ref 12–15.5)
LYMPHOCYTES # BLD: 1 K/MCL (ref 1–4)
LYMPHOCYTES NFR BLD: 16 %
MAGNESIUM SERPL-MCNC: 2.2 MG/DL (ref 1.7–2.4)
MCH RBC QN AUTO: 29.2 PG (ref 26–34)
MCHC RBC AUTO-ENTMCNC: 31.5 G/DL (ref 32–36.5)
MCV RBC AUTO: 92.7 FL (ref 78–100)
METAMYELOCYTES NFR BLD: 1 % (ref 0–2)
MONOCYTES # BLD: 0.5 K/MCL (ref 0.3–0.9)
MONOCYTES NFR BLD: 8 %
NEUTROPHILS # BLD: 4 K/MCL (ref 1.8–7.7)
NEUTS BAND NFR BLD: 1 % (ref 0–10)
NEUTS SEG NFR BLD: 62 %
NRBC BLD MANUAL-RTO: 1 /100 WBC
P AXIS (DEGREES): 18
PHOSPHATE SERPL-MCNC: 3.3 MG/DL (ref 2.4–4.7)
PLAT MORPH BLD: NORMAL
PLATELET # BLD AUTO: 121 K/MCL (ref 140–450)
POLYCHROMASIA BLD QL SMEAR: ABNORMAL
POTASSIUM SERPL-SCNC: 4.2 MMOL/L (ref 3.4–5.1)
PR-INTERVAL (MSEC): 160
PROT SERPL-MCNC: 6.2 G/DL (ref 6.4–8.2)
QRS-INTERVAL (MSEC): 96
QT-INTERVAL (MSEC): 404
QTC: 497
R AXIS (DEGREES): -2
RBC # BLD: 2.88 MIL/MCL (ref 4–5.2)
REPORT TEXT: NORMAL
SODIUM SERPL-SCNC: 136 MMOL/L (ref 135–145)
T AXIS (DEGREES): -177
TROPONIN I SERPL DL<=0.01 NG/ML-MCNC: 25 NG/L
VENTRICULAR RATE EKG/MIN (BPM): 91
WBC # BLD: 6.3 K/MCL (ref 4.2–11)
WBC MORPH BLD: NORMAL

## 2025-05-19 PROCEDURE — 10002803 HB RX 637: Performed by: STUDENT IN AN ORGANIZED HEALTH CARE EDUCATION/TRAINING PROGRAM

## 2025-05-19 PROCEDURE — 10006031 HB ROOM CHARGE TELEMETRY

## 2025-05-19 PROCEDURE — 10002803 HB RX 637: Performed by: INTERNAL MEDICINE

## 2025-05-19 PROCEDURE — 84484 ASSAY OF TROPONIN QUANT: CPT | Performed by: INTERNAL MEDICINE

## 2025-05-19 PROCEDURE — 97116 GAIT TRAINING THERAPY: CPT

## 2025-05-19 PROCEDURE — 96372 THER/PROPH/DIAG INJ SC/IM: CPT | Performed by: INTERNAL MEDICINE

## 2025-05-19 PROCEDURE — 10004651 HB RX, NO CHARGE ITEM: Performed by: INTERNAL MEDICINE

## 2025-05-19 PROCEDURE — 90935 HEMODIALYSIS ONE EVALUATION: CPT

## 2025-05-19 PROCEDURE — 10002800 HB RX 250 W HCPCS: Performed by: INTERNAL MEDICINE

## 2025-05-19 PROCEDURE — 85027 COMPLETE CBC AUTOMATED: CPT | Performed by: INTERNAL MEDICINE

## 2025-05-19 PROCEDURE — 97530 THERAPEUTIC ACTIVITIES: CPT

## 2025-05-19 PROCEDURE — 99233 SBSQ HOSP IP/OBS HIGH 50: CPT | Performed by: INTERNAL MEDICINE

## 2025-05-19 PROCEDURE — 10002803 HB RX 637: Performed by: HOSPITALIST

## 2025-05-19 PROCEDURE — 83735 ASSAY OF MAGNESIUM: CPT | Performed by: INTERNAL MEDICINE

## 2025-05-19 PROCEDURE — 84100 ASSAY OF PHOSPHORUS: CPT | Performed by: INTERNAL MEDICINE

## 2025-05-19 PROCEDURE — 80053 COMPREHEN METABOLIC PANEL: CPT | Performed by: INTERNAL MEDICINE

## 2025-05-19 PROCEDURE — 36415 COLL VENOUS BLD VENIPUNCTURE: CPT | Performed by: INTERNAL MEDICINE

## 2025-05-19 PROCEDURE — 93005 ELECTROCARDIOGRAM TRACING: CPT | Performed by: INTERNAL MEDICINE

## 2025-05-19 PROCEDURE — 99232 SBSQ HOSP IP/OBS MODERATE 35: CPT | Performed by: SURGERY

## 2025-05-19 RX ORDER — AMIODARONE HYDROCHLORIDE 200 MG/1
200 TABLET ORAL
Status: DISCONTINUED | OUTPATIENT
Start: 2025-05-21 | End: 2025-05-24 | Stop reason: HOSPADM

## 2025-05-19 RX ADMIN — SODIUM CHLORIDE, PRESERVATIVE FREE 2 ML: 5 INJECTION INTRAVENOUS at 21:47

## 2025-05-19 RX ADMIN — HEPARIN SODIUM 5000 UNITS: 5000 INJECTION INTRAVENOUS; SUBCUTANEOUS at 05:48

## 2025-05-19 RX ADMIN — SODIUM CHLORIDE, PRESERVATIVE FREE 2 ML: 5 INJECTION INTRAVENOUS at 08:40

## 2025-05-19 RX ADMIN — EPOETIN ALFA-EPBX 10000 UNITS: 10000 INJECTION, SOLUTION INTRAVENOUS; SUBCUTANEOUS at 17:18

## 2025-05-19 RX ADMIN — HYDROCODONE BITARTRATE AND ACETAMINOPHEN 1 TABLET: 5; 325 TABLET ORAL at 14:52

## 2025-05-19 RX ADMIN — AMIODARONE HYDROCHLORIDE 200 MG: 200 TABLET ORAL at 08:40

## 2025-05-19 RX ADMIN — RIFAMPIN 600 MG: 300 CAPSULE ORAL at 08:40

## 2025-05-19 RX ADMIN — HEPARIN SODIUM 5000 UNITS: 5000 INJECTION INTRAVENOUS; SUBCUTANEOUS at 21:46

## 2025-05-19 RX ADMIN — HEPARIN SODIUM 5000 UNITS: 5000 INJECTION INTRAVENOUS; SUBCUTANEOUS at 14:52

## 2025-05-19 RX ADMIN — LIDOCAINE 2 PATCH: 4 PATCH TOPICAL at 08:40

## 2025-05-19 RX ADMIN — HYDROCODONE BITARTRATE AND ACETAMINOPHEN 1 TABLET: 5; 325 TABLET ORAL at 21:45

## 2025-05-19 RX ADMIN — HYDROCODONE BITARTRATE AND ACETAMINOPHEN 1 TABLET: 5; 325 TABLET ORAL at 06:20

## 2025-05-19 ASSESSMENT — PAIN SCALES - GENERAL
PAINLEVEL_OUTOF10: 5
PAINLEVEL_OUTOF10: 4
PAINLEVEL_OUTOF10: 2
PAINLEVEL_OUTOF10: 4
PAINLEVEL_OUTOF10: 7
PAINLEVEL_OUTOF10: 9

## 2025-05-19 ASSESSMENT — PAIN SCALES - WONG BAKER
WONGBAKER_NUMERICALRESPONSE: 9
WONGBAKER_NUMERICALRESPONSE: 4

## 2025-05-19 ASSESSMENT — COGNITIVE AND FUNCTIONAL STATUS - GENERAL
BASIC_MOBILITY_RAW_SCORE: 23
BASIC_MOBILITY_CONVERTED_SCORE: 50.88

## 2025-05-20 LAB
ABO + RH BLD: NORMAL
ANION GAP SERPL CALC-SCNC: 10 MMOL/L (ref 7–19)
BLD GP AB SCN SERPL QL GEL: NEGATIVE
BUN SERPL-MCNC: 23 MG/DL (ref 6–20)
BUN/CREAT SERPL: 5 (ref 7–25)
CALCIUM SERPL-MCNC: 9.5 MG/DL (ref 8.4–10.2)
CHLORIDE SERPL-SCNC: 102 MMOL/L (ref 97–110)
CO2 SERPL-SCNC: 28 MMOL/L (ref 21–32)
CREAT SERPL-MCNC: 4.48 MG/DL (ref 0.51–0.95)
DEPRECATED RDW RBC: 65.4 FL (ref 39–50)
EGFRCR SERPLBLD CKD-EPI 2021: 11 ML/MIN/{1.73_M2}
ERYTHROCYTE [DISTWIDTH] IN BLOOD: 19.9 % (ref 11–15)
FASTING DURATION TIME PATIENT: ABNORMAL H
GLUCOSE SERPL-MCNC: 113 MG/DL (ref 70–99)
HCT VFR BLD CALC: 29.2 % (ref 36–46.5)
HGB BLD-MCNC: 9.1 G/DL (ref 12–15.5)
MCH RBC QN AUTO: 29.4 PG (ref 26–34)
MCHC RBC AUTO-ENTMCNC: 31.2 G/DL (ref 32–36.5)
MCV RBC AUTO: 94.2 FL (ref 78–100)
NRBC BLD MANUAL-RTO: 0 /100 WBC
PLATELET # BLD AUTO: 134 K/MCL (ref 140–450)
POTASSIUM SERPL-SCNC: 4.2 MMOL/L (ref 3.4–5.1)
RBC # BLD: 3.1 MIL/MCL (ref 4–5.2)
SODIUM SERPL-SCNC: 136 MMOL/L (ref 135–145)
TYPE AND SCREEN EXPIRATION DATE: NORMAL
WBC # BLD: 6.7 K/MCL (ref 4.2–11)

## 2025-05-20 PROCEDURE — 10002803 HB RX 637: Performed by: HOSPITALIST

## 2025-05-20 PROCEDURE — 10006031 HB ROOM CHARGE TELEMETRY

## 2025-05-20 PROCEDURE — 99233 SBSQ HOSP IP/OBS HIGH 50: CPT | Performed by: NURSE PRACTITIONER

## 2025-05-20 PROCEDURE — 10002803 HB RX 637: Performed by: INTERNAL MEDICINE

## 2025-05-20 PROCEDURE — 86850 RBC ANTIBODY SCREEN: CPT | Performed by: INTERNAL MEDICINE

## 2025-05-20 PROCEDURE — 10004651 HB RX, NO CHARGE ITEM: Performed by: INTERNAL MEDICINE

## 2025-05-20 PROCEDURE — 36415 COLL VENOUS BLD VENIPUNCTURE: CPT | Performed by: INTERNAL MEDICINE

## 2025-05-20 PROCEDURE — 10002800 HB RX 250 W HCPCS: Performed by: INTERNAL MEDICINE

## 2025-05-20 PROCEDURE — 99233 SBSQ HOSP IP/OBS HIGH 50: CPT | Performed by: INTERNAL MEDICINE

## 2025-05-20 PROCEDURE — 85027 COMPLETE CBC AUTOMATED: CPT | Performed by: INTERNAL MEDICINE

## 2025-05-20 PROCEDURE — 80048 BASIC METABOLIC PNL TOTAL CA: CPT | Performed by: INTERNAL MEDICINE

## 2025-05-20 PROCEDURE — 10002803 HB RX 637: Performed by: STUDENT IN AN ORGANIZED HEALTH CARE EDUCATION/TRAINING PROGRAM

## 2025-05-20 PROCEDURE — 96372 THER/PROPH/DIAG INJ SC/IM: CPT | Performed by: INTERNAL MEDICINE

## 2025-05-20 RX ORDER — DIPHENHYDRAMINE HCL 25 MG
25 CAPSULE ORAL EVERY 6 HOURS PRN
Status: DISCONTINUED | OUTPATIENT
Start: 2025-05-20 | End: 2025-05-24 | Stop reason: HOSPADM

## 2025-05-20 RX ADMIN — HYDROCODONE BITARTRATE AND ACETAMINOPHEN 1 TABLET: 5; 325 TABLET ORAL at 20:45

## 2025-05-20 RX ADMIN — HEPARIN SODIUM 5000 UNITS: 5000 INJECTION INTRAVENOUS; SUBCUTANEOUS at 14:12

## 2025-05-20 RX ADMIN — HEPARIN SODIUM 5000 UNITS: 5000 INJECTION INTRAVENOUS; SUBCUTANEOUS at 20:47

## 2025-05-20 RX ADMIN — SODIUM CHLORIDE, PRESERVATIVE FREE 2 ML: 5 INJECTION INTRAVENOUS at 20:45

## 2025-05-20 RX ADMIN — SODIUM CHLORIDE, PRESERVATIVE FREE 2 ML: 5 INJECTION INTRAVENOUS at 09:37

## 2025-05-20 RX ADMIN — HEPARIN SODIUM 5000 UNITS: 5000 INJECTION INTRAVENOUS; SUBCUTANEOUS at 05:19

## 2025-05-20 RX ADMIN — LIDOCAINE 2 PATCH: 4 PATCH TOPICAL at 09:36

## 2025-05-20 RX ADMIN — DIPHENHYDRAMINE HYDROCHLORIDE 25 MG: 25 CAPSULE ORAL at 20:45

## 2025-05-20 RX ADMIN — RIFAMPIN 600 MG: 300 CAPSULE ORAL at 09:36

## 2025-05-20 ASSESSMENT — PAIN SCALES - GENERAL
PAINLEVEL_OUTOF10: 7
PAINLEVEL_OUTOF10: 5

## 2025-05-21 ENCOUNTER — APPOINTMENT (OUTPATIENT)
Dept: DIALYSIS | Age: 55
DRG: 177 | End: 2025-05-21
Attending: INTERNAL MEDICINE

## 2025-05-21 LAB
ANION GAP SERPL CALC-SCNC: 9 MMOL/L (ref 7–19)
BACTERIA SPEC ANAEROBE+AEROBE CULT: NORMAL
BUN SERPL-MCNC: 36 MG/DL (ref 6–20)
BUN/CREAT SERPL: 6 (ref 7–25)
CALCIUM SERPL-MCNC: 9.4 MG/DL (ref 8.4–10.2)
CHLORIDE SERPL-SCNC: 98 MMOL/L (ref 97–110)
CO2 SERPL-SCNC: 28 MMOL/L (ref 21–32)
CREAT SERPL-MCNC: 6.41 MG/DL (ref 0.51–0.95)
DEPRECATED RDW RBC: 64 FL (ref 39–50)
EGFRCR SERPLBLD CKD-EPI 2021: 7 ML/MIN/{1.73_M2}
ERYTHROCYTE [DISTWIDTH] IN BLOOD: 19.5 % (ref 11–15)
FASTING DURATION TIME PATIENT: ABNORMAL H
GLUCOSE SERPL-MCNC: 104 MG/DL (ref 70–99)
GRAM STN SPEC: NORMAL
HCT VFR BLD CALC: 27.3 % (ref 36–46.5)
HGB BLD-MCNC: 8.6 G/DL (ref 12–15.5)
MAGNESIUM SERPL-MCNC: 2.2 MG/DL (ref 1.7–2.4)
MCH RBC QN AUTO: 29.7 PG (ref 26–34)
MCHC RBC AUTO-ENTMCNC: 31.5 G/DL (ref 32–36.5)
MCV RBC AUTO: 94.1 FL (ref 78–100)
NRBC BLD MANUAL-RTO: 0 /100 WBC
PLATELET # BLD AUTO: 144 K/MCL (ref 140–450)
POTASSIUM SERPL-SCNC: 4.8 MMOL/L (ref 3.4–5.1)
RBC # BLD: 2.9 MIL/MCL (ref 4–5.2)
SODIUM SERPL-SCNC: 130 MMOL/L (ref 135–145)
T4 FREE SERPL-MCNC: 1.2 NG/DL (ref 0.8–1.5)
TSH SERPL-ACNC: 8.86 MCUNITS/ML (ref 0.35–5)
WBC # BLD: 5.8 K/MCL (ref 4.2–11)

## 2025-05-21 PROCEDURE — 80048 BASIC METABOLIC PNL TOTAL CA: CPT | Performed by: INTERNAL MEDICINE

## 2025-05-21 PROCEDURE — 84439 ASSAY OF FREE THYROXINE: CPT | Performed by: NURSE PRACTITIONER

## 2025-05-21 PROCEDURE — 10002803 HB RX 637: Performed by: INTERNAL MEDICINE

## 2025-05-21 PROCEDURE — 10002803 HB RX 637: Performed by: STUDENT IN AN ORGANIZED HEALTH CARE EDUCATION/TRAINING PROGRAM

## 2025-05-21 PROCEDURE — 90935 HEMODIALYSIS ONE EVALUATION: CPT

## 2025-05-21 PROCEDURE — 96372 THER/PROPH/DIAG INJ SC/IM: CPT | Performed by: INTERNAL MEDICINE

## 2025-05-21 PROCEDURE — 99233 SBSQ HOSP IP/OBS HIGH 50: CPT | Performed by: INTERNAL MEDICINE

## 2025-05-21 PROCEDURE — 10002803 HB RX 637: Performed by: HOSPITALIST

## 2025-05-21 PROCEDURE — 85027 COMPLETE CBC AUTOMATED: CPT | Performed by: INTERNAL MEDICINE

## 2025-05-21 PROCEDURE — 84443 ASSAY THYROID STIM HORMONE: CPT | Performed by: NURSE PRACTITIONER

## 2025-05-21 PROCEDURE — 36415 COLL VENOUS BLD VENIPUNCTURE: CPT | Performed by: INTERNAL MEDICINE

## 2025-05-21 PROCEDURE — 10006031 HB ROOM CHARGE TELEMETRY

## 2025-05-21 PROCEDURE — 10004651 HB RX, NO CHARGE ITEM: Performed by: INTERNAL MEDICINE

## 2025-05-21 PROCEDURE — 10002800 HB RX 250 W HCPCS: Performed by: INTERNAL MEDICINE

## 2025-05-21 PROCEDURE — 83735 ASSAY OF MAGNESIUM: CPT | Performed by: INTERNAL MEDICINE

## 2025-05-21 RX ORDER — POLYETHYLENE GLYCOL 3350 17 G/17G
17 POWDER, FOR SOLUTION ORAL DAILY
Status: DISCONTINUED | OUTPATIENT
Start: 2025-05-21 | End: 2025-05-24 | Stop reason: HOSPADM

## 2025-05-21 RX ADMIN — AMIODARONE HYDROCHLORIDE 200 MG: 200 TABLET ORAL at 08:19

## 2025-05-21 RX ADMIN — SODIUM CHLORIDE, PRESERVATIVE FREE 2 ML: 5 INJECTION INTRAVENOUS at 08:20

## 2025-05-21 RX ADMIN — LIDOCAINE 2 PATCH: 4 PATCH TOPICAL at 08:20

## 2025-05-21 RX ADMIN — DIPHENHYDRAMINE HYDROCHLORIDE 25 MG: 25 CAPSULE ORAL at 12:43

## 2025-05-21 RX ADMIN — RIFAMPIN 600 MG: 300 CAPSULE ORAL at 08:19

## 2025-05-21 RX ADMIN — EPOETIN ALFA-EPBX 10000 UNITS: 10000 INJECTION, SOLUTION INTRAVENOUS; SUBCUTANEOUS at 17:15

## 2025-05-21 RX ADMIN — HEPARIN SODIUM 5000 UNITS: 5000 INJECTION INTRAVENOUS; SUBCUTANEOUS at 16:15

## 2025-05-21 RX ADMIN — SODIUM CHLORIDE, PRESERVATIVE FREE 2 ML: 5 INJECTION INTRAVENOUS at 21:08

## 2025-05-21 RX ADMIN — HEPARIN SODIUM 5000 UNITS: 5000 INJECTION INTRAVENOUS; SUBCUTANEOUS at 05:49

## 2025-05-21 RX ADMIN — HYDROCODONE BITARTRATE AND ACETAMINOPHEN 1 TABLET: 5; 325 TABLET ORAL at 05:52

## 2025-05-21 RX ADMIN — HYDROCODONE BITARTRATE AND ACETAMINOPHEN 1 TABLET: 5; 325 TABLET ORAL at 21:08

## 2025-05-21 ASSESSMENT — PAIN SCALES - GENERAL
PAINLEVEL_OUTOF10: 0
PAINLEVEL_OUTOF10: 7
PAINLEVEL_OUTOF10: 5
PAINLEVEL_OUTOF10: 0

## 2025-05-22 ENCOUNTER — ANESTHESIA (OUTPATIENT)
Dept: SURGERY | Age: 55
End: 2025-05-22

## 2025-05-22 ENCOUNTER — ANESTHESIA EVENT (OUTPATIENT)
Dept: SURGERY | Age: 55
End: 2025-05-22

## 2025-05-22 LAB
ANION GAP SERPL CALC-SCNC: 10 MMOL/L (ref 7–19)
BUN SERPL-MCNC: 22 MG/DL (ref 6–20)
BUN/CREAT SERPL: 4 (ref 7–25)
CALCIUM SERPL-MCNC: 9.7 MG/DL (ref 8.4–10.2)
CHLORIDE SERPL-SCNC: 99 MMOL/L (ref 97–110)
CO2 SERPL-SCNC: 28 MMOL/L (ref 21–32)
CREAT SERPL-MCNC: 4.91 MG/DL (ref 0.51–0.95)
DEPRECATED RDW RBC: 65.6 FL (ref 39–50)
EGFRCR SERPLBLD CKD-EPI 2021: 10 ML/MIN/{1.73_M2}
ERYTHROCYTE [DISTWIDTH] IN BLOOD: 19.9 % (ref 11–15)
FASTING DURATION TIME PATIENT: ABNORMAL H
GLUCOSE SERPL-MCNC: 115 MG/DL (ref 70–99)
HCG SERPL QL: POSITIVE
HCT VFR BLD CALC: 31.1 % (ref 36–46.5)
HGB BLD-MCNC: 9.8 G/DL (ref 12–15.5)
MAGNESIUM SERPL-MCNC: 2.3 MG/DL (ref 1.7–2.4)
MCH RBC QN AUTO: 30.2 PG (ref 26–34)
MCHC RBC AUTO-ENTMCNC: 31.5 G/DL (ref 32–36.5)
MCV RBC AUTO: 95.7 FL (ref 78–100)
NRBC BLD MANUAL-RTO: 0 /100 WBC
PHOSPHATE SERPL-MCNC: 3.5 MG/DL (ref 2.4–4.7)
PLATELET # BLD AUTO: 158 K/MCL (ref 140–450)
POTASSIUM SERPL-SCNC: 3.7 MMOL/L (ref 3.4–5.1)
RBC # BLD: 3.25 MIL/MCL (ref 4–5.2)
SODIUM SERPL-SCNC: 133 MMOL/L (ref 135–145)
WBC # BLD: 6.8 K/MCL (ref 4.2–11)

## 2025-05-22 PROCEDURE — 10002803 HB RX 637: Performed by: INTERNAL MEDICINE

## 2025-05-22 PROCEDURE — 99232 SBSQ HOSP IP/OBS MODERATE 35: CPT | Performed by: INTERNAL MEDICINE

## 2025-05-22 PROCEDURE — 84100 ASSAY OF PHOSPHORUS: CPT | Performed by: INTERNAL MEDICINE

## 2025-05-22 PROCEDURE — 13000122 HB VASCULAR BASIC CASE S/U + 1ST 15 MIN: Performed by: SURGERY

## 2025-05-22 PROCEDURE — 36819 AV FUSE UPPR ARM BASILIC: CPT | Performed by: SURGERY

## 2025-05-22 PROCEDURE — 10002800 HB RX 250 W HCPCS: Performed by: SURGERY

## 2025-05-22 PROCEDURE — 10006027 HB SUPPLY 278: Performed by: SURGERY

## 2025-05-22 PROCEDURE — 10006023 HB SUPPLY 272: Performed by: SURGERY

## 2025-05-22 PROCEDURE — 10002800 HB RX 250 W HCPCS

## 2025-05-22 PROCEDURE — 10004651 HB RX, NO CHARGE ITEM: Performed by: INTERNAL MEDICINE

## 2025-05-22 PROCEDURE — 83735 ASSAY OF MAGNESIUM: CPT | Performed by: INTERNAL MEDICINE

## 2025-05-22 PROCEDURE — 13000123 HB VASCULAR BASIC CASE EA ADD MINUTE: Performed by: SURGERY

## 2025-05-22 PROCEDURE — 10006031 HB ROOM CHARGE TELEMETRY

## 2025-05-22 PROCEDURE — 84703 CHORIONIC GONADOTROPIN ASSAY: CPT | Performed by: SURGERY

## 2025-05-22 PROCEDURE — 10004452 HB PACU ADDL 30 MINUTES: Performed by: SURGERY

## 2025-05-22 PROCEDURE — 13000002 HB ANESTHESIA  GENERAL   S/U + 1ST 15 MIN: Performed by: SURGERY

## 2025-05-22 PROCEDURE — 80048 BASIC METABOLIC PNL TOTAL CA: CPT | Performed by: INTERNAL MEDICINE

## 2025-05-22 PROCEDURE — 85027 COMPLETE CBC AUTOMATED: CPT | Performed by: INTERNAL MEDICINE

## 2025-05-22 PROCEDURE — 10004651 HB RX, NO CHARGE ITEM: Performed by: SURGERY

## 2025-05-22 PROCEDURE — 13000003 HB ANESTHESIA  GENERAL EA ADD MINUTE: Performed by: SURGERY

## 2025-05-22 PROCEDURE — 10002803 HB RX 637: Performed by: SURGERY

## 2025-05-22 PROCEDURE — 10004451 HB PACU RECOVERY 1ST 30 MINUTES: Performed by: SURGERY

## 2025-05-22 PROCEDURE — 03180ZD BYPASS LEFT BRACHIAL ARTERY TO UPPER ARM VEIN, OPEN APPROACH: ICD-10-PCS | Performed by: SURGERY

## 2025-05-22 PROCEDURE — 10002800 HB RX 250 W HCPCS: Performed by: ANESTHESIOLOGY

## 2025-05-22 PROCEDURE — 10002807 HB RX 258: Performed by: SURGERY

## 2025-05-22 PROCEDURE — 36415 COLL VENOUS BLD VENIPUNCTURE: CPT | Performed by: INTERNAL MEDICINE

## 2025-05-22 PROCEDURE — 10002803 HB RX 637: Performed by: STUDENT IN AN ORGANIZED HEALTH CARE EDUCATION/TRAINING PROGRAM

## 2025-05-22 DEVICE — HORIZON TI SMALL 6 CLIPS/CART
Type: IMPLANTABLE DEVICE | Site: ARM UPPER | Status: FUNCTIONAL
Brand: WECK

## 2025-05-22 DEVICE — CLIP INTERNAL MED CHEVRON 6 CRTDG LIGATE TRIANGULATE CROSS: Type: IMPLANTABLE DEVICE | Site: ARM UPPER | Status: FUNCTIONAL

## 2025-05-22 RX ORDER — MIDAZOLAM HYDROCHLORIDE 1 MG/ML
INJECTION, SOLUTION INTRAMUSCULAR; INTRAVENOUS PRN
Status: DISCONTINUED | OUTPATIENT
Start: 2025-05-22 | End: 2025-05-22

## 2025-05-22 RX ORDER — HYDROCODONE BITARTRATE AND ACETAMINOPHEN 10; 325 MG/1; MG/1
1 TABLET ORAL EVERY 4 HOURS PRN
Status: DISCONTINUED | OUTPATIENT
Start: 2025-05-22 | End: 2025-05-24 | Stop reason: HOSPADM

## 2025-05-22 RX ORDER — ACETAMINOPHEN 500 MG
1000 TABLET ORAL
Status: DISCONTINUED | OUTPATIENT
Start: 2025-05-22 | End: 2025-05-22 | Stop reason: HOSPADM

## 2025-05-22 RX ORDER — 0.9 % SODIUM CHLORIDE 0.9 %
10 VIAL (ML) INJECTION PRN
Status: DISCONTINUED | OUTPATIENT
Start: 2025-05-22 | End: 2025-05-24 | Stop reason: HOSPADM

## 2025-05-22 RX ORDER — 0.9 % SODIUM CHLORIDE 0.9 %
2 VIAL (ML) INJECTION EVERY 12 HOURS SCHEDULED
Status: CANCELLED | OUTPATIENT
Start: 2025-05-22

## 2025-05-22 RX ORDER — 0.9 % SODIUM CHLORIDE 0.9 %
10 VIAL (ML) INJECTION PRN
Status: DISCONTINUED | OUTPATIENT
Start: 2025-05-22 | End: 2025-05-22 | Stop reason: HOSPADM

## 2025-05-22 RX ORDER — ONDANSETRON 2 MG/ML
4 INJECTION INTRAMUSCULAR; INTRAVENOUS ONCE
Status: DISCONTINUED | OUTPATIENT
Start: 2025-05-22 | End: 2025-05-22 | Stop reason: HOSPADM

## 2025-05-22 RX ORDER — HYDROCODONE BITARTRATE AND ACETAMINOPHEN 5; 325 MG/1; MG/1
1 TABLET ORAL EVERY 4 HOURS PRN
Status: DISCONTINUED | OUTPATIENT
Start: 2025-05-22 | End: 2025-05-24 | Stop reason: HOSPADM

## 2025-05-22 RX ORDER — DEXTROSE MONOHYDRATE 25 G/50ML
25 INJECTION, SOLUTION INTRAVENOUS PRN
Status: DISCONTINUED | OUTPATIENT
Start: 2025-05-22 | End: 2025-05-22 | Stop reason: HOSPADM

## 2025-05-22 RX ORDER — PROPOFOL 10 MG/ML
INJECTION, EMULSION INTRAVENOUS PRN
Status: DISCONTINUED | OUTPATIENT
Start: 2025-05-22 | End: 2025-05-22

## 2025-05-22 RX ORDER — HYDRALAZINE HYDROCHLORIDE 20 MG/ML
5 INJECTION INTRAMUSCULAR; INTRAVENOUS EVERY 10 MIN PRN
Status: DISCONTINUED | OUTPATIENT
Start: 2025-05-22 | End: 2025-05-22 | Stop reason: HOSPADM

## 2025-05-22 RX ORDER — 0.9 % SODIUM CHLORIDE 0.9 %
2 VIAL (ML) INJECTION EVERY 12 HOURS SCHEDULED
Status: DISCONTINUED | OUTPATIENT
Start: 2025-05-22 | End: 2025-05-22 | Stop reason: HOSPADM

## 2025-05-22 RX ORDER — NICOTINE POLACRILEX 4 MG
30 LOZENGE BUCCAL
Status: DISCONTINUED | OUTPATIENT
Start: 2025-05-22 | End: 2025-05-22 | Stop reason: HOSPADM

## 2025-05-22 RX ADMIN — SODIUM CHLORIDE, PRESERVATIVE FREE 2 ML: 5 INJECTION INTRAVENOUS at 10:09

## 2025-05-22 RX ADMIN — LIDOCAINE 2 PATCH: 4 PATCH TOPICAL at 10:09

## 2025-05-22 RX ADMIN — DIPHENHYDRAMINE HYDROCHLORIDE 25 MG: 25 CAPSULE ORAL at 06:22

## 2025-05-22 RX ADMIN — POLYETHYLENE GLYCOL 3350 17 G: 17 POWDER, FOR SOLUTION ORAL at 10:09

## 2025-05-22 RX ADMIN — DIPHENHYDRAMINE HYDROCHLORIDE 25 MG: 25 CAPSULE ORAL at 15:41

## 2025-05-22 RX ADMIN — FENTANYL CITRATE 100 MCG: 50 INJECTION INTRAMUSCULAR; INTRAVENOUS at 17:02

## 2025-05-22 RX ADMIN — PROPOFOL 100 MG: 10 INJECTION, EMULSION INTRAVENOUS at 17:03

## 2025-05-22 RX ADMIN — MIDAZOLAM HYDROCHLORIDE 2 MG: 1 INJECTION, SOLUTION INTRAMUSCULAR; INTRAVENOUS at 17:02

## 2025-05-22 RX ADMIN — FENTANYL CITRATE 25 MCG: 50 INJECTION INTRAMUSCULAR; INTRAVENOUS at 19:31

## 2025-05-22 RX ADMIN — RIFAMPIN 600 MG: 300 CAPSULE ORAL at 10:09

## 2025-05-22 RX ADMIN — HYDROCODONE BITARTRATE AND ACETAMINOPHEN 1 TABLET: 10; 325 TABLET ORAL at 21:06

## 2025-05-22 ASSESSMENT — PAIN SCALES - GENERAL
PAINLEVEL_OUTOF10: 5
PAINLEVEL_OUTOF10: 8
PAINLEVEL_OUTOF10: 5
PAINLEVEL_OUTOF10: 3
PAINLEVEL_OUTOF10: 9
PAINLEVEL_OUTOF10: 3
PAINLEVEL_OUTOF10: 5
PAINLEVEL_OUTOF10: 3
PAINLEVEL_OUTOF10: 5
PAINLEVEL_OUTOF10: 5

## 2025-05-23 ENCOUNTER — APPOINTMENT (OUTPATIENT)
Dept: DIALYSIS | Age: 55
DRG: 177 | End: 2025-05-23
Attending: INTERNAL MEDICINE

## 2025-05-23 LAB
ANION GAP SERPL CALC-SCNC: 12 MMOL/L (ref 7–19)
BUN SERPL-MCNC: 30 MG/DL (ref 6–20)
BUN/CREAT SERPL: 4 (ref 7–25)
CALCIUM SERPL-MCNC: 9.1 MG/DL (ref 8.4–10.2)
CHLORIDE SERPL-SCNC: 99 MMOL/L (ref 97–110)
CO2 SERPL-SCNC: 25 MMOL/L (ref 21–32)
CREAT SERPL-MCNC: 6.8 MG/DL (ref 0.51–0.95)
DEPRECATED RDW RBC: 66.4 FL (ref 39–50)
EGFRCR SERPLBLD CKD-EPI 2021: 7 ML/MIN/{1.73_M2}
ERYTHROCYTE [DISTWIDTH] IN BLOOD: 19.9 % (ref 11–15)
FASTING DURATION TIME PATIENT: ABNORMAL H
GLUCOSE SERPL-MCNC: 111 MG/DL (ref 70–99)
HCT VFR BLD CALC: 26.9 % (ref 36–46.5)
HGB BLD-MCNC: 8.3 G/DL (ref 12–15.5)
MAGNESIUM SERPL-MCNC: 2.3 MG/DL (ref 1.7–2.4)
MCH RBC QN AUTO: 29.3 PG (ref 26–34)
MCHC RBC AUTO-ENTMCNC: 30.9 G/DL (ref 32–36.5)
MCV RBC AUTO: 95.1 FL (ref 78–100)
NRBC BLD MANUAL-RTO: 0 /100 WBC
PLATELET # BLD AUTO: 145 K/MCL (ref 140–450)
POTASSIUM SERPL-SCNC: 4.1 MMOL/L (ref 3.4–5.1)
RBC # BLD: 2.83 MIL/MCL (ref 4–5.2)
SODIUM SERPL-SCNC: 132 MMOL/L (ref 135–145)
WBC # BLD: 7.7 K/MCL (ref 4.2–11)

## 2025-05-23 PROCEDURE — 10002803 HB RX 637: Performed by: INTERNAL MEDICINE

## 2025-05-23 PROCEDURE — 10002803 HB RX 637: Performed by: SURGERY

## 2025-05-23 PROCEDURE — 99233 SBSQ HOSP IP/OBS HIGH 50: CPT | Performed by: INTERNAL MEDICINE

## 2025-05-23 PROCEDURE — 10004651 HB RX, NO CHARGE ITEM: Performed by: INTERNAL MEDICINE

## 2025-05-23 PROCEDURE — 10002800 HB RX 250 W HCPCS: Performed by: INTERNAL MEDICINE

## 2025-05-23 PROCEDURE — 36415 COLL VENOUS BLD VENIPUNCTURE: CPT | Performed by: SURGERY

## 2025-05-23 PROCEDURE — 99232 SBSQ HOSP IP/OBS MODERATE 35: CPT | Performed by: INTERNAL MEDICINE

## 2025-05-23 PROCEDURE — 83735 ASSAY OF MAGNESIUM: CPT | Performed by: INTERNAL MEDICINE

## 2025-05-23 PROCEDURE — 90935 HEMODIALYSIS ONE EVALUATION: CPT

## 2025-05-23 PROCEDURE — 10002803 HB RX 637: Performed by: STUDENT IN AN ORGANIZED HEALTH CARE EDUCATION/TRAINING PROGRAM

## 2025-05-23 PROCEDURE — 10006031 HB ROOM CHARGE TELEMETRY

## 2025-05-23 PROCEDURE — 99024 POSTOP FOLLOW-UP VISIT: CPT | Performed by: SURGERY

## 2025-05-23 PROCEDURE — 85027 COMPLETE CBC AUTOMATED: CPT | Performed by: SURGERY

## 2025-05-23 PROCEDURE — 80048 BASIC METABOLIC PNL TOTAL CA: CPT | Performed by: SURGERY

## 2025-05-23 PROCEDURE — 96372 THER/PROPH/DIAG INJ SC/IM: CPT | Performed by: INTERNAL MEDICINE

## 2025-05-23 RX ADMIN — DIPHENHYDRAMINE HYDROCHLORIDE 25 MG: 25 CAPSULE ORAL at 03:44

## 2025-05-23 RX ADMIN — HYDROCODONE BITARTRATE AND ACETAMINOPHEN 1 TABLET: 5; 325 TABLET ORAL at 17:21

## 2025-05-23 RX ADMIN — SODIUM CHLORIDE, PRESERVATIVE FREE 2 ML: 5 INJECTION INTRAVENOUS at 08:30

## 2025-05-23 RX ADMIN — EPOETIN ALFA-EPBX 10000 UNITS: 10000 INJECTION, SOLUTION INTRAVENOUS; SUBCUTANEOUS at 18:13

## 2025-05-23 RX ADMIN — HYDROCODONE BITARTRATE AND ACETAMINOPHEN 1 TABLET: 5; 325 TABLET ORAL at 23:14

## 2025-05-23 RX ADMIN — HEPARIN SODIUM 5000 UNITS: 5000 INJECTION INTRAVENOUS; SUBCUTANEOUS at 20:55

## 2025-05-23 RX ADMIN — HYDROCODONE BITARTRATE AND ACETAMINOPHEN 1 TABLET: 5; 325 TABLET ORAL at 13:30

## 2025-05-23 RX ADMIN — SODIUM CHLORIDE, PRESERVATIVE FREE 2 ML: 5 INJECTION INTRAVENOUS at 20:56

## 2025-05-23 RX ADMIN — HEPARIN SODIUM 5000 UNITS: 5000 INJECTION INTRAVENOUS; SUBCUTANEOUS at 13:30

## 2025-05-23 RX ADMIN — POLYETHYLENE GLYCOL 3350 17 G: 17 POWDER, FOR SOLUTION ORAL at 08:30

## 2025-05-23 RX ADMIN — HYDROCODONE BITARTRATE AND ACETAMINOPHEN 1 TABLET: 10; 325 TABLET ORAL at 03:42

## 2025-05-23 RX ADMIN — RIFAMPIN 600 MG: 300 CAPSULE ORAL at 08:30

## 2025-05-23 RX ADMIN — AMIODARONE HYDROCHLORIDE 200 MG: 200 TABLET ORAL at 08:30

## 2025-05-23 RX ADMIN — HEPARIN SODIUM 5000 UNITS: 5000 INJECTION INTRAVENOUS; SUBCUTANEOUS at 06:43

## 2025-05-23 RX ADMIN — DIPHENHYDRAMINE HYDROCHLORIDE 25 MG: 25 CAPSULE ORAL at 18:13

## 2025-05-23 RX ADMIN — LIDOCAINE 2 PATCH: 4 PATCH TOPICAL at 08:30

## 2025-05-23 ASSESSMENT — PAIN SCALES - GENERAL
PAINLEVEL_OUTOF10: 7
PAINLEVEL_OUTOF10: 3
PAINLEVEL_OUTOF10: 4
PAINLEVEL_OUTOF10: 2
PAINLEVEL_OUTOF10: 4
PAINLEVEL_OUTOF10: 8

## 2025-05-23 ASSESSMENT — PAIN SCALES - PAIN ASSESSMENT IN ADVANCED DEMENTIA (PAINAD)
BREATHING: NORMAL
BREATHING: NORMAL

## 2025-05-24 VITALS
DIASTOLIC BLOOD PRESSURE: 66 MMHG | TEMPERATURE: 97.7 F | BODY MASS INDEX: 21.95 KG/M2 | WEIGHT: 123.9 LBS | OXYGEN SATURATION: 99 % | SYSTOLIC BLOOD PRESSURE: 102 MMHG | HEIGHT: 63 IN | RESPIRATION RATE: 18 BRPM | HEART RATE: 92 BPM

## 2025-05-24 PROCEDURE — 96372 THER/PROPH/DIAG INJ SC/IM: CPT | Performed by: INTERNAL MEDICINE

## 2025-05-24 PROCEDURE — 10002800 HB RX 250 W HCPCS: Performed by: INTERNAL MEDICINE

## 2025-05-24 PROCEDURE — 10002803 HB RX 637: Performed by: INTERNAL MEDICINE

## 2025-05-24 PROCEDURE — 10004651 HB RX, NO CHARGE ITEM: Performed by: INTERNAL MEDICINE

## 2025-05-24 PROCEDURE — 10002803 HB RX 637: Performed by: SURGERY

## 2025-05-24 PROCEDURE — 10002803 HB RX 637: Performed by: STUDENT IN AN ORGANIZED HEALTH CARE EDUCATION/TRAINING PROGRAM

## 2025-05-24 PROCEDURE — 99239 HOSP IP/OBS DSCHRG MGMT >30: CPT | Performed by: INTERNAL MEDICINE

## 2025-05-24 RX ORDER — AMIODARONE HYDROCHLORIDE 200 MG/1
200 TABLET ORAL
Qty: 60 TABLET | Refills: 1 | Status: ON HOLD | OUTPATIENT
Start: 2025-05-26

## 2025-05-24 RX ORDER — HYDROCODONE BITARTRATE AND ACETAMINOPHEN 5; 325 MG/1; MG/1
1 TABLET ORAL EVERY 6 HOURS PRN
Qty: 20 TABLET | Refills: 0 | Status: ON HOLD | OUTPATIENT
Start: 2025-05-24

## 2025-05-24 RX ADMIN — DIPHENHYDRAMINE HYDROCHLORIDE 25 MG: 25 CAPSULE ORAL at 08:10

## 2025-05-24 RX ADMIN — POLYETHYLENE GLYCOL 3350 17 G: 17 POWDER, FOR SOLUTION ORAL at 08:06

## 2025-05-24 RX ADMIN — HEPARIN SODIUM 5000 UNITS: 5000 INJECTION INTRAVENOUS; SUBCUTANEOUS at 06:02

## 2025-05-24 RX ADMIN — HYDROCODONE BITARTRATE AND ACETAMINOPHEN 1 TABLET: 10; 325 TABLET ORAL at 08:10

## 2025-05-24 RX ADMIN — SODIUM CHLORIDE, PRESERVATIVE FREE 2 ML: 5 INJECTION INTRAVENOUS at 08:07

## 2025-05-24 RX ADMIN — RIFAMPIN 600 MG: 300 CAPSULE ORAL at 08:06

## 2025-05-24 RX ADMIN — LIDOCAINE 2 PATCH: 4 PATCH TOPICAL at 08:06

## 2025-05-24 RX ADMIN — DIPHENHYDRAMINE HYDROCHLORIDE 25 MG: 25 CAPSULE ORAL at 00:24

## 2025-05-24 ASSESSMENT — PAIN SCALES - GENERAL
PAINLEVEL_OUTOF10: 3
PAINLEVEL_OUTOF10: 8
PAINLEVEL_OUTOF10: 2

## 2025-05-27 ENCOUNTER — TELEPHONE (OUTPATIENT)
Dept: CARE COORDINATION | Age: 55
End: 2025-05-27

## 2025-05-27 ENCOUNTER — APPOINTMENT (OUTPATIENT)
Dept: CARDIOLOGY | Age: 55
End: 2025-05-27
Attending: NURSE PRACTITIONER

## 2025-05-28 ENCOUNTER — TELEPHONE (OUTPATIENT)
Dept: CARE COORDINATION | Age: 55
End: 2025-05-28

## 2025-05-30 ENCOUNTER — APPOINTMENT (OUTPATIENT)
Dept: ULTRASOUND IMAGING | Age: 55
End: 2025-05-30
Attending: INTERNAL MEDICINE

## 2025-05-30 ENCOUNTER — APPOINTMENT (OUTPATIENT)
Dept: CT IMAGING | Age: 55
End: 2025-05-30
Attending: EMERGENCY MEDICINE

## 2025-05-30 ENCOUNTER — APPOINTMENT (OUTPATIENT)
Dept: GENERAL RADIOLOGY | Age: 55
End: 2025-05-30
Attending: EMERGENCY MEDICINE

## 2025-05-30 ENCOUNTER — HOSPITAL ENCOUNTER (OUTPATIENT)
Age: 55
Setting detail: OBSERVATION
End: 2025-05-30
Attending: EMERGENCY MEDICINE | Admitting: INTERNAL MEDICINE

## 2025-05-30 DIAGNOSIS — R06.00 DYSPNEA, UNSPECIFIED TYPE: ICD-10-CM

## 2025-05-30 DIAGNOSIS — N18.6 ESRD ON HEMODIALYSIS  (CMD): Primary | ICD-10-CM

## 2025-05-30 DIAGNOSIS — Z99.2 ESRD ON HEMODIALYSIS  (CMD): Primary | ICD-10-CM

## 2025-05-30 PROBLEM — M54.6 ACUTE BILATERAL THORACIC BACK PAIN: Status: ACTIVE | Noted: 2025-05-30

## 2025-05-30 PROBLEM — R06.02 SHORTNESS OF BREATH: Status: ACTIVE | Noted: 2025-05-30

## 2025-05-30 LAB
ALBUMIN SERPL-MCNC: 2.7 G/DL (ref 3.4–5)
ALBUMIN/GLOB SERPL: 0.6 {RATIO} (ref 1–2.4)
ALP SERPL-CCNC: 241 UNITS/L (ref 45–117)
ALT SERPL-CCNC: 18 UNITS/L
ANION GAP SERPL CALC-SCNC: 15 MMOL/L (ref 7–19)
APTT PPP: 22 SEC (ref 22–32)
AST SERPL-CCNC: 40 UNITS/L
ATRIAL RATE (BPM): 73
BASOPHILS # BLD: 0.1 K/MCL (ref 0–0.3)
BASOPHILS NFR BLD: 2 %
BILIRUB SERPL-MCNC: 0.7 MG/DL (ref 0.2–1)
BUN SERPL-MCNC: 29 MG/DL (ref 6–20)
BUN/CREAT SERPL: 5 (ref 7–25)
CALCIUM SERPL-MCNC: 8.8 MG/DL (ref 8.4–10.2)
CHLORIDE SERPL-SCNC: 96 MMOL/L (ref 97–110)
CK SERPL-CCNC: 79 UNITS/L (ref 26–192)
CO2 SERPL-SCNC: 26 MMOL/L (ref 21–32)
CREAT SERPL-MCNC: 6.22 MG/DL (ref 0.51–0.95)
D DIMER PPP FEU-MCNC: 5.92 MG/L (FEU)
DEPRECATED RDW RBC: 64.5 FL (ref 39–50)
EGFRCR SERPLBLD CKD-EPI 2021: 8 ML/MIN/{1.73_M2}
EOSINOPHIL # BLD: 0.4 K/MCL (ref 0–0.5)
EOSINOPHIL NFR BLD: 9 %
ERYTHROCYTE [DISTWIDTH] IN BLOOD: 18.6 % (ref 11–15)
FASTING DURATION TIME PATIENT: ABNORMAL H
FLUAV RNA RESP QL NAA+PROBE: NOT DETECTED
FLUBV RNA RESP QL NAA+PROBE: NOT DETECTED
GLOBULIN SER-MCNC: 4.2 G/DL (ref 2–4)
GLUCOSE SERPL-MCNC: 96 MG/DL (ref 70–99)
HCT VFR BLD CALC: 26.7 % (ref 36–46.5)
HGB BLD-MCNC: 8.6 G/DL (ref 12–15.5)
IMM GRANULOCYTES # BLD AUTO: 0 K/MCL (ref 0–0.2)
IMM GRANULOCYTES # BLD: 1 %
INR PPP: 1
LYMPHOCYTES # BLD: 0.5 K/MCL (ref 1–4)
LYMPHOCYTES NFR BLD: 13 %
MAGNESIUM SERPL-MCNC: 2.1 MG/DL (ref 1.7–2.4)
MCH RBC QN AUTO: 30.4 PG (ref 26–34)
MCHC RBC AUTO-ENTMCNC: 32.2 G/DL (ref 32–36.5)
MCV RBC AUTO: 94.3 FL (ref 78–100)
MONOCYTES # BLD: 0.2 K/MCL (ref 0.3–0.9)
MONOCYTES NFR BLD: 6 %
NEUTROPHILS # BLD: 2.9 K/MCL (ref 1.8–7.7)
NEUTROPHILS NFR BLD: 69 %
NRBC BLD MANUAL-RTO: 0 /100 WBC
NT-PROBNP SERPL-MCNC: ABNORMAL PG/ML
P AXIS (DEGREES): 27
PLATELET # BLD AUTO: 179 K/MCL (ref 140–450)
POTASSIUM SERPL-SCNC: 4 MMOL/L (ref 3.4–5.1)
PR-INTERVAL (MSEC): 152
PROT SERPL-MCNC: 6.9 G/DL (ref 6.4–8.2)
PROTHROMBIN TIME: 10.4 SEC (ref 9.7–11.8)
QRS-INTERVAL (MSEC): 88
QT-INTERVAL (MSEC): 514
QTC: 566
R AXIS (DEGREES): 18
RAINBOW EXTRA TUBES HOLD SPECIMEN: NORMAL
RBC # BLD: 2.83 MIL/MCL (ref 4–5.2)
REPORT TEXT: NORMAL
RSV AG NPH QL IA.RAPID: NOT DETECTED
SARS-COV-2 RNA RESP QL NAA+PROBE: NOT DETECTED
SERVICE CMNT-IMP: NORMAL
SERVICE CMNT-IMP: NORMAL
SODIUM SERPL-SCNC: 133 MMOL/L (ref 135–145)
T AXIS (DEGREES): -166
TROPONIN I SERPL DL<=0.01 NG/ML-MCNC: 19 NG/L
VENTRICULAR RATE EKG/MIN (BPM): 73
WBC # BLD: 4.1 K/MCL (ref 4.2–11)

## 2025-05-30 PROCEDURE — 10002803 HB RX 637: Performed by: INTERNAL MEDICINE

## 2025-05-30 PROCEDURE — G0378 HOSPITAL OBSERVATION PER HR: HCPCS

## 2025-05-30 PROCEDURE — 87340 HEPATITIS B SURFACE AG IA: CPT | Performed by: INTERNAL MEDICINE

## 2025-05-30 PROCEDURE — 10004651 HB RX, NO CHARGE ITEM: Performed by: INTERNAL MEDICINE

## 2025-05-30 PROCEDURE — 10002805 HB CONTRAST AGENT: Performed by: EMERGENCY MEDICINE

## 2025-05-30 PROCEDURE — 83735 ASSAY OF MAGNESIUM: CPT | Performed by: EMERGENCY MEDICINE

## 2025-05-30 PROCEDURE — 96372 THER/PROPH/DIAG INJ SC/IM: CPT | Performed by: INTERNAL MEDICINE

## 2025-05-30 PROCEDURE — 99223 1ST HOSP IP/OBS HIGH 75: CPT | Performed by: INTERNAL MEDICINE

## 2025-05-30 PROCEDURE — 80053 COMPREHEN METABOLIC PANEL: CPT | Performed by: EMERGENCY MEDICINE

## 2025-05-30 PROCEDURE — 82550 ASSAY OF CK (CPK): CPT | Performed by: EMERGENCY MEDICINE

## 2025-05-30 PROCEDURE — 83880 ASSAY OF NATRIURETIC PEPTIDE: CPT | Performed by: EMERGENCY MEDICINE

## 2025-05-30 PROCEDURE — 85379 FIBRIN DEGRADATION QUANT: CPT | Performed by: EMERGENCY MEDICINE

## 2025-05-30 PROCEDURE — 10002800 HB RX 250 W HCPCS: Performed by: INTERNAL MEDICINE

## 2025-05-30 PROCEDURE — 85610 PROTHROMBIN TIME: CPT | Performed by: EMERGENCY MEDICINE

## 2025-05-30 PROCEDURE — 0241U COVID/FLU/RSV PANEL: CPT | Performed by: EMERGENCY MEDICINE

## 2025-05-30 PROCEDURE — 99285 EMERGENCY DEPT VISIT HI MDM: CPT | Performed by: EMERGENCY MEDICINE

## 2025-05-30 PROCEDURE — 93970 EXTREMITY STUDY: CPT

## 2025-05-30 PROCEDURE — 71045 X-RAY EXAM CHEST 1 VIEW: CPT

## 2025-05-30 PROCEDURE — 85025 COMPLETE CBC W/AUTO DIFF WBC: CPT | Performed by: EMERGENCY MEDICINE

## 2025-05-30 PROCEDURE — 85730 THROMBOPLASTIN TIME PARTIAL: CPT | Performed by: EMERGENCY MEDICINE

## 2025-05-30 PROCEDURE — 71275 CT ANGIOGRAPHY CHEST: CPT

## 2025-05-30 PROCEDURE — 84484 ASSAY OF TROPONIN QUANT: CPT | Performed by: EMERGENCY MEDICINE

## 2025-05-30 RX ORDER — HEPARIN SODIUM 5000 [USP'U]/ML
5000 INJECTION, SOLUTION INTRAVENOUS; SUBCUTANEOUS EVERY 8 HOURS SCHEDULED
Status: DISCONTINUED | OUTPATIENT
Start: 2025-05-30 | End: 2025-06-03 | Stop reason: HOSPADM

## 2025-05-30 RX ORDER — AMIODARONE HYDROCHLORIDE 200 MG/1
200 TABLET ORAL
Status: DISCONTINUED | OUTPATIENT
Start: 2025-05-30 | End: 2025-06-03 | Stop reason: HOSPADM

## 2025-05-30 RX ORDER — GUAIFENESIN/DEXTROMETHORPHAN 100-10MG/5
10 SYRUP ORAL EVERY 4 HOURS PRN
Status: DISCONTINUED | OUTPATIENT
Start: 2025-05-30 | End: 2025-06-03 | Stop reason: HOSPADM

## 2025-05-30 RX ORDER — CINACALCET 30 MG/1
30 TABLET, FILM COATED ORAL DAILY
Status: DISCONTINUED | OUTPATIENT
Start: 2025-05-30 | End: 2025-05-30

## 2025-05-30 RX ORDER — POLYETHYLENE GLYCOL 3350 17 G/17G
17 POWDER, FOR SOLUTION ORAL DAILY PRN
Status: DISCONTINUED | OUTPATIENT
Start: 2025-05-30 | End: 2025-06-03 | Stop reason: HOSPADM

## 2025-05-30 RX ORDER — LANTHANUM CARBONATE 500 MG/1
1000 TABLET, CHEWABLE ORAL
Status: DISCONTINUED | OUTPATIENT
Start: 2025-05-31 | End: 2025-06-03 | Stop reason: HOSPADM

## 2025-05-30 RX ORDER — HYDROCODONE BITARTRATE AND ACETAMINOPHEN 10; 325 MG/1; MG/1
1 TABLET ORAL EVERY 6 HOURS PRN
Refills: 0 | Status: DISCONTINUED | OUTPATIENT
Start: 2025-05-30 | End: 2025-06-03 | Stop reason: HOSPADM

## 2025-05-30 RX ORDER — ACETAMINOPHEN 325 MG/1
650 TABLET ORAL EVERY 4 HOURS PRN
Status: DISCONTINUED | OUTPATIENT
Start: 2025-05-30 | End: 2025-06-03 | Stop reason: HOSPADM

## 2025-05-30 RX ORDER — 0.9 % SODIUM CHLORIDE 0.9 %
2 VIAL (ML) INJECTION EVERY 12 HOURS SCHEDULED
Status: DISCONTINUED | OUTPATIENT
Start: 2025-05-30 | End: 2025-06-03 | Stop reason: HOSPADM

## 2025-05-30 RX ORDER — ONDANSETRON 2 MG/ML
4 INJECTION INTRAMUSCULAR; INTRAVENOUS EVERY 12 HOURS PRN
Status: DISCONTINUED | OUTPATIENT
Start: 2025-05-30 | End: 2025-06-03 | Stop reason: HOSPADM

## 2025-05-30 RX ORDER — ONDANSETRON 4 MG/1
4 TABLET, ORALLY DISINTEGRATING ORAL EVERY 12 HOURS PRN
Status: DISCONTINUED | OUTPATIENT
Start: 2025-05-30 | End: 2025-06-03 | Stop reason: HOSPADM

## 2025-05-30 RX ORDER — 0.9 % SODIUM CHLORIDE 0.9 %
10 VIAL (ML) INJECTION PRN
Status: DISCONTINUED | OUTPATIENT
Start: 2025-05-30 | End: 2025-06-03 | Stop reason: HOSPADM

## 2025-05-30 RX ORDER — RIFAMPIN 300 MG/1
300 CAPSULE ORAL 2 TIMES DAILY
Status: DISCONTINUED | OUTPATIENT
Start: 2025-05-30 | End: 2025-06-03 | Stop reason: HOSPADM

## 2025-05-30 RX ORDER — DIPHENHYDRAMINE HCL 25 MG
25 CAPSULE ORAL EVERY 4 HOURS PRN
Status: DISCONTINUED | OUTPATIENT
Start: 2025-05-30 | End: 2025-06-03 | Stop reason: HOSPADM

## 2025-05-30 RX ORDER — ACETAMINOPHEN 650 MG/1
650 SUPPOSITORY RECTAL EVERY 4 HOURS PRN
Status: DISCONTINUED | OUTPATIENT
Start: 2025-05-30 | End: 2025-06-03 | Stop reason: HOSPADM

## 2025-05-30 RX ORDER — MIDODRINE HYDROCHLORIDE 10 MG/1
10 TABLET ORAL DAILY
COMMUNITY

## 2025-05-30 RX ADMIN — DIPHENHYDRAMINE HYDROCHLORIDE 25 MG: 25 CAPSULE ORAL at 20:43

## 2025-05-30 RX ADMIN — HEPARIN SODIUM 5000 UNITS: 5000 INJECTION INTRAVENOUS; SUBCUTANEOUS at 23:03

## 2025-05-30 RX ADMIN — IOHEXOL 85 ML: 350 INJECTION, SOLUTION INTRAVENOUS at 16:03

## 2025-05-30 RX ADMIN — SODIUM CHLORIDE, PRESERVATIVE FREE 2 ML: 5 INJECTION INTRAVENOUS at 20:43

## 2025-05-30 RX ADMIN — AMIODARONE HYDROCHLORIDE 200 MG: 200 TABLET ORAL at 23:03

## 2025-05-30 RX ADMIN — RIFAMPIN 300 MG: 300 CAPSULE ORAL at 23:23

## 2025-05-30 SDOH — SOCIAL STABILITY: SOCIAL INSECURITY: HOW OFTEN DOES ANYONE, INCLUDING FAMILY AND FRIENDS, PHYSICALLY HURT YOU?: NEVER

## 2025-05-30 SDOH — SOCIAL STABILITY: SOCIAL INSECURITY: HOW OFTEN DOES ANYONE, INCLUDING FAMILY AND FRIENDS, SCREAM OR CURSE AT YOU?: NEVER

## 2025-05-30 SDOH — SOCIAL STABILITY: SOCIAL INSECURITY: HOW OFTEN DOES ANYONE, INCLUDING FAMILY AND FRIENDS, INSULT OR TALK DOWN TO YOU?: NEVER

## 2025-05-30 SDOH — SOCIAL STABILITY: SOCIAL INSECURITY: HOW OFTEN DOES ANYONE, INCLUDING FAMILY AND FRIENDS, THREATEN YOU WITH HARM?: NEVER

## 2025-05-30 ASSESSMENT — PAIN SCALES - GENERAL: PAINLEVEL_OUTOF10: 0

## 2025-05-31 ENCOUNTER — APPOINTMENT (OUTPATIENT)
Dept: DIALYSIS | Age: 55
End: 2025-05-31
Attending: INTERNAL MEDICINE

## 2025-05-31 LAB
ABO + RH BLD: NORMAL
ANION GAP SERPL CALC-SCNC: 13 MMOL/L (ref 7–19)
BLD GP AB SCN SERPL QL GEL: NEGATIVE
BLOOD EXPIRATION DATE: NORMAL
BUN SERPL-MCNC: 32 MG/DL (ref 6–20)
BUN/CREAT SERPL: 4 (ref 7–25)
CALCIUM SERPL-MCNC: 8.6 MG/DL (ref 8.4–10.2)
CHLORIDE SERPL-SCNC: 98 MMOL/L (ref 97–110)
CO2 SERPL-SCNC: 26 MMOL/L (ref 21–32)
CREAT SERPL-MCNC: 7.6 MG/DL (ref 0.51–0.95)
CROSSMATCH RESULT: NORMAL
DEPRECATED RDW RBC: 67.8 FL (ref 39–50)
DISPENSE STATUS: NORMAL
EGFRCR SERPLBLD CKD-EPI 2021: 6 ML/MIN/{1.73_M2}
ERYTHROCYTE [DISTWIDTH] IN BLOOD: 19.2 % (ref 11–15)
FASTING DURATION TIME PATIENT: ABNORMAL H
GLUCOSE SERPL-MCNC: 94 MG/DL (ref 70–99)
HBV SURFACE AG SER QL: NEGATIVE
HCT VFR BLD CALC: 24.1 % (ref 36–46.5)
HGB BLD-MCNC: 7.5 G/DL (ref 12–15.5)
ISBT BLOOD TYPE: 5100
ISSUE DATE/TIME: NORMAL
MCH RBC QN AUTO: 30.1 PG (ref 26–34)
MCHC RBC AUTO-ENTMCNC: 31.1 G/DL (ref 32–36.5)
MCV RBC AUTO: 96.8 FL (ref 78–100)
NRBC BLD MANUAL-RTO: 0 /100 WBC
PLATELET # BLD AUTO: 141 K/MCL (ref 140–450)
POTASSIUM SERPL-SCNC: 4.3 MMOL/L (ref 3.4–5.1)
PRODUCT CODE: NORMAL
PRODUCT DESCRIPTION: NORMAL
PRODUCT ID: NORMAL
RBC # BLD: 2.49 MIL/MCL (ref 4–5.2)
SODIUM SERPL-SCNC: 133 MMOL/L (ref 135–145)
TYPE AND SCREEN EXPIRATION DATE: NORMAL
UNIT BLOOD TYPE: NORMAL
UNIT NUMBER: NORMAL
WBC # BLD: 4.2 K/MCL (ref 4.2–11)

## 2025-05-31 PROCEDURE — G0257 UNSCHED DIALYSIS ESRD PT HOS: HCPCS

## 2025-05-31 PROCEDURE — 86923 COMPATIBILITY TEST ELECTRIC: CPT

## 2025-05-31 PROCEDURE — 10002800 HB RX 250 W HCPCS: Performed by: INTERNAL MEDICINE

## 2025-05-31 PROCEDURE — 99253 IP/OBS CNSLTJ NEW/EST LOW 45: CPT | Performed by: NURSE PRACTITIONER

## 2025-05-31 PROCEDURE — 36415 COLL VENOUS BLD VENIPUNCTURE: CPT | Performed by: INTERNAL MEDICINE

## 2025-05-31 PROCEDURE — 86850 RBC ANTIBODY SCREEN: CPT | Performed by: INTERNAL MEDICINE

## 2025-05-31 PROCEDURE — G0378 HOSPITAL OBSERVATION PER HR: HCPCS

## 2025-05-31 PROCEDURE — 99233 SBSQ HOSP IP/OBS HIGH 50: CPT | Performed by: INTERNAL MEDICINE

## 2025-05-31 PROCEDURE — 97163 PT EVAL HIGH COMPLEX 45 MIN: CPT

## 2025-05-31 PROCEDURE — 85027 COMPLETE CBC AUTOMATED: CPT | Performed by: INTERNAL MEDICINE

## 2025-05-31 PROCEDURE — 10002803 HB RX 637: Performed by: INTERNAL MEDICINE

## 2025-05-31 PROCEDURE — 80048 BASIC METABOLIC PNL TOTAL CA: CPT | Performed by: INTERNAL MEDICINE

## 2025-05-31 PROCEDURE — 10004651 HB RX, NO CHARGE ITEM: Performed by: INTERNAL MEDICINE

## 2025-05-31 PROCEDURE — 96372 THER/PROPH/DIAG INJ SC/IM: CPT | Performed by: INTERNAL MEDICINE

## 2025-05-31 RX ORDER — SODIUM CHLORIDE 9 MG/ML
INJECTION, SOLUTION INTRAVENOUS CONTINUOUS PRN
Status: ACTIVE | OUTPATIENT
Start: 2025-05-31 | End: 2025-05-31

## 2025-05-31 RX ORDER — SODIUM CITRATE 4 % (5 ML)
3 SYRINGE (ML) MISCELLANEOUS PRN
Status: DISCONTINUED | OUTPATIENT
Start: 2025-05-31 | End: 2025-06-03 | Stop reason: HOSPADM

## 2025-05-31 RX ADMIN — SODIUM CHLORIDE, PRESERVATIVE FREE 2 ML: 5 INJECTION INTRAVENOUS at 21:12

## 2025-05-31 RX ADMIN — LANTHANUM CARBONATE 1000 MG: 500 TABLET, CHEWABLE ORAL at 16:39

## 2025-05-31 RX ADMIN — HEPARIN SODIUM 5000 UNITS: 5000 INJECTION INTRAVENOUS; SUBCUTANEOUS at 21:12

## 2025-05-31 RX ADMIN — ONDANSETRON 4 MG: 4 TABLET, ORALLY DISINTEGRATING ORAL at 21:12

## 2025-05-31 RX ADMIN — LANTHANUM CARBONATE 1000 MG: 500 TABLET, CHEWABLE ORAL at 08:46

## 2025-05-31 RX ADMIN — HYDROCODONE BITARTRATE AND ACETAMINOPHEN 1 TABLET: 10; 325 TABLET ORAL at 21:07

## 2025-05-31 RX ADMIN — HEPARIN SODIUM 5000 UNITS: 5000 INJECTION INTRAVENOUS; SUBCUTANEOUS at 16:39

## 2025-05-31 RX ADMIN — EPOETIN ALFA-EPBX 20000 UNITS: 20000 INJECTION, SOLUTION INTRAVENOUS; SUBCUTANEOUS at 17:09

## 2025-05-31 RX ADMIN — SODIUM CHLORIDE, PRESERVATIVE FREE 2 ML: 5 INJECTION INTRAVENOUS at 09:27

## 2025-05-31 RX ADMIN — HYDROCODONE BITARTRATE AND ACETAMINOPHEN 1 TABLET: 10; 325 TABLET ORAL at 08:46

## 2025-05-31 RX ADMIN — HEPARIN SODIUM 5000 UNITS: 5000 INJECTION INTRAVENOUS; SUBCUTANEOUS at 05:28

## 2025-05-31 RX ADMIN — RIFAMPIN 300 MG: 300 CAPSULE ORAL at 21:07

## 2025-05-31 RX ADMIN — DIPHENHYDRAMINE HYDROCHLORIDE 25 MG: 25 CAPSULE ORAL at 08:46

## 2025-05-31 RX ADMIN — RIFAMPIN 300 MG: 300 CAPSULE ORAL at 08:46

## 2025-05-31 SDOH — ECONOMIC STABILITY: INCOME INSECURITY: IN THE PAST 12 MONTHS, HAS THE ELECTRIC, GAS, OIL, OR WATER COMPANY THREATENED TO SHUT OFF SERVICE IN YOUR HOME?: NO

## 2025-05-31 SDOH — SOCIAL STABILITY: SOCIAL INSECURITY: HOW OFTEN DOES ANYONE, INCLUDING FAMILY AND FRIENDS, THREATEN YOU WITH HARM?: NEVER

## 2025-05-31 SDOH — ECONOMIC STABILITY: FOOD INSECURITY: WITHIN THE PAST 12 MONTHS, THE FOOD YOU BOUGHT JUST DIDN'T LAST AND YOU DIDN'T HAVE MONEY TO GET MORE.: NEVER TRUE

## 2025-05-31 SDOH — SOCIAL STABILITY: SOCIAL INSECURITY: HOW OFTEN DOES ANYONE, INCLUDING FAMILY AND FRIENDS, SCREAM OR CURSE AT YOU?: NEVER

## 2025-05-31 SDOH — ECONOMIC STABILITY: GENERAL

## 2025-05-31 SDOH — SOCIAL STABILITY: SOCIAL NETWORK: SUPPORT SYSTEMS: CHILDREN;FAMILY MEMBERS

## 2025-05-31 SDOH — HEALTH STABILITY: PHYSICAL HEALTH: DO YOU HAVE DIFFICULTY DRESSING OR BATHING?: YES

## 2025-05-31 SDOH — ECONOMIC STABILITY: HOUSING INSECURITY: DO YOU HAVE PROBLEMS WITH ANY OF THE FOLLOWING?: NONE OF THE ABOVE

## 2025-05-31 SDOH — HEALTH STABILITY: GENERAL: BECAUSE OF A PHYSICAL, MENTAL, OR EMOTIONAL CONDITION, DO YOU HAVE DIFFICULTY DOING ERRANDS ALONE?: YES

## 2025-05-31 SDOH — HEALTH STABILITY: PHYSICAL HEALTH: DO YOU HAVE SERIOUS DIFFICULTY WALKING OR CLIMBING STAIRS?: YES

## 2025-05-31 SDOH — SOCIAL STABILITY: SOCIAL INSECURITY: HOW OFTEN DOES ANYONE, INCLUDING FAMILY AND FRIENDS, PHYSICALLY HURT YOU?: NEVER

## 2025-05-31 SDOH — ECONOMIC STABILITY: HOUSING INSECURITY: WHAT IS YOUR LIVING SITUATION TODAY?: I HAVE A STEADY PLACE TO LIVE

## 2025-05-31 SDOH — SOCIAL STABILITY: SOCIAL INSECURITY: HOW OFTEN DOES ANYONE, INCLUDING FAMILY AND FRIENDS, INSULT OR TALK DOWN TO YOU?: NEVER

## 2025-05-31 SDOH — ECONOMIC STABILITY: HOUSING INSECURITY: WHAT IS YOUR LIVING SITUATION TODAY?: APARTMENT

## 2025-05-31 SDOH — ECONOMIC STABILITY: HOUSING INSECURITY: WHAT IS YOUR LIVING SITUATION TODAY?: ADULT CHILDREN

## 2025-05-31 ASSESSMENT — PATIENT HEALTH QUESTIONNAIRE - PHQ9
SUM OF ALL RESPONSES TO PHQ9 QUESTIONS 1 AND 2: 0
2. FEELING DOWN, DEPRESSED OR HOPELESS: NOT AT ALL
CLINICAL INTERPRETATION OF PHQ2 SCORE: NO FURTHER SCREENING NEEDED
SUM OF ALL RESPONSES TO PHQ9 QUESTIONS 1 AND 2: 0
IS PATIENT ABLE TO COMPLETE PHQ2 OR PHQ9: YES
1. LITTLE INTEREST OR PLEASURE IN DOING THINGS: NOT AT ALL

## 2025-05-31 ASSESSMENT — LIFESTYLE VARIABLES
HOW OFTEN DO YOU HAVE A DRINK CONTAINING ALCOHOL: NEVER
AUDIT-C TOTAL SCORE: 0
ALCOHOL_USE_STATUS: NO OR LOW RISK WITH VALIDATED TOOL
HOW OFTEN DO YOU HAVE 6 OR MORE DRINKS ON ONE OCCASION: NEVER
HOW MANY STANDARD DRINKS CONTAINING ALCOHOL DO YOU HAVE ON A TYPICAL DAY: 0,1 OR 2

## 2025-05-31 ASSESSMENT — COLUMBIA-SUICIDE SEVERITY RATING SCALE - C-SSRS
IS THE PATIENT ABLE TO COMPLETE C-SSRS: YES
6. HAVE YOU EVER DONE ANYTHING, STARTED TO DO ANYTHING, OR PREPARED TO DO ANYTHING TO END YOUR LIFE?: NO
1. WITHIN THE PAST MONTH, HAVE YOU WISHED YOU WERE DEAD OR WISHED YOU COULD GO TO SLEEP AND NOT WAKE UP?: NO
2. HAVE YOU ACTUALLY HAD ANY THOUGHTS OF KILLING YOURSELF?: NO

## 2025-05-31 ASSESSMENT — ACTIVITIES OF DAILY LIVING (ADL)
ADL_SHORT_OF_BREATH: YES
TOILETING: INDEPENDENT
ADL_BEFORE_ADMISSION: NEEDS/REQUIRES ASSISTANCE
RECENT_DECLINE_ADL: YES, DECLINE IN BATHING/DRESSING/FEEDING, COLLABORATE WITH PROVIDER (T)
ADL_SCORE: 9
BATHING: NEEDS ASSISTANCE
DRESSING: NEEDS ASSISTANCE
FEEDING: INDEPENDENT

## 2025-05-31 ASSESSMENT — PAIN SCALES - GENERAL
PAINLEVEL_OUTOF10: 8
PAINLEVEL_OUTOF10: 5
PAINLEVEL_OUTOF10: 0
PAINLEVEL_OUTOF10: 3
PAINLEVEL_OUTOF10: 8

## 2025-05-31 ASSESSMENT — COGNITIVE AND FUNCTIONAL STATUS - GENERAL
BASIC_MOBILITY_RAW_SCORE: 24
BASIC_MOBILITY_CONVERTED_SCORE: 57.68

## 2025-06-01 ENCOUNTER — APPOINTMENT (OUTPATIENT)
Dept: CT IMAGING | Age: 55
End: 2025-06-01

## 2025-06-01 ENCOUNTER — APPOINTMENT (OUTPATIENT)
Dept: GENERAL RADIOLOGY | Age: 55
End: 2025-06-01
Attending: NURSE PRACTITIONER

## 2025-06-01 VITALS
SYSTOLIC BLOOD PRESSURE: 135 MMHG | HEIGHT: 60 IN | HEART RATE: 72 BPM | WEIGHT: 125.66 LBS | RESPIRATION RATE: 17 BRPM | DIASTOLIC BLOOD PRESSURE: 77 MMHG | OXYGEN SATURATION: 99 % | TEMPERATURE: 98.2 F | BODY MASS INDEX: 24.67 KG/M2

## 2025-06-01 LAB
ALBUMIN SERPL-MCNC: 2.5 G/DL (ref 3.4–5)
ALBUMIN/GLOB SERPL: 0.7 {RATIO} (ref 1–2.4)
ALP SERPL-CCNC: 230 UNITS/L (ref 45–117)
ALT SERPL-CCNC: 22 UNITS/L
ANION GAP SERPL CALC-SCNC: 10 MMOL/L (ref 7–19)
AST SERPL-CCNC: 48 UNITS/L
BASOPHILS # BLD: 0.1 K/MCL (ref 0–0.3)
BASOPHILS NFR BLD: 2 %
BILIRUB SERPL-MCNC: 0.6 MG/DL (ref 0.2–1)
BUN SERPL-MCNC: 15 MG/DL (ref 6–20)
BUN/CREAT SERPL: 3 (ref 7–25)
CALCIUM SERPL-MCNC: 8.6 MG/DL (ref 8.4–10.2)
CHLORIDE SERPL-SCNC: 100 MMOL/L (ref 97–110)
CO2 SERPL-SCNC: 30 MMOL/L (ref 21–32)
CREAT SERPL-MCNC: 4.68 MG/DL (ref 0.51–0.95)
DEPRECATED RDW RBC: 68 FL (ref 39–50)
EGFRCR SERPLBLD CKD-EPI 2021: 11 ML/MIN/{1.73_M2}
EOSINOPHIL # BLD: 0.5 K/MCL (ref 0–0.5)
EOSINOPHIL NFR BLD: 13 %
ERYTHROCYTE [DISTWIDTH] IN BLOOD: 20.6 % (ref 11–15)
FASTING DURATION TIME PATIENT: ABNORMAL H
GLOBULIN SER-MCNC: 3.7 G/DL (ref 2–4)
GLUCOSE SERPL-MCNC: 88 MG/DL (ref 70–99)
HCT VFR BLD CALC: 27.2 % (ref 36–46.5)
HGB BLD-MCNC: 8.7 G/DL (ref 12–15.5)
IMM GRANULOCYTES # BLD AUTO: 0 K/MCL (ref 0–0.2)
IMM GRANULOCYTES # BLD: 1 %
LYMPHOCYTES # BLD: 0.8 K/MCL (ref 1–4)
LYMPHOCYTES NFR BLD: 18 %
MCH RBC QN AUTO: 29.7 PG (ref 26–34)
MCHC RBC AUTO-ENTMCNC: 32 G/DL (ref 32–36.5)
MCV RBC AUTO: 92.8 FL (ref 78–100)
MONOCYTES # BLD: 0.4 K/MCL (ref 0.3–0.9)
MONOCYTES NFR BLD: 10 %
NEUTROPHILS # BLD: 2.3 K/MCL (ref 1.8–7.7)
NEUTROPHILS NFR BLD: 56 %
NRBC BLD MANUAL-RTO: 0 /100 WBC
PLATELET # BLD AUTO: 138 K/MCL (ref 140–450)
POTASSIUM SERPL-SCNC: 4.3 MMOL/L (ref 3.4–5.1)
PROT SERPL-MCNC: 6.2 G/DL (ref 6.4–8.2)
RBC # BLD: 2.93 MIL/MCL (ref 4–5.2)
SODIUM SERPL-SCNC: 136 MMOL/L (ref 135–145)
WBC # BLD: 4.1 K/MCL (ref 4.2–11)

## 2025-06-01 PROCEDURE — 10002800 HB RX 250 W HCPCS: Performed by: FAMILY MEDICINE

## 2025-06-01 PROCEDURE — 10002800 HB RX 250 W HCPCS: Performed by: INTERNAL MEDICINE

## 2025-06-01 PROCEDURE — 85025 COMPLETE CBC W/AUTO DIFF WBC: CPT | Performed by: INTERNAL MEDICINE

## 2025-06-01 PROCEDURE — 36415 COLL VENOUS BLD VENIPUNCTURE: CPT | Performed by: INTERNAL MEDICINE

## 2025-06-01 PROCEDURE — G0378 HOSPITAL OBSERVATION PER HR: HCPCS

## 2025-06-01 PROCEDURE — 99233 SBSQ HOSP IP/OBS HIGH 50: CPT | Performed by: INTERNAL MEDICINE

## 2025-06-01 PROCEDURE — 10004651 HB RX, NO CHARGE ITEM: Performed by: INTERNAL MEDICINE

## 2025-06-01 PROCEDURE — 96372 THER/PROPH/DIAG INJ SC/IM: CPT | Performed by: INTERNAL MEDICINE

## 2025-06-01 PROCEDURE — 72128 CT CHEST SPINE W/O DYE: CPT

## 2025-06-01 PROCEDURE — 72072 X-RAY EXAM THORAC SPINE 3VWS: CPT

## 2025-06-01 PROCEDURE — 72131 CT LUMBAR SPINE W/O DYE: CPT

## 2025-06-01 PROCEDURE — 99233 SBSQ HOSP IP/OBS HIGH 50: CPT | Performed by: NURSE PRACTITIONER

## 2025-06-01 PROCEDURE — 10002803 HB RX 637: Performed by: INTERNAL MEDICINE

## 2025-06-01 PROCEDURE — 80053 COMPREHEN METABOLIC PANEL: CPT | Performed by: INTERNAL MEDICINE

## 2025-06-01 RX ORDER — METOCLOPRAMIDE HYDROCHLORIDE 5 MG/ML
5 INJECTION INTRAMUSCULAR; INTRAVENOUS EVERY 6 HOURS PRN
Status: DISCONTINUED | OUTPATIENT
Start: 2025-06-01 | End: 2025-06-03 | Stop reason: HOSPADM

## 2025-06-01 RX ADMIN — HEPARIN SODIUM 5000 UNITS: 5000 INJECTION INTRAVENOUS; SUBCUTANEOUS at 13:09

## 2025-06-01 RX ADMIN — HYDROCODONE BITARTRATE AND ACETAMINOPHEN 1 TABLET: 10; 325 TABLET ORAL at 10:19

## 2025-06-01 RX ADMIN — RIFAMPIN 300 MG: 300 CAPSULE ORAL at 09:03

## 2025-06-01 RX ADMIN — METOCLOPRAMIDE 5 MG: 5 INJECTION, SOLUTION INTRAMUSCULAR; INTRAVENOUS at 17:37

## 2025-06-01 RX ADMIN — LANTHANUM CARBONATE 1000 MG: 500 TABLET, CHEWABLE ORAL at 09:02

## 2025-06-01 RX ADMIN — RIFAMPIN 300 MG: 300 CAPSULE ORAL at 20:07

## 2025-06-01 RX ADMIN — LANTHANUM CARBONATE 1000 MG: 500 TABLET, CHEWABLE ORAL at 17:36

## 2025-06-01 RX ADMIN — LANTHANUM CARBONATE 1000 MG: 500 TABLET, CHEWABLE ORAL at 12:34

## 2025-06-01 RX ADMIN — ONDANSETRON 4 MG: 2 INJECTION INTRAMUSCULAR; INTRAVENOUS at 13:32

## 2025-06-01 RX ADMIN — SODIUM CHLORIDE, PRESERVATIVE FREE 2 ML: 5 INJECTION INTRAVENOUS at 09:03

## 2025-06-01 RX ADMIN — SODIUM CHLORIDE, PRESERVATIVE FREE 2 ML: 5 INJECTION INTRAVENOUS at 20:07

## 2025-06-01 RX ADMIN — HEPARIN SODIUM 5000 UNITS: 5000 INJECTION INTRAVENOUS; SUBCUTANEOUS at 06:02

## 2025-06-01 RX ADMIN — HEPARIN SODIUM 5000 UNITS: 5000 INJECTION INTRAVENOUS; SUBCUTANEOUS at 22:47

## 2025-06-01 RX ADMIN — HYDROCODONE BITARTRATE AND ACETAMINOPHEN 1 TABLET: 10; 325 TABLET ORAL at 03:07

## 2025-06-01 ASSESSMENT — PAIN SCALES - GENERAL
PAINLEVEL_OUTOF10: 0
PAINLEVEL_OUTOF10: 7
PAINLEVEL_OUTOF10: 0
PAINLEVEL_OUTOF10: 0
PAINLEVEL_OUTOF10: 5
PAINLEVEL_OUTOF10: 0

## 2025-06-02 ENCOUNTER — APPOINTMENT (OUTPATIENT)
Dept: NUCLEAR MEDICINE | Age: 55
End: 2025-06-02

## 2025-06-02 ENCOUNTER — APPOINTMENT (OUTPATIENT)
Dept: DIALYSIS | Age: 55
End: 2025-06-02
Attending: INTERNAL MEDICINE

## 2025-06-02 LAB
ANION GAP SERPL CALC-SCNC: 9 MMOL/L (ref 7–19)
BUN SERPL-MCNC: 24 MG/DL (ref 6–20)
BUN/CREAT SERPL: 4 (ref 7–25)
CALCIUM SERPL-MCNC: 8.6 MG/DL (ref 8.4–10.2)
CHLORIDE SERPL-SCNC: 100 MMOL/L (ref 97–110)
CO2 SERPL-SCNC: 29 MMOL/L (ref 21–32)
CREAT SERPL-MCNC: 6.38 MG/DL (ref 0.51–0.95)
DEPRECATED RDW RBC: 67.8 FL (ref 39–50)
EGFRCR SERPLBLD CKD-EPI 2021: 7 ML/MIN/{1.73_M2}
ERYTHROCYTE [DISTWIDTH] IN BLOOD: 20.1 % (ref 11–15)
FASTING DURATION TIME PATIENT: ABNORMAL H
GLUCOSE SERPL-MCNC: 91 MG/DL (ref 70–99)
HCT VFR BLD CALC: 29 % (ref 36–46.5)
HGB BLD-MCNC: 9.1 G/DL (ref 12–15.5)
MCH RBC QN AUTO: 29.3 PG (ref 26–34)
MCHC RBC AUTO-ENTMCNC: 31.4 G/DL (ref 32–36.5)
MCV RBC AUTO: 93.2 FL (ref 78–100)
NRBC BLD MANUAL-RTO: 0 /100 WBC
PLATELET # BLD AUTO: 134 K/MCL (ref 140–450)
POTASSIUM SERPL-SCNC: 4.1 MMOL/L (ref 3.4–5.1)
RBC # BLD: 3.11 MIL/MCL (ref 4–5.2)
SODIUM SERPL-SCNC: 134 MMOL/L (ref 135–145)
WBC # BLD: 4.2 K/MCL (ref 4.2–11)

## 2025-06-02 PROCEDURE — 90935 HEMODIALYSIS ONE EVALUATION: CPT

## 2025-06-02 PROCEDURE — 80048 BASIC METABOLIC PNL TOTAL CA: CPT | Performed by: INTERNAL MEDICINE

## 2025-06-02 PROCEDURE — A9503 TC99M MEDRONATE: HCPCS

## 2025-06-02 PROCEDURE — 85027 COMPLETE CBC AUTOMATED: CPT | Performed by: INTERNAL MEDICINE

## 2025-06-02 PROCEDURE — 96372 THER/PROPH/DIAG INJ SC/IM: CPT | Performed by: INTERNAL MEDICINE

## 2025-06-02 PROCEDURE — 10006150 HB RX 343

## 2025-06-02 PROCEDURE — 10002803 HB RX 637: Performed by: INTERNAL MEDICINE

## 2025-06-02 PROCEDURE — 99233 SBSQ HOSP IP/OBS HIGH 50: CPT | Performed by: INTERNAL MEDICINE

## 2025-06-02 PROCEDURE — G0378 HOSPITAL OBSERVATION PER HR: HCPCS

## 2025-06-02 PROCEDURE — 36415 COLL VENOUS BLD VENIPUNCTURE: CPT | Performed by: INTERNAL MEDICINE

## 2025-06-02 PROCEDURE — 10004651 HB RX, NO CHARGE ITEM: Performed by: INTERNAL MEDICINE

## 2025-06-02 PROCEDURE — 10002800 HB RX 250 W HCPCS: Performed by: INTERNAL MEDICINE

## 2025-06-02 RX ORDER — ALPRAZOLAM 0.25 MG
0.5 TABLET ORAL ONCE
Status: COMPLETED | OUTPATIENT
Start: 2025-06-02 | End: 2025-06-02

## 2025-06-02 RX ORDER — TC 99M MEDRONATE 20 MG/10ML
25 INJECTION, POWDER, LYOPHILIZED, FOR SOLUTION INTRAVENOUS ONCE
Status: COMPLETED | OUTPATIENT
Start: 2025-06-02 | End: 2025-06-02

## 2025-06-02 RX ORDER — HYDROXYZINE HYDROCHLORIDE 25 MG/1
25 TABLET, FILM COATED ORAL 3 TIMES DAILY PRN
Status: DISCONTINUED | OUTPATIENT
Start: 2025-06-02 | End: 2025-06-03 | Stop reason: HOSPADM

## 2025-06-02 RX ORDER — HYDROXYZINE HYDROCHLORIDE 25 MG/1
25 TABLET, FILM COATED ORAL 3 TIMES DAILY PRN
Qty: 30 TABLET | Refills: 0 | Status: SHIPPED | OUTPATIENT
Start: 2025-06-02

## 2025-06-02 RX ADMIN — HYDROCODONE BITARTRATE AND ACETAMINOPHEN 1 TABLET: 10; 325 TABLET ORAL at 07:50

## 2025-06-02 RX ADMIN — ALPRAZOLAM 0.5 MG: 0.25 TABLET ORAL at 17:40

## 2025-06-02 RX ADMIN — SODIUM CHLORIDE, PRESERVATIVE FREE 2 ML: 5 INJECTION INTRAVENOUS at 21:19

## 2025-06-02 RX ADMIN — AMIODARONE HYDROCHLORIDE 200 MG: 200 TABLET ORAL at 07:50

## 2025-06-02 RX ADMIN — DIPHENHYDRAMINE HYDROCHLORIDE 25 MG: 25 CAPSULE ORAL at 00:31

## 2025-06-02 RX ADMIN — Medication 3 MG: at 21:15

## 2025-06-02 RX ADMIN — RIFAMPIN 300 MG: 300 CAPSULE ORAL at 21:08

## 2025-06-02 RX ADMIN — LANTHANUM CARBONATE 1000 MG: 500 TABLET, CHEWABLE ORAL at 07:50

## 2025-06-02 RX ADMIN — TC 99M MEDRONATE 28.7 MILLICURIE: 20 INJECTION, POWDER, LYOPHILIZED, FOR SOLUTION INTRAVENOUS at 07:26

## 2025-06-02 RX ADMIN — HEPARIN SODIUM 5000 UNITS: 5000 INJECTION INTRAVENOUS; SUBCUTANEOUS at 21:08

## 2025-06-02 RX ADMIN — RIFAMPIN 300 MG: 300 CAPSULE ORAL at 07:50

## 2025-06-02 RX ADMIN — HEPARIN SODIUM 5000 UNITS: 5000 INJECTION INTRAVENOUS; SUBCUTANEOUS at 06:01

## 2025-06-02 RX ADMIN — HYDROXYZINE HYDROCHLORIDE 25 MG: 25 TABLET, FILM COATED ORAL at 21:15

## 2025-06-02 RX ADMIN — HEPARIN SODIUM 5000 UNITS: 5000 INJECTION INTRAVENOUS; SUBCUTANEOUS at 17:41

## 2025-06-02 RX ADMIN — LANTHANUM CARBONATE 1000 MG: 500 TABLET, CHEWABLE ORAL at 17:40

## 2025-06-02 RX ADMIN — SODIUM CHLORIDE, PRESERVATIVE FREE 2 ML: 5 INJECTION INTRAVENOUS at 07:51

## 2025-06-02 SDOH — ECONOMIC STABILITY: GENERAL: FINANCIAL RESOURCE STRAIN: RESOURCES DECLINED

## 2025-06-02 SDOH — ECONOMIC STABILITY: INCOME INSECURITY
IN THE PAST 12 MONTHS, HAS THE ELECTRIC, GAS, OIL, OR WATER COMPANY THREATENED TO SHUT OFF SERVICE IN YOUR HOME?: RESOURCES DECLINED

## 2025-06-02 SDOH — ECONOMIC STABILITY: FOOD INSECURITY: FOOD INSECURITY: RESOURCES DECLINED

## 2025-06-02 ASSESSMENT — PAIN SCALES - WONG BAKER
WONGBAKER_NUMERICALRESPONSE: 0
WONGBAKER_NUMERICALRESPONSE: 3
WONGBAKER_NUMERICALRESPONSE: 0

## 2025-06-02 ASSESSMENT — PAIN SCALES - GENERAL
PAINLEVEL_OUTOF10: 7
PAINLEVEL_OUTOF10: 0
PAINLEVEL_OUTOF10: 2
PAINLEVEL_OUTOF10: 0
PAINLEVEL_OUTOF10: 2

## 2025-06-02 ASSESSMENT — COGNITIVE AND FUNCTIONAL STATUS - GENERAL: DO YOU HAVE SERIOUS DIFFICULTY WALKING OR CLIMBING STAIRS: NO

## 2025-06-03 VITALS
BODY MASS INDEX: 25.02 KG/M2 | HEART RATE: 70 BPM | OXYGEN SATURATION: 100 % | TEMPERATURE: 98.2 F | HEIGHT: 60 IN | RESPIRATION RATE: 16 BRPM | DIASTOLIC BLOOD PRESSURE: 77 MMHG | WEIGHT: 127.43 LBS | SYSTOLIC BLOOD PRESSURE: 130 MMHG

## 2025-06-03 PROCEDURE — 10004651 HB RX, NO CHARGE ITEM: Performed by: INTERNAL MEDICINE

## 2025-06-03 PROCEDURE — 10002800 HB RX 250 W HCPCS: Performed by: INTERNAL MEDICINE

## 2025-06-03 PROCEDURE — 96372 THER/PROPH/DIAG INJ SC/IM: CPT | Performed by: INTERNAL MEDICINE

## 2025-06-03 PROCEDURE — 10002803 HB RX 637: Performed by: INTERNAL MEDICINE

## 2025-06-03 PROCEDURE — G0378 HOSPITAL OBSERVATION PER HR: HCPCS

## 2025-06-03 RX ORDER — ALPRAZOLAM 0.5 MG
0.5 TABLET ORAL NIGHTLY PRN
Qty: 20 TABLET | Refills: 0 | Status: SHIPPED | OUTPATIENT
Start: 2025-06-03 | End: 2025-07-03

## 2025-06-03 RX ADMIN — HYDROCODONE BITARTRATE AND ACETAMINOPHEN 1 TABLET: 10; 325 TABLET ORAL at 01:19

## 2025-06-03 RX ADMIN — RIFAMPIN 300 MG: 300 CAPSULE ORAL at 08:17

## 2025-06-03 RX ADMIN — LANTHANUM CARBONATE 1000 MG: 500 TABLET, CHEWABLE ORAL at 13:14

## 2025-06-03 RX ADMIN — LANTHANUM CARBONATE 1000 MG: 500 TABLET, CHEWABLE ORAL at 08:17

## 2025-06-03 RX ADMIN — HEPARIN SODIUM 5000 UNITS: 5000 INJECTION INTRAVENOUS; SUBCUTANEOUS at 05:50

## 2025-06-03 RX ADMIN — SODIUM CHLORIDE, PRESERVATIVE FREE 2 ML: 5 INJECTION INTRAVENOUS at 08:19

## 2025-06-03 RX ADMIN — LANTHANUM CARBONATE 1000 MG: 500 TABLET, CHEWABLE ORAL at 16:20

## 2025-06-03 RX ADMIN — HYDROCODONE BITARTRATE AND ACETAMINOPHEN 1 TABLET: 10; 325 TABLET ORAL at 08:17

## 2025-06-03 RX ADMIN — EPOETIN ALFA-EPBX 20000 UNITS: 20000 INJECTION, SOLUTION INTRAVENOUS; SUBCUTANEOUS at 13:14

## 2025-06-03 ASSESSMENT — PAIN SCALES - GENERAL
PAINLEVEL_OUTOF10: 5
PAINLEVEL_OUTOF10: 3
PAINLEVEL_OUTOF10: 0

## 2025-06-03 ASSESSMENT — PAIN SCALES - WONG BAKER: WONGBAKER_NUMERICALRESPONSE: 5

## 2025-06-04 ENCOUNTER — TELEPHONE (OUTPATIENT)
Dept: NEUROSURGERY | Age: 55
End: 2025-06-04

## 2025-06-05 ENCOUNTER — TELEPHONE (OUTPATIENT)
Dept: CARE COORDINATION | Age: 55
End: 2025-06-05

## 2025-06-06 ENCOUNTER — TELEPHONE (OUTPATIENT)
Dept: CARE COORDINATION | Age: 55
End: 2025-06-06

## 2025-06-07 ENCOUNTER — APPOINTMENT (OUTPATIENT)
Dept: GENERAL RADIOLOGY | Age: 55
End: 2025-06-07
Attending: EMERGENCY MEDICINE

## 2025-06-07 ENCOUNTER — HOSPITAL ENCOUNTER (EMERGENCY)
Age: 55
Discharge: HOME | End: 2025-06-07
Attending: EMERGENCY MEDICINE

## 2025-06-07 VITALS
HEART RATE: 76 BPM | SYSTOLIC BLOOD PRESSURE: 95 MMHG | TEMPERATURE: 98.4 F | DIASTOLIC BLOOD PRESSURE: 82 MMHG | RESPIRATION RATE: 20 BRPM | BODY MASS INDEX: 26.23 KG/M2 | WEIGHT: 133.6 LBS | HEIGHT: 60 IN | OXYGEN SATURATION: 100 %

## 2025-06-07 DIAGNOSIS — R11.2 NAUSEA AND VOMITING IN ADULT: Primary | ICD-10-CM

## 2025-06-07 DIAGNOSIS — N18.6 END STAGE RENAL DISEASE  (CMD): ICD-10-CM

## 2025-06-07 LAB
ALBUMIN SERPL-MCNC: 3.1 G/DL (ref 3.4–5)
ALBUMIN/GLOB SERPL: 0.7 {RATIO} (ref 1–2.4)
ALP SERPL-CCNC: 269 UNITS/L (ref 45–117)
ALT SERPL-CCNC: 31 UNITS/L
ANION GAP SERPL CALC-SCNC: 11 MMOL/L (ref 7–19)
AST SERPL-CCNC: 58 UNITS/L
ATRIAL RATE (BPM): 76
BASOPHILS # BLD: 0.1 K/MCL (ref 0–0.3)
BASOPHILS NFR BLD: 1 %
BILIRUB SERPL-MCNC: 1.3 MG/DL (ref 0.2–1)
BUN SERPL-MCNC: 16 MG/DL (ref 6–20)
BUN/CREAT SERPL: 3 (ref 7–25)
CALCIUM SERPL-MCNC: 9.3 MG/DL (ref 8.4–10.2)
CHLORIDE SERPL-SCNC: 96 MMOL/L (ref 97–110)
CO2 SERPL-SCNC: 32 MMOL/L (ref 21–32)
CREAT SERPL-MCNC: 4.93 MG/DL (ref 0.51–0.95)
DEPRECATED RDW RBC: 67 FL (ref 39–50)
EGFRCR SERPLBLD CKD-EPI 2021: 10 ML/MIN/{1.73_M2}
EOSINOPHIL # BLD: 0.2 K/MCL (ref 0–0.5)
EOSINOPHIL NFR BLD: 6 %
ERYTHROCYTE [DISTWIDTH] IN BLOOD: 19.7 % (ref 11–15)
FASTING DURATION TIME PATIENT: ABNORMAL H
GLOBULIN SER-MCNC: 4.2 G/DL (ref 2–4)
GLUCOSE SERPL-MCNC: 123 MG/DL (ref 70–99)
HCT VFR BLD CALC: 31.9 % (ref 36–46.5)
HGB BLD-MCNC: 10 G/DL (ref 12–15.5)
IMM GRANULOCYTES # BLD AUTO: 0 K/MCL (ref 0–0.2)
IMM GRANULOCYTES # BLD: 1 %
LYMPHOCYTES # BLD: 0.6 K/MCL (ref 1–4)
LYMPHOCYTES NFR BLD: 16 %
MCH RBC QN AUTO: 29.7 PG (ref 26–34)
MCHC RBC AUTO-ENTMCNC: 31.3 G/DL (ref 32–36.5)
MCV RBC AUTO: 94.7 FL (ref 78–100)
MONOCYTES # BLD: 0.3 K/MCL (ref 0.3–0.9)
MONOCYTES NFR BLD: 8 %
NEUTROPHILS # BLD: 2.4 K/MCL (ref 1.8–7.7)
NEUTROPHILS NFR BLD: 68 %
NRBC BLD MANUAL-RTO: 0 /100 WBC
P AXIS (DEGREES): 9
PLATELET # BLD AUTO: 123 K/MCL (ref 140–450)
POTASSIUM SERPL-SCNC: 3.8 MMOL/L (ref 3.4–5.1)
PR-INTERVAL (MSEC): 154
PROT SERPL-MCNC: 7.3 G/DL (ref 6.4–8.2)
QRS-INTERVAL (MSEC): 88
QT-INTERVAL (MSEC): 490
QTC: 551
R AXIS (DEGREES): -5
RAINBOW EXTRA TUBES HOLD SPECIMEN: NORMAL
RAINBOW EXTRA TUBES HOLD SPECIMEN: NORMAL
RBC # BLD: 3.37 MIL/MCL (ref 4–5.2)
REPORT TEXT: NORMAL
SODIUM SERPL-SCNC: 135 MMOL/L (ref 135–145)
T AXIS (DEGREES): -167
TROPONIN I SERPL DL<=0.01 NG/ML-MCNC: 11 NG/L
VENTRICULAR RATE EKG/MIN (BPM): 76
WBC # BLD: 3.5 K/MCL (ref 4.2–11)

## 2025-06-07 PROCEDURE — 96374 THER/PROPH/DIAG INJ IV PUSH: CPT

## 2025-06-07 PROCEDURE — 10002800 HB RX 250 W HCPCS: Performed by: EMERGENCY MEDICINE

## 2025-06-07 PROCEDURE — 93010 ELECTROCARDIOGRAM REPORT: CPT | Performed by: INTERNAL MEDICINE

## 2025-06-07 PROCEDURE — 85025 COMPLETE CBC W/AUTO DIFF WBC: CPT | Performed by: EMERGENCY MEDICINE

## 2025-06-07 PROCEDURE — 93005 ELECTROCARDIOGRAM TRACING: CPT | Performed by: EMERGENCY MEDICINE

## 2025-06-07 PROCEDURE — 80053 COMPREHEN METABOLIC PANEL: CPT | Performed by: EMERGENCY MEDICINE

## 2025-06-07 PROCEDURE — 84484 ASSAY OF TROPONIN QUANT: CPT | Performed by: EMERGENCY MEDICINE

## 2025-06-07 PROCEDURE — 71045 X-RAY EXAM CHEST 1 VIEW: CPT

## 2025-06-07 PROCEDURE — 99285 EMERGENCY DEPT VISIT HI MDM: CPT | Performed by: EMERGENCY MEDICINE

## 2025-06-07 RX ORDER — ONDANSETRON 2 MG/ML
4 INJECTION INTRAMUSCULAR; INTRAVENOUS ONCE
Status: COMPLETED | OUTPATIENT
Start: 2025-06-07 | End: 2025-06-07

## 2025-06-07 RX ADMIN — ONDANSETRON 4 MG: 2 INJECTION INTRAMUSCULAR; INTRAVENOUS at 14:28

## 2025-06-09 LAB
ATRIAL RATE (BPM): 76
P AXIS (DEGREES): 9
PR-INTERVAL (MSEC): 154
QRS-INTERVAL (MSEC): 88
QT-INTERVAL (MSEC): 490
QTC: 551
R AXIS (DEGREES): -5
REPORT TEXT: NORMAL
T AXIS (DEGREES): -167
VENTRICULAR RATE EKG/MIN (BPM): 76

## 2025-06-11 ENCOUNTER — APPOINTMENT (OUTPATIENT)
Dept: CT IMAGING | Age: 55
End: 2025-06-11
Attending: PSYCHIATRY & NEUROLOGY

## 2025-06-11 ENCOUNTER — HOSPITAL ENCOUNTER (OUTPATIENT)
Age: 55
Setting detail: OBSERVATION
Discharge: HOME | End: 2025-06-13
Attending: STUDENT IN AN ORGANIZED HEALTH CARE EDUCATION/TRAINING PROGRAM | Admitting: INTERNAL MEDICINE

## 2025-06-11 DIAGNOSIS — R20.2 PARESTHESIAS: ICD-10-CM

## 2025-06-11 DIAGNOSIS — R94.31 PROLONGED Q-T INTERVAL ON ECG: Primary | ICD-10-CM

## 2025-06-11 DIAGNOSIS — R42 DIZZINESS: ICD-10-CM

## 2025-06-11 DIAGNOSIS — D61.818 PANCYTOPENIA (CMD): ICD-10-CM

## 2025-06-11 DIAGNOSIS — E87.1 HYPONATREMIA: ICD-10-CM

## 2025-06-11 DIAGNOSIS — E87.8 HYPOCHLOREMIA: ICD-10-CM

## 2025-06-11 PROBLEM — R20.0 NUMBNESS AND TINGLING OF UPPER AND LOWER EXTREMITIES OF BOTH SIDES: Status: ACTIVE | Noted: 2025-06-11

## 2025-06-11 LAB
ANION GAP SERPL CALC-SCNC: 12 MMOL/L (ref 7–19)
BASOPHILS # BLD: 0 K/MCL (ref 0–0.3)
BASOPHILS NFR BLD: 1 %
BUN SERPL-MCNC: 8 MG/DL (ref 6–20)
BUN/CREAT SERPL: 3 (ref 7–25)
CALCIUM SERPL-MCNC: 9.5 MG/DL (ref 8.4–10.2)
CHLORIDE SERPL-SCNC: 94 MMOL/L (ref 97–110)
CO2 SERPL-SCNC: 31 MMOL/L (ref 21–32)
CREAT SERPL-MCNC: 2.76 MG/DL (ref 0.51–0.95)
DEPRECATED RDW RBC: 63.7 FL (ref 39–50)
EGFRCR SERPLBLD CKD-EPI 2021: 20 ML/MIN/{1.73_M2}
EOSINOPHIL # BLD: 0.1 K/MCL (ref 0–0.5)
EOSINOPHIL NFR BLD: 5 %
ERYTHROCYTE [DISTWIDTH] IN BLOOD: 18.4 % (ref 11–15)
FASTING DURATION TIME PATIENT: ABNORMAL H
GLUCOSE SERPL-MCNC: 99 MG/DL (ref 70–99)
HCT VFR BLD CALC: 26.9 % (ref 36–46.5)
HGB BLD-MCNC: 8.7 G/DL (ref 12–15.5)
IMM GRANULOCYTES # BLD AUTO: 0 K/MCL (ref 0–0.2)
IMM GRANULOCYTES # BLD: 0 %
LYMPHOCYTES # BLD: 0.5 K/MCL (ref 1–4)
LYMPHOCYTES NFR BLD: 19 %
MAGNESIUM SERPL-MCNC: 1.9 MG/DL (ref 1.7–2.4)
MCH RBC QN AUTO: 30.3 PG (ref 26–34)
MCHC RBC AUTO-ENTMCNC: 32.3 G/DL (ref 32–36.5)
MCV RBC AUTO: 93.7 FL (ref 78–100)
MONOCYTES # BLD: 0.3 K/MCL (ref 0.3–0.9)
MONOCYTES NFR BLD: 10 %
NEUTROPHILS # BLD: 1.8 K/MCL (ref 1.8–7.7)
NEUTROPHILS NFR BLD: 65 %
NRBC BLD MANUAL-RTO: 0 /100 WBC
PLATELET # BLD AUTO: 94 K/MCL (ref 140–450)
POTASSIUM SERPL-SCNC: 3.5 MMOL/L (ref 3.4–5.1)
RBC # BLD: 2.87 MIL/MCL (ref 4–5.2)
SODIUM SERPL-SCNC: 133 MMOL/L (ref 135–145)
TROPONIN I SERPL DL<=0.01 NG/ML-MCNC: 12 NG/L
WBC # BLD: 2.8 K/MCL (ref 4.2–11)

## 2025-06-11 PROCEDURE — 10004651 HB RX, NO CHARGE ITEM: Performed by: INTERNAL MEDICINE

## 2025-06-11 PROCEDURE — 80048 BASIC METABOLIC PNL TOTAL CA: CPT | Performed by: STUDENT IN AN ORGANIZED HEALTH CARE EDUCATION/TRAINING PROGRAM

## 2025-06-11 PROCEDURE — 86706 HEP B SURFACE ANTIBODY: CPT | Performed by: INTERNAL MEDICINE

## 2025-06-11 PROCEDURE — 99291 CRITICAL CARE FIRST HOUR: CPT

## 2025-06-11 PROCEDURE — 93005 ELECTROCARDIOGRAM TRACING: CPT | Performed by: STUDENT IN AN ORGANIZED HEALTH CARE EDUCATION/TRAINING PROGRAM

## 2025-06-11 PROCEDURE — G0378 HOSPITAL OBSERVATION PER HR: HCPCS

## 2025-06-11 PROCEDURE — 70450 CT HEAD/BRAIN W/O DYE: CPT

## 2025-06-11 PROCEDURE — 86704 HEP B CORE ANTIBODY TOTAL: CPT | Performed by: INTERNAL MEDICINE

## 2025-06-11 PROCEDURE — 82607 VITAMIN B-12: CPT | Performed by: INTERNAL MEDICINE

## 2025-06-11 PROCEDURE — 87340 HEPATITIS B SURFACE AG IA: CPT | Performed by: INTERNAL MEDICINE

## 2025-06-11 PROCEDURE — 84484 ASSAY OF TROPONIN QUANT: CPT | Performed by: STUDENT IN AN ORGANIZED HEALTH CARE EDUCATION/TRAINING PROGRAM

## 2025-06-11 PROCEDURE — 99285 EMERGENCY DEPT VISIT HI MDM: CPT | Performed by: STUDENT IN AN ORGANIZED HEALTH CARE EDUCATION/TRAINING PROGRAM

## 2025-06-11 PROCEDURE — 83735 ASSAY OF MAGNESIUM: CPT | Performed by: STUDENT IN AN ORGANIZED HEALTH CARE EDUCATION/TRAINING PROGRAM

## 2025-06-11 PROCEDURE — 10002800 HB RX 250 W HCPCS: Performed by: STUDENT IN AN ORGANIZED HEALTH CARE EDUCATION/TRAINING PROGRAM

## 2025-06-11 PROCEDURE — 96365 THER/PROPH/DIAG IV INF INIT: CPT

## 2025-06-11 PROCEDURE — 85025 COMPLETE CBC W/AUTO DIFF WBC: CPT | Performed by: STUDENT IN AN ORGANIZED HEALTH CARE EDUCATION/TRAINING PROGRAM

## 2025-06-11 PROCEDURE — 99292 CRITICAL CARE ADDL 30 MIN: CPT | Performed by: INTERNAL MEDICINE

## 2025-06-11 PROCEDURE — 99223 1ST HOSP IP/OBS HIGH 75: CPT | Performed by: INTERNAL MEDICINE

## 2025-06-11 RX ORDER — POLYETHYLENE GLYCOL 3350 17 G/17G
17 POWDER, FOR SOLUTION ORAL DAILY PRN
Status: DISCONTINUED | OUTPATIENT
Start: 2025-06-11 | End: 2025-06-13 | Stop reason: HOSPADM

## 2025-06-11 RX ORDER — MIDODRINE HYDROCHLORIDE 5 MG/1
10 TABLET ORAL DAILY
Status: DISCONTINUED | OUTPATIENT
Start: 2025-06-12 | End: 2025-06-13 | Stop reason: HOSPADM

## 2025-06-11 RX ORDER — ACETAMINOPHEN 325 MG/1
650 TABLET ORAL EVERY 4 HOURS PRN
Status: DISCONTINUED | OUTPATIENT
Start: 2025-06-11 | End: 2025-06-13 | Stop reason: HOSPADM

## 2025-06-11 RX ORDER — MAGNESIUM SULFATE 4 G/50ML
4 INJECTION INTRAVENOUS ONCE
Status: COMPLETED | OUTPATIENT
Start: 2025-06-11 | End: 2025-06-11

## 2025-06-11 RX ORDER — HEPARIN SODIUM 5000 [USP'U]/ML
5000 INJECTION, SOLUTION INTRAVENOUS; SUBCUTANEOUS EVERY 8 HOURS SCHEDULED
Status: DISCONTINUED | OUTPATIENT
Start: 2025-06-11 | End: 2025-06-13 | Stop reason: HOSPADM

## 2025-06-11 RX ORDER — RIFAMPIN 300 MG/1
300 CAPSULE ORAL 2 TIMES DAILY
Status: DISCONTINUED | OUTPATIENT
Start: 2025-06-11 | End: 2025-06-13 | Stop reason: HOSPADM

## 2025-06-11 RX ORDER — AMIODARONE HYDROCHLORIDE 200 MG/1
200 TABLET ORAL
Status: DISCONTINUED | OUTPATIENT
Start: 2025-06-13 | End: 2025-06-13 | Stop reason: HOSPADM

## 2025-06-11 RX ORDER — ACETAMINOPHEN 650 MG/1
650 SUPPOSITORY RECTAL EVERY 4 HOURS PRN
Status: DISCONTINUED | OUTPATIENT
Start: 2025-06-11 | End: 2025-06-13 | Stop reason: HOSPADM

## 2025-06-11 RX ORDER — LORAZEPAM 2 MG/ML
1 INJECTION INTRAMUSCULAR
Status: DISCONTINUED | OUTPATIENT
Start: 2025-06-11 | End: 2025-06-13 | Stop reason: HOSPADM

## 2025-06-11 RX ORDER — 0.9 % SODIUM CHLORIDE 0.9 %
2 VIAL (ML) INJECTION EVERY 12 HOURS SCHEDULED
Status: DISCONTINUED | OUTPATIENT
Start: 2025-06-11 | End: 2025-06-13 | Stop reason: HOSPADM

## 2025-06-11 RX ORDER — 0.9 % SODIUM CHLORIDE 0.9 %
10 VIAL (ML) INJECTION PRN
Status: DISCONTINUED | OUTPATIENT
Start: 2025-06-11 | End: 2025-06-13 | Stop reason: HOSPADM

## 2025-06-11 RX ORDER — HYDROXYZINE HYDROCHLORIDE 25 MG/1
25 TABLET, FILM COATED ORAL 3 TIMES DAILY PRN
Status: DISCONTINUED | OUTPATIENT
Start: 2025-06-11 | End: 2025-06-13 | Stop reason: HOSPADM

## 2025-06-11 RX ORDER — ALPRAZOLAM 0.25 MG
0.5 TABLET ORAL NIGHTLY PRN
Status: DISCONTINUED | OUTPATIENT
Start: 2025-06-11 | End: 2025-06-13 | Stop reason: HOSPADM

## 2025-06-11 RX ORDER — LANTHANUM CARBONATE 500 MG/1
1000 TABLET, CHEWABLE ORAL
Status: DISCONTINUED | OUTPATIENT
Start: 2025-06-11 | End: 2025-06-13 | Stop reason: HOSPADM

## 2025-06-11 RX ADMIN — SODIUM CHLORIDE, PRESERVATIVE FREE 2 ML: 5 INJECTION INTRAVENOUS at 23:47

## 2025-06-11 RX ADMIN — MAGNESIUM SULFATE HEPTAHYDRATE 4 G: 4 INJECTION, SOLUTION INTRAVENOUS at 16:25

## 2025-06-11 SDOH — SOCIAL STABILITY: SOCIAL INSECURITY: HOW OFTEN DOES ANYONE, INCLUDING FAMILY AND FRIENDS, PHYSICALLY HURT YOU?: NEVER

## 2025-06-11 SDOH — ECONOMIC STABILITY: HOUSING INSECURITY: WHAT IS YOUR LIVING SITUATION TODAY?: I HAVE A STEADY PLACE TO LIVE

## 2025-06-11 SDOH — ECONOMIC STABILITY: HOUSING INSECURITY: DO YOU HAVE PROBLEMS WITH ANY OF THE FOLLOWING?: NONE OF THE ABOVE

## 2025-06-11 SDOH — ECONOMIC STABILITY: FOOD INSECURITY: WITHIN THE PAST 12 MONTHS, THE FOOD YOU BOUGHT JUST DIDN'T LAST AND YOU DIDN'T HAVE MONEY TO GET MORE.: NEVER TRUE

## 2025-06-11 SDOH — HEALTH STABILITY: PHYSICAL HEALTH: DO YOU HAVE SERIOUS DIFFICULTY WALKING OR CLIMBING STAIRS?: NO

## 2025-06-11 SDOH — HEALTH STABILITY: GENERAL: BECAUSE OF A PHYSICAL, MENTAL, OR EMOTIONAL CONDITION, DO YOU HAVE DIFFICULTY DOING ERRANDS ALONE?: NO

## 2025-06-11 SDOH — ECONOMIC STABILITY: INCOME INSECURITY: IN THE PAST 12 MONTHS, HAS THE ELECTRIC, GAS, OIL, OR WATER COMPANY THREATENED TO SHUT OFF SERVICE IN YOUR HOME?: NO

## 2025-06-11 SDOH — ECONOMIC STABILITY: GENERAL

## 2025-06-11 SDOH — SOCIAL STABILITY: SOCIAL INSECURITY: HOW OFTEN DOES ANYONE, INCLUDING FAMILY AND FRIENDS, THREATEN YOU WITH HARM?: NEVER

## 2025-06-11 SDOH — HEALTH STABILITY: PHYSICAL HEALTH: DO YOU HAVE DIFFICULTY DRESSING OR BATHING?: NO

## 2025-06-11 SDOH — ECONOMIC STABILITY: HOUSING INSECURITY: WHAT IS YOUR LIVING SITUATION TODAY?: FAMILY MEMBERS

## 2025-06-11 SDOH — SOCIAL STABILITY: SOCIAL INSECURITY: HOW OFTEN DOES ANYONE, INCLUDING FAMILY AND FRIENDS, SCREAM OR CURSE AT YOU?: NEVER

## 2025-06-11 SDOH — SOCIAL STABILITY: SOCIAL NETWORK: SUPPORT SYSTEMS: CHILDREN

## 2025-06-11 SDOH — SOCIAL STABILITY: SOCIAL INSECURITY: HOW OFTEN DOES ANYONE, INCLUDING FAMILY AND FRIENDS, INSULT OR TALK DOWN TO YOU?: NEVER

## 2025-06-11 SDOH — ECONOMIC STABILITY: HOUSING INSECURITY: WHAT IS YOUR LIVING SITUATION TODAY?: APARTMENT

## 2025-06-11 ASSESSMENT — LIFESTYLE VARIABLES
HOW OFTEN DO YOU HAVE A DRINK CONTAINING ALCOHOL: NEVER
ALCOHOL_USE_STATUS: NO OR LOW RISK WITH VALIDATED TOOL
HOW MANY STANDARD DRINKS CONTAINING ALCOHOL DO YOU HAVE ON A TYPICAL DAY: 0,1 OR 2
HOW OFTEN DO YOU HAVE 6 OR MORE DRINKS ON ONE OCCASION: NEVER
AUDIT-C TOTAL SCORE: 0

## 2025-06-11 ASSESSMENT — ENCOUNTER SYMPTOMS
BACK PAIN: 1
SHORTNESS OF BREATH: 0
DIZZINESS: 1
SORE THROAT: 0
ABDOMINAL PAIN: 0
FATIGUE: 0
CONFUSION: 0
BRUISES/BLEEDS EASILY: 0
LIGHT-HEADEDNESS: 1
NUMBNESS: 1
FEVER: 0

## 2025-06-11 ASSESSMENT — PATIENT HEALTH QUESTIONNAIRE - PHQ9
2. FEELING DOWN, DEPRESSED OR HOPELESS: NOT AT ALL
1. LITTLE INTEREST OR PLEASURE IN DOING THINGS: NOT AT ALL
CLINICAL INTERPRETATION OF PHQ2 SCORE: NO FURTHER SCREENING NEEDED
SUM OF ALL RESPONSES TO PHQ9 QUESTIONS 1 AND 2: 0
SUM OF ALL RESPONSES TO PHQ9 QUESTIONS 1 AND 2: 0
IS PATIENT ABLE TO COMPLETE PHQ2 OR PHQ9: YES

## 2025-06-11 ASSESSMENT — ACTIVITIES OF DAILY LIVING (ADL)
RECENT_DECLINE_ADL: NO
ADL_SCORE: 12
ADL_SHORT_OF_BREATH: NO
ADL_BEFORE_ADMISSION: INDEPENDENT

## 2025-06-11 ASSESSMENT — PAIN SCALES - GENERAL: PAINLEVEL_OUTOF10: 0

## 2025-06-12 ENCOUNTER — TELEPHONE (OUTPATIENT)
Dept: CARE COORDINATION | Age: 55
End: 2025-06-12

## 2025-06-12 ENCOUNTER — APPOINTMENT (OUTPATIENT)
Dept: DIALYSIS | Age: 55
End: 2025-06-12
Attending: INTERNAL MEDICINE

## 2025-06-12 ENCOUNTER — TELEPHONE (OUTPATIENT)
Dept: CARDIOLOGY | Age: 55
End: 2025-06-12

## 2025-06-12 PROBLEM — I50.22 CHRONIC HFREF (HEART FAILURE WITH REDUCED EJECTION FRACTION)  (CMD): Status: ACTIVE | Noted: 2024-08-15

## 2025-06-12 PROBLEM — Z99.2 ESRD (END STAGE RENAL DISEASE) ON DIALYSIS  (CMD): Status: ACTIVE | Noted: 2023-01-18

## 2025-06-12 PROBLEM — Z95.810 IMPLANTABLE CARDIOVERTER-DEFIBRILLATOR (ICD) IN SITU: Status: ACTIVE | Noted: 2025-06-12

## 2025-06-12 LAB
ALBUMIN SERPL-MCNC: 2.8 G/DL (ref 3.4–5)
ALBUMIN/GLOB SERPL: 0.8 {RATIO} (ref 1–2.4)
ALP SERPL-CCNC: 228 UNITS/L (ref 45–117)
ALT SERPL-CCNC: 32 UNITS/L
ANION GAP SERPL CALC-SCNC: 10 MMOL/L (ref 7–19)
AST SERPL-CCNC: 41 UNITS/L
ATRIAL RATE (BPM): 73
ATRIAL RATE (BPM): 86
BASOPHILS # BLD: 0.1 K/MCL (ref 0–0.3)
BASOPHILS NFR BLD: 2 %
BILIRUB SERPL-MCNC: 0.6 MG/DL (ref 0.2–1)
BUN SERPL-MCNC: 11 MG/DL (ref 6–20)
BUN/CREAT SERPL: 3 (ref 7–25)
CALCIUM SERPL-MCNC: 9.2 MG/DL (ref 8.4–10.2)
CHLORIDE SERPL-SCNC: 94 MMOL/L (ref 97–110)
CO2 SERPL-SCNC: 33 MMOL/L (ref 21–32)
CREAT SERPL-MCNC: 4.26 MG/DL (ref 0.51–0.95)
DEPRECATED RDW RBC: 67.6 FL (ref 39–50)
EGFRCR SERPLBLD CKD-EPI 2021: 12 ML/MIN/{1.73_M2}
EOSINOPHIL # BLD: 0.2 K/MCL (ref 0–0.5)
EOSINOPHIL NFR BLD: 7 %
ERYTHROCYTE [DISTWIDTH] IN BLOOD: 19 % (ref 11–15)
FASTING DURATION TIME PATIENT: ABNORMAL H
FOLATE SERPL-MCNC: 8.8 NG/ML
GLOBULIN SER-MCNC: 3.7 G/DL (ref 2–4)
GLUCOSE SERPL-MCNC: 110 MG/DL (ref 70–99)
HBV CORE IGG+IGM SER QL: NEGATIVE
HBV SURFACE AG SER QL: NEGATIVE
HCT VFR BLD CALC: 28 % (ref 36–46.5)
HGB BLD-MCNC: 8.9 G/DL (ref 12–15.5)
IMM GRANULOCYTES # BLD AUTO: 0 K/MCL (ref 0–0.2)
IMM GRANULOCYTES # BLD: 0 %
LYMPHOCYTES # BLD: 0.8 K/MCL (ref 1–4)
LYMPHOCYTES NFR BLD: 24 %
MAGNESIUM SERPL-MCNC: 4.1 MG/DL (ref 1.7–2.4)
MCH RBC QN AUTO: 30.8 PG (ref 26–34)
MCHC RBC AUTO-ENTMCNC: 31.8 G/DL (ref 32–36.5)
MCV RBC AUTO: 96.9 FL (ref 78–100)
MONOCYTES # BLD: 0.3 K/MCL (ref 0.3–0.9)
MONOCYTES NFR BLD: 10 %
NEUTROPHILS # BLD: 1.8 K/MCL (ref 1.8–7.7)
NEUTROPHILS NFR BLD: 57 %
NRBC BLD MANUAL-RTO: 0 /100 WBC
P AXIS (DEGREES): 17
P AXIS (DEGREES): 18
PLATELET # BLD AUTO: 103 K/MCL (ref 140–450)
POTASSIUM SERPL-SCNC: 4.3 MMOL/L (ref 3.4–5.1)
PR-INTERVAL (MSEC): 154
PR-INTERVAL (MSEC): 178
PROT SERPL-MCNC: 6.5 G/DL (ref 6.4–8.2)
QRS-INTERVAL (MSEC): 92
QRS-INTERVAL (MSEC): 98
QT-INTERVAL (MSEC): 444
QT-INTERVAL (MSEC): 618
QTC: 531
QTC: 681
R AXIS (DEGREES): -3
R AXIS (DEGREES): 6
RAINBOW EXTRA TUBES HOLD SPECIMEN: NORMAL
RBC # BLD: 2.89 MIL/MCL (ref 4–5.2)
REPORT TEXT: NORMAL
REPORT TEXT: NORMAL
SODIUM SERPL-SCNC: 133 MMOL/L (ref 135–145)
T AXIS (DEGREES): -154
T AXIS (DEGREES): 158
VENTRICULAR RATE EKG/MIN (BPM): 73
VENTRICULAR RATE EKG/MIN (BPM): 86
VIT B12 SERPL-MCNC: 527 PG/ML (ref 211–911)
WBC # BLD: 3.2 K/MCL (ref 4.2–11)

## 2025-06-12 PROCEDURE — 96372 THER/PROPH/DIAG INJ SC/IM: CPT | Performed by: INTERNAL MEDICINE

## 2025-06-12 PROCEDURE — 80053 COMPREHEN METABOLIC PANEL: CPT | Performed by: INTERNAL MEDICINE

## 2025-06-12 PROCEDURE — 83735 ASSAY OF MAGNESIUM: CPT | Performed by: INTERNAL MEDICINE

## 2025-06-12 PROCEDURE — 96375 TX/PRO/DX INJ NEW DRUG ADDON: CPT

## 2025-06-12 PROCEDURE — 10002803 HB RX 637: Performed by: INTERNAL MEDICINE

## 2025-06-12 PROCEDURE — 10002800 HB RX 250 W HCPCS: Performed by: INTERNAL MEDICINE

## 2025-06-12 PROCEDURE — 99233 SBSQ HOSP IP/OBS HIGH 50: CPT | Performed by: INTERNAL MEDICINE

## 2025-06-12 PROCEDURE — 97116 GAIT TRAINING THERAPY: CPT

## 2025-06-12 PROCEDURE — 97166 OT EVAL MOD COMPLEX 45 MIN: CPT

## 2025-06-12 PROCEDURE — 85025 COMPLETE CBC W/AUTO DIFF WBC: CPT | Performed by: INTERNAL MEDICINE

## 2025-06-12 PROCEDURE — 10004651 HB RX, NO CHARGE ITEM: Performed by: INTERNAL MEDICINE

## 2025-06-12 PROCEDURE — G0378 HOSPITAL OBSERVATION PER HR: HCPCS

## 2025-06-12 PROCEDURE — 97535 SELF CARE MNGMENT TRAINING: CPT

## 2025-06-12 PROCEDURE — 90935 HEMODIALYSIS ONE EVALUATION: CPT

## 2025-06-12 PROCEDURE — 97161 PT EVAL LOW COMPLEX 20 MIN: CPT

## 2025-06-12 PROCEDURE — 36415 COLL VENOUS BLD VENIPUNCTURE: CPT | Performed by: INTERNAL MEDICINE

## 2025-06-12 RX ORDER — METOCLOPRAMIDE HYDROCHLORIDE 5 MG/ML
5 INJECTION INTRAMUSCULAR; INTRAVENOUS EVERY 6 HOURS PRN
Status: DISCONTINUED | OUTPATIENT
Start: 2025-06-12 | End: 2025-06-13 | Stop reason: HOSPADM

## 2025-06-12 RX ORDER — SODIUM CITRATE 4 % (5 ML)
3 SYRINGE (ML) MISCELLANEOUS PRN
Status: DISCONTINUED | OUTPATIENT
Start: 2025-06-12 | End: 2025-06-13 | Stop reason: HOSPADM

## 2025-06-12 RX ADMIN — LANTHANUM CARBONATE 1000 MG: 500 TABLET, CHEWABLE ORAL at 17:11

## 2025-06-12 RX ADMIN — LANTHANUM CARBONATE 1000 MG: 500 TABLET, CHEWABLE ORAL at 09:55

## 2025-06-12 RX ADMIN — MIDODRINE HYDROCHLORIDE 10 MG: 5 TABLET ORAL at 09:55

## 2025-06-12 RX ADMIN — METOCLOPRAMIDE 5 MG: 5 INJECTION, SOLUTION INTRAMUSCULAR; INTRAVENOUS at 12:13

## 2025-06-12 RX ADMIN — HEPARIN SODIUM 5000 UNITS: 5000 INJECTION INTRAVENOUS; SUBCUTANEOUS at 15:07

## 2025-06-12 RX ADMIN — RIFAMPIN 300 MG: 300 CAPSULE ORAL at 23:15

## 2025-06-12 RX ADMIN — LANTHANUM CARBONATE 1000 MG: 500 TABLET, CHEWABLE ORAL at 12:12

## 2025-06-12 RX ADMIN — SODIUM CHLORIDE, PRESERVATIVE FREE 2 ML: 5 INJECTION INTRAVENOUS at 09:57

## 2025-06-12 RX ADMIN — SODIUM CHLORIDE, PRESERVATIVE FREE 2 ML: 5 INJECTION INTRAVENOUS at 23:18

## 2025-06-12 RX ADMIN — RIFAMPIN 300 MG: 300 CAPSULE ORAL at 09:55

## 2025-06-12 RX ADMIN — EPOETIN ALFA-EPBX 10000 UNITS: 10000 INJECTION, SOLUTION INTRAVENOUS; SUBCUTANEOUS at 16:36

## 2025-06-12 RX ADMIN — HEPARIN SODIUM 5000 UNITS: 5000 INJECTION INTRAVENOUS; SUBCUTANEOUS at 05:56

## 2025-06-12 RX ADMIN — HEPARIN SODIUM 5000 UNITS: 5000 INJECTION INTRAVENOUS; SUBCUTANEOUS at 23:17

## 2025-06-12 RX ADMIN — MORPHINE SULFATE 2 MG: 2 INJECTION, SOLUTION INTRAMUSCULAR; INTRAVENOUS at 15:05

## 2025-06-12 ASSESSMENT — ENCOUNTER SYMPTOMS
FATIGUE: 1
BLOOD IN STOOL: 0
SEIZURES: 0
CHILLS: 0
ADENOPATHY: 0
CONFUSION: 0
VOMITING: 0
WEAKNESS: 0
ABDOMINAL DISTENTION: 0
APPETITE CHANGE: 0
EYE DISCHARGE: 0
ABDOMINAL PAIN: 0
BACK PAIN: 0
POLYDIPSIA: 0
HEADACHES: 0
PAIN SEVERITY NOW: 8
RHINORRHEA: 0
CONSTIPATION: 0
CHEST TIGHTNESS: 0
COUGH: 0
NERVOUS/ANXIOUS: 0
LIGHT-HEADEDNESS: 0
WHEEZING: 0
FEVER: 0
DIZZINESS: 0
SHORTNESS OF BREATH: 0
STRIDOR: 0
ACTIVITY CHANGE: 0
DIARRHEA: 0
SORE THROAT: 0
NAUSEA: 0

## 2025-06-12 ASSESSMENT — PAIN SCALES - GENERAL
PAINLEVEL_OUTOF10: 3
PAINLEVEL_OUTOF10: 0
PAINLEVEL_OUTOF10: 8
PAINLEVEL_OUTOF10: 2

## 2025-06-12 ASSESSMENT — COGNITIVE AND FUNCTIONAL STATUS - GENERAL
HELP NEEDED DRESSING REGULAR UPPER BODY CLOTHING: A LITTLE
DAILY_ACTIVITY_CONVERTED_SCORE: 42.03
BECAUSE OF A PHYSICAL, MENTAL, OR EMOTIONAL CONDITION, DO YOU HAVE DIFFICULTY DOING ERRANDS ALONE: YES
DO YOU HAVE SERIOUS DIFFICULTY WALKING OR CLIMBING STAIRS: NO
HELP NEEDED DRESSING REGULAR LOWER BODY CLOTHING: A LITTLE
HELP NEEDED FOR TOILETING: A LITTLE
DO YOU HAVE DIFFICULTY DRESSING OR BATHING: NO
BASIC_MOBILITY_CONVERTED_SCORE: 41.05
BECAUSE OF A PHYSICAL, MENTAL, OR EMOTIONAL CONDITION, DO YOU HAVE SERIOUS DIFFICULTY CONCENTRATING, REMEMBERING OR MAKING DECISIONS: NO
DAILY_ACTIVITY_RAW_SCORE: 20
HELP NEEDED FOR BATHING: A LITTLE
BASIC_MOBILITY_RAW_SCORE: 18

## 2025-06-12 ASSESSMENT — ACTIVITIES OF DAILY LIVING (ADL)
PRIOR_ADL_BATHING: INDEPENDENT
GROOMING: INDEPENDENT
EATING: INDEPENDENT
PRIOR_ADL: INDEPENDENT
HOME_MANAGEMENT_TIME_ENTRY: 17
PRIOR_ADL_TOILETING: INDEPENDENT

## 2025-06-13 ENCOUNTER — APPOINTMENT (OUTPATIENT)
Dept: GENERAL RADIOLOGY | Facility: HOSPITAL | Age: 55
End: 2025-06-13
Attending: EMERGENCY MEDICINE
Payer: MEDICAID

## 2025-06-13 ENCOUNTER — HOSPITAL ENCOUNTER (EMERGENCY)
Facility: HOSPITAL | Age: 55
End: 2025-06-13
Payer: MEDICAID

## 2025-06-13 ENCOUNTER — HOSPITAL ENCOUNTER (INPATIENT)
Facility: HOSPITAL | Age: 55
LOS: 6 days | Discharge: HOME | End: 2025-06-20
Attending: EMERGENCY MEDICINE
Payer: MEDICAID

## 2025-06-13 ENCOUNTER — TELEPHONE (OUTPATIENT)
Dept: CARE COORDINATION | Age: 55
End: 2025-06-13

## 2025-06-13 ENCOUNTER — APPOINTMENT (OUTPATIENT)
Dept: DIALYSIS | Age: 55
End: 2025-06-13
Attending: INTERNAL MEDICINE

## 2025-06-13 ENCOUNTER — HOSPITAL ENCOUNTER (INPATIENT)
Facility: HOSPITAL | Age: 55
LOS: 6 days | Discharge: HOME | End: 2025-06-20
Attending: EMERGENCY MEDICINE | Admitting: HOSPITALIST
Payer: MEDICAID

## 2025-06-13 VITALS
RESPIRATION RATE: 16 BRPM | HEART RATE: 72 BPM | HEIGHT: 60 IN | WEIGHT: 128.97 LBS | DIASTOLIC BLOOD PRESSURE: 86 MMHG | BODY MASS INDEX: 25.32 KG/M2 | OXYGEN SATURATION: 100 % | TEMPERATURE: 97.2 F | SYSTOLIC BLOOD PRESSURE: 143 MMHG

## 2025-06-13 DIAGNOSIS — I47.20 V-TACH (HCC): Primary | ICD-10-CM

## 2025-06-13 LAB
ANION GAP SERPL CALC-SCNC: 13 MMOL/L (ref 0–18)
BASE EXCESS BLD CALC-SCNC: 13.7 MMOL/L (ref ?–2)
BASOPHILS # BLD AUTO: 0.05 X10(3) UL (ref 0–0.2)
BASOPHILS NFR BLD AUTO: 1.6 %
BUN BLD-MCNC: 10 MG/DL (ref 9–23)
BUN/CREAT SERPL: 3.4 (ref 10–20)
CALCIUM BLD-MCNC: 9.1 MG/DL (ref 8.7–10.4)
CHLORIDE SERPL-SCNC: 102 MMOL/L (ref 98–112)
CO2 SERPL-SCNC: 24 MMOL/L (ref 21–32)
CREAT BLD-MCNC: 2.97 MG/DL (ref 0.55–1.02)
DEPRECATED RDW RBC AUTO: 64 FL (ref 35.1–46.3)
EGFRCR SERPLBLD CKD-EPI 2021: 18 ML/MIN/1.73M2 (ref 60–?)
EOSINOPHIL # BLD AUTO: 0.14 X10(3) UL (ref 0–0.7)
EOSINOPHIL NFR BLD AUTO: 4.5 %
ERYTHROCYTE [DISTWIDTH] IN BLOOD BY AUTOMATED COUNT: 18.9 % (ref 11–15)
GLUCOSE BLD-MCNC: 153 MG/DL (ref 70–99)
HBV SURFACE AB SER-ACNC: 51.85 MUNITS/ML
HCO3 BLDA-SCNC: 35.6 MEQ/L (ref 21–27)
HCT VFR BLD AUTO: 25.4 % (ref 35–48)
HGB BLD-MCNC: 8.4 G/DL (ref 12–16)
IMM GRANULOCYTES # BLD AUTO: 0.01 X10(3) UL (ref 0–1)
IMM GRANULOCYTES NFR BLD: 0.3 %
LYMPHOCYTES # BLD AUTO: 0.72 X10(3) UL (ref 1–4)
LYMPHOCYTES NFR BLD AUTO: 22.9 %
MAGNESIUM SERPL-MCNC: 2.3 MG/DL (ref 1.6–2.6)
MCH RBC QN AUTO: 30.9 PG (ref 26–34)
MCHC RBC AUTO-ENTMCNC: 33.1 G/DL (ref 31–37)
MCV RBC AUTO: 93.4 FL (ref 80–100)
MODIFIED ALLEN TEST: POSITIVE
MONOCYTES # BLD AUTO: 0.28 X10(3) UL (ref 0.1–1)
MONOCYTES NFR BLD AUTO: 8.9 %
NEUTROPHILS # BLD AUTO: 1.94 X10 (3) UL (ref 1.5–7.7)
NEUTROPHILS # BLD AUTO: 1.94 X10(3) UL (ref 1.5–7.7)
NEUTROPHILS NFR BLD AUTO: 61.8 %
O2 CT BLD-SCNC: 11.5 VOL% (ref 15–23)
OSMOLALITY SERPL CALC.SUM OF ELEC: 290 MOSM/KG (ref 275–295)
PCO2 BLDA: 35 MM HG (ref 35–45)
PH BLDA: 7.62 [PH] (ref 7.35–7.45)
PLATELET # BLD AUTO: 126 10(3)UL (ref 150–450)
PLATELETS.RETICULATED NFR BLD AUTO: 5.5 % (ref 0–7)
PO2 BLDA: 307 MM HG (ref 80–100)
POTASSIUM SERPL-SCNC: 3.8 MMOL/L (ref 3.5–5.1)
PUNCTURE CHARGE: YES
RBC # BLD AUTO: 2.72 X10(6)UL (ref 3.8–5.3)
SAO2 % BLDA: >99 % (ref 94–100)
SODIUM SERPL-SCNC: 139 MMOL/L (ref 136–145)
TROPONIN I SERPL HS-MCNC: 9 NG/L (ref ?–34)
WBC # BLD AUTO: 3.1 X10(3) UL (ref 4–11)

## 2025-06-13 PROCEDURE — 71045 X-RAY EXAM CHEST 1 VIEW: CPT | Performed by: EMERGENCY MEDICINE

## 2025-06-13 PROCEDURE — 10002803 HB RX 637: Performed by: INTERNAL MEDICINE

## 2025-06-13 PROCEDURE — 99239 HOSP IP/OBS DSCHRG MGMT >30: CPT | Performed by: INTERNAL MEDICINE

## 2025-06-13 PROCEDURE — G0378 HOSPITAL OBSERVATION PER HR: HCPCS

## 2025-06-13 PROCEDURE — G0257 UNSCHED DIALYSIS ESRD PT HOS: HCPCS

## 2025-06-13 PROCEDURE — 99024 POSTOP FOLLOW-UP VISIT: CPT | Performed by: SURGERY

## 2025-06-13 PROCEDURE — 96372 THER/PROPH/DIAG INJ SC/IM: CPT | Performed by: INTERNAL MEDICINE

## 2025-06-13 PROCEDURE — 10004651 HB RX, NO CHARGE ITEM: Performed by: INTERNAL MEDICINE

## 2025-06-13 PROCEDURE — 99223 1ST HOSP IP/OBS HIGH 75: CPT | Performed by: HOSPITALIST

## 2025-06-13 PROCEDURE — 10002800 HB RX 250 W HCPCS: Performed by: INTERNAL MEDICINE

## 2025-06-13 RX ORDER — LIDOCAINE HYDROCHLORIDE ANHYDROUS AND DEXTROSE MONOHYDRATE .8; 5 G/100ML; G/100ML
INJECTION, SOLUTION INTRAVENOUS CONTINUOUS
Status: DISCONTINUED | OUTPATIENT
Start: 2025-06-13 | End: 2025-06-15

## 2025-06-13 RX ORDER — CALCIUM GLUCONATE 20 MG/ML
2 INJECTION, SOLUTION INTRAVENOUS ONCE
Status: COMPLETED | OUTPATIENT
Start: 2025-06-13 | End: 2025-06-13

## 2025-06-13 RX ADMIN — AMIODARONE HYDROCHLORIDE 200 MG: 200 TABLET ORAL at 08:39

## 2025-06-13 RX ADMIN — LANTHANUM CARBONATE 1000 MG: 500 TABLET, CHEWABLE ORAL at 12:29

## 2025-06-13 RX ADMIN — SODIUM CHLORIDE, PRESERVATIVE FREE 2 ML: 5 INJECTION INTRAVENOUS at 08:40

## 2025-06-13 RX ADMIN — MIDODRINE HYDROCHLORIDE 10 MG: 5 TABLET ORAL at 08:39

## 2025-06-13 RX ADMIN — LANTHANUM CARBONATE 1000 MG: 500 TABLET, CHEWABLE ORAL at 08:39

## 2025-06-13 RX ADMIN — RIFAMPIN 300 MG: 300 CAPSULE ORAL at 08:39

## 2025-06-13 RX ADMIN — HEPARIN SODIUM 5000 UNITS: 5000 INJECTION INTRAVENOUS; SUBCUTANEOUS at 05:36

## 2025-06-13 ASSESSMENT — PAIN SCALES - GENERAL: PAINLEVEL_OUTOF10: 2

## 2025-06-14 ENCOUNTER — APPOINTMENT (OUTPATIENT)
Dept: CV DIAGNOSTICS | Facility: HOSPITAL | Age: 55
End: 2025-06-14
Attending: INTERNAL MEDICINE
Payer: MEDICAID

## 2025-06-14 PROBLEM — Z95.810 ICD (IMPLANTABLE CARDIOVERTER-DEFIBRILLATOR) IN PLACE: Chronic | Status: ACTIVE | Noted: 2025-06-14

## 2025-06-14 PROBLEM — N18.6 END STAGE RENAL FAILURE ON DIALYSIS (HCC): Chronic | Status: ACTIVE | Noted: 2025-06-14

## 2025-06-14 PROBLEM — Z99.2 END STAGE RENAL FAILURE ON DIALYSIS (HCC): Chronic | Status: ACTIVE | Noted: 2025-06-14

## 2025-06-14 LAB
ALBUMIN SERPL-MCNC: 3.8 G/DL (ref 3.2–4.8)
ANION GAP SERPL CALC-SCNC: 7 MMOL/L (ref 0–18)
ATRIAL RATE: 81 BPM
ATRIAL RATE: 92 BPM
BUN BLD-MCNC: 12 MG/DL (ref 9–23)
BUN/CREAT SERPL: 3.4 (ref 10–20)
CALCIUM BLD-MCNC: 9.3 MG/DL (ref 8.7–10.4)
CHLORIDE SERPL-SCNC: 102 MMOL/L (ref 98–112)
CO2 SERPL-SCNC: 30 MMOL/L (ref 21–32)
CREAT BLD-MCNC: 3.48 MG/DL (ref 0.55–1.02)
EGFRCR SERPLBLD CKD-EPI 2021: 15 ML/MIN/1.73M2 (ref 60–?)
FLUAV + FLUBV RNA SPEC NAA+PROBE: NEGATIVE
FLUAV + FLUBV RNA SPEC NAA+PROBE: NEGATIVE
GLUCOSE BLD-MCNC: 109 MG/DL (ref 70–99)
IRON SATN MFR SERPL: 20 % (ref 15–50)
IRON SERPL-MCNC: 37 UG/DL (ref 50–170)
OSMOLALITY SERPL CALC.SUM OF ELEC: 288 MOSM/KG (ref 275–295)
P AXIS: 21 DEGREES
P AXIS: 37 DEGREES
P-R INTERVAL: 146 MS
P-R INTERVAL: 162 MS
PHOSPHATE SERPL-MCNC: 2.1 MG/DL (ref 2.4–5.1)
POTASSIUM SERPL-SCNC: 4.4 MMOL/L (ref 3.5–5.1)
Q-T INTERVAL: 464 MS
Q-T INTERVAL: 508 MS
QRS DURATION: 90 MS
QRS DURATION: 94 MS
QTC CALCULATION (BEZET): 539 MS
QTC CALCULATION (BEZET): 628 MS
R AXIS: 0 DEGREES
R AXIS: 4 DEGREES
RSV RNA SPEC NAA+PROBE: NEGATIVE
SARS-COV-2 RNA RESP QL NAA+PROBE: NOT DETECTED
SODIUM SERPL-SCNC: 139 MMOL/L (ref 136–145)
T AXIS: 178 DEGREES
T AXIS: 239 DEGREES
TOTAL IRON BINDING CAPACITY: 189 UG/DL (ref 250–425)
TRANSFERRIN SERPL-MCNC: 123 MG/DL (ref 250–380)
VENTRICULAR RATE: 81 BPM
VENTRICULAR RATE: 92 BPM

## 2025-06-14 PROCEDURE — 99223 1ST HOSP IP/OBS HIGH 75: CPT | Performed by: INTERNAL MEDICINE

## 2025-06-14 PROCEDURE — 99233 SBSQ HOSP IP/OBS HIGH 50: CPT | Performed by: HOSPITALIST

## 2025-06-14 PROCEDURE — 93306 TTE W/DOPPLER COMPLETE: CPT | Performed by: INTERNAL MEDICINE

## 2025-06-14 PROCEDURE — 93005 ELECTROCARDIOGRAM TRACING: CPT

## 2025-06-14 PROCEDURE — 5A0935A ASSISTANCE WITH RESPIRATORY VENTILATION, LESS THAN 24 CONSECUTIVE HOURS, HIGH NASAL FLOW/VELOCITY: ICD-10-PCS | Performed by: HOSPITALIST

## 2025-06-14 RX ORDER — RIFAMPIN 300 MG/1
600 CAPSULE ORAL DAILY
COMMUNITY

## 2025-06-14 RX ORDER — HYDROXYZINE HYDROCHLORIDE 25 MG/1
25 TABLET, FILM COATED ORAL 3 TIMES DAILY PRN
Status: DISCONTINUED | OUTPATIENT
Start: 2025-06-14 | End: 2025-06-20

## 2025-06-14 RX ORDER — HYDROCODONE BITARTRATE AND ACETAMINOPHEN 5; 325 MG/1; MG/1
1 TABLET ORAL EVERY 6 HOURS PRN
COMMUNITY
End: 2025-06-20

## 2025-06-14 RX ORDER — HYDROXYZINE HYDROCHLORIDE 25 MG/1
25 TABLET, FILM COATED ORAL 3 TIMES DAILY PRN
COMMUNITY

## 2025-06-14 RX ORDER — LANTHANUM CARBONATE 1000 MG/1
1000 TABLET, CHEWABLE ORAL
COMMUNITY

## 2025-06-14 RX ORDER — HEPARIN SODIUM 5000 [USP'U]/ML
5000 INJECTION, SOLUTION INTRAVENOUS; SUBCUTANEOUS EVERY 12 HOURS SCHEDULED
Status: DISCONTINUED | OUTPATIENT
Start: 2025-06-14 | End: 2025-06-20

## 2025-06-14 RX ORDER — RIFAMPIN 300 MG/1
600 CAPSULE ORAL DAILY
Status: DISCONTINUED | OUTPATIENT
Start: 2025-06-14 | End: 2025-06-20

## 2025-06-14 RX ORDER — LANTHANUM CARBONATE 500 MG/1
1000 TABLET, CHEWABLE ORAL
Status: DISCONTINUED | OUTPATIENT
Start: 2025-06-14 | End: 2025-06-16

## 2025-06-14 RX ORDER — AMIODARONE HYDROCHLORIDE 200 MG/1
200 TABLET ORAL
COMMUNITY
End: 2025-06-20

## 2025-06-14 NOTE — PLAN OF CARE
Problem: Patient Centered Care  Goal: Patient preferences are identified and integrated in the patient's plan of care  Description: Interventions:  - What would you like us to know as we care for you?   - Provide timely, complete, and accurate information to patient/family  - Incorporate patient and family knowledge, values, beliefs, and cultural backgrounds into the planning and delivery of care  - Encourage patient/family to participate in care and decision-making at the level they choose  - Honor patient and family perspectives and choices  Outcome: Progressing     Problem: Patient/Family Goals  Goal: Patient/Family Long Term Goal  Description: Patient's Long Term Goal:     Interventions:  - See additional Care Plan goals for specific interventions  Outcome: Progressing  Goal: Patient/Family Short Term Goal  Description: Patient's Short Term Goal:     Interventions:   - See additional Care Plan goals for specific interventions  Outcome: Progressing     Problem: CARDIOVASCULAR - ADULT  Goal: Maintains optimal cardiac output and hemodynamic stability  Description: INTERVENTIONS:  - Monitor vital signs, rhythm, and trends  - Monitor for bleeding, hypotension and signs of decreased cardiac output  - Evaluate effectiveness of vasoactive medications to optimize hemodynamic stability  - Monitor arterial and/or venous puncture sites for bleeding and/or hematoma  - Assess quality of pulses, skin color and temperature  - Assess for signs of decreased coronary artery perfusion - ex. Angina  - Evaluate fluid balance, assess for edema, trend weights  Outcome: Progressing  Goal: Absence of cardiac arrhythmias or at baseline  Description: INTERVENTIONS:  - Continuous cardiac monitoring, monitor vital signs, obtain 12 lead EKG if indicated  - Evaluate effectiveness of antiarrhythmic and heart rate control medications as ordered  - Initiate emergency measures for life threatening arrhythmias  - Monitor electrolytes and  administer replacement therapy as ordered  Outcome: Progressing     Problem: RESPIRATORY - ADULT  Goal: Achieves optimal ventilation and oxygenation  Description: INTERVENTIONS:  - Assess for changes in respiratory status  - Assess for changes in mentation and behavior  - Position to facilitate oxygenation and minimize respiratory effort  - Oxygen supplementation based on oxygen saturation or ABGs  - Provide Smoking Cessation handout, if applicable  - Encourage broncho-pulmonary hygiene including cough, deep breathe, Incentive Spirometry  - Assess the need for suctioning and perform as needed  - Assess and instruct to report SOB or any respiratory difficulty  - Respiratory Therapy support as indicated  - Manage/alleviate anxiety  - Monitor for signs/symptoms of CO2 retention  Outcome: Progressing

## 2025-06-14 NOTE — PROGRESS NOTES
Lizette Mccarthy Patient Status:  Emergency    1970 MRN [id number]   Location [facility] EMERGENCY DEPARTMENT Attending Ananth Milton MD   Hosp Day # 0 PCP No primary care provider on file.     Cardiology Nocturnal APN Note    Briefly: (Documentation from chart review)     Lizette Mccarthy is a 54 y/o female who presented with VT and has a PMH/PSH of:     Past Medical History[1]    Primary Cardiologist Janelle    Vital Signs:       2025    11:45 PM 2025    11:49 PM   Vitals History   /85 148/81   Pulse 82 83   Resp 11 17   SpO2 100 % 100 %        Labs:   Lab Results   Component Value Date    WBC 3.1 2025    HGB 8.4 2025    HCT 25.4 2025    .0 2025    CREATSERUM 2.97 2025    BUN 10 2025     2025    K 3.8 2025     2025    CO2 24.0 2025     2025    CA 9.1 2025    MG 2.3 2025    TROPHS 9 2025       Diagnostics:   No results found.    Allergies:  Allergies[2]    Medications:  Current Hospital Medications[3]    Assessment:   VT  - [employer] ICD in situ  - Device interrogation in ER  - Got amiodarone in the field  - Qtc prolonged at 628  - Changed to lidocaine at 1mg  - Multiple admissions, was discharged earlier in the day from [facility]        Plan:    - Continue to monitor overnight  - Formal Cardiology consult to follow in AM.       BRIAN Dexter  [facility]  2025  12:00 AM         [1] History reviewed. No pertinent past medical history.  [2] Not on File  [3]   lidocaine in dextrose 5%

## 2025-06-14 NOTE — CONSULTS
[facility]  part of [facility]    Report of Consultation    Lizette XVCAA Mccarthy Patient Status:  Inpatient    1970 MRN [id number]   Location [facility] 2W/SW Attending Dolly Jose *   Hosp Day # 0 PCP No primary care provider on file.     Date of Admission:  2025    Reason for Consultation:   V. tach    History of Present Illness:   Patient is a 55-year-old female who presents with chief complaint of feeling dizzy lightheaded and syncopal episode.  Found to have evidence of V. tach on field by paramedics given amiodarone.  Patient with underlying history of end-stage renal disease initially on peritoneal dialysis now transition to hemodialysis.  Currently resting in bed.  Admits to some mild chest discomfort.  No significant respiratory distress.  Hemodynamically stable.    Past Medical History  Past Medical History[1]    Past Surgical History  Past Surgical History[2]    Family History  Family History[3]    Social History  Social Hx on file[4]        Current Medications:  Current Hospital Medications[5]  Prescriptions Prior to Admission[6]    Allergies  Allergies[7]    Review of Systems:   Constitutional: Fatigue, weakness  HEENT: denies headache, sore throat, vision loss  Cardio: denies chest pain, chest pressure, palpitations  Respiratory: denies dyspnea, cough, wheezing, hemoptysis   GI: denies nausea, vomiting, abdominal pain  : denies dysuria, hematuria  Musculoskeletal: denies arthralgia, myalgia  Integumentary: denies rash, itching  Neurological: denies syncope, weakness, dizziness,   Psychiatric: denies depression, anxiety  Hematologic: denies bruising        Physical Exam:   Blood pressure 138/83, pulse 82, temperature 96.5 °F (35.8 °C), temperature source Temporal, resp. rate 18, height 5' (1.524 m), weight 131 lb 6.3 oz (59.6 kg), SpO2 100%.    Constitutional: no acute distress  Eyes: PERRL  ENT: nares patent  Neck: neck supple, no JVD  Cardio: RRR, S1  S2  Respiratory: clear to auscultation bilaterally, no wheezing, rales, rhonchi, crackles  GI: abdomen soft, non tender, active bowel souds, no organomegaly  Extremities: no clubbing, cyanosis, edema  Neurologic: no gross motor deficits  Skin: warm, dry    Results:   Laboratory Data  Lab Results   Component Value Date    WBC 3.1 (L) 06/13/2025    HGB 8.4 (L) 06/13/2025    HCT 25.4 (L) 06/13/2025    .0 (L) 06/13/2025    CREATSERUM 3.48 (H) 06/14/2025    BUN 12 06/14/2025     06/14/2025    K 4.4 06/14/2025     06/14/2025    CO2 30.0 06/14/2025     (H) 06/14/2025    CA 9.3 06/14/2025    ALB 3.8 06/14/2025    MG 2.3 06/13/2025    PHOS 2.1 (L) 06/14/2025         Imaging  No results found.    Assessment   1.  V. tach  2.  Dilated cardiomyopathy  3.  Pancytopenia  4.  End-stage renal disease on hemodialysis  5.  Latent tuberculosis    Plan   -Patient presents after evidence of V. tach with ICD shock.  Received amiodarone initially eval by cardiology transition to IV lidocaine.  EP consultation pending.  - History of significant dilated cardiomyopathy.  2D echo pending.  - End-stage renal disease.  Renal replacement therapy per nephrology recommendations.  - History of latent tuberculosis continue rifampin at this time  - DVT prophylaxis: Heparin  - Reviewed vitals, labs and imaging    Jessica Clifford DO  Pulmonary Critical Care Medicine  [facility]  6/14/2025  11:28 AM        [1]   Past Medical History:   End stage renal failure on dialysis (HCC)    ICD (implantable cardioverter-defibrillator) in place   [2] History reviewed. No pertinent surgical history.  [3] History reviewed. No pertinent family history.  [4]   Social History  Socioeconomic History    Marital status:    [5]   Current Facility-Administered Medications   Medication Dose Route Frequency    heparin (Porcine) 5000 UNIT/ML injection 5,000 Units  5,000 Units Subcutaneous 2 times per day    hydrOXYzine (Atarax) tab 25 mg   25 mg Oral TID PRN    [Held by provider] lanthanum (Fosrenol) chewable tab 1,000 mg  1,000 mg Oral TID CC    rifAMPin (Rifadin) cap 600 mg  600 mg Oral Daily    lidocaine in dextrose 5% 8-5 MG/ML-% infusion premix (for cardiac)  1-4 mg/min Intravenous Continuous   [6]   Medications Prior to Admission   Medication Sig    Lanthanum Carbonate 1000 MG Oral Chew Tab Chew 1 tablet (1,000 mg total) by mouth 3 (three) times daily with meals.    hydrOXYzine 25 MG Oral Tab Take 1 tablet (25 mg total) by mouth 3 (three) times daily as needed for Itching.    rifAMPin 300 MG Oral Cap Take 2 capsules (600 mg total) by mouth daily.    HYDROcodone-acetaminophen 5-325 MG Oral Tab Take 1 tablet by mouth every 6 (six) hours as needed for Pain.    amiodarone 200 MG Oral Tab Take 1 tablet (200 mg total) by mouth Every Monday, Wednesday, and Friday.   [7] Not on File

## 2025-06-14 NOTE — ED PROVIDER NOTES
Patient Seen in: [facility] Emergency Department    History     Chief Complaint   Patient presents with    Arrythmia/Palpitations       HPI        History is provided by patient/independent historian: patient, EMS  55 year old female with history of ESRD, defibrillator, brought in by EMS for syncopal episodes.  Patient supposedly had 2 episodes of seizure/syncope witnessed by family and called EMS.  On their arrival, they report witnessed 1 episode of V. tach, initiated on 150 mg bolus of amiodarone and brought her in.  Patient is complaining of feeling lightheaded.  No associated chest pain or shortness of breath.  She just got dialyzed earlier today.    History reviewed. Past Medical History[1]      History reviewed. Past Surgical History[2]      Home Medications reviewed :  Prescriptions Prior to Admission[3]      History reviewed. Social Hx on file[4]      ROS  Review of Systems   Respiratory:  Negative for shortness of breath.    Cardiovascular:  Negative for chest pain.   Neurological:  Positive for light-headedness.   All other systems reviewed and are negative.     All other pertinent organ systems are reviewed and are negative.      Physical Exam     ED Triage Vitals [06/13/25 2234]   /74   Pulse 82   Resp 17   Temp 98.2 °F (36.8 °C)   Temp src Temporal   SpO2 100 %   O2 Device None (Room air)     Vital signs reviewed.      Physical Exam  Vitals and nursing note reviewed.   Cardiovascular:      Pulses: Normal pulses.   Pulmonary:      Effort: No respiratory distress.      Comments: Right upper chest with dialysis catheter in place  Abdominal:      General: There is no distension.   Neurological:      Mental Status: She is alert.             ED Course       Labs:     Labs Reviewed   CBC WITH DIFFERENTIAL WITH PLATELET - Abnormal; Notable for the following components:       Result Value    WBC 3.1 (*)     RBC 2.72 (*)     HGB 8.4 (*)     HCT 25.4 (*)     RDW-SD 64.0 (*)     RDW 18.9 (*)     PLT  126.0 (*)     Lymphocyte Absolute 0.72 (*)     All other components within normal limits   BASIC METABOLIC PANEL (8) - Abnormal; Notable for the following components:    Glucose 153 (*)     Creatinine 2.97 (*)     BUN/CREA Ratio 3.4 (*)     eGFR-Cr 18 (*)     All other components within normal limits   TROPONIN I HIGH SENSITIVITY - Normal   MAGNESIUM - Normal   DRUG ABUSE PANEL 11 SCREEN   ARTERIAL BLOOD GAS   RAINBOW DRAW LAVENDER   RAINBOW DRAW LIGHT GREEN   RAINBOW DRAW BLUE   RAINBOW DRAW GOLD         My EKG Interpretation: EKG    Rate, intervals and axes as noted on EKG Report.  Rate: 92  Rhythm: Sinus Rhythm  Reading: normal for rate, normal for rhythm, ST abnormalities V3-6           As reviewed and Interpreted by me      Imaging Results Available and Reviewed while in ED:   No results found.  My review and independent interpretation of XR images: no infiltrate. Radiology report corroborates this in addition to other details as reported by them.      Decision rules/scores evaluated: none      Diagnostic labs/tests considered but not ordered: none    ED Medications Administered:   Medications   lidocaine in dextrose 5% 8-5 MG/ML-% infusion premix (for cardiac) (1 mg/min Intravenous New Bag 6/13/25 2310)   lidocaine (cardiac) (Xylocaine) 20 mg/mL injection 100 mg (100 mg Intravenous Given 6/13/25 2244)   calcium gluconate 2g in 100mL iso-NaCl IVPB premix (2 g Intravenous Given 6/13/25 2311)                MDM       Medical Decision Making      Differential Diagnosis: After obtaining the patient's history, performing the physical exam and reviewing the diagnostics, multiple initial diagnoses were considered based on the presenting problem including vtach, arrhythmia, NSTEMI, STEMI    External document review: I personally reviewed available external medical records for any recent pertinent discharge summaries, testing, and procedures - the findings are as follows: none available    Complicating Factors: The  patient already  has no past medical history on file. to contribute to the complexity of this ED evaluation.    Procedures performed: none    Discussed management with physician/appropriate source: Pennie TORRES for [facility] - recommending switch amio to lidocaine, add abg and urine tox, Dr. Lopez, Dr. Clifford    Considered admission/deescalation of care for: vtach    Social determinants of health affecting patient care: none    Prescription medications considered: discussed continuing current medication regimen    The patient requires continuous monitoring for: vtach    Shared decision making: discussed possible admission    Critical Care Time:  Total critical care time for this patient was 30 minutes exclusive of separately billable procedures, but in obtaining history, performing a physical exam, bedside monitoring of interventions, collecting and interpreting test, and discussion with consultants.        Disposition and Plan     Clinical Impression:  1. V-tach (HCC)        Disposition:  Admit    Follow-up:  No follow-up provider specified.    Medications Prescribed:  There are no discharge medications for this patient.      Hospital Problems       Present on Admission           ICD-10-CM Noted POA    V-tach (HCC) I47.20 6/13/2025 Unknown                     [1] History reviewed. No pertinent past medical history.  [2] No past surgical history on file.  [3] (Not in a hospital admission)   [4]

## 2025-06-14 NOTE — PROGRESS NOTES
[facility]  part of [facility]    Progress Note    Lizette Mccarthy Patient Status:  Inpatient    1970 MRN [id number]   Location [facility] 2W/SW Attending Dolly Jose *   Hosp Day # 0 PCP No primary care provider on file.     Chief complaint vt     Subjective:   Lizette Mccarthy is a(n) 55 year old female pt denies cp.     ROS:   No cp, sob   No c/d   No n/v     Objective:   Blood pressure 135/81, pulse 89, temperature 97.8 °F (36.6 °C), temperature source Temporal, resp. rate 19, height 5' (1.524 m), weight 131 lb 6.3 oz (59.6 kg), SpO2 100%.      Intake/Output Summary (Last 24 hours) at 2025 0821  Last data filed at 2025 0600  Gross per 24 hour   Intake 51.25 ml   Output --   Net 51.25 ml       Patient Weight(s) for the past 336 hrs:   Weight   25 0051 131 lb 6.3 oz (59.6 kg)   25 2234 116 lb 13.5 oz (53 kg)           General appearance: alert, appears stated age and cooperative  Pulmonary:  clear to auscultation bilaterally  Cardiovascular: S1, S2 normal, no murmur, click, rub or gallop, regular rate and rhythm  Abdominal: soft, non-tender; bowel sounds normal; no masses,  no organomegaly  Extremities: extremities normal, atraumatic, no cyanosis or edema        Medicines:   Current Hospital Medications[1]                    Medication Infusions[2]        Lab Results   Component Value Date    WBC 3.1 (L) 2025    HGB 8.4 (L) 2025    HCT 25.4 (L) 2025    .0 (L) 2025    CREATSERUM 3.48 (H) 2025    BUN 12 2025     2025    K 4.4 2025     2025    CO2 30.0 2025     (H) 2025    CA 9.3 2025    ALB 3.8 2025    MG 2.3 2025    PHOS 2.1 (L) 2025       No results found.  EKG 12 Lead  Result Date: 2025  Normal sinus rhythm Minimal voltage criteria for LVH, may be normal variant ( Sokolow-Wheeler ) ST & T wave abnormality, consider inferior ischemia ST  & T wave abnormality, consider anterolateral ischemia Abnormal ECG No previous ECGs found in [facility]      Results:     CBC:    Lab Results   Component Value Date    WBC 3.1 (L) 06/13/2025     Lab Results   Component Value Date    HGB 8.4 (L) 06/13/2025      Lab Results   Component Value Date    .0 (L) 06/13/2025       Recent Labs   Lab 06/13/25  2242 06/14/25  0458   * 109*   BUN 10 12   CREATSERUM 2.97* 3.48*   CA 9.1 9.3    139   K 3.8 4.4    102   CO2 24.0 30.0           Assessment and Plan:       Sustained pulseless V. tach  Requiring defibrillation, given amiodarone in the field, currently on lidocaine drip, will continue same.  Cardiology has been consulted, device to be interrogated  Apprec cards consult - cont lidocaine drip, echo to be repeated, dr garcia to see   H/o idiopathic dilated cmy ef 25%  Dc'ed [facility]      Syncope  Secondary to V. tach, responded to meds, unclear if shock was delivered.  Will continue to monitor closely; interrogation to be done      Chronic systolic heart failure  No signs of acute exacerbation, EF 25%.  Resume home meds.     ESRD  Nephrology consulted, dialysis days M/W/F, electrolytes currently stable.     Latent TB  Continue rifampin once medication list confirmed by family     Prolonged QT syndrome  Will avoid QT prolonging medications     Prophylaxis  Subcutaneous heparin     CODE STATUS  Full               Dolly Martinez, DO         Chart reviewed, including current vitals, notes, labs and imaging  Labs ordered and medications adjusted as outlined above  Coordinate care with care team/consultants  Discussed with patient results of tests, management plan as outlined above, and the need for ongoing hospitalization  D/w RN     [facility]        6/14/2025             Supplementary Documentation:   DVT Mechanical Prophylaxis:        DVT Pharmacologic Prophylaxis   Medication   None                Code Status: Not on file  Cat: No  urinary catheter in place  Cat Duration (in days):   Central line:    HEMALATHA:                           [1]   Current Facility-Administered Medications   Medication Dose Route Frequency    lidocaine in dextrose 5% 8-5 MG/ML-% infusion premix (for cardiac)  1-4 mg/min Intravenous Continuous   [2]    lidocaine in dextrose 5% 1 mg/min (06/13/25 0710)

## 2025-06-14 NOTE — H&P
[facility]  part of [facility]    History & Physical    Lizette Mccarthy Patient Status:  Emergency    1970 MRN [id number]   Location [facility] EMERGENCY DEPARTMENT Attending Ananth Milton MD   Hosp Day # 0 PCP No primary care provider on file.     Date:  2025  Date of Admission:  2025    Chief Complaint:  Chief Complaint   Patient presents with    Arrythmia/Palpitations       History of Present Illness:  Lizette Mccarthy is a(n) 55 year old female, with a past medical history significant for V-fib arrest currently with ICD in place, nonischemic cardiomyopathy with an EF of 25 to 30% and ESRD was brought in after she passed out at home.  Claims she was watching TV at home with family and the next thing she remembers was being awake in the hospital.  Denies any preceding chest pain shortness of breath nausea vomiting diaphoresis or palpitations.  EMS was called patient was found to be pulseless and V. tach given amiodarone unclear if shock was delivered, brought to the ER again went into ventricular tachycardia started on lidocaine drip considering history of prolonged QT interval    History:  Past Medical History[1] Nonischemic cardiomyopathy, V-fib arrest, ESRD  Past Surgical History[2] AICD/pacemaker in place  Family History[3] No sick contacts  Social history: Denies tobacco alcohol or drugs, lives with family    Allergies:  Allergies[4]    Home Medications:  None       Review of Systems:  Constitutional:  Weakness, Fatigue.  Eye:  Negative.  Ear/Nose/Mouth/Throat:  Negative.  Respiratory:  Negative  Cardiovascular: Negative  Gastrointestinal:  Negative.  Genitourinary:  Negative  Endocrine:  Negative.  Immunologic:  Negative.  Musculoskeletal:  Negative.  Integumentary:  Negative.  Neurologic: Passed out  Psychiatric:  Negative.  ROS reviewed as documented in chart    Physical Exam:  Temp:  [98.2 °F (36.8 °C)] 98.2 °F (36.8 °C)  Pulse:  [72-82] 72  Resp:  [14-21] 14  BP:  (130-157)/(66-74) 157/70  SpO2:  [99 %-100 %] 100 %    General:  Alert and oriented.  Diffuse skin problem:  None.  Eye:  Pupils are equal, round and reactive to light, extraocular movements are intact, Normal conjunctiva.  HENT:  Normocephalic, oral mucosa is moist.  Head:  Normocephalic, atraumatic.  Neck:  Supple, non-tender, no carotid bruit, no jugular venous distention, no lymphadenopathy, no thyromegaly.  Respiratory:  Lungs are clear to auscultation, respirations are non-labored, breath sounds are equal, symmetrical chest wall expansion.  Cardiovascular:  Normal rate, regular rhythm, no murmur, no edema.  Gastrointestinal:  Soft, non-tender, non-distended, normal bowel sounds, no organomegaly.  Lymphatics:  No lymphadenopathy neck, axilla, groin.  Musculoskeletal: Normal range of motion.  normal strength.  Feet:  Normal pulses.  Neurologic:  Alert, oriented, no focal deficits, cranial nerves II-XII are grossly intact.  Cognition and Speech:  Oriented, speech clear and coherent.  Psychiatric:  Cooperative, appropriate mood & affect.      Laboratory Data:   Lab Results   Component Value Date    WBC 3.1 06/13/2025    HGB 8.4 06/13/2025    HCT 25.4 06/13/2025    .0 06/13/2025    CREATSERUM 2.97 06/13/2025    BUN 10 06/13/2025     06/13/2025    K 3.8 06/13/2025     06/13/2025    CO2 24.0 06/13/2025     06/13/2025    CA 9.1 06/13/2025    MG 2.3 06/13/2025       Imaging:  No results found.     Assessment and Plan:    Sustained pulseless V. tach  Requiring defibrillation, given amiodarone in the field, currently on lidocaine, will continue same.  Cardiology has been consulted, device to be interrogated    Syncope  Secondary to V. tach, responded to meds, unclear if shock was delivered.  Will continue to monitor closely    Chronic systolic heart failure  No signs of acute exacerbation, EF 25%.  Resume home meds.    ESRD  Nephrology consulted, dialysis days M/W/F, electrolytes currently  stable.    Latent TB  Continue rifampin once medication list confirmed by family    Prolonged QT syndrome  Will avoid QT prolonging medications    Prophylaxis  Subcutaneous heparin    CODE STATUS  Full    Primary care physician  No primary care provider on file.    MDM: High, acute illness/severe exacerbation of chronic illness posing threat to life.  IV medications requiring close inpatient monitoring  60 minutes spent on this admission - examining patient, obtaining history, reviewing previous medical records, going over test results/imaging and discussing plan of care. All questions answered.     Disposition  Clinical course will dictate outcome      SHIRA DE LA CRUZ MD  6/13/2025  11:22 PM         [1] History reviewed. No pertinent past medical history.  [2] No past surgical history on file.  [3] History reviewed. No pertinent family history.  [4] Not on File     91

## 2025-06-14 NOTE — ED QUICK NOTES
Pt in pulseless vtach prior to arrival, shock given. Upon arrival pt did not complain of any chest pain, SOB, or dizziness. 18G ultrasound IV placed. During triage patient experienced another episode of vtach with no pulse and converted to sinus rhythm. No shock given. Pt placed on cardiac pads. Lidocaine bolus given. Patient SpO2 slowly decreased and placed on 15L non-re breather O2 mask. Calcium gluconate and lidocaine IV medication currently infusing. PCT at bedside interrogating pacemaker. RN notified patient was at dialysis earlier today.

## 2025-06-14 NOTE — CM/SW NOTE
Received MDO for SDOH Domestic Safety.  Upon review, all questions were answered \"No.\"    SW consulted w/ DEANN Wong. Confirmed SDOH was entered in error. RN has now corrected it.      Carolyn, MSW, LSW [id number]

## 2025-06-14 NOTE — PLAN OF CARE
Pt alert, vitals stable. Lidocaine drip infusing. Called [employer] rep regarding pt's ICD. Son will bring in transmitter tonight so information can be sent.   Problem: Patient Centered Care  Goal: Patient preferences are identified and integrated in the patient's plan of care  Description: Interventions:  - What would you like us to know as we care for you?   - Provide timely, complete, and accurate information to patient/family  - Incorporate patient and family knowledge, values, beliefs, and cultural backgrounds into the planning and delivery of care  - Encourage patient/family to participate in care and decision-making at the level they choose  - Honor patient and family perspectives and choices  Outcome: Progressing     Problem: Patient/Family Goals  Goal: Patient/Family Long Term Goal  Description: Patient's Long Term Goal:     Interventions:  -   - See additional Care Plan goals for specific interventions  Outcome: Progressing  Goal: Patient/Family Short Term Goal  Description: Patient's Short Term Goal:     Interventions:   -   - See additional Care Plan goals for specific interventions  Outcome: Progressing     Problem: CARDIOVASCULAR - ADULT  Goal: Maintains optimal cardiac output and hemodynamic stability  Description: INTERVENTIONS:  - Monitor vital signs, rhythm, and trends  - Monitor for bleeding, hypotension and signs of decreased cardiac output  - Evaluate effectiveness of vasoactive medications to optimize hemodynamic stability  - Monitor arterial and/or venous puncture sites for bleeding and/or hematoma  - Assess quality of pulses, skin color and temperature  - Assess for signs of decreased coronary artery perfusion - ex. Angina  - Evaluate fluid balance, assess for edema, trend weights  Outcome: Progressing  Goal: Absence of cardiac arrhythmias or at baseline  Description: INTERVENTIONS:  - Continuous cardiac monitoring, monitor vital signs, obtain 12 lead EKG if indicated  - Evaluate  effectiveness of antiarrhythmic and heart rate control medications as ordered  - Initiate emergency measures for life threatening arrhythmias  - Monitor electrolytes and administer replacement therapy as ordered  Outcome: Progressing     Problem: RESPIRATORY - ADULT  Goal: Achieves optimal ventilation and oxygenation  Description: INTERVENTIONS:  - Assess for changes in respiratory status  - Assess for changes in mentation and behavior  - Position to facilitate oxygenation and minimize respiratory effort  - Oxygen supplementation based on oxygen saturation or ABGs  - Provide Smoking Cessation handout, if applicable  - Encourage broncho-pulmonary hygiene including cough, deep breathe, Incentive Spirometry  - Assess the need for suctioning and perform as needed  - Assess and instruct to report SOB or any respiratory difficulty  - Respiratory Therapy support as indicated  - Manage/alleviate anxiety  - Monitor for signs/symptoms of CO2 retention  Outcome: Progressing

## 2025-06-14 NOTE — CONSULTS
[facility] Cardiology  Report of Consultation    Lizette Mccarthy Patient Status:  Inpatient    1970 MRN [id number]   Location [facility] 2W/SW Attending Maria Teresa Lopez MD   Hosp Day # 0 PCP No primary care provider on file.     Reason for Consultation:  Cardiac arrest.    History of Present Illness:  Lizette Mccarthy is Shavon Blevins, a  55 year old female known to our group with a complex medical history.  She was last admitted to [facility] and April and seen at that time, by my partner, Dr. Gan.  She was watching television with her family last night.  She felt very dizzy and lightheaded and then passed out.  Paramedics were contacted.  In the field, they noted an episode of ventricular tachycardia and gave her a bolus of IV amiodarone.  She really does not remember much of the event.  She is Mexican-speaking, history obtained with use of  services.  She is a very poor historian.    The patient has a history of end-stage renal failure.  She was previously on peritoneal dialysis and developed peritonitis.  PD catheter was removed.  She had a permacath placed and also had a dialysis fistula placed.  She was just at [facility] with pain in her left hand, felt to be a problem with her dialysis fistula.  She was dialyzed through her permacath.  She has a history of idiopathic dilated cardiomyopathy.  Left heart catheterization at [facility] in 2024 showed no obstructive epicardial CAD.  During her hospitalization at [facility], an echocardiogram on 2025 by report showed severe left ventricular dysfunction, EF 25% with moderate mitral regurgitation.  She is unaware of what medication she takes at home.  She is back to her baseline now.    History:  Past Medical History[1]  Past Surgical History[2]  Family Hx: Negative for premature CAD.       Allergies:  Allergies[3]    Medications:  Current Hospital Medications[4]  Patient is unaware of her home  medications.  Unable to locate any list of medications as unfortunately she is currently in epic as a Milvia Mccarthy.    Review of Systems:  All systems were reviewed and are negative except as described above in HPI.    Physical Exam:  Blood pressure 136/79, pulse 81, temperature 97.8 °F (36.6 °C), temperature source Temporal, resp. rate 14, height 5' (1.524 m), weight 131 lb 6.3 oz (59.6 kg), SpO2 98%.  Temp (24hrs), Av °F (36.7 °C), Min:97.8 °F (36.6 °C), Max:98.2 °F (36.8 °C)    Wt Readings from Last 3 Encounters:   25 131 lb 6.3 oz (59.6 kg)       Telemetry: Normal sinus rhythm.  General: Alert and in no apparent distress.  HEENT: No scleral icterus.  MMM.  Neck: No JVD, carotids 2+ no bruits.  Cardiac: Regular rhythm, S1, S2 normal, 2/6 apical holosystolic murmur, no rub or gallop.  Lungs: Clear without wheezes.  Abdomen: Soft, non-tender.   Extremities: Without clubbing, cyanosis or edema.  Peripheral pulses are 2+.  Neurologic: Alert and oriented, normal affect.  Skin: Warm and dry.  Right chest PD catheter.  Left subcutaneous ICD.    Laboratories and Data:  Diagnostics:  EKG: Voltage criteria for LVH.  Prolonged QT.  Echo: 2025 as outlined in HPI.  Stress Test:   Cath: 10/2024 at [facility], as outlined in HPI.  No CAD.  CTA Chest:   CXR: My review, portable film nondiagnostic for heart size.  PD catheter in the left subcutaneous ICD.    Labs:   Recent Labs   Lab 25  2242   *   BUN 10   CREATSERUM 2.97*   CA 9.1      K 3.8      CO2 24.0      Lab Results   Component Value Date    WBC 3.1 2025    RBC 2.72 2025    HGB 8.4 2025    HCT 25.4 2025    MCV 93.4 2025    MCH 30.9 2025    MCHC 33.1 2025    RDW 18.9 2025    .0 2025     No results found for: \"PT\", \"INR\"    Assessment/Plan:  Problem List[5]    Ventricular tachycardia, recurrent.  Appropriate ICD shock.  Prolonged QT following defibrillator discharge.  Received amiodarone  in the field.  Currently on IV lidocaine.  Repeat EKG this morning pending.  Family to bring in list of all of her medications.  Electrophysiology evaluation to try and find a medical regimen which will hopefully control her arrhythmias.  Further recommendations when we have complete information regarding her current cardiac medical regimen, especially antiarrhythmic attic therapy.    Idiopathic dilated cardiomyopathy.  Severe LV dysfunction by echo at [facility].  Will repeat Doppler echocardiogram today to reevaluate ventricular and valvular function.    End-stage renal failure.  Previously on peritoneal dialysis.  Now on hemodialysis secondary to peritonitis.  Per nephrology.    Keep in CCU today.  Will follow.  Thanks.    Garrett Paul MD  6/14/2025  5:57 AM  C5       [1]   Past Medical History:   End stage renal failure on dialysis ([facility])    ICD (implantable cardioverter-defibrillator) in place   [2] History reviewed. No pertinent surgical history.  [3] Not on File  [4]   Current Facility-Administered Medications:     lidocaine in dextrose 5% 8-5 MG/ML-% infusion premix (for cardiac), 1-4 mg/min, Intravenous, Continuous  [5]   Patient Active Problem List  Diagnosis    V-tach ([facility])    ICD (implantable cardioverter-defibrillator) in place    End stage renal failure on dialysis ([facility])

## 2025-06-14 NOTE — ED QUICK NOTES
Orders for admission, patient is aware of plan and ready to go upstairs. Any questions, please call ED RN Jeremiah at extension [phone].     Patient Covid vaccination status: Unvaccinated     COVID Test Ordered in ED: None    COVID Suspicion at Admission: N/A    Running Infusions: Medication Infusions[1]     Mental Status/LOC at time of transport: A&OX4       Lethargic    18g R AC extended length  20g R PIV hand       Other pertinent information:   CIWA score: N/A   NIH score:  N/A             [1]    lidocaine in dextrose 5% 1 mg/min (06/13/25 7194)

## 2025-06-15 ENCOUNTER — TELEPHONE (OUTPATIENT)
Dept: CARE COORDINATION | Age: 55
End: 2025-06-15

## 2025-06-15 LAB
ANION GAP SERPL CALC-SCNC: 5 MMOL/L (ref 0–18)
BASOPHILS # BLD AUTO: 0.06 X10(3) UL (ref 0–0.2)
BASOPHILS NFR BLD AUTO: 1.6 %
BUN BLD-MCNC: 19 MG/DL (ref 9–23)
BUN/CREAT SERPL: 3.7 (ref 10–20)
CALCIUM BLD-MCNC: 9.3 MG/DL (ref 8.7–10.4)
CHLORIDE SERPL-SCNC: 103 MMOL/L (ref 98–112)
CO2 SERPL-SCNC: 31 MMOL/L (ref 21–32)
CREAT BLD-MCNC: 5.19 MG/DL (ref 0.55–1.02)
DEPRECATED RDW RBC AUTO: 72.2 FL (ref 35.1–46.3)
EGFRCR SERPLBLD CKD-EPI 2021: 9 ML/MIN/1.73M2 (ref 60–?)
EOSINOPHIL # BLD AUTO: 0.29 X10(3) UL (ref 0–0.7)
EOSINOPHIL NFR BLD AUTO: 7.8 %
ERYTHROCYTE [DISTWIDTH] IN BLOOD BY AUTOMATED COUNT: 19.5 % (ref 11–15)
GLUCOSE BLD-MCNC: 105 MG/DL (ref 70–99)
HCT VFR BLD AUTO: 25 % (ref 35–48)
HGB BLD-MCNC: 7.8 G/DL (ref 12–16)
IMM GRANULOCYTES # BLD AUTO: 0.01 X10(3) UL (ref 0–1)
IMM GRANULOCYTES NFR BLD: 0.3 %
LYMPHOCYTES # BLD AUTO: 0.67 X10(3) UL (ref 1–4)
LYMPHOCYTES NFR BLD AUTO: 18.1 %
MCH RBC QN AUTO: 31.2 PG (ref 26–34)
MCHC RBC AUTO-ENTMCNC: 31.2 G/DL (ref 31–37)
MCV RBC AUTO: 100 FL (ref 80–100)
MONOCYTES # BLD AUTO: 0.29 X10(3) UL (ref 0.1–1)
MONOCYTES NFR BLD AUTO: 7.8 %
NEUTROPHILS # BLD AUTO: 2.38 X10 (3) UL (ref 1.5–7.7)
NEUTROPHILS # BLD AUTO: 2.38 X10(3) UL (ref 1.5–7.7)
NEUTROPHILS NFR BLD AUTO: 64.4 %
OSMOLALITY SERPL CALC.SUM OF ELEC: 291 MOSM/KG (ref 275–295)
PLATELET # BLD AUTO: 121 10(3)UL (ref 150–450)
PLATELET MORPHOLOGY: NORMAL
PLATELETS.RETICULATED NFR BLD AUTO: 4.6 % (ref 0–7)
POTASSIUM SERPL-SCNC: 4.7 MMOL/L (ref 3.5–5.1)
RBC # BLD AUTO: 2.5 X10(6)UL (ref 3.8–5.3)
SODIUM SERPL-SCNC: 139 MMOL/L (ref 136–145)
WBC # BLD AUTO: 3.7 X10(3) UL (ref 4–11)

## 2025-06-15 PROCEDURE — 99233 SBSQ HOSP IP/OBS HIGH 50: CPT | Performed by: HOSPITALIST

## 2025-06-15 PROCEDURE — 99233 SBSQ HOSP IP/OBS HIGH 50: CPT | Performed by: INTERNAL MEDICINE

## 2025-06-15 PROCEDURE — 99223 1ST HOSP IP/OBS HIGH 75: CPT | Performed by: INTERNAL MEDICINE

## 2025-06-15 RX ORDER — ALBUMIN (HUMAN) 12.5 G/50ML
25 SOLUTION INTRAVENOUS
Status: ACTIVE | OUTPATIENT
Start: 2025-06-16 | End: 2025-06-17

## 2025-06-15 RX ORDER — AMIODARONE HYDROCHLORIDE 200 MG/1
200 TABLET ORAL 2 TIMES DAILY WITH MEALS
Status: DISCONTINUED | OUTPATIENT
Start: 2025-06-15 | End: 2025-06-17

## 2025-06-15 RX ORDER — HEPARIN SODIUM 1000 [USP'U]/ML
1.5 INJECTION, SOLUTION INTRAVENOUS; SUBCUTANEOUS ONCE
Status: DISCONTINUED | OUTPATIENT
Start: 2025-06-15 | End: 2025-06-17

## 2025-06-15 NOTE — PROGRESS NOTES
[facility] - CARDIOLOGY PROGRESS NOTE      Lizette Mccarthy Patient Status:  Inpatient    1970 MRN [id number]   Location [facility] 2W/SW Attending Dolly Jose *   Hosp Day # 1 PCP No primary care provider on file.   Patient with nonischemic cardiomyopathy, congestive heart failure, end-stage renal disease and ICD with syncope and ICD shock d/t VF      Assessment and Plan:     Heart failure with reduced ejection fraction  History of EF 25% and moderate mitral vegetation  Cardiac cath in [facility] 2024 no obstructive coronary disease  Repeat echo EF 21%, moderate mitral regurgitation severe pulmonary hypertension    End-stage renal disease  History of PD dialysis but removed due to infection  Has permacath and fistula now    Ventricular fibrillation  6 appropriate shocks for VT and VF   All terminated VF but the VF would return  No more VF here on iv lido  Would stop lido and start po amio    Plan  Stop iv lido  Start po amio  monitor    Subjective:   Lizette Mccarthy is a(n) 55 year old female with no new c/o today x mild sob.  Urdu-speaking    Objective:   Blood pressure 132/81, pulse 87, temperature 96.8 °F (36 °C), temperature source Temporal, resp. rate 19, height 5' (1.524 m), weight 131 lb 6.3 oz (59.6 kg), SpO2 94%.    Exam  Gen: No acute distress, alert and oriented   Neck:supple,no JVD  Pulm: Lungs ok, normal respiratory effort  CV: Heart with regular rate and rhythm,no pathologic murmur  Abd: Abdomen soft, nontender, nondistended,  bowel sounds present  Ext:  no clubbing, no cyanosis  Neuro: no focal deficits  Skin: no rashes or lesions      Scheduled Meds: Scheduled Medications[1]    Continuous Infusions: Medication Infusions[2]    Results:     Lab Results   Component Value Date    WBC 3.7 (L) 06/15/2025    HGB 7.8 (L) 06/15/2025    HCT 25.0 (L) 06/15/2025    .0 (L) 06/15/2025    CREATSERUM 5.19 (H) 06/15/2025    BUN 19 06/15/2025     06/15/2025    K  4.7 06/15/2025     06/15/2025    CO2 31.0 06/15/2025     (H) 06/15/2025    CA 9.3 06/15/2025    ALB 3.8 06/14/2025    MG 2.3 06/13/2025    PHOS 2.1 (L) 06/14/2025       No results found.  EKG 12 Lead  Result Date: 6/14/2025  Normal sinus rhythm Minimal voltage criteria for LVH, may be normal variant ( Sokolow-Wheeler ) ST & T wave abnormality, consider inferior ischemia ST & T wave abnormality, consider anterolateral ischemia Abnormal ECG No previous ECGs found in [facility] Confirmed by DENI VILLALPANDO, YAQUELIN ([id number]) on 6/14/2025 5:24:40 PM      Imaging: I independently visualized all relevant chest imaging in PACS, agree with radiology interpretation except where noted.    Yaquelin Villalpando MD  [facility]  Cardiac Electrophysiology  6/15/2025  [id number]  Critical care provider statement:     Critical care time (minutes):  35    Critical care time was exclusive of:  Separately billable procedures and treating other patients    Critical care was necessary to treat or prevent imminent or life-threatening deterioration of the following conditions:  Cardiac failure with ventricular fibrillation, icd shock, severe chf and severe pulm htn.    Critical care was time spent personally by me on the following activities:  Development of treatment plan with patient or surrogate, discussions with consultants, discussions with primary provider, evaluation of patient's response to treatment, examination of patient, obtaining history from patient or surrogate, ordering and performing treatments and interventions, ordering and review of laboratory studies, ordering and review of radiographic studies, pulse oximetry and re-evaluation of patient's condition.           [1]    heparin  1.5 mL Intracatheter Once    [START ON 6/16/2025] epoetin gabriela  5,000 Units Subcutaneous Once per day on Monday Wednesday Friday    heparin  5,000 Units Subcutaneous 2 times per day    [Held by provider] Lanthanum Carbonate  1,000 mg Oral TID CC     rifAMPin  600 mg Oral Daily   [2]    lidocaine in dextrose 5% 1 mg/min (06/15/25 8572)

## 2025-06-15 NOTE — PROGRESS NOTES
[facility]  part of [facility]    Progress Note    Lizette Mccarthy Patient Status:  Inpatient    1970 MRN [id number]   Location [facility] 2W/SW Attending Dolly Jose *   Hosp Day # 1 PCP No primary care provider on file.     Chief complaint vt     Subjective:   Lizette Mccarthy is a(n) 55 year old female pt denies cp. Doing well today. Feels better     ROS:   No cp, sob   No c/d   No n/v     Objective:   Blood pressure 132/81, pulse 87, temperature 96.8 °F (36 °C), temperature source Temporal, resp. rate 19, height 5' (1.524 m), weight 131 lb 6.3 oz (59.6 kg), SpO2 94%.      Intake/Output Summary (Last 24 hours) at 6/15/2025 1050  Last data filed at 2025 2100  Gross per 24 hour   Intake 280 ml   Output --   Net 280 ml       Patient Weight(s) for the past 336 hrs:   Weight   25 0051 131 lb 6.3 oz (59.6 kg)   25 2234 116 lb 13.5 oz (53 kg)           General appearance: alert, appears stated age and cooperative  Pulmonary:  clear to auscultation bilaterally  Cardiovascular: S1, S2 normal, no murmur, click, rub or gallop, regular rate and rhythm  Abdominal: soft, non-tender; bowel sounds normal; no masses,  no organomegaly  Extremities: extremities normal, atraumatic, no cyanosis or edema        Medicines:   Current Hospital Medications[1]    Narcotic Medications            HYDROcodone-acetaminophen 5-325 MG Oral Tab            Ant-Infective Medications            rifAMPin 300 MG Oral Cap                Blood Pressure and Cardiac Medications            amiodarone 200 MG Oral Tab            Medication Infusions[2]        Lab Results   Component Value Date    WBC 3.7 (L) 06/15/2025    HGB 7.8 (L) 06/15/2025    HCT 25.0 (L) 06/15/2025    .0 (L) 06/15/2025    CREATSERUM 5.19 (H) 06/15/2025    BUN 19 06/15/2025     06/15/2025    K 4.7 06/15/2025     06/15/2025    CO2 31.0 06/15/2025     (H) 06/15/2025    CA 9.3 06/15/2025    ALB 3.8  06/14/2025    MG 2.3 06/13/2025    PHOS 2.1 (L) 06/14/2025       No results found.  EKG 12 Lead  Result Date: 6/14/2025  Normal sinus rhythm Minimal voltage criteria for LVH, may be normal variant ( Sokolow-Wheeler ) ST & T wave abnormality, consider inferior ischemia ST & T wave abnormality, consider anterolateral ischemia Abnormal ECG No previous ECGs found in [facility] Confirmed by DENI VILLALPANDO, YAQUELIN ([id number]) on 6/14/2025 5:24:40 PM      Results:     CBC:    Lab Results   Component Value Date    WBC 3.7 (L) 06/15/2025    WBC 3.1 (L) 06/13/2025     Lab Results   Component Value Date    HGB 7.8 (L) 06/15/2025    HGB 8.4 (L) 06/13/2025      Lab Results   Component Value Date    .0 (L) 06/15/2025    .0 (L) 06/13/2025       Recent Labs   Lab 06/13/25  2242 06/14/25  0458 06/15/25  0404   * 109* 105*   BUN 10 12 19   CREATSERUM 2.97* 3.48* 5.19*   CA 9.1 9.3 9.3    139 139   K 3.8 4.4 4.7    102 103   CO2 24.0 30.0 31.0           Assessment and Plan:       Sustained pulseless V. tach  Requiring defibrillation, given amiodarone in the field, currently on lidocaine drip now off  Cardiology has been consulted, device to be interrogated  Apprec cards consult - cont lidocaine drip, echo to be repeated, dr villalpando to see - repeat echo with ef 21%reversible restrictive pattern, decreased left ventricular diastolic compliance or increase left atrial pressure grade 3 dd , mod mr, mod tr , pulm pressure markedly elevated   H/o idiopathic dilated cmy ef 25%  Interrogation showing 6 shocks of vt/vf 6/13  Dc'ed from [facility] friday     Syncope  Secondary to V. tach, responded to meds, unclear if shock was delivered.  Will continue to monitor closely; interrogation to be done      Chronic systolic heart failure  No signs of acute exacerbation, EF 25%.  Resume home meds.     ESRD  Nephrology consulted, dialysis days M/W/F, electrolytes currently stable.     Latent TB  Continue rifampin      Prolonged QT  syndrome  Will avoid QT prolonging medications     Prophylaxis  Subcutaneous heparin     CODE STATUS  Full               Dolly Martinez DO         Chart reviewed, including current vitals, notes, labs and imaging  Labs ordered and medications adjusted as outlined above  Coordinate care with care team/consultants  Discussed with patient results of tests, management plan as outlined above, and the need for ongoing hospitalization  D/w RN     [facility]        6/14/2025             Supplementary Documentation:   DVT Mechanical Prophylaxis:        DVT Pharmacologic Prophylaxis   Medication    heparin (Porcine) 5000 UNIT/ML injection 5,000 Units                Code Status: Not on file  Cat: No urinary catheter in place  Cat Duration (in days):   Central line:    HEMALATHA:                           [1]   Current Facility-Administered Medications   Medication Dose Route Frequency    heparin (Porcine) 5000 UNIT/ML injection 5,000 Units  5,000 Units Subcutaneous 2 times per day    hydrOXYzine (Atarax) tab 25 mg  25 mg Oral TID PRN    [Held by provider] lanthanum (Fosrenol) chewable tab 1,000 mg  1,000 mg Oral TID CC    rifAMPin (Rifadin) cap 600 mg  600 mg Oral Daily    lidocaine in dextrose 5% 8-5 MG/ML-% infusion premix (for cardiac)  1-4 mg/min Intravenous Continuous   [2]    lidocaine in dextrose 5% 1 mg/min (06/15/25 0940)

## 2025-06-15 NOTE — PLAN OF CARE
Problem: Patient Centered Care  Goal: Patient preferences are identified and integrated in the patient's plan of care  Description: Interventions:  - What would you like us to know as we care for you?   - Provide timely, complete, and accurate information to patient/family  - Incorporate patient and family knowledge, values, beliefs, and cultural backgrounds into the planning and delivery of care  - Encourage patient/family to participate in care and decision-making at the level they choose  - Honor patient and family perspectives and choices  6/14/2025 2147 by Palma Dixon RN  Outcome: Progressing  6/14/2025 2147 by Palma Dixon RN  Outcome: Progressing     Problem: Patient/Family Goals  Goal: Patient/Family Long Term Goal  Description: Patient's Long Term Goal: discharge    Interventions:  - - Monitor vitals  - Monitor appropriate labs  - Pain management  - Follow MD order  - Diagnostics per order  - Update/Informing patient and family on plan of care  - Discharge planning  - See additional Care Plan goals for specific interventions  6/14/2025 2147 by Palma Dixon RN  Outcome: Progressing  6/14/2025 2147 by Palma Dixon RN  Outcome: Progressing  Goal: Patient/Family Short Term Goal  Description: Patient's Short Term Goal: feel better    Interventions:   - - Monitor vitals  - Monitor appropriate labs  - Pain management  - Follow MD order  - Diagnostics per order  - Update/Informing patient and family on plan of care  - Discharge planning  - See additional Care Plan goals for specific interventions  6/14/2025 2147 by Palma Dixon RN  Outcome: Progressing  6/14/2025 2147 by Palma Dixon RN  Outcome: Progressing     Problem: CARDIOVASCULAR - ADULT  Goal: Maintains optimal cardiac output and hemodynamic stability  Description: INTERVENTIONS:  - Monitor vital signs, rhythm, and trends  - Monitor for bleeding, hypotension and signs of decreased cardiac output  - Evaluate effectiveness of vasoactive  medications to optimize hemodynamic stability  - Monitor arterial and/or venous puncture sites for bleeding and/or hematoma  - Assess quality of pulses, skin color and temperature  - Assess for signs of decreased coronary artery perfusion - ex. Angina  - Evaluate fluid balance, assess for edema, trend weights  6/14/2025 2147 by Palma Dixon RN  Outcome: Progressing  6/14/2025 2147 by Palma Dixon RN  Outcome: Progressing  Goal: Absence of cardiac arrhythmias or at baseline  Description: INTERVENTIONS:  - Continuous cardiac monitoring, monitor vital signs, obtain 12 lead EKG if indicated  - Evaluate effectiveness of antiarrhythmic and heart rate control medications as ordered  - Initiate emergency measures for life threatening arrhythmias  - Monitor electrolytes and administer replacement therapy as ordered  6/14/2025 2147 by Palma Dixon RN  Outcome: Progressing  6/14/2025 2147 by Palma Dixon RN  Outcome: Progressing     Problem: RESPIRATORY - ADULT  Goal: Achieves optimal ventilation and oxygenation  Description: INTERVENTIONS:  - Assess for changes in respiratory status  - Assess for changes in mentation and behavior  - Position to facilitate oxygenation and minimize respiratory effort  - Oxygen supplementation based on oxygen saturation or ABGs  - Provide Smoking Cessation handout, if applicable  - Encourage broncho-pulmonary hygiene including cough, deep breathe, Incentive Spirometry  - Assess the need for suctioning and perform as needed  - Assess and instruct to report SOB or any respiratory difficulty  - Respiratory Therapy support as indicated  - Manage/alleviate anxiety  - Monitor for signs/symptoms of CO2 retention  6/14/2025 2147 by Palma Dixon RN  Outcome: Progressing  6/14/2025 2147 by Palma Dixon RN  Outcome: Progressing

## 2025-06-15 NOTE — PROGRESS NOTES
[facility]  part of [facility]     Progress Note    Lizette XVCAA Mccarthy Patient Status:  Inpatient    1970 MRN [id number]   Location [facility] 2W/SW Attending Dolly Jose *   Hosp Day # 1 PCP No primary care provider on file.       Subjective:   Patient seen and examined.  Resting in bed.  No additional ICD shocks during hospital course    Objective:   Blood pressure 131/80, pulse 81, temperature 97.1 °F (36.2 °C), temperature source Temporal, resp. rate 20, height 5' (1.524 m), weight 131 lb 6.3 oz (59.6 kg), SpO2 99%.  Intake/Output:   Last 3 shifts: I/O last 3 completed shifts:  In: 331.3 [P.O.:270; I.V.:61.3]  Out: -    This shift: I/O this shift:  In: 283 [I.V.:283]  Out: -      Vent Settings:      Hemodynamic parameters (last 24 hours):      Scheduled Meds: Current Hospital Medications[1]    Continuous Infusions: Medication Infusions[2]    Physical Exam  Constitutional: no acute distress  Eyes: PERRL  ENT: nares pateint  Neck: supple, no JVD  Cardio: RRR, S1 S2  Respiratory: clear to auscultation bilaterally, no wheezing, rales, rhonchi, crackles  GI: abdomen soft, non tender, active bowel sounds, no organomegaly  Extremities: no clubbing, cyanosis, edema  Neurologic: no gross motor deficits  Skin: warm, dry      Results:     Lab Results   Component Value Date    WBC 3.7 06/15/2025    HGB 7.8 06/15/2025    HCT 25.0 06/15/2025    .0 06/15/2025    CREATSERUM 5.19 06/15/2025    BUN 19 06/15/2025     06/15/2025    K 4.7 06/15/2025     06/15/2025    CO2 31.0 06/15/2025     06/15/2025    CA 9.3 06/15/2025       No results found.    EKG 12 Lead  Result Date: 2025  Normal sinus rhythm Minimal voltage criteria for LVH, may be normal variant ( Sokolow-Wheeler ) ST & T wave abnormality, consider inferior ischemia ST & T wave abnormality, consider anterolateral ischemia Abnormal ECG No previous ECGs found in [facility] Confirmed by DENI VILLALPANDO CASH ([id number]) on  6/14/2025 5:24:40 PM      Assessment   1.  V. tach  2.  Dilated cardiomyopathy  3.  Pancytopenia  4.  End-stage renal disease on hemodialysis  5.  Latent tuberculosis     Plan   -Patient presents after evidence of V. tach with ICD shock.  Received amiodarone initially and had been started on lidocaine gtt.  - ICD interrogated with 6 appropriate shocks.  Further management per EP.  - History of significant dilated cardiomyopathy.   - End-stage renal disease.  Renal replacement therapy per nephrology recommendations.  - History of latent tuberculosis continue rifampin at this time  - DVT prophylaxis: Heparin    Jessica Clifford DO  Pulmonary Critical Care Medicine  [facility]        [1]   Current Facility-Administered Medications   Medication Dose Route Frequency    [START ON 6/16/2025] sodium chloride 0.9 % IV bolus 100 mL  100 mL Intravenous Q30 Min PRN    And    [START ON 6/16/2025] albumin human (Albumin) 25% injection 25 g  25 g Intravenous PRN Dialysis    heparin (Porcine) 1000 UNIT/ML injection 1,500 Units  1.5 mL Intracatheter Once    [START ON 6/16/2025] epoetin gabriela (Epogen, Procrit) 5,000 Units injection  5,000 Units Subcutaneous Once per day on Monday Wednesday Friday    heparin (Porcine) 5000 UNIT/ML injection 5,000 Units  5,000 Units Subcutaneous 2 times per day    hydrOXYzine (Atarax) tab 25 mg  25 mg Oral TID PRN    [Held by provider] lanthanum (Fosrenol) chewable tab 1,000 mg  1,000 mg Oral TID CC    rifAMPin (Rifadin) cap 600 mg  600 mg Oral Daily   [2]

## 2025-06-15 NOTE — CONSULTS
[facility]  part of [facility]    Report of Consultation    Lizette XVCAA Mccarthy Patient Status:  Inpatient    1970 MRN [id number]   Location [facility] 2W/SW Attending Dolly Jose *   Hosp Day # 1 PCP No primary care provider on file.     Date of Admission:  2025  Date of Consult:  6/15/2025   Reason for Consultation:   ESRD    History of Present Illness:   Patient is a 55 year old female who was admitted to the hospital for V-tach (HCC):  The patient was admitted to the hospital on Friday.  She has a history of end-stage renal disease and was previously on peritoneal dialysis.    The patient was transitioned over to hemodialysis.    She has a history of ischemic cardiomyopathy with an ejection fraction of 25%.    Per report she was brought to the emergency room due to syncope.  She had 2 episodes of seizure and syncope and brought in from home.    She is found to be in pulseless and V. tach.  She is currently in the ICU on a lidocaine drip    Past Medical History  Past Medical History:   End stage renal failure on dialysis ([facility])    ICD (implantable cardioverter-defibrillator) in place       Past Surgical History  History reviewed. No pertinent surgical history.    Family History  History reviewed. No pertinent family history.    Social History  Social History  Socioeconomic History    Marital status:      Social Drivers of Health     Food Insecurity: No Food Insecurity (2025)    NCSS - Food Insecurity     Worried About Running Out of Food in the Last Year: No     Ran Out of Food in the Last Year: No   Transportation Needs: No Transportation Needs (2025)    NCSS - Transportation     Lack of Transportation: No   Housing Stability: Not At Risk (2025)    NCSS - Housing/Utilities     Has Housing: Yes     Worried About Losing Housing: No     Unable to Get Utilities: No       Current Medications:  Current Facility-Administered Medications   Medication  Dose Route Frequency    heparin (Porcine) 5000 UNIT/ML injection 5,000 Units  5,000 Units Subcutaneous 2 times per day    hydrOXYzine (Atarax) tab 25 mg  25 mg Oral TID PRN    [Held by provider] lanthanum (Fosrenol) chewable tab 1,000 mg  1,000 mg Oral TID CC    rifAMPin (Rifadin) cap 600 mg  600 mg Oral Daily    lidocaine in dextrose 5% 8-5 MG/ML-% infusion premix (for cardiac)  1-4 mg/min Intravenous Continuous     Medications Prior to Admission   Medication Sig    rifAMPin 300 MG Oral Cap Take 2 capsules (600 mg total) by mouth daily.    HYDROcodone-acetaminophen 5-325 MG Oral Tab Take 1 tablet by mouth every 6 (six) hours as needed for Pain.    amiodarone 200 MG Oral Tab Take 1 tablet (200 mg total) by mouth Every Monday, Wednesday, and Friday.    Lanthanum Carbonate 1000 MG Oral Chew Tab Chew 1 tablet (1,000 mg total) by mouth 3 (three) times daily with meals.    hydrOXYzine 25 MG Oral Tab Take 1 tablet (25 mg total) by mouth 3 (three) times daily as needed for Itching.         Review of Systems:     General: Weak      A comprehensive 12 point review of systems was completed.  Pertinent positives as above and all the rest were negative.     Physical Exam:   /81 (BP Location: Right arm)   Pulse 87   Temp 96.8 °F (36 °C) (Temporal)   Resp 19   Ht 5' (1.524 m)   Wt 131 lb 6.3 oz (59.6 kg)   SpO2 94%   BMI 25.66 kg/m²      Intake/Output Summary (Last 24 hours) at 6/15/2025 1053  Last data filed at 6/14/2025 2100  Gross per 24 hour   Intake 280 ml   Output --   Net 280 ml     Wt Readings from Last 1 Encounters:   06/14/25 131 lb 6.3 oz (59.6 kg)       Exam  Gen: No acute distress  Heent: NC AT, mucous memb clear, neck supple  Pulm: Lungs clear, normal respiratory effort  CV: Heart with regular rate and rhythm, no edema  Abd: Abdomen soft, nontender, nondistended, no organomegaly, bowel sounds present  Skin: no symptoms reported  Psych: alert and oriented        Results:     Laboratory Data:  Recent Labs    Lab 06/13/25 2242 06/15/25  0404   RBC 2.72* 2.50*   HGB 8.4* 7.8*   HCT 25.4* 25.0*   MCV 93.4 100.0   MCH 30.9 31.2   MCHC 33.1 31.2   RDW 18.9* 19.5*   NEPRELIM 1.94 2.38   WBC 3.1* 3.7*   .0* 121.0*         Recent Labs   Lab 06/13/25 2242 06/14/25  0458 06/15/25  0404   * 109* 105*   BUN 10 12 19   CREATSERUM 2.97* 3.48* 5.19*   CA 9.1 9.3 9.3    139 139   K 3.8 4.4 4.7    102 103   CO2 24.0 30.0 31.0        Imaging:  No results found.            Impression/Receommendations:   1 - ESRD  Dialysis tomorrow through her dialysis catheter    2 -Vtach /CHFrEF  Per cardiology.  She is on a lidocaine drip    3 - pancytopenia  Will order Epogen    Thank you for allowing me to participate in the care of your patient.    PILI TELLO MD  6/15/2025

## 2025-06-15 NOTE — PROGRESS NOTES
Critical Care    [employer] S-ICD was interrogated last night.  She had 6 appropriate shocks for VT/VF 6/13 ~2200.  I reviewed w/ rep, report in chart.  Message to EFRAÍN Garay NP  Critical Care

## 2025-06-16 ENCOUNTER — TELEPHONE (OUTPATIENT)
Dept: CARE COORDINATION | Age: 55
End: 2025-06-16

## 2025-06-16 LAB
ALBUMIN SERPL-MCNC: 3.8 G/DL (ref 3.2–4.8)
ANION GAP SERPL CALC-SCNC: 9 MMOL/L (ref 0–18)
ATRIAL RATE: 73 BPM
BASOPHILS # BLD AUTO: 0.05 X10(3) UL (ref 0–0.2)
BASOPHILS NFR BLD AUTO: 1.4 %
BUN BLD-MCNC: 31 MG/DL (ref 9–23)
BUN/CREAT SERPL: 4.5 (ref 10–20)
CALCIUM BLD-MCNC: 9.3 MG/DL (ref 8.7–10.4)
CHLORIDE SERPL-SCNC: 101 MMOL/L (ref 98–112)
CO2 SERPL-SCNC: 27 MMOL/L (ref 21–32)
CREAT BLD-MCNC: 6.82 MG/DL (ref 0.55–1.02)
DEPRECATED RDW RBC AUTO: 69.6 FL (ref 35.1–46.3)
EGFRCR SERPLBLD CKD-EPI 2021: 7 ML/MIN/1.73M2 (ref 60–?)
EOSINOPHIL # BLD AUTO: 0.26 X10(3) UL (ref 0–0.7)
EOSINOPHIL NFR BLD AUTO: 7.5 %
ERYTHROCYTE [DISTWIDTH] IN BLOOD BY AUTOMATED COUNT: 19.4 % (ref 11–15)
GLUCOSE BLD-MCNC: 107 MG/DL (ref 70–99)
HBV CORE AB SERPL QL IA: NONREACTIVE
HBV SURFACE AB SER QL: REACTIVE
HBV SURFACE AB SERPL IA-ACNC: 49.39 MIU/ML
HBV SURFACE AG SER-ACNC: <0.1 [IU]/L
HBV SURFACE AG SERPL QL IA: NONREACTIVE
HCT VFR BLD AUTO: 24.5 % (ref 35–48)
HGB BLD-MCNC: 7.6 G/DL (ref 12–16)
IMM GRANULOCYTES # BLD AUTO: 0.02 X10(3) UL (ref 0–1)
IMM GRANULOCYTES NFR BLD: 0.6 %
LYMPHOCYTES # BLD AUTO: 0.89 X10(3) UL (ref 1–4)
LYMPHOCYTES NFR BLD AUTO: 25.6 %
MCH RBC QN AUTO: 30.4 PG (ref 26–34)
MCHC RBC AUTO-ENTMCNC: 31 G/DL (ref 31–37)
MCV RBC AUTO: 98 FL (ref 80–100)
MONOCYTES # BLD AUTO: 0.26 X10(3) UL (ref 0.1–1)
MONOCYTES NFR BLD AUTO: 7.5 %
NEUTROPHILS # BLD AUTO: 1.99 X10 (3) UL (ref 1.5–7.7)
NEUTROPHILS # BLD AUTO: 1.99 X10(3) UL (ref 1.5–7.7)
NEUTROPHILS NFR BLD AUTO: 57.4 %
OSMOLALITY SERPL CALC.SUM OF ELEC: 291 MOSM/KG (ref 275–295)
P AXIS: 10 DEGREES
P-R INTERVAL: 164 MS
PHOSPHATE SERPL-MCNC: 2.7 MG/DL (ref 2.4–5.1)
PLATELET # BLD AUTO: 135 10(3)UL (ref 150–450)
POTASSIUM SERPL-SCNC: 5.1 MMOL/L (ref 3.5–5.1)
Q-T INTERVAL: 530 MS
QRS DURATION: 100 MS
QTC CALCULATION (BEZET): 583 MS
R AXIS: 4 DEGREES
RBC # BLD AUTO: 2.5 X10(6)UL (ref 3.8–5.3)
SODIUM SERPL-SCNC: 137 MMOL/L (ref 136–145)
T AXIS: 185 DEGREES
VENTRICULAR RATE: 73 BPM
WBC # BLD AUTO: 3.5 X10(3) UL (ref 4–11)

## 2025-06-16 PROCEDURE — 5A1D70Z PERFORMANCE OF URINARY FILTRATION, INTERMITTENT, LESS THAN 6 HOURS PER DAY: ICD-10-PCS | Performed by: INTERNAL MEDICINE

## 2025-06-16 PROCEDURE — 99233 SBSQ HOSP IP/OBS HIGH 50: CPT | Performed by: INTERNAL MEDICINE

## 2025-06-16 PROCEDURE — 99232 SBSQ HOSP IP/OBS MODERATE 35: CPT | Performed by: HOSPITALIST

## 2025-06-16 RX ORDER — HEPARIN SODIUM 1000 [USP'U]/ML
1.5 INJECTION, SOLUTION INTRAVENOUS; SUBCUTANEOUS ONCE
Status: DISCONTINUED | OUTPATIENT
Start: 2025-06-18 | End: 2025-06-20

## 2025-06-16 NOTE — PROGRESS NOTES
[facility]  part of [facility]    Progress Note    Lizette Mccarthy Patient Status:  Inpatient    1970 MRN [id number]   Location [facility] 2W/SW Attending Dolly Jose *   Hosp Day # 2 PCP No primary care provider on file.       Subjective:   Lizette Mccarthy is a(n) 55 year old female who I am seeing for ESRD      Patient is resting in bed    Objective:   /76 (BP Location: Right arm)   Pulse 76   Temp 97.1 °F (36.2 °C) (Temporal)   Resp 20   Ht 5' (1.524 m)   Wt 134 lb 14.7 oz (61.2 kg)   SpO2 100%   BMI 26.35 kg/m²      Intake/Output Summary (Last 24 hours) at 2025 1006  Last data filed at 2025 0745  Gross per 24 hour   Intake 443 ml   Output 0 ml   Net 443 ml     Wt Readings from Last 1 Encounters:   25 134 lb 14.7 oz (61.2 kg)       Exam  Gen: No acute distress, Heent: NC AT, mucous memb clear, neck supple  Pulm: Lungs clear, normal respiratory effort  CV: Heart with regular rate and rhythm, no edema  Abd: Abdomen soft, nontender, nondistended, no organomegaly, bowel sounds present  Skin: no symptoms reported  Psych: alert and oriented    Assessment and Plan:   1 - ESRD  Dialysis today through her dialysis catheter.  Dialysis should be continued as a life-sustaining modality, she should not miss any date.    The patient told me today that she goes to the [place] dialysis unit  and Friday and since for 3 hours     2 -Vtach /CHFrEF  Per cardiology.  She is on amiodarone     3 - pancytopenia  On Epogen              Results:     Recent Labs   Lab 25  2242 06/15/25  0404 25  0741   RBC 2.72* 2.50* 2.50*   HGB 8.4* 7.8* 7.6*   HCT 25.4* 25.0* 24.5*   MCV 93.4 100.0 98.0   MCH 30.9 31.2 30.4   MCHC 33.1 31.2 31.0   RDW 18.9* 19.5* 19.4*   NEPRELIM 1.94 2.38 1.99   WBC 3.1* 3.7* 3.5*   .0* 121.0* 135.0*         Recent Labs   Lab 25  0458 06/15/25  0404 25  0741   * 105* 107*   BUN 12 19 31*    CREATSERUM 3.48* 5.19* 6.82*   CA 9.3 9.3 9.3    139 137   K 4.4 4.7 5.1    103 101   CO2 30.0 31.0 27.0          No results found.        PILI TELLO MD  6/16/2025

## 2025-06-16 NOTE — PROGRESS NOTES
[facility]  part of [facility]    Progress Note    Lizette Mccarthy Patient Status:  Inpatient    1970 MRN [id number]   Location [facility] 2W/SW Attending Dolly Jose *   Hosp Day # 2 PCP No primary care provider on file.     Chief complaint vt     Subjective:   Lizette Mccarthy is a(n) 55 year old female pt denies cp. Doing well today. Feels better - no c/o     ROS:   No cp, sob   No c/d   No n/v     Objective:   Blood pressure 139/87, pulse 69, temperature 97 °F (36.1 °C), temperature source Temporal, resp. rate 22, height 5' (1.524 m), weight 134 lb 14.7 oz (61.2 kg), SpO2 100%.      Intake/Output Summary (Last 24 hours) at 2025 1321  Last data filed at 2025 0745  Gross per 24 hour   Intake 160 ml   Output 0 ml   Net 160 ml       Patient Weight(s) for the past 336 hrs:   Weight   25 0700 134 lb 14.7 oz (61.2 kg)   25 0051 131 lb 6.3 oz (59.6 kg)   25 2234 116 lb 13.5 oz (53 kg)           General appearance: alert, appears stated age and cooperative  Pulmonary:  clear to auscultation bilaterally  Cardiovascular: S1, S2 normal, no murmur, click, rub or gallop, regular rate and rhythm  Abdominal: soft, non-tender; bowel sounds normal; no masses,  no organomegaly  Extremities: extremities normal, atraumatic, no cyanosis or edema        Medicines:   Current Hospital Medications[1]    Narcotic Medications            HYDROcodone-acetaminophen 5-325 MG Oral Tab            Ant-Infective Medications            rifAMPin 300 MG Oral Cap                Blood Pressure and Cardiac Medications            amiodarone 200 MG Oral Tab            Medication Infusions[2]        Lab Results   Component Value Date    WBC 3.5 (L) 2025    HGB 7.6 (L) 2025    HCT 24.5 (L) 2025    .0 (L) 2025    CREATSERUM 6.82 (H) 2025    BUN 31 (H) 2025     2025    K 5.1 2025     2025    CO2 27.0 2025    GLU  107 (H) 06/16/2025    CA 9.3 06/16/2025    ALB 3.8 06/16/2025    MG 2.3 06/13/2025    PHOS 2.7 06/16/2025       No results found.  EKG 12 Lead  Result Date: 6/16/2025  Normal sinus rhythm Minimal voltage criteria for LVH, may be normal variant ( Rodriguez product ) ST & T wave abnormality, consider inferior ischemia ST & T wave abnormality, consider anterolateral ischemia Prolonged QT interval or tu fusion, consider myocardial disease, electrolyte imbalance, or drug effects Abnormal ECG When compared with ECG of 13-JUN-2025 22:39, ST no longer depressed in Anterior leads      Results:     CBC:    Lab Results   Component Value Date    WBC 3.5 (L) 06/16/2025    WBC 3.7 (L) 06/15/2025    WBC 3.1 (L) 06/13/2025     Lab Results   Component Value Date    HGB 7.6 (L) 06/16/2025    HGB 7.8 (L) 06/15/2025    HGB 8.4 (L) 06/13/2025      Lab Results   Component Value Date    .0 (L) 06/16/2025    .0 (L) 06/15/2025    .0 (L) 06/13/2025       Recent Labs   Lab 06/14/25  0458 06/15/25  0404 06/16/25  0741   * 105* 107*   BUN 12 19 31*   CREATSERUM 3.48* 5.19* 6.82*   CA 9.3 9.3 9.3    139 137   K 4.4 4.7 5.1    103 101   CO2 30.0 31.0 27.0           Assessment and Plan:       Sustained pulseless V. tach  Requiring defibrillation, given amiodarone in the field, currently on lidocaine drip now off  Cardiology has been consulted, device to be interrogated  Apprec cards consult - now off lidocaine drip and on amiodarone. Monitor on tele   Echo repeated, dr garcia to see - repeat echo with ef 21%reversible restrictive pattern, decreased left ventricular diastolic compliance or increase left atrial pressure grade 3 dd , mod mr, mod tr , pulm pressure markedly elevated   H/o idiopathic dilated cmy ef 25%  Interrogation showing 6 shocks of vt/vf 6/13  [facility] friday     Syncope  Secondary to V. tach, responded to meds, unclear if shock was delivered.  Will continue to monitor closely;  interrogation to be done      Chronic systolic heart failure  No signs of acute exacerbation, EF 25%.  Resume home meds.     ESRD  Nephrology consulted, dialysis days M/W/F, electrolytes currently stable.     Latent TB  Continue rifampin      Prolonged QT syndrome  Now on amio cont to monitor on tele      Prophylaxis  Subcutaneous heparin     CODE STATUS  Full               Dolly Martinez DO         Chart reviewed, including current vitals, notes, labs and imaging  Labs ordered and medications adjusted as outlined above  Coordinate care with care team/consultants  Discussed with patient results of tests, management plan as outlined above, and the need for ongoing hospitalization  D/w RN     MDM high              Supplementary Documentation:   DVT Mechanical Prophylaxis:        DVT Pharmacologic Prophylaxis   Medication    [START ON 6/18/2025] heparin (Porcine) 1000 UNIT/ML injection 1,500 Units    heparin (Porcine) 1000 UNIT/ML injection 1,500 Units    heparin (Porcine) 5000 UNIT/ML injection 5,000 Units                Code Status: Not on file  Cat: No urinary catheter in place  Cat Duration (in days):   Central line:    HEMALATHA:                           [1]   Current Facility-Administered Medications   Medication Dose Route Frequency    [START ON 6/18/2025] heparin (Porcine) 1000 UNIT/ML injection 1,500 Units  1.5 mL Intracatheter Once    sodium chloride 0.9 % IV bolus 100 mL  100 mL Intravenous Q30 Min PRN    And    albumin human (Albumin) 25% injection 25 g  25 g Intravenous PRN Dialysis    heparin (Porcine) 1000 UNIT/ML injection 1,500 Units  1.5 mL Intracatheter Once    epoetin gabriela (Epogen, Procrit) 5,000 Units injection  5,000 Units Subcutaneous Once per day on Monday Wednesday Friday    amiodarone (Pacerone) tab 200 mg  200 mg Oral BID with meals    heparin (Porcine) 5000 UNIT/ML injection 5,000 Units  5,000 Units Subcutaneous 2 times per day    hydrOXYzine (Atarax) tab 25 mg  25 mg Oral TID PRN     rifAMPin (Rifadin) cap 600 mg  600 mg Oral Daily   [2]

## 2025-06-16 NOTE — PROGRESS NOTES
[facility]  part of [facility]    Progress Note      Assessment and Plan:   1.  V. tach with dilated cardiomyopathy-stabilized clinically and well mentally status post AICD termination on June 13.  Moderate mitral regurgitation with severe pulmonary hypertension.  Recent coronary angiography without critical disease of the coronaries.  Now off IV lidocaine.    Recommendations:  1.  Amiodarone  2.  As per cardiology  3.  Telemetry transfer    2.  End-stage renal disease-timing of next hemodialysis as per nephrology.    3.  DVT prophylaxis-subcutaneous heparin    4.  Pancytopenia-to follow    5.  History of latent tuberculosis    Subjective:   Lizette Mccarthy is a(n) 55 year old female who is breathing comfortably    Objective:   Blood pressure 125/76, pulse 76, temperature 97.1 °F (36.2 °C), temperature source Temporal, resp. rate 20, height 5' (1.524 m), weight 134 lb 14.7 oz (61.2 kg), SpO2 100%.    Physical Exam alert female  HEENT examination is unremarkable with pupils equal round and reactive to light and accommodation.   Neck without adenopathy, thyromegaly, JVD nor bruit.   Lungs few basilar crackles to auscultation and percussion.  Cardiac regular rate and rhythm no murmur.   Abdomen nontender, without hepatosplenomegaly and no mass appreciable.   Extremities without clubbing cyanosis nor edema.   Neurologic grossly intact with symmetric tone and strength and reflex.  Skin without gross abnormality     Results:     Lab Results   Component Value Date    WBC 3.5 06/16/2025    HGB 7.6 06/16/2025    HCT 24.5 06/16/2025    .0 06/16/2025    CREATSERUM 6.82 06/16/2025    BUN 31 06/16/2025     06/16/2025    K 5.1 06/16/2025     06/16/2025    CO2 27.0 06/16/2025     06/16/2025    CA 9.3 06/16/2025    ALB 3.8 06/16/2025    PHOS 2.7 06/16/2025     Chest x-ray looks good    Jericho Simon MD  Medical Director, Critical Care, [facility]  Medical Director, [facility]  [facility]  Pager: [phone]

## 2025-06-16 NOTE — CM/SW NOTE
06/16/25 1400   CM/SW Referral Data   Referral Source    Reason for Referral Discharge planning   Informant Patient;Son   Medical Hx   Does patient have an established PCP? Yes  (Kim Cortez)   Patient Info   Patient's Current Mental Status at Time of Assessment Alert;Oriented   Patient Communication Issues Language barrier  (Dominican)   Patient's Home Environment Condo/Apt no elevator   Patient lives with Son   Patient Status Prior to Admission   Independent with ADLs and Mobility Yes   Services in place prior to admission Dialysis   Dialysis Clinic [facility]  ([place])   Scheduled Dialysis days M-W-F   Dialysis scheduled time 1030   Discharge Needs   Anticipated D/C needs Outpatient dialysis     Pt discussed during nursing rounds. Dx VTACH, ESRD on HD. Pt listed as Milvia Mccarthy upon admission. CM confirmed patient's name is Shavon Gonzalez. Copy of SS and insurance cards sent to registration to be added to her EMR. Home w/sons, independent w/o device prior to admission, Dominican speaking only. Pt's son Phillip is POC for dc planning, his contact is [phone]. Current w/[facility] [place] for outpatient HD, MWF at 10:30am. Pt is ambulatory in room. No additional home care needs anticipated on dc.     Plan: Home w/sons with [facility] iin [place] for outpatient HD pending medical clearance.    / to remain available for support and/or discharge planning.     SVETA Dunn    [phone]

## 2025-06-16 NOTE — PROGRESS NOTES
[facility]  part of [facility]    Cardiology Progress Note    Lziette XVCAA Mccarthy Patient Status:  Inpatient    1970 MRN [id number]   Location [facility] 2W/SW Attending Dolly Jose *   Hosp Day # 2 PCP No primary care provider on file.     Interval History   She reports no new complaints today, aside from mild shortness of breath, which is stable for her. No chest pain, palpitations, dizziness, or syncope.   No new fevers, infections, or systemic symptoms.  Tele without further episodes of VT/VF.     Assessment and Plan:   ESRD    Ventricular Fibrillation / Electrical Storm (status post 6 shocks)  -Electrical storm likely secondary to advanced cardiomyopathy  -Recent LHC in 10/2025 without obstructive CAD  -Underwent 6 appropriate ICD shocks for VT/VF, occurred on 25 - all terminating VF temporarily until IV lidocaine  -Stopped IV lidocaine and tarted oral amiodarone (monitor QTc, LFTs, TSH)  -Telemetry monitoring to assess for recurrence  -Appreciate EP input     HFrEF  Severe pulmonary hypertension  -Chronic HFrEF, EF 20-25%  -Moderate MR and severe pulmonary hypertension on echo  -Likely secondary to chronic LV dysfunction  -Stable hemodynamics  -Volume status to be managed carefully given ESRD  -Continue guideline-directed medical therapy (GDMT) as tolerated    Objective:   Patient Vitals for the past 24 hrs:   BP Temp Temp src Pulse Resp SpO2 Weight   25 0900 125/76 -- -- 76 20 100 % --   25 0800 108/83 -- -- 77 17 100 % --   25 0700 118/83 97.1 °F (36.2 °C) Temporal 72 12 100 % 134 lb 14.7 oz (61.2 kg)   25 0605 127/82 -- -- 74 16 99 % --   25 0411 130/78 98.5 °F (36.9 °C) Temporal 80 17 100 % --   25 0200 120/79 -- -- 76 20 99 % --   25 0008 (!) 139/91 -- -- 80 16 99 % --   06/15/25 2301 140/81 -- -- 76 21 100 % --   06/15/25 2000 123/87 97.1 °F (36.2 °C) Temporal 80 (!) 28 90 % --   06/15/25 1900 127/85 -- -- 81 16 100  % --   06/15/25 1800 123/84 -- -- 81 15 100 % --   06/15/25 1600 122/71 97.1 °F (36.2 °C) Temporal 73 20 100 % --   06/15/25 1400 128/77 -- -- 80 18 93 % --   06/15/25 1200 131/80 97.1 °F (36.2 °C) Temporal 81 20 99 % --       Intake/Output:   Last 3 shifts:   Intake/Output                   06/14/25 0700 - 06/15/25 0659 06/15/25 0700 - 06/16/25 0659 06/16/25 0700 - 06/17/25 0659       Intake    P.O.  270  50  100    P.O. 270 50 100    I.V.  10  293  --    I.V. 10 10 --    Volume (mL) Lidocaine -- 283 --    Total Intake 280 343 100       Output    Urine  --  --  --    Urine Occurrence 1 x 1 x 2 x    Emesis/NG output  --  --  0    Emesis -- -- 0    Stool  --  --  --    Stool Count Calculated for I/O 2 x 4 x --    Total Output -- -- 0       Net I/O     280 343 100             Vent Settings:      Hemodynamic parameters (last 24 hours):      Scheduled Meds: Scheduled Medications[1]    Continuous Infusions: Medication Infusions[2]    Results:     Lab Results   Component Value Date    WBC 3.5 (L) 06/16/2025    HGB 7.6 (L) 06/16/2025    HCT 24.5 (L) 06/16/2025    .0 (L) 06/16/2025    CREATSERUM 6.82 (H) 06/16/2025    BUN 31 (H) 06/16/2025     06/16/2025    K 5.1 06/16/2025     06/16/2025    CO2 27.0 06/16/2025     (H) 06/16/2025    CA 9.3 06/16/2025    ALB 3.8 06/16/2025    MG 2.3 06/13/2025    PHOS 2.7 06/16/2025       Recent Labs   Lab 06/14/25  0458 06/15/25  0404 06/16/25  0741   * 105* 107*   BUN 12 19 31*   CREATSERUM 3.48* 5.19* 6.82*   CA 9.3 9.3 9.3    139 137   K 4.4 4.7 5.1    103 101   CO2 30.0 31.0 27.0     Recent Labs   Lab 06/13/25  2242 06/15/25  0404 06/16/25  0741   RBC 2.72* 2.50* 2.50*   HGB 8.4* 7.8* 7.6*   HCT 25.4* 25.0* 24.5*   MCV 93.4 100.0 98.0   MCH 30.9 31.2 30.4   MCHC 33.1 31.2 31.0   RDW 18.9* 19.5* 19.4*   NEPRELIM 1.94 2.38 1.99   WBC 3.1* 3.7* 3.5*   .0* 121.0* 135.0*       No results for input(s): \"BNPML\" in the last 168 hours.    No  results for input(s): \"TROP\", \"CK\" in the last 168 hours.    No results found.      CARD ECHO 2D DOPPLER CONTRAST (CPT=93306)  Result Date: 2025  Transthoracic Echocardiogram Name:Lizette Mccarthy Xvcaa Date: 2025 :  1970 Ht:  (60in)  BP: 136 / 79 MRN:  [id number]    Age:  55years    Wt:  (131lb) HR: 88bpm Loc:  [facility]       Gndr: F          BSA: 1.56m^2 Sonographer: Hossein PETERSEN, RVT Ordering:    Garrett Paul Consulting:  Jessica Clifford MD ---------------------------------------------------------------------------- History/Indications:  Implantable Cardioverter-Defibrillator. Ventricular tachycardia arrest. ---------------------------------------------------------------------------- Procedure information:  A transthoracic complete 2D study was performed. Additional evaluation included M-mode, complete spectral Doppler, and color Doppler.  Patient status:  Inpatient.  Location:  [facility]    This was a routine study. Transthoracic echocardiography for diagnosis, ventricular function evaluation, and assessment of valvular function. Image quality was suboptimal. The study was technically limited due to poor acoustic window availability and patient supine. Intravenous contrast (Definity) was administered to evaluate left ventricular function. ECG rhythm:   Normal sinus ---------------------------------------------------------------------------- Conclusions: 1. Left ventricle: The cavity size was increased. Wall thickness was normal.    Systolic function was markedly reduced. The estimated ejection fraction    was 21%, by biplane method of disks. There was severe diffuse    hypokinesis. Doppler parameters are consistent with a reversible    restrictive pattern, indicative of decreased left ventricular diastolic    compliance and/or increased left atrial pressure - grade 3 diastolic    dysfunction. 2. Left atrium: The left atrial volume was markedly increased. 3. Mitral valve: There was moderate regurgitation, with  multiple jets. The    regurgitant vena contracta width was 0.5cm. 4. Tricuspid valve: There was moderate regurgitation. 5. Pulmonary arteries: Systolic pressure was markedly increased, estimated    to be 90mm Hg. 6. Pericardium, extracardiac: There was a right pleural effusion. Impressions:  No previous study from [facility] was available for comparison. * ---------------------------------------------------------------------------- * Findings: Left ventricle:  The cavity size was increased. Wall thickness was normal. Systolic function was markedly reduced. The estimated ejection fraction was 21%, by biplane method of disks. There was severe diffuse hypokinesis. Doppler parameters are consistent with a reversible restrictive pattern, indicative of decreased left ventricular diastolic compliance and/or increased left atrial pressure - grade 3 diastolic dysfunction. Left atrium:  The left atrial volume was markedly increased. Right ventricle:  The cavity size was normal. Systolic function was normal. Right atrium:  The atrium was at the upper limits of normal in size. Mitral valve:  The valve was structurally normal. Leaflet separation was normal.  Doppler:  Transvalvular velocity was within the normal range. There was no evidence for stenosis. There was moderate regurgitation, with multiple jets. Aortic valve:  The valve was structurally normal. The valve was trileaflet. Cusp separation was normal.  Doppler:  Transvalvular velocity was within the normal range. There was no evidence for stenosis. There was no significant regurgitation. Tricuspid valve:  The valve is structurally normal. Leaflet separation was normal.  Doppler:  Transvalvular velocity was within the normal range. There was no evidence for stenosis. There was moderate regurgitation. Pulmonic valve:   The valve is structurally normal. Cusp separation was normal.  Doppler:  Transvalvular velocity was within the normal range. There was no  evidence for stenosis. There was no significant regurgitation. Pericardium:   There was no pericardial effusion. Pleura:  There was a right pleural effusion. Aorta: Aortic root: The aortic root was normal. Ascending aorta: The ascending aorta was normal. Pulmonary arteries: Systolic pressure was markedly increased, estimated to be 90mm Hg. Systemic veins:  Central venous respirophasic diameter changes are blunted (< 50%). Inferior vena cava: The IVC was normal-sized. ---------------------------------------------------------------------------- Measurements  Left ventricle                  Value          Ref  IVS thickness, ED, PLAX         0.8    cm      0.6 - 0.9  LV ID, ED, PLAX             (H) 5.9    cm      3.8 - 5.2  LV ID, ES, PLAX             (H) 5.3    cm      2.2 - 3.5  LV PW thickness, ED, PLAX       0.8    cm      0.6 - 0.9  IVS/LV PW ratio, ED, PLAX       1.00           ---------  LV PW/LV ID ratio, ED, PLAX     0.14           ---------  LV ejection fraction        (L) 22     %       54 - 74  Stroke volume/bsa, 2D           33     ml/m^2  ---------  LV end-diastolic volume,    (H) 221    ml      48 - 140  1-p A4C  LV ejection fraction, 1-p   (L) 22     %       46 - 78  A4C  Stroke volume, 1-p A4C          48     ml      ---------  LV end-diastolic            (H) 142    ml/m^2  30 - 82  volume/bsa, 1-p A4C  Stroke volume/bsa, 1-p A4C      31     ml/m^2  ---------  LV end-diastolic volume,    (H) 229    ml      46 - 106  2-p  LV end-systolic volume, 2-p (H) 183    ml      14 - 42  LV ejection fraction, 2-p   (L) 20     %       54 - 74  Stroke volume, 2-p              46     ml      ---------  LV end-diastolic            (H) 147    ml/m^2  29 - 61  volume/bsa, 2-p  LV end-systolic volume/bsa, (H) 117    ml/m^2  8 - 24  2-p  Stroke volume/bsa, 2-p          29.5   ml/m^2  ---------  LV dP/dt, MR jet                1010   mm Hg/s ---------  LV e', lateral                  10.7   cm/sec  >=10.0  LV E/e', lateral                 12             <=13  LV e', medial               (L) 6.0    cm/sec  >=7.0  LV E/e', medial                 21             ---------  LV e', average                  8.4    cm/sec  ---------  LV E/e', average            (H) 15             <=14  LVOT                            Value          Ref  LVOT ID                         2.1    cm      ---------  LVOT peak velocity, S           1      m/sec   ---------  LVOT VTI, S                     15.1   cm      ---------  LVOT peak gradient, S           4      mm Hg   ---------  LVOT mean gradient, S           2      mm Hg   ---------  Stroke volume (SV), LVOT DP     52     ml      ---------  Stroke index (SV/bsa), LVOT     34     ml/m^2  ---------  DP  Aortic root                     Value          Ref  Aortic root ID                  3.3    cm      2.3 - 3.8  Ascending aorta                 Value          Ref  Ascending aorta ID              2.8    cm      1.9 - 3.5  Left atrium                     Value          Ref  LA ID, A-P, ES              (H) 4.0    cm      2.7 - 3.8  LA volume, S                (H) 100    ml      22 - 52  LA volume/bsa, S            (H) 64     ml/m^2  16 - 34  LA volume, ES, 1-p A4C      (H) 88     ml      22 - 52  LA volume, ES, 1-p A2C      (H) 94     ml      22 - 52  LA volume, ES, A/L              108    ml      ---------  LA volume/bsa, ES, A/L      (H) 69     ml/m^2  16 - 34  LA/aortic root ratio            1.21           ---------  Mitral valve                    Value          Ref  Mitral E-wave peak velocity     1.24   m/sec   ---------  Mitral A-wave peak velocity     0.63   m/sec   ---------  Mitral deceleration time        145    ms      ---------  Mitral peak gradient, D         6      mm Hg   ---------  Mitral E/A ratio, peak          2              ---------  Mitral regurg vena              0.5    cm      ---------  contracta width  Mitral regurg peak velocity     518    cm/sec  ---------  Mitral peak LV-LA gradient,      107.33 mm Hg   ---------  S  Pulmonary artery                Value          Ref  PA pressure, S, DP              90     mm Hg   ---------  Tricuspid valve                 Value          Ref  Tricuspid regurg peak       (H) 4.52   m/sec   <=2.8  velocity  Tricuspid peak RV-RA            82     mm Hg   ---------  gradient  Right atrium                    Value          Ref  RA ID, S-I, ES, A4C             4.9    cm      3.4 - 5.3  RA ID/bsa, S-I, ES, A4C         3.1    cm/m^2  1.9 - 3.1  RA area, ES, A4C                17     cm^2    10 - 18  RA volume, ES, 1-p A4C          51     ml      ---------  RA volume/bsa, ES, 1-p A4C      33     ml/m^2  9 - 33  Systemic veins                  Value          Ref  Estimated CVP                   8      mm Hg   ---------  Inferior vena cava              Value          Ref  ID                              1.7    cm      <=2.1  Right ventricle                 Value          Ref  TAPSE, MM                       1.80   cm      >=1.70  RV pressure, S, DP              90     mm Hg   ---------  RV s', lateral                  12.1   cm/sec  >=9.5 Legend: (L)  and  (H)  molly values outside specified reference range. ---------------------------------------------------------------------------- Prepared and electronically signed by Cheng Melendez 06/14/2025 16:51        Exam:     Physical Exam:  General: Alert and oriented x 3. No apparent distress.   HEENT: Normocephalic, anicteric sclera, neck supple, no thyromegaly or adenopathy.  Neck: +_JVD, carotids 2+, no bruits.  Cardiac: Regular rate and rhythm. S1 S2 noted, +holosystolic m  Lungs: Clear without wheezes, rales, rhonchi or dullness.  Normal excursions and effort.  Abdomen: Soft, non-tender. No organosplenomegally, mass or rebound, BS-present.  Extremities: Without clubbing or cyanosis. No edema.    Neurologic: Alert and oriented, normal affect. No focal defects  Skin: Warm and dry.     Wellington Tapia, [facility]  [place]       [1]    [START ON 6/18/2025] heparin  1.5 mL Intracatheter Once    heparin  1.5 mL Intracatheter Once    epoetin gabriela  5,000 Units Subcutaneous Once per day on Monday Wednesday Friday    amiodarone  200 mg Oral BID with meals    heparin  5,000 Units Subcutaneous 2 times per day    rifAMPin  600 mg Oral Daily   [2]

## 2025-06-16 NOTE — PLAN OF CARE
Problem: Patient Centered Care  Goal: Patient preferences are identified and integrated in the patient's plan of care  Description: Interventions:  - What would you like us to know as we care for you?   - Provide timely, complete, and accurate information to patient/family  - Incorporate patient and family knowledge, values, beliefs, and cultural backgrounds into the planning and delivery of care  - Encourage patient/family to participate in care and decision-making at the level they choose  - Honor patient and family perspectives and choices  Outcome: Progressing     Problem: Patient/Family Goals  Goal: Patient/Family Long Term Goal  Description: Patient's Long Term Goal: discharge    Interventions:  - - Monitor vitals  - Monitor appropriate labs  - Pain management  - Follow MD order  - Diagnostics per order  - Update/Informing patient and family on plan of care  - Discharge planning  - See additional Care Plan goals for specific interventions  Outcome: Progressing  Goal: Patient/Family Short Term Goal  Description: Patient's Short Term Goal: feel better    Interventions:   - - Monitor vitals  - Monitor appropriate labs  - Pain management  - Follow MD order  - Diagnostics per order  - Update/Informing patient and family on plan of care  - Discharge planning  - See additional Care Plan goals for specific interventions  Outcome: Progressing     Problem: CARDIOVASCULAR - ADULT  Goal: Maintains optimal cardiac output and hemodynamic stability  Description: INTERVENTIONS:  - Monitor vital signs, rhythm, and trends  - Monitor for bleeding, hypotension and signs of decreased cardiac output  - Evaluate effectiveness of vasoactive medications to optimize hemodynamic stability  - Monitor arterial and/or venous puncture sites for bleeding and/or hematoma  - Assess quality of pulses, skin color and temperature  - Assess for signs of decreased coronary artery perfusion - ex. Angina  - Evaluate fluid balance, assess for edema,  trend weights  Outcome: Progressing  Goal: Absence of cardiac arrhythmias or at baseline  Description: INTERVENTIONS:  - Continuous cardiac monitoring, monitor vital signs, obtain 12 lead EKG if indicated  - Evaluate effectiveness of antiarrhythmic and heart rate control medications as ordered  - Initiate emergency measures for life threatening arrhythmias  - Monitor electrolytes and administer replacement therapy as ordered  Outcome: Progressing     Problem: RESPIRATORY - ADULT  Goal: Achieves optimal ventilation and oxygenation  Description: INTERVENTIONS:  - Assess for changes in respiratory status  - Assess for changes in mentation and behavior  - Position to facilitate oxygenation and minimize respiratory effort  - Oxygen supplementation based on oxygen saturation or ABGs  - Provide Smoking Cessation handout, if applicable  - Encourage broncho-pulmonary hygiene including cough, deep breathe, Incentive Spirometry  - Assess the need for suctioning and perform as needed  - Assess and instruct to report SOB or any respiratory difficulty  - Respiratory Therapy support as indicated  - Manage/alleviate anxiety  - Monitor for signs/symptoms of CO2 retention  Outcome: Progressing

## 2025-06-17 LAB
ALBUMIN SERPL-MCNC: 3.8 G/DL (ref 3.2–4.8)
ANION GAP SERPL CALC-SCNC: 8 MMOL/L (ref 0–18)
ATRIAL RATE: 84 BPM
BASOPHILS # BLD AUTO: 0.05 X10(3) UL (ref 0–0.2)
BASOPHILS NFR BLD AUTO: 1.4 %
BUN BLD-MCNC: 21 MG/DL (ref 9–23)
BUN/CREAT SERPL: 4.9 (ref 10–20)
CALCIUM BLD-MCNC: 8.8 MG/DL (ref 8.7–10.4)
CHLORIDE SERPL-SCNC: 100 MMOL/L (ref 98–112)
CO2 SERPL-SCNC: 29 MMOL/L (ref 21–32)
CREAT BLD-MCNC: 4.25 MG/DL (ref 0.55–1.02)
DEPRECATED RDW RBC AUTO: 67.7 FL (ref 35.1–46.3)
EGFRCR SERPLBLD CKD-EPI 2021: 12 ML/MIN/1.73M2 (ref 60–?)
EOSINOPHIL # BLD AUTO: 0.27 X10(3) UL (ref 0–0.7)
EOSINOPHIL NFR BLD AUTO: 7.5 %
ERYTHROCYTE [DISTWIDTH] IN BLOOD BY AUTOMATED COUNT: 19.1 % (ref 11–15)
GLUCOSE BLD-MCNC: 103 MG/DL (ref 70–99)
HCT VFR BLD AUTO: 25 % (ref 35–48)
HGB BLD-MCNC: 7.8 G/DL (ref 12–16)
IMM GRANULOCYTES # BLD AUTO: 0.01 X10(3) UL (ref 0–1)
IMM GRANULOCYTES NFR BLD: 0.3 %
LYMPHOCYTES # BLD AUTO: 0.61 X10(3) UL (ref 1–4)
LYMPHOCYTES NFR BLD AUTO: 17 %
MCH RBC QN AUTO: 29.9 PG (ref 26–34)
MCHC RBC AUTO-ENTMCNC: 31.2 G/DL (ref 31–37)
MCV RBC AUTO: 95.8 FL (ref 80–100)
MONOCYTES # BLD AUTO: 0.35 X10(3) UL (ref 0.1–1)
MONOCYTES NFR BLD AUTO: 9.7 %
NEUTROPHILS # BLD AUTO: 2.3 X10 (3) UL (ref 1.5–7.7)
NEUTROPHILS # BLD AUTO: 2.3 X10(3) UL (ref 1.5–7.7)
NEUTROPHILS NFR BLD AUTO: 64.1 %
OSMOLALITY SERPL CALC.SUM OF ELEC: 287 MOSM/KG (ref 275–295)
P AXIS: 7 DEGREES
P-R INTERVAL: 148 MS
PHOSPHATE SERPL-MCNC: 2.4 MG/DL (ref 2.4–5.1)
PLATELET # BLD AUTO: 145 10(3)UL (ref 150–450)
POTASSIUM SERPL-SCNC: 4.3 MMOL/L (ref 3.5–5.1)
Q-T INTERVAL: 464 MS
QRS DURATION: 96 MS
QTC CALCULATION (BEZET): 548 MS
R AXIS: -2 DEGREES
RBC # BLD AUTO: 2.61 X10(6)UL (ref 3.8–5.3)
SODIUM SERPL-SCNC: 137 MMOL/L (ref 136–145)
T AXIS: 175 DEGREES
VENTRICULAR RATE: 84 BPM
WBC # BLD AUTO: 3.6 X10(3) UL (ref 4–11)

## 2025-06-17 PROCEDURE — 99233 SBSQ HOSP IP/OBS HIGH 50: CPT | Performed by: HOSPITALIST

## 2025-06-17 PROCEDURE — 99233 SBSQ HOSP IP/OBS HIGH 50: CPT | Performed by: INTERNAL MEDICINE

## 2025-06-17 PROCEDURE — 99232 SBSQ HOSP IP/OBS MODERATE 35: CPT | Performed by: INTERNAL MEDICINE

## 2025-06-17 RX ORDER — AMIODARONE HYDROCHLORIDE 200 MG/1
200 TABLET ORAL DAILY
Status: DISCONTINUED | OUTPATIENT
Start: 2025-06-18 | End: 2025-06-20

## 2025-06-17 NOTE — PROGRESS NOTES
[facility]  part of [facility]    Cardiology Progress Note    Shavon Isac Terrazasvar Patient Status:  Inpatient    1970 MRN [id number]   Location [facility] 2W/SW Attending Dolly Jose *   Hosp Day # 3 PCP No primary care provider on file.     Interval History   Denies any chest pain. Improved breathing.   No palpitations, dizziness, or syncope.   No new fevers, infections, or systemic symptoms.    Tele without further episodes of VT/VF.   Assessment and Plan:   ESRD    Ventricular Fibrillation / Electrical Storm (status post 6 shocks)  -Electrical storm likely secondary to advanced cardiomyopathy  -Recent LHC in 10/2025 without obstructive CAD  -Underwent 6 appropriate ICD shocks for VT/VF, occurred on 25 - all terminating VF temporarily until IV lidocaine  -No longer on IV lidocaine, on PO amiodarone loading   -ECG this AM with QTc 548ms, will decrease amiodarone 200mg once daily  -Telemetry monitoring to assess for recurrence  -Appreciate EP input     HFrEF  Severe pulmonary hypertension  -Chronic HFrEF, EF 20-25%  -Moderate MR and severe pulmonary hypertension on echo  -Likely secondary to chronic LV dysfunction  -Stable hemodynamics  -Volume status to be managed carefully given ESRD  -Continue guideline-directed medical therapy (GDMT) as tolerated    Objective:   Patient Vitals for the past 24 hrs:   BP Temp Temp src Pulse Resp SpO2 Weight   25 0900 125/76 -- -- 76 20 100 % --   25 0800 108/83 -- -- 77 17 100 % --   25 0700 118/83 97.1 °F (36.2 °C) Temporal 72 12 100 % 134 lb 14.7 oz (61.2 kg)   25 0605 127/82 -- -- 74 16 99 % --   25 0411 130/78 98.5 °F (36.9 °C) Temporal 80 17 100 % --   25 0200 120/79 -- -- 76 20 99 % --   25 0008 (!) 139/91 -- -- 80 16 99 % --   06/15/25 2301 140/81 -- -- 76 21 100 % --   06/15/25 2000 123/87 97.1 °F (36.2 °C) Temporal 80 (!) 28 90 % --   06/15/25 1900 127/85 -- -- 81 16 100 % --    06/15/25 1800 123/84 -- -- 81 15 100 % --   06/15/25 1600 122/71 97.1 °F (36.2 °C) Temporal 73 20 100 % --   06/15/25 1400 128/77 -- -- 80 18 93 % --   06/15/25 1200 131/80 97.1 °F (36.2 °C) Temporal 81 20 99 % --       Intake/Output:   Last 3 shifts:   Intake/Output                   06/14/25 0700 - 06/15/25 0659 06/15/25 0700 - 06/16/25 0659 06/16/25 0700 - 06/17/25 0659       Intake    P.O.  270  50  100    P.O. 270 50 100    I.V.  10  293  --    I.V. 10 10 --    Volume (mL) Lidocaine -- 283 --    Total Intake 280 343 100       Output    Urine  --  --  --    Urine Occurrence 1 x 1 x 2 x    Emesis/NG output  --  --  0    Emesis -- -- 0    Stool  --  --  --    Stool Count Calculated for I/O 2 x 4 x --    Total Output -- -- 0       Net I/O     280 343 100             Vent Settings:      Hemodynamic parameters (last 24 hours):      Scheduled Meds: Scheduled Medications[1]    Continuous Infusions: Medication Infusions[2]    Results:     Lab Results   Component Value Date    WBC 3.6 (L) 06/17/2025    HGB 7.8 (L) 06/17/2025    HCT 25.0 (L) 06/17/2025    .0 (L) 06/17/2025    CREATSERUM 4.25 (H) 06/17/2025    BUN 21 06/17/2025     06/17/2025    K 4.3 06/17/2025     06/17/2025    CO2 29.0 06/17/2025     (H) 06/17/2025    CA 8.8 06/17/2025    ALB 3.8 06/17/2025    MG 2.3 06/13/2025    PHOS 2.4 06/17/2025       Recent Labs   Lab 06/15/25  0404 06/16/25  0741 06/17/25  0312   * 107* 103*   BUN 19 31* 21   CREATSERUM 5.19* 6.82* 4.25*   CA 9.3 9.3 8.8    137 137   K 4.7 5.1 4.3    101 100   CO2 31.0 27.0 29.0     Recent Labs   Lab 06/15/25  0404 06/16/25  0741 06/17/25  0312   RBC 2.50* 2.50* 2.61*   HGB 7.8* 7.6* 7.8*   HCT 25.0* 24.5* 25.0*   .0 98.0 95.8   MCH 31.2 30.4 29.9   MCHC 31.2 31.0 31.2   RDW 19.5* 19.4* 19.1*   NEPRELIM 2.38 1.99 2.30   WBC 3.7* 3.5* 3.6*   .0* 135.0* 145.0*       No results for input(s): \"BNPML\" in the last 168 hours.    No results for  input(s): \"TROP\", \"CK\" in the last 168 hours.    No results found.  EKG 12 Lead  Result Date: 2025  Normal sinus rhythm LVH with repolarization abnormality ( [place] product , Romhilt-Alvarado ) Prolonged QT interval or tu fusion, consider myocardial disease, electrolyte imbalance, or drug effects Abnormal ECG Confirmed by Anderson Rodriguez ([id number]) on 2025 11:55:11 AM    EKG 12 Lead  Result Date: 2025  Normal sinus rhythm Minimal voltage criteria for LVH, may be normal variant ( Rodriguez product ) ST & T wave abnormality, consider inferior ischemia ST & T wave abnormality, consider anterolateral ischemia Prolonged QT interval or tu fusion, consider myocardial disease, electrolyte imbalance, or drug effects Abnormal ECG When compared with ECG of 2025 22:39, ST no longer depressed in Anterior leads T wave inversion now evident in anterolateral leads Confirmed by juan pablo oviedo ([id number]) on 2025 3:51:15 PM    CARD ECHO 2D DOPPLER CONTRAST (CPT=93306)  Result Date: 2025  Transthoracic Echocardiogram Name:Lizette Mccarthy Xvcaa Date: 2025 :  1970 Ht:  (60in)  BP: 136 / 79 MRN:  [id number]    Age:  55years    Wt:  (131lb) HR: 88bpm Loc:  [facility]       Gndr: F          BSA: 1.56m^2 Sonographer: Hossein PETERSEN T Ordering:    Garrett Paul Consulting:  Jessica Clifford MD ---------------------------------------------------------------------------- History/Indications:  Implantable Cardioverter-Defibrillator. Ventricular tachycardia arrest. ---------------------------------------------------------------------------- Procedure information:  A transthoracic complete 2D study was performed. Additional evaluation included M-mode, complete spectral Doppler, and color Doppler.  Patient status:  Inpatient.  Location:  U    This was a routine study. Transthoracic echocardiography for diagnosis, ventricular function evaluation, and assessment of valvular function. Image quality was suboptimal. The study was  technically limited due to poor acoustic window availability and patient supine. Intravenous contrast (Definity) was administered to evaluate left ventricular function. ECG rhythm:   Normal sinus ---------------------------------------------------------------------------- Conclusions: 1. Left ventricle: The cavity size was increased. Wall thickness was normal.    Systolic function was markedly reduced. The estimated ejection fraction    was 21%, by biplane method of disks. There was severe diffuse    hypokinesis. Doppler parameters are consistent with a reversible    restrictive pattern, indicative of decreased left ventricular diastolic    compliance and/or increased left atrial pressure - grade 3 diastolic    dysfunction. 2. Left atrium: The left atrial volume was markedly increased. 3. Mitral valve: There was moderate regurgitation, with multiple jets. The    regurgitant vena contracta width was 0.5cm. 4. Tricuspid valve: There was moderate regurgitation. 5. Pulmonary arteries: Systolic pressure was markedly increased, estimated    to be 90mm Hg. 6. Pericardium, extracardiac: There was a right pleural effusion. Impressions:  No previous study from [facility] was available for comparison. * ---------------------------------------------------------------------------- * Findings: Left ventricle:  The cavity size was increased. Wall thickness was normal. Systolic function was markedly reduced. The estimated ejection fraction was 21%, by biplane method of disks. There was severe diffuse hypokinesis. Doppler parameters are consistent with a reversible restrictive pattern, indicative of decreased left ventricular diastolic compliance and/or increased left atrial pressure - grade 3 diastolic dysfunction. Left atrium:  The left atrial volume was markedly increased. Right ventricle:  The cavity size was normal. Systolic function was normal. Right atrium:  The atrium was at the upper limits of normal in  size. Mitral valve:  The valve was structurally normal. Leaflet separation was normal.  Doppler:  Transvalvular velocity was within the normal range. There was no evidence for stenosis. There was moderate regurgitation, with multiple jets. Aortic valve:  The valve was structurally normal. The valve was trileaflet. Cusp separation was normal.  Doppler:  Transvalvular velocity was within the normal range. There was no evidence for stenosis. There was no significant regurgitation. Tricuspid valve:  The valve is structurally normal. Leaflet separation was normal.  Doppler:  Transvalvular velocity was within the normal range. There was no evidence for stenosis. There was moderate regurgitation. Pulmonic valve:   The valve is structurally normal. Cusp separation was normal.  Doppler:  Transvalvular velocity was within the normal range. There was no evidence for stenosis. There was no significant regurgitation. Pericardium:   There was no pericardial effusion. Pleura:  There was a right pleural effusion. Aorta: Aortic root: The aortic root was normal. Ascending aorta: The ascending aorta was normal. Pulmonary arteries: Systolic pressure was markedly increased, estimated to be 90mm Hg. Systemic veins:  Central venous respirophasic diameter changes are blunted (< 50%). Inferior vena cava: The IVC was normal-sized. ---------------------------------------------------------------------------- Measurements  Left ventricle                  Value          Ref  IVS thickness, ED, PLAX         0.8    cm      0.6 - 0.9  LV ID, ED, PLAX             (H) 5.9    cm      3.8 - 5.2  LV ID, ES, PLAX             (H) 5.3    cm      2.2 - 3.5  LV PW thickness, ED, PLAX       0.8    cm      0.6 - 0.9  IVS/LV PW ratio, ED, PLAX       1.00           ---------  LV PW/LV ID ratio, ED, PLAX     0.14           ---------  LV ejection fraction        (L) 22     %       54 - 74  Stroke volume/bsa, 2D           33     ml/m^2  ---------  LV end-diastolic  volume,    (H) 221    ml      48 - 140  1-p A4C  LV ejection fraction, 1-p   (L) 22     %       46 - 78  A4C  Stroke volume, 1-p A4C          48     ml      ---------  LV end-diastolic            (H) 142    ml/m^2  30 - 82  volume/bsa, 1-p A4C  Stroke volume/bsa, 1-p A4C      31     ml/m^2  ---------  LV end-diastolic volume,    (H) 229    ml      46 - 106  2-p  LV end-systolic volume, 2-p (H) 183    ml      14 - 42  LV ejection fraction, 2-p   (L) 20     %       54 - 74  Stroke volume, 2-p              46     ml      ---------  LV end-diastolic            (H) 147    ml/m^2  29 - 61  volume/bsa, 2-p  LV end-systolic volume/bsa, (H) 117    ml/m^2  8 - 24  2-p  Stroke volume/bsa, 2-p          29.5   ml/m^2  ---------  LV dP/dt, MR jet                1010   mm Hg/s ---------  LV e', lateral                  10.7   cm/sec  >=10.0  LV E/e', lateral                12             <=13  LV e', medial               (L) 6.0    cm/sec  >=7.0  LV E/e', medial                 21             ---------  LV e', average                  8.4    cm/sec  ---------  LV E/e', average            (H) 15             <=14  LVOT                            Value          Ref  LVOT ID                         2.1    cm      ---------  LVOT peak velocity, S           1      m/sec   ---------  LVOT VTI, S                     15.1   cm      ---------  LVOT peak gradient, S           4      mm Hg   ---------  LVOT mean gradient, S           2      mm Hg   ---------  Stroke volume (SV), LVOT DP     52     ml      ---------  Stroke index (SV/bsa), LVOT     34     ml/m^2  ---------  DP  Aortic root                     Value          Ref  Aortic root ID                  3.3    cm      2.3 - 3.8  Ascending aorta                 Value          Ref  Ascending aorta ID              2.8    cm      1.9 - 3.5  Left atrium                     Value          Ref  LA ID, A-P, ES              (H) 4.0    cm      2.7 - 3.8  LA volume, S                (H) 100    ml       22 - 52  LA volume/bsa, S            (H) 64     ml/m^2  16 - 34  LA volume, ES, 1-p A4C      (H) 88     ml      22 - 52  LA volume, ES, 1-p A2C      (H) 94     ml      22 - 52  LA volume, ES, A/L              108    ml      ---------  LA volume/bsa, ES, A/L      (H) 69     ml/m^2  16 - 34  LA/aortic root ratio            1.21           ---------  Mitral valve                    Value          Ref  Mitral E-wave peak velocity     1.24   m/sec   ---------  Mitral A-wave peak velocity     0.63   m/sec   ---------  Mitral deceleration time        145    ms      ---------  Mitral peak gradient, D         6      mm Hg   ---------  Mitral E/A ratio, peak          2              ---------  Mitral regurg vena              0.5    cm      ---------  contracta width  Mitral regurg peak velocity     518    cm/sec  ---------  Mitral peak LV-LA gradient,     107.33 mm Hg   ---------  S  Pulmonary artery                Value          Ref  PA pressure, S, DP              90     mm Hg   ---------  Tricuspid valve                 Value          Ref  Tricuspid regurg peak       (H) 4.52   m/sec   <=2.8  velocity  Tricuspid peak RV-RA            82     mm Hg   ---------  gradient  Right atrium                    Value          Ref  RA ID, S-I, ES, A4C             4.9    cm      3.4 - 5.3  RA ID/bsa, S-I, ES, A4C         3.1    cm/m^2  1.9 - 3.1  RA area, ES, A4C                17     cm^2    10 - 18  RA volume, ES, 1-p A4C          51     ml      ---------  RA volume/bsa, ES, 1-p A4C      33     ml/m^2  9 - 33  Systemic veins                  Value          Ref  Estimated CVP                   8      mm Hg   ---------  Inferior vena cava              Value          Ref  ID                              1.7    cm      <=2.1  Right ventricle                 Value          Ref  TAPSE, MM                       1.80   cm      >=1.70  RV pressure, S, DP              90     mm Hg   ---------  RV s', lateral                  12.1   cm/sec   >=9.5 Legend: (L)  and  (H)  molly values outside specified reference range. ---------------------------------------------------------------------------- Prepared and electronically signed by Cheng Melendez 06/14/2025 16:51        Exam:     Physical Exam:  General: Alert and oriented x 3. No apparent distress.   HEENT: Normocephalic, anicteric sclera, neck supple, no thyromegaly or adenopathy.  Neck: +_JVD, carotids 2+, no bruits.  Cardiac: Regular rate and rhythm. S1 S2 noted, +holosystolic m  Lungs: Clear without wheezes, rales, rhonchi or dullness.  Normal excursions and effort.  Abdomen: Soft, non-tender. No organosplenomegally, mass or rebound, BS-present.  Extremities: Without clubbing or cyanosis. No edema.    Neurologic: Alert and oriented, normal affect. No focal defects  Skin: Warm and dry.     Wellington Tapia,   [facility]       [1]    [START ON 6/18/2025] amiodarone  200 mg Oral Daily    [START ON 6/18/2025] heparin  1.5 mL Intracatheter Once    heparin  1.5 mL Intracatheter Once    epoetin gabriela  5,000 Units Subcutaneous Once per day on Monday Wednesday Friday    heparin  5,000 Units Subcutaneous 2 times per day    rifAMPin  600 mg Oral Daily   [2]

## 2025-06-17 NOTE — PAYOR COMM NOTE
--------------  ADMISSION REVIEW     Payor: [facility]  Subscriber #:  [id number]  Authorization Number: [id number]    Admit date: 6/14/25  Admit time: 12:37 AM       Chief Complaint   Patient presents with    Arrythmia/Palpitations   History is provided by patient/independent historian: patient, EMS  55 year old female with history of ESRD, defibrillator, brought in by EMS for syncopal episodes.  Patient supposedly had 2 episodes of seizure/syncope witnessed by family and called EMS.  On their arrival, they report witnessed 1 episode of V. tach, initiated on 150 mg bolus of amiodarone and brought her in.    Patient is complaining of feeling lightheaded.    She just got dialyzed earlier today.    Respiratory:  Negative for shortness of breath.    Cardiovascular:  Negative for chest pain.   Neurological:  Positive for light-headedness.   All other systems reviewed and are negative.       ED Triage Vitals [06/13/25 2234]   /74   Pulse 82   Resp 17   Temp 98.2 °F (36.8 °C)   Temp src Temporal   SpO2 100 %   O2 Device None (Room air)   Physical Exam  Vitals and nursing note reviewed.   Cardiovascular:      Pulses: Normal pulses.   Pulmonary:      Effort: No respiratory distress.      Comments: Right upper chest with dialysis catheter in place  Labs:     Labs Reviewed   CBC WITH DIFFERENTIAL WITH PLATELET - Abnormal; Notable for the following components:       Result Value    WBC 3.1 (*)     RBC 2.72 (*)     HGB 8.4 (*)     HCT 25.4 (*)     RDW-SD 64.0 (*)     RDW 18.9 (*)     .0 (*)     Lymphocyte Absolute 0.72 (*)     All other components within normal limits   BASIC METABOLIC PANEL (8) - Abnormal; Notable for the following components:    Glucose 153 (*)     Creatinine 2.97 (*)     BUN/CREA Ratio 3.4 (*)     eGFR-Cr 18 (*)     All other components within normal limits   TROPONIN I HIGH SENSITIVITY - Normal   MAGNESIUM - Normal   DRUG ABUSE PANEL 11 SCREEN   ARTERIAL BLOOD GAS    RAINBOW DRAW LAVENDER   RAINBOW DRAW LIGHT GREEN   RAINBOW DRAW BLUE   RAINBOW DRAW GOLD     My EKG Interpretation: EKG    Rate, intervals and axes as noted on EKG Report.  Rate: 92  Rhythm: Sinus Rhythm  Reading: normal for rate, normal for rhythm, ST abnormalities V3-6    ED Medications Administered:   Medications   lidocaine in dextrose 5% 8-5 MG/ML-% infusion premix (for cardiac) (1 mg/min Intravenous New Bag 6/13/25 2310)   lidocaine (cardiac) (Xylocaine) 20 mg/mL injection 100 mg (100 mg Intravenous Given 6/13/25 2244)   calcium gluconate 2g in 100mL iso-NaCl IVPB premix (2 g Intravenous Given 6/13/25 2311)         Differential Diagnosis: After obtaining the patient's history, performing the physical exam and reviewing the diagnostics, multiple initial diagnoses were considered based on the presenting problem including vtach, arrhythmia, NSTEMI, STEMI    Clinical Impression:  1. V-tach (HCC)    H AND P     55 year old female, with a past medical history significant for V-fib arrest currently with ICD in place, nonischemic cardiomyopathy with an EF of 25 to 30% and ESRD   was brought in after she passed out at home.  .  EMS was called patient was found to be pulseless and V. tach given amiodarone unclear if shock was delivered, brought to the   ER again went into ventricular tachycardia started on lidocaine drip considering history of prolonged QT interval            Sustained pulseless V. tach  Requiring defibrillation, given amiodarone in the field, currently on lidocaine, will continue same.  Cardiology has been consulted, device to be interrogated     Syncope  Secondary to V. tach, responded to meds, unclear if shock was delivered.  Will continue to monitor closely     Chronic systolic heart failure     ESRD  Nephrology consulted, dialysis days M/W/F, electrolytes currently stable.     Latent TB  Continue rifampin once medication list confirmed by family     Prolonged QT syndrome  Will avoid QT prolonging  medications    PULMONARY CONSULT  V TACH     Patient is a 55-year-old female who presents with chief complaint of feeling dizzy lightheaded and syncopal episode. Found to have evidence of V. tach on field by paramedics given amiodarone. Patient with underlying history of end-stage renal disease initially on peritoneal dialysis now transition to hemodialysis. Currently resting in bed. Admits to some mild chest discomfort.   + FATIGUE WEAKNESS    1.  V. tach  2.  Dilated cardiomyopathy  3.  Pancytopenia  4.  End-stage renal disease on hemodialysis  5.  Latent tuberculosis     -Patient presents after evidence of V. tach with ICD shock.  Received amiodarone initially eval by cardiology transition to IV lidocaine.  EP consultation pending.  - History of significant dilated cardiomyopathy.  2D echo pending.  - End-stage renal disease.  Renal replacement therapy per nephrology recommendations.  - History of latent tuberculosis continue rifampin at this time    6/15  HGB DOWN 7.8  CARDS CONSULT  Patient with nonischemic cardiomyopathy, congestive heart failure, end-stage renal disease and ICD with syncope and ICD shock d/t VF     Heart failure with reduced ejection fraction  History of EF 25% and moderate mitral vegetation  Cardiac cath in [facility] October 2024 no obstructive coronary disease  Repeat echo EF 21%, moderate mitral regurgitation severe pulmonary hypertension     End-stage renal disease  History of PD dialysis but removed due to infection  Has permacath and fistula now     Ventricular fibrillation  6 appropriate shocks for VT and VF June 13  All terminated VF but the VF would return  No more VF here on iv lido  Would stop lido and start po amio     Plan  Stop iv lido  Start po amio  monitor    NEPHROLOGY CONSULT  1 - ESRD  Dialysis tomorrow through her dialysis catheter     2 -Vtach /CHFrEF  Per cardiology.  She is on a lidocaine drip     3 - pancytopenia  Will order Epogen    PULMONARY CONSULT    Patient seen and  examined.  Resting in bed.  No additional ICD shocks during hospital course   1.  V. tach  2.  Dilated cardiomyopathy  3.  Pancytopenia  4.  End-stage renal disease on hemodialysis  5.  Latent tuberculosis     -Patient presents after evidence of V. tach with ICD shock.  Received amiodarone initially and had been started on lidocaine gtt.  - ICD interrogated with 6 appropriate shocks.  Further management per EP.  - History of significant dilated cardiomyopathy.   - End-stage renal disease.  Renal replacement therapy per nephrology recommendations.  ATTENDING    Sustained pulseless V. tach  Requiring defibrillation, given amiodarone in the field, currently on lidocaine drip now off  Cardiology has been consulted, device to be interrogated  Apprec cards consult - cont lidocaine drip, echo to be repeated, dr garcia to see - repeat echo with ef 21%reversible restrictive pattern, decreased left ventricular diastolic compliance or increase left atrial pressure grade 3 dd , mod mr, mod tr , pulm pressure markedly elevated   H/o idiopathic dilated cmy ef 25%  Interrogation showing 6 shocks of vt/vf 6/13  Dc'ed from [facility] friday     Syncope  Secondary to V. tach, responded to meds, unclear if shock was delivered.  Will continue to monitor closely; interrogation to be done      Chronic systolic heart failure  No signs of acute exacerbation, EF 25%.  Resume home meds.     ESRD  Nephrology consulted, dialysis days M/W/F, electrolytes currently stable.    6/16  Chief complaint vt     Sustained pulseless V. tach  Requiring defibrillation, given amiodarone in the field, currently on lidocaine drip now off  Cardiology has been consulted, device to be interrogated  Apprec cards consult - now off lidocaine drip and on amiodarone. Monitor on tele   Echo repeated, dr garcia to see - repeat echo with ef 21%reversible restrictive pattern, decreased left ventricular diastolic compliance or increase left atrial pressure grade 3 dd , mod  mr, mod tr , pulm pressure markedly elevated   H/o idiopathic dilated cmy ef 25%  Interrogation showing 6 shocks of vt/vf 6/13       Syncope  Secondary to V. tach, responded to meds, unclear if shock was delivered.  Will continue to monitor closely; interrogation to be done      Chronic systolic heart failure  No signs of acute exacerbation, EF 25%.  Resume home meds.     ESRD  Nephrology consulted, dialysis days M/W/F, electrolytes currently stable.     Latent TB  Continue rifampin       MEDICATIONS ADMINISTERED IN LAST 1 DAY:  amiodarone (Pacerone) tab 200 mg       Date Action Dose Route User    6/16/2025 1729 Given 200 mg Oral Arina Powell, RN          epoetin gabriela (Epogen, Procrit) 5,000 Units injection       Date Action Dose Route User    6/16/2025 1607 Given 5,000 Units Subcutaneous (Left Lower Abdomen) Arina Powell, RN          heparin (Porcine) 5000 UNIT/ML injection 5,000 Units       Date Action Dose Route User    6/16/2025 2009 Given 5,000 Units Subcutaneous (Right Lower Abdomen) Shayy Choe, RN            Vitals (last day)       Date/Time Temp Pulse Resp BP SpO2 Weight O2 Device O2 Flow Rate (L/min) [facility]    06/17/25 0800 98.1 °F (36.7 °C) 82 15 117/75 90 % -- None (Room air) -- FS    06/17/25 0700 -- -- -- -- -- -- None (Room air) -- FS    06/17/25 0400 98.1 °F (36.7 °C) 79 16 125/78 96 % -- None (Room air) -- SP    06/17/25 0000 97.6 °F (36.4 °C) 85 12 125/82 94 % -- None (Room air) -- SP    06/16/25 2000 98.5 °F (36.9 °C) 83 19 124/77 96 % -- -- -- SP    06/16/25 1800 -- 87 13 140/88 100 % -- None (Room air) -- MW    06/16/25 1700 -- 87 19 130/87 98 % -- None (Room air) -- MW    06/16/25 1600 97.3 °F (36.3 °C) 91 17 128/82 98 % -- None (Room air) -- MW    06/16/25 1500 -- 81 12 145/82 100 % -- None (Room air) -- MW    06/16/25 1400 -- 79 17 147/85 100 % -- None (Room air) -- MW    06/16/25 1300 -- 68 13 144/76 100 % -- None (Room air) -- MW    06/16/25 1200 97 °F (36.1 °C) 69 22 139/87 100 % --  None (Room air) -- MW    06/16/25 1100 -- 76 14 134/80 100 % -- None (Room air) -- MW    06/16/25 1000 -- 78 15 123/73 100 % -- None (Room air) -- MW    06/16/25 0900 -- 76 20 125/76 100 % -- None (Room air) -- MW    06/16/25 0800 -- 77 17 108/83 100 % -- None (Room air) --     06/16/25 0700 97.1 °F (36.2 °C) 72 12 118/83 100 % 134 lb 14.7 oz (61.2 kg) Nasal cannula 2 L/min     06/16/25 0605 -- 74 16 127/82 99 % -- Nasal cannula 2 L/min     06/16/25 0411 98.5 °F (36.9 °C) 80 17 130/78 100 % -- Nasal cannula 2 L/min     06/16/25 0200 -- 76 20 120/79 99 % -- Nasal cannula 2 L/min     06/16/25 0008 -- 80 16 139/91 99 % -- Nasal cannula 2 L/min MF          CIWA Scores (since admission)       None

## 2025-06-17 NOTE — PROGRESS NOTES
[facility]  part of [facility]    Progress Note    Shavon Browning Patient Status:  Inpatient    1970 MRN [id number]   Location [facility] 2W/SW Attending Dolly Jose *   Hosp Day # 3 PCP No primary care provider on file.       Subjective:   Shavon Browning is a(n) 55 year old female who I am seeing for ESRD    Resting in bed      Objective:   /75 (BP Location: Right arm)   Pulse 82   Temp 98.1 °F (36.7 °C) (Oral)   Resp 15   Ht 5' (1.524 m)   Wt 134 lb 14.7 oz (61.2 kg)   SpO2 90%   BMI 26.35 kg/m²      Intake/Output Summary (Last 24 hours) at 2025 0847  Last data filed at 2025 1600  Gross per 24 hour   Intake 100 ml   Output 2 ml   Net 98 ml     Wt Readings from Last 1 Encounters:   25 134 lb 14.7 oz (61.2 kg)       Exam  Gen: No acute distress, Heent: NC AT, mucous memb clear, neck supple  Pulm: Lungs clear, normal respiratory effort  CV: Heart with regular rate and rhythm, no edema  Abd: Abdomen soft, nontender, nondistended, no organomegaly, bowel sounds present  Skin: no symptoms reported  Psych: alert and oriented    Assessment and Plan:     1 - ESRD  The patient receives  dialysis and was dialyzed yesterday     The patient told me today that she goes to the [place] dialysis unit  and Friday and since for 3 hours     2 -Vtach /CHFrEF  Per cardiology.  She is on amiodarone     3 - pancytopenia  On Epogen               Results:     Recent Labs   Lab 06/15/25  0404 25  0741 25  0312   RBC 2.50* 2.50* 2.61*   HGB 7.8* 7.6* 7.8*   HCT 25.0* 24.5* 25.0*   .0 98.0 95.8   MCH 31.2 30.4 29.9   MCHC 31.2 31.0 31.2   RDW 19.5* 19.4* 19.1*   NEPRELIM 2.38 1.99 2.30   WBC 3.7* 3.5* 3.6*   .0* 135.0* 145.0*         Recent Labs   Lab 06/15/25  0404 25  0741 25  0312   * 107* 103*   BUN 19 31* 21   CREATSERUM 5.19* 6.82* 4.25*   CA 9.3 9.3 8.8    137  137   K 4.7 5.1 4.3    101 100   CO2 31.0 27.0 29.0          No results found.        PILI TELLO MD  6/17/2025

## 2025-06-17 NOTE — PLAN OF CARE
Problem: CARDIOVASCULAR - ADULT  Goal: Maintains optimal cardiac output and hemodynamic stability  Description: INTERVENTIONS:  - Monitor vital signs, rhythm, and trends  - Monitor for bleeding, hypotension and signs of decreased cardiac output  - Evaluate effectiveness of vasoactive medications to optimize hemodynamic stability  - Monitor arterial and/or venous puncture sites for bleeding and/or hematoma  - Assess quality of pulses, skin color and temperature  - Assess for signs of decreased coronary artery perfusion - ex. Angina  - Evaluate fluid balance, assess for edema, trend weights  Outcome: Progressing  Goal: Absence of cardiac arrhythmias or at baseline  Description: INTERVENTIONS:  - Continuous cardiac monitoring, monitor vital signs, obtain 12 lead EKG if indicated  - Evaluate effectiveness of antiarrhythmic and heart rate control medications as ordered  - Initiate emergency measures for life threatening arrhythmias  - Monitor electrolytes and administer replacement therapy as ordered  Outcome: Progressing     Problem: RESPIRATORY - ADULT  Goal: Achieves optimal ventilation and oxygenation  Description: INTERVENTIONS:  - Assess for changes in respiratory status  - Assess for changes in mentation and behavior  - Position to facilitate oxygenation and minimize respiratory effort  - Oxygen supplementation based on oxygen saturation or ABGs  - Provide Smoking Cessation handout, if applicable  - Encourage broncho-pulmonary hygiene including cough, deep breathe, Incentive Spirometry  - Assess the need for suctioning and perform as needed  - Assess and instruct to report SOB or any respiratory difficulty  - Respiratory Therapy support as indicated  - Manage/alleviate anxiety  - Monitor for signs/symptoms of CO2 retention  Outcome: Progressing

## 2025-06-17 NOTE — PROGRESS NOTES
[facility]  part of [facility]    Progress Note    Shavon Browning Patient Status:  Inpatient    1970 MRN [id number]   Location [facility] 2W/SW Attending Dolly Jose *   Hosp Day # 3 PCP No primary care provider on file.     Chief complaint vt     Subjective:   Shavon Browning is a(n) 55 year old female pt denies cp. Doing well today. Feels better - no c/o     ROS:   No cp, sob   No c/d   No n/v     Objective:   Blood pressure 131/85, pulse 84, temperature 98.2 °F (36.8 °C), temperature source Oral, resp. rate 24, height 5' (1.524 m), weight 134 lb 14.7 oz (61.2 kg), SpO2 100%.      Intake/Output Summary (Last 24 hours) at 2025 1448  Last data filed at 2025 1600  Gross per 24 hour   Intake 100 ml   Output 2 ml   Net 98 ml       Patient Weight(s) for the past 336 hrs:   Weight   25 0700 134 lb 14.7 oz (61.2 kg)   25 0051 131 lb 6.3 oz (59.6 kg)   25 2234 116 lb 13.5 oz (53 kg)           General appearance: alert, appears stated age and cooperative  Pulmonary:  clear to auscultation bilaterally  Cardiovascular: S1, S2 normal, no murmur, click, rub or gallop, regular rate and rhythm  Abdominal: soft, non-tender; bowel sounds normal; no masses,  no organomegaly  Extremities: extremities normal, atraumatic, no cyanosis or edema        Medicines:   Current Hospital Medications[1]    Narcotic Medications            HYDROcodone-acetaminophen 5-325 MG Oral Tab            Ant-Infective Medications            rifAMPin 300 MG Oral Cap                Blood Pressure and Cardiac Medications            amiodarone 200 MG Oral Tab            Medication Infusions[2]        Lab Results   Component Value Date    WBC 3.6 (L) 2025    HGB 7.8 (L) 2025    HCT 25.0 (L) 2025    .0 (L) 2025    CREATSERUM 4.25 (H) 2025    BUN 21 2025     2025    K 4.3 2025     2025    CO2 29.0 2025      (H) 06/17/2025    CA 8.8 06/17/2025    ALB 3.8 06/17/2025    MG 2.3 06/13/2025    PHOS 2.4 06/17/2025       No results found.  EKG 12 Lead  Result Date: 6/17/2025  Normal sinus rhythm LVH with repolarization abnormality ( [place] product , [place]hilt-Alvarado ) Prolonged QT interval or tu fusion, consider myocardial disease, electrolyte imbalance, or drug effects Abnormal ECG Confirmed by Anderson Rodriguez ([id number]) on 6/17/2025 11:55:11 AM    EKG 12 Lead  Result Date: 6/16/2025  Normal sinus rhythm Minimal voltage criteria for LVH, may be normal variant ( [place] product ) ST & T wave abnormality, consider inferior ischemia ST & T wave abnormality, consider anterolateral ischemia Prolonged QT interval or tu fusion, consider myocardial disease, electrolyte imbalance, or drug effects Abnormal ECG When compared with ECG of 13-JUN-2025 22:39, ST no longer depressed in Anterior leads T wave inversion now evident in anterolateral leads Confirmed by juan pablo oviedo ([id number]) on 6/16/2025 3:51:15 PM      Results:     CBC:    Lab Results   Component Value Date    WBC 3.6 (L) 06/17/2025    WBC 3.5 (L) 06/16/2025    WBC 3.7 (L) 06/15/2025     Lab Results   Component Value Date    HGB 7.8 (L) 06/17/2025    HGB 7.6 (L) 06/16/2025    HGB 7.8 (L) 06/15/2025      Lab Results   Component Value Date    .0 (L) 06/17/2025    .0 (L) 06/16/2025    .0 (L) 06/15/2025       Recent Labs   Lab 06/15/25  0404 06/16/25  0741 06/17/25  0312   * 107* 103*   BUN 19 31* 21   CREATSERUM 5.19* 6.82* 4.25*   CA 9.3 9.3 8.8    137 137   K 4.7 5.1 4.3    101 100   CO2 31.0 27.0 29.0           Assessment and Plan:       Sustained pulseless V. tach  Requiring defibrillation, given amiodarone in the field, currently on lidocaine drip now off  Cardiology has been consulted, device to be interrogated  Apprec cards consult - now off lidocaine drip and on amiodarone. Monitor on tele   Echo repeated, dr garcia to see - repeat echo  with ef 21%reversible restrictive pattern, decreased left ventricular diastolic compliance or increase left atrial pressure grade 3 dd , mod mr, mod tr , pulm pressure markedly elevated   H/o idiopathic dilated cmy ef 25%  Interrogation showing 6 shocks of vt/vf 6/13  Dc'ed from [facility] Friday  Qt prolonged on amio. EKG repeated. Decrease amio per cards today     Syncope  Secondary to V. tach, responded to meds, unclear if shock was delivered.  Will continue to monitor closely; interrogation to be done      Chronic systolic heart failure  No signs of acute exacerbation, EF 25%.  Resume home meds.     ESRD  Nephrology consulted, dialysis days M/W/F, electrolytes currently stable.     Latent TB  Continue rifampin      Prolonged QT syndrome  Now on amio cont to monitor on tele      Prophylaxis  Subcutaneous heparin     CODE STATUS  Full               Dolly Martinez,          Chart reviewed, including current vitals, notes, labs and imaging  Labs ordered and medications adjusted as outlined above  Coordinate care with care team/consultants  Discussed with patient results of tests, management plan as outlined above, and the need for ongoing hospitalization  D/w RN     MDM high              Supplementary Documentation:   DVT Mechanical Prophylaxis:        DVT Pharmacologic Prophylaxis   Medication    [START ON 6/18/2025] heparin (Porcine) 1000 UNIT/ML injection 1,500 Units    heparin (Porcine) 5000 UNIT/ML injection 5,000 Units                Code Status: Not on file  Cat: No urinary catheter in place  Cat Duration (in days):   Central line:    HEMALATHA:                           [1]   Current Facility-Administered Medications   Medication Dose Route Frequency    [START ON 6/18/2025] amiodarone (Pacerone) tab 200 mg  200 mg Oral Daily    [START ON 6/18/2025] heparin (Porcine) 1000 UNIT/ML injection 1,500 Units  1.5 mL Intracatheter Once    sodium chloride 0.9 % IV bolus 100 mL  100 mL Intravenous Q30 Min PRN     And    albumin human (Albumin) 25% injection 25 g  25 g Intravenous PRN Dialysis    epoetin gabriela (Epogen, Procrit) 5,000 Units injection  5,000 Units Subcutaneous Once per day on Monday Wednesday Friday    heparin (Porcine) 5000 UNIT/ML injection 5,000 Units  5,000 Units Subcutaneous 2 times per day    hydrOXYzine (Atarax) tab 25 mg  25 mg Oral TID PRN    rifAMPin (Rifadin) cap 600 mg  600 mg Oral Daily   [2]

## 2025-06-17 NOTE — PROGRESS NOTES
[facility]  part of [facility]    Progress Note    Shavon Browning Patient Status:  Inpatient    1970 MRN [id number]   Location [facility] 2W/SW Attending Dolly Jose *   Hosp Day # 3 PCP No primary care provider on file.       Subjective:   Subjective:  Patient was seen and examined  Comfortable on room air  Denied chest pain or dyspnea or cough  No fever  No abdominal pain  No pressors requirement  Objective:   Blood pressure 117/75, pulse 82, temperature 98.1 °F (36.7 °C), temperature source Oral, resp. rate 15, height 5' (1.524 m), weight 134 lb 14.7 oz (61.2 kg), SpO2 90%.  Physical Exam  Vitals and nursing note reviewed.   Constitutional:       General: She is not in acute distress.     Appearance: She is ill-appearing.   HENT:      Head: Atraumatic.      Nose: Nose normal.      Mouth/Throat:      Mouth: Mucous membranes are moist.   Eyes:      General: No scleral icterus.  Cardiovascular:      Rate and Rhythm: Normal rate.      Heart sounds: Murmur heard.      No gallop.   Pulmonary:      Effort: No respiratory distress.      Breath sounds: No stridor. No wheezing, rhonchi or rales.   Abdominal:      General: Abdomen is flat. Bowel sounds are normal. There is no distension.      Palpations: Abdomen is soft.      Tenderness: There is no guarding.   Musculoskeletal:      Right lower leg: No edema.      Left lower leg: No edema.   Lymphadenopathy:      Cervical: No cervical adenopathy.   Skin:     General: Skin is dry.   Neurological:      General: No focal deficit present.      Mental Status: She is oriented to person, place, and time.         Results:   Lab Results   Component Value Date    WBC 3.6 (L) 2025    HGB 7.8 (L) 2025    HCT 25.0 (L) 2025    .0 (L) 2025    CREATSERUM 4.25 (H) 2025    BUN 21 2025     2025    K 4.3 2025     2025    CO2 29.0 2025     (H) 2025     CA 8.8 06/17/2025    ALB 3.8 06/17/2025    MG 2.3 06/13/2025    PHOS 2.4 06/17/2025    TROPHS 9 06/13/2025       No results found.  EKG 12 Lead  Result Date: 6/16/2025  Normal sinus rhythm Minimal voltage criteria for LVH, may be normal variant ( [place] product ) ST & T wave abnormality, consider inferior ischemia ST & T wave abnormality, consider anterolateral ischemia Prolonged QT interval or tu fusion, consider myocardial disease, electrolyte imbalance, or drug effects Abnormal ECG When compared with ECG of 13-JUN-2025 22:39, ST no longer depressed in Anterior leads T wave inversion now evident in anterolateral leads Confirmed by juan pablo oviedo ([id number]) on 6/16/2025 3:51:15 PM      Assessment & Plan:       1-V-fib/electrical storm status post 6 shocks  Nonischemic cardiomyopathy with known LVEF 20%  Patient with ICD  Amiodarone  Cardiology and EP following    2-nonischemic cardiomyopathy HFrEF LVEF 20 %   Moderate MR and severe pulmonary hypertension    3-ESRD on HD  Renal following    4-pulmonary  Chest x-ray with no acute findings and currently on room air    5-DVT prophylaxis  Heparin subcu              Caty Ritter MD  6/17/2025

## 2025-06-18 LAB
ALBUMIN SERPL-MCNC: 3.7 G/DL (ref 3.2–4.8)
ALT SERPL-CCNC: 26 U/L (ref 10–49)
ANION GAP SERPL CALC-SCNC: 9 MMOL/L (ref 0–18)
AST SERPL-CCNC: 40 U/L (ref ?–34)
ATRIAL RATE: 82 BPM
BASOPHILS # BLD AUTO: 0.04 X10(3) UL (ref 0–0.2)
BASOPHILS NFR BLD AUTO: 1.4 %
BUN BLD-MCNC: 34 MG/DL (ref 9–23)
BUN/CREAT SERPL: 5.4 (ref 10–20)
CALCIUM BLD-MCNC: 9.3 MG/DL (ref 8.7–10.4)
CHLORIDE SERPL-SCNC: 101 MMOL/L (ref 98–112)
CO2 SERPL-SCNC: 27 MMOL/L (ref 21–32)
CREAT BLD-MCNC: 6.34 MG/DL (ref 0.55–1.02)
DEPRECATED RDW RBC AUTO: 66.3 FL (ref 35.1–46.3)
EGFRCR SERPLBLD CKD-EPI 2021: 7 ML/MIN/1.73M2 (ref 60–?)
EOSINOPHIL # BLD AUTO: 0.15 X10(3) UL (ref 0–0.7)
EOSINOPHIL NFR BLD AUTO: 5.4 %
ERYTHROCYTE [DISTWIDTH] IN BLOOD BY AUTOMATED COUNT: 18.8 % (ref 11–15)
GLUCOSE BLD-MCNC: 92 MG/DL (ref 70–99)
HCT VFR BLD AUTO: 24 % (ref 35–48)
HGB BLD-MCNC: 7.8 G/DL (ref 12–16)
IMM GRANULOCYTES # BLD AUTO: 0 X10(3) UL (ref 0–1)
IMM GRANULOCYTES NFR BLD: 0 %
LYMPHOCYTES # BLD AUTO: 0.7 X10(3) UL (ref 1–4)
LYMPHOCYTES NFR BLD AUTO: 25.4 %
MCH RBC QN AUTO: 31.2 PG (ref 26–34)
MCHC RBC AUTO-ENTMCNC: 32.5 G/DL (ref 31–37)
MCV RBC AUTO: 96 FL (ref 80–100)
MONOCYTES # BLD AUTO: 0.27 X10(3) UL (ref 0.1–1)
MONOCYTES NFR BLD AUTO: 9.8 %
NEUTROPHILS # BLD AUTO: 1.6 X10 (3) UL (ref 1.5–7.7)
NEUTROPHILS # BLD AUTO: 1.6 X10(3) UL (ref 1.5–7.7)
NEUTROPHILS NFR BLD AUTO: 58 %
OSMOLALITY SERPL CALC.SUM OF ELEC: 291 MOSM/KG (ref 275–295)
P AXIS: 7 DEGREES
P-R INTERVAL: 152 MS
PHOSPHATE SERPL-MCNC: 3.2 MG/DL (ref 2.4–5.1)
PLATELET # BLD AUTO: 139 10(3)UL (ref 150–450)
POTASSIUM SERPL-SCNC: 4.5 MMOL/L (ref 3.5–5.1)
Q-T INTERVAL: 446 MS
QRS DURATION: 88 MS
QTC CALCULATION (BEZET): 521 MS
R AXIS: -11 DEGREES
RBC # BLD AUTO: 2.5 X10(6)UL (ref 3.8–5.3)
SODIUM SERPL-SCNC: 137 MMOL/L (ref 136–145)
T AXIS: 161 DEGREES
T4 FREE SERPL-MCNC: 1.3 NG/DL (ref 0.8–1.7)
TSI SER-ACNC: 8.79 UIU/ML (ref 0.55–4.78)
VENTRICULAR RATE: 82 BPM
WBC # BLD AUTO: 2.8 X10(3) UL (ref 4–11)

## 2025-06-18 PROCEDURE — 99233 SBSQ HOSP IP/OBS HIGH 50: CPT | Performed by: HOSPITALIST

## 2025-06-18 PROCEDURE — 99233 SBSQ HOSP IP/OBS HIGH 50: CPT | Performed by: INTERNAL MEDICINE

## 2025-06-18 RX ORDER — ALBUMIN (HUMAN) 12.5 G/50ML
25 SOLUTION INTRAVENOUS
Status: DISCONTINUED | OUTPATIENT
Start: 2025-06-20 | End: 2025-06-20

## 2025-06-18 NOTE — PROGRESS NOTES
Pulmonary Medicine Inpatient Progress Note                 Subjective:  In HD this am with plan for 3 liters removal  On RA afebrile  Denies respiratory complaints     ALLERGIES:  Allergies[1]     MEDS:  Home Medications:  Medications Taking[2]  Scheduled Medication:  Scheduled Medications[3]  Continuous Infusing Medication:  Medication Infusions[4]  PRN Medications:  PRN Medications[5]       PHYSICAL EXAM:  /72 (BP Location: Right arm)   Pulse 85   Temp 97.6 °F (36.4 °C) (Oral)   Resp 18   Ht 5' (1.524 m)   Wt 134 lb 14.7 oz (61.2 kg)   SpO2 98%   BMI 26.35 kg/m²   CONSTITUTIONAL: alert, oriented, no apparent distress  HEENT: atraumatic normocephalic  MOUTH: mucous membranes are moist. No OP exudates  NECK/THROAT: no JVD. Trachea midline. No obvious thyromegaly  LUNG: clear b/l no wheezing, crackles. Chest symmetric with respiratory motion  HEART: regular rate and rhythm, no obvious murmers or gallops note  ABD: soft non tender. + bowel sounds. No organomegaly noted  EXT: no clubbing, cyanosis, or edema noted       IMAGES:  CXR 6/13/25  Stable cardiomegaly.  No acute pulmonary process.       LABS:  Recent Labs   Lab 06/16/25  0741 06/17/25 0312 06/18/25  0714   RBC 2.50* 2.61* 2.50*   HGB 7.6* 7.8* 7.8*   HCT 24.5* 25.0* 24.0*   MCV 98.0 95.8 96.0   MCH 30.4 29.9 31.2   MCHC 31.0 31.2 32.5   RDW 19.4* 19.1* 18.8*   NEPRELIM 1.99 2.30 1.60   WBC 3.5* 3.6* 2.8*   .0* 145.0* 139.0*       Recent Labs   Lab 06/16/25  0741 06/17/25 0312 06/18/25  0714   * 103* 92   BUN 31* 21 34*   CREATSERUM 6.82* 4.25* 6.34*   EGFRCR 7* 12* 7*   CA 9.3 8.8 9.3   ALB 3.8 3.8 3.7    137 137   K 5.1 4.3 4.5    100 101   CO2 27.0 29.0 27.0          ASSESSMENT/PLAN:  V-fib storm s/p 6 shocks  -NICM with LVEF 20%. Pt has ICD  -cards and EP managing  -on amio    NICM, severe pulm HTN  -EF 20%  -cardiac optimization as per cards    ESRD  -HD as per renal    Proph  -DVT: hep     Dispo  -full  code      Pt's acute pulmonary and critical care issues have significantly improved. Therefore, our service will sign off at this time. Please don't hesitate to contact us if there are questions or concerns we can address or the patient's clinical status changes which requires our service's attention.       Thank you for the opportunity to care for Shavon Alexander     IRASEMA Teixeira DO, MPH  Pulmonary Critical Care Medicine  Located within [facility] Pulmonary and Critical Care Medicine          [1] Not on File  [2]   Outpatient Medications Marked as Taking for the 6/13/25 encounter (Hospital Encounter)   Medication Sig Dispense Refill    rifAMPin 300 MG Oral Cap Take 2 capsules (600 mg total) by mouth daily.      HYDROcodone-acetaminophen 5-325 MG Oral Tab Take 1 tablet by mouth every 6 (six) hours as needed for Pain.      amiodarone 200 MG Oral Tab Take 1 tablet (200 mg total) by mouth Every Monday, Wednesday, and Friday.      Lanthanum Carbonate 1000 MG Oral Chew Tab Chew 1 tablet (1,000 mg total) by mouth 3 (three) times daily with meals.     [3]    amiodarone  200 mg Oral Daily    heparin  1.5 mL Intracatheter Once    epoetin gabriela  5,000 Units Subcutaneous Once per day on Monday Wednesday Friday    heparin  5,000 Units Subcutaneous 2 times per day    rifAMPin  600 mg Oral Daily   [4] [5]   [START ON 6/20/2025] sodium chloride **AND** [START ON 6/20/2025] albumin human    hydrOXYzine

## 2025-06-18 NOTE — PAYOR COMM NOTE
--------------  CONTINUED STAY REVIEW    Payor: [facility]  Subscriber #:  [id number]  Authorization Number: [id number]    Admit date: 6/14/25  Admit time: 12:37 AM    6/18  Pulmonary Medicine Inpatient Progress Note                 Subjective:  In HD this am with plan for 3 liters removal  On RA afebrile  Denies respiratory complaints     ALLERGIES:  [Allergies]    [Allergies]  Not on File     MEDS:  Home Medications:  [Medications Taking]    [Medications Taking]         Outpatient Medications Marked as Taking for the 6/13/25 encounter (Hospital Encounter)   Medication Sig Dispense Refill    rifAMPin 300 MG Oral Cap Take 2 capsules (600 mg total) by mouth daily.        HYDROcodone-acetaminophen 5-325 MG Oral Tab Take 1 tablet by mouth every 6 (six) hours as needed for Pain.        amiodarone 200 MG Oral Tab Take 1 tablet (200 mg total) by mouth Every Monday, Wednesday, and Friday.        Lanthanum Carbonate 1000 MG Oral Chew Tab Chew 1 tablet (1,000 mg total) by mouth 3 (three) times daily with meals.         Scheduled Medication:  [Scheduled Medications]    [Scheduled Medications]   amiodarone  200 mg Oral Daily    heparin  1.5 mL Intracatheter Once    epoetin gabriela  5,000 Units Subcutaneous Once per day on Monday Wednesday Friday    heparin  5,000 Units Subcutaneous 2 times per day    rifAMPin  600 mg Oral Daily     Continuous Infusing Medication:  [Medication Infusions]    [Medication Infusions]  PRN Medications:  [PRN Medications]     [PRN Medications]    [START ON 6/20/2025] sodium chloride **AND** [START ON 6/20/2025] albumin human    hydrOXYzine        PHYSICAL EXAM:  /72 (BP Location: Right arm)   Pulse 85   Temp 97.6 °F (36.4 °C) (Oral)   Resp 18   Ht 5' (1.524 m)   Wt 134 lb 14.7 oz (61.2 kg)   SpO2 98%   BMI 26.35 kg/m²   CONSTITUTIONAL: alert, oriented, no apparent distress  HEENT: atraumatic normocephalic  MOUTH: mucous membranes are moist. No OP  exudates  NECK/THROAT: no JVD. Trachea midline. No obvious thyromegaly  LUNG: clear b/l no wheezing, crackles. Chest symmetric with respiratory motion  HEART: regular rate and rhythm, no obvious murmers or gallops note  ABD: soft non tender. + bowel sounds. No organomegaly noted  EXT: no clubbing, cyanosis, or edema noted        IMAGES:  CXR 6/13/25  Stable cardiomegaly.  No acute pulmonary process.         LABS:        Recent Labs   Lab 06/16/25  0741 06/17/25 0312 06/18/25  0714   RBC 2.50* 2.61* 2.50*   HGB 7.6* 7.8* 7.8*   HCT 24.5* 25.0* 24.0*   MCV 98.0 95.8 96.0   MCH 30.4 29.9 31.2   MCHC 31.0 31.2 32.5   RDW 19.4* 19.1* 18.8*   NEPRELIM 1.99 2.30 1.60   WBC 3.5* 3.6* 2.8*   .0* 145.0* 139.0*               Recent Labs   Lab 06/16/25  0741 06/17/25 0312 06/18/25  0714   * 103* 92   BUN 31* 21 34*   CREATSERUM 6.82* 4.25* 6.34*   EGFRCR 7* 12* 7*   CA 9.3 8.8 9.3   ALB 3.8 3.8 3.7    137 137   K 5.1 4.3 4.5    100 101   CO2 27.0 29.0 27.0            ASSESSMENT/PLAN:  V-fib storm s/p 6 shocks  -NICM with LVEF 20%. Pt has ICD  -cards and EP managing  -on amio     NICM, severe pulm HTN  -EF 20%  -cardiac optimization as per cards     ESRD  -HD as per renal     Proph  -DVT: hep      Dispo  -full code        Pt's acute pulmonary and critical care issues have significantly improved. Therefore, our service will sign off at this time. Please don't hesitate to contact us if there are questions or concerns we can address or the patient's clinical status changes which requires our service's attention.        Thank you for the opportunity to care for Shavon Terrazasdarion Teixeira DO, MPH  Pulmonary Critical Care Medicine  [facility]monary and Critical Care Medicine                Electronically signed by Jeffrey Teixeira DO at 6/18/2025 10:37 AM

## 2025-06-18 NOTE — PROGRESS NOTES
Progress Note  Shavon Terrazasvar Patient Status:  Inpatient    1970 MRN [id number]   Location [facility] 3W/SW Attending Marcin Christopher,*   Hosp Day # 4 PCP No primary care provider on file.     SUBJECTIVE:    Patient seen during HD. ROS completed with facility .  Denies chest pain or dyspnea.     VITALS:  /72 (BP Location: Right arm)   Pulse 85   Temp 97.6 °F (36.4 °C) (Oral)   Resp 18   Ht 5' (1.524 m)   Wt 134 lb 14.7 oz (61.2 kg)   SpO2 98%   BMI 26.35 kg/m²   INTAKE/OUTPUT:    Intake/Output Summary (Last 24 hours) at 2025 1110  Last data filed at 2025 0900  Gross per 24 hour   Intake 710 ml   Output --   Net 710 ml     Last 3 Weights   25 0700 134 lb 14.7 oz (61.2 kg)   25 0051 131 lb 6.3 oz (59.6 kg)   25 2234 116 lb 13.5 oz (53 kg)     LABS:  Recent Labs   Lab 25  0741 25  0312 25  0714   * 103* 92   BUN 31* 21 34*   CREATSERUM 6.82* 4.25* 6.34*   EGFRCR 7* 12* 7*   CA 9.3 8.8 9.3    137 137   K 5.1 4.3 4.5    100 101   CO2 27.0 29.0 27.0     Recent Labs   Lab 25  0741 25  0312 25  0714   RBC 2.50* 2.61* 2.50*   HGB 7.6* 7.8* 7.8*   HCT 24.5* 25.0* 24.0*   MCV 98.0 95.8 96.0   MCH 30.4 29.9 31.2   MCHC 31.0 31.2 32.5   RDW 19.4* 19.1* 18.8*   NEPRELIM 1.99 2.30 1.60   WBC 3.5* 3.6* 2.8*   .0* 145.0* 139.0*     No results for input(s): \"TROP\", \"CK\" in the last 168 hours.  DIAGNOSTICS:  TELEMETRY:      ECHO 1970:  Conclusions:   1. Left ventricle: The cavity size was increased. Wall thickness was normal.      Systolic function was markedly reduced. The estimated ejection fraction      was 21%, by biplane method of disks. There was severe diffuse      hypokinesis. Doppler parameters are consistent with a reversible      restrictive pattern, indicative of decreased left ventricular diastolic      compliance and/or increased left atrial pressure - grade 3 diastolic       dysfunction.   2. Left atrium: The left atrial volume was markedly increased.   3. Mitral valve: There was moderate regurgitation, with multiple jets. The      regurgitant vena contracta width was 0.5cm.   4. Tricuspid valve: There was moderate regurgitation.   5. Pulmonary arteries: Systolic pressure was markedly increased, estimated      to be 90mm Hg.   6. Pericardium, extracardiac: There was a right pleural effusion.   Impressions:  No previous study from [facility] was   available for comparison.     ROS: Negative unless noted above   PHYSICAL EXAM:  General: Alert and oriented x 3. No apparent distress.  HEENT: Normocephalic, sclera are icteric. Hearing appropriate bilaterally.  Neck: No JVD or Carotid bruits. Trachea midline.   Cardiac: Regular rate and rhythm. S1, S2 auscultated. No murmurs, rubs, or gallops appreciated.   Lungs: Clear without wheezes, rales, rhonchi or dullness. Chest expansion symmetrical. Regular effort.  Abdomen: Soft, non-tender, +BS. No hepatosplenomegaly or appreciable masses.   Extremities: Without clubbing, cyanosis. Peripheral pulses are 2+. Edema   Neurologic: Motor and sensory nerves grossly intact.   Psych: Appropriate affect   Skin: Warm and dry. No obvious lesions, wounds, or ulcerations.     MEDICATIONS:  Scheduled Medications[1]  Medication Infusions[2]    ASSESSMENT:    VT/VF s/p 6 Shocks on 6/13/25: [facility]- ICD  - C 10/2025 at [facility] without obstructive disease   - s/p Lidocaine gtt, now on oral Amiodarone   - EKG with prolonged Qtc earlier this stay therefore Amio was decreased   - Bedside tele stable     Chronic HFrEF, LVEF 20%, NICM   Mod MR & TR   ESRD on HD: M-W-F  Hx Left Radical Nephrectomy 2022  Chronic Anemia   - Nephro following, volume with HD  - Not currently medical therapy for NICM   - o/p on Midodrine for HD.  No ACE/ARB/ARNI/ MRA with ESRD  - Being considered for Heart and Kidney Transplant through [facility]    Latent TB  - Rifampin      PLAN:  - Check EKG to assess Qtc   - Check baseline TSH and LFTs with the addition of Amiodarone   - Bps have been stable here start lopressor; likely trial nitrates tomorrow   - She has a f/u appt with [facility] on June 25th     Plan of care discussed with patient and RN.     Janine Caldera, MSN, FNP-BC, CCK  06/18/25   11:10 AM           [1]    amiodarone  200 mg Oral Daily    heparin  1.5 mL Intracatheter Once    epoetin gabriela  5,000 Units Subcutaneous Once per day on Monday Wednesday Friday    heparin  5,000 Units Subcutaneous 2 times per day    rifAMPin  600 mg Oral Daily   [2]

## 2025-06-18 NOTE — PAYOR COMM NOTE
--------------  CONTINUED STAY REVIEW    Payor: [facility]  Subscriber #:  [id number]  Authorization Number: [id number]    Admit date: 6/14/25  Admit time: 12:37 AM    6/17/25           Blood pressure 131/85, pulse 84, temperature 98.2 °F (36.8 °C), temperature source Oral, resp. rate 24, height 5' (1.524 m), weight 134 lb 14.7 oz (61.2 kg), SpO2 100%.      General appearance: alert, appears stated age and cooperative  Pulmonary:  clear to auscultation bilaterally  Cardiovascular: S1, S2 normal, no murmur, click, rub or gallop, regular rate and rhythm  Abdominal: soft, non-tender; bowel sounds normal; no masses,  no organomegaly  Extremities: extremities normal, atraumatic, no cyanosis or edema    Lab Results   Component Value Date     WBC 3.6 (L) 06/17/2025     HGB 7.8 (L) 06/17/2025     HCT 25.0 (L) 06/17/2025     .0 (L) 06/17/2025     CREATSERUM 4.25 (H) 06/17/2025     BUN 21 06/17/2025      06/17/2025     K 4.3 06/17/2025      06/17/2025     CO2 29.0 06/17/2025      (H) 06/17/2025     CA 8.8 06/17/2025     ALB 3.8 06/17/2025     MG 2.3 06/13/2025     PHOS 2.4 06/17/2025      EKG 12 Lead  Result Date: 6/17/2025  Normal sinus rhythm LVH with repolarization abnormality ( [place] product , Romhilt-Alvarado ) Prolonged QT interval or tu fusion, consider myocardial disease, electrolyte imbalance, or drug effects Abnormal ECG Confirmed by Anderson Rodriguez ([id number]) on 6/17/2025 11:55:11 AM     EKG 12 Lead  Result Date: 6/16/2025  Normal sinus rhythm Minimal voltage criteria for LVH, may be normal variant ( Rodriguez product ) ST & T wave abnormality, consider inferior ischemia ST & T wave abnormality, consider anterolateral ischemia Prolonged QT interval or tu fusion, consider myocardial disease, electrolyte imbalance, or drug effects Abnormal ECG When compared with ECG of 13-JUN-2025 22:39, ST no longer depressed in Anterior leads T wave inversion now evident in  anterolateral leads Confirmed by juan pablo oviedo ([id number]) on 6/16/2025 3:51:15 PM     CBC:          Lab Results   Component Value Date     WBC 3.6 (L) 06/17/2025     WBC 3.5 (L) 06/16/2025     WBC 3.7 (L) 06/15/2025      Lab Results   Component Value Date     HGB 7.8 (L) 06/17/2025     HGB 7.6 (L) 06/16/2025     HGB 7.8 (L) 06/15/2025            Lab Results   Component Value Date     .0 (L) 06/17/2025     .0 (L) 06/16/2025     .0 (L) 06/15/2025      Lab 06/15/25  0404 06/16/25  0741 06/17/25  0312   * 107* 103*   BUN 19 31* 21   CREATSERUM 5.19* 6.82* 4.25*   CA 9.3 9.3 8.8    137 137   K 4.7 5.1 4.3    101 100   CO2 31.0 27.0 29.0       Assessment and Plan:       Sustained pulseless V. tach  Requiring defibrillation, given amiodarone in the field, currently on lidocaine drip now off  Cardiology has been consulted, device to be interrogated  Apprec cards consult - now off lidocaine drip and on amiodarone. Monitor on tele   Echo repeated, dr garcia to see - repeat echo with ef 21%reversible restrictive pattern, decreased left ventricular diastolic compliance or increase left atrial pressure grade 3 dd , mod mr, mod tr , pulm pressure markedly elevated   H/o idiopathic dilated cmy ef 25%  Interrogation showing 6 shocks of vt/vf 6/13  Dc'ed from [facility] Friday  Qt prolonged on amio. EKG repeated. Decrease amio per cards today     Syncope  Secondary to V. tach, responded to meds, unclear if shock was delivered.  Will continue to monitor closely; interrogation to be done      Chronic systolic heart failure  No signs of acute exacerbation, EF 25%.  Resume home meds.     ESRD  Nephrology consulted, dialysis days M/W/F, electrolytes currently stable.     Latent TB  Continue rifampin      Prolonged QT syndrome  Now on amio cont to monitor on tele      Prophylaxis  Subcutaneous heparin                    CARDIOLOGY  Assessment and Plan:      Ventricular Fibrillation / Electrical Storm  (status post 6 shocks)  -Electrical storm likely secondary to advanced cardiomyopathy  -Recent LHC in 10/2025 without obstructive CAD  -Underwent 6 appropriate ICD shocks for VT/VF, occurred on 6/13/25 - all terminating VF temporarily until IV lidocaine  -No longer on IV lidocaine, on PO amiodarone loading                 -ECG this AM with QTc 548ms, will decrease amiodarone 200mg once daily  -Telemetry monitoring to assess for recurrence  -Appreciate EP input      HFrEF  Severe pulmonary hypertension  -Chronic HFrEF, EF 20-25%  -Moderate MR and severe pulmonary hypertension on echo  -Likely secondary to chronic LV dysfunction  -Stable hemodynamics  -Volume status to be managed carefully given ESRD  -Continue guideline-directed medical therapy (GDMT) as tolerated        PULMONOLOGY  Assessment & Plan:       1-V-fib/electrical storm status post 6 shocks  Nonischemic cardiomyopathy with known LVEF 20%  Patient with ICD  Amiodarone  Cardiology and EP following     2-nonischemic cardiomyopathy HFrEF LVEF 20 %   Moderate MR and severe pulmonary hypertension     3-ESRD on HD  Renal following     4-pulmonary  Chest x-ray with no acute findings and currently on room air     5-DVT prophylaxis  Heparin subcu          MEDICATIONS ADMINISTERED IN LAST 1 DAY:  amiodarone (Pacerone) tab 200 mg       Date Action Dose Route User    6/17/2025 0842 Given 200 mg Oral Nadeem Sy RN          heparin (Porcine) 5000 UNIT/ML injection 5,000 Units       Date Action Dose Route User    6/17/2025 2030 Given 5,000 Units Subcutaneous (Right Lower Abdomen) Eunice Villagran RN    6/17/2025 0842 Given 5,000 Units Subcutaneous (Right Lower Abdomen) Nadeem Sy RN          rifAMPin (Rifadin) cap 600 mg       Date Action Dose Route User    6/17/2025 0849 Given 600 mg Oral Nadeem Sy RN            Vitals (last day)       Date/Time Temp Pulse Resp BP SpO2 Weight O2 Device O2 Flow Rate (L/min) Who    06/18/25 0517 98.4 °F (36.9 °C) 82 18 125/79 96 %  -- None (Room air) --     06/18/25 0057 98.5 °F (36.9 °C) 79 18 121/71 97 % -- None (Room air) -- SS    06/17/25 2019 98.3 °F (36.8 °C) 82 16 119/79 100 % -- None (Room air) -- SS    06/17/25 1850 98.4 °F (36.9 °C) 84 16 131/86 100 % -- None (Room air) --     06/17/25 1800 -- 83 16 123/82 100 % -- None (Room air) --     06/17/25 1700 -- 83 23 129/91 100 % -- -- --     06/17/25 1600 -- 80 16 133/78 100 % -- None (Room air) --     06/17/25 1500 -- 84 26 124/86 100 % -- None (Room air) --     06/17/25 1400 -- 98 19 124/80 94 % -- None (Room air) --     06/17/25 1300 -- 86 19 133/80 99 % -- None (Room air) --     06/17/25 1200 98.2 °F (36.8 °C) 84 24 131/85 100 % -- None (Room air) --     06/17/25 1100 -- 83 17 122/77 100 % -- None (Room air) --     06/17/25 1000 -- 78 24 122/79 94 % -- None (Room air) --     06/17/25 0900 -- 85 19 133/78 98 % -- None (Room air) -- FS    06/17/25 0800 98.1 °F (36.7 °C) 82 15 117/75 90 % -- None (Room air) --     06/17/25 0700 -- 79 14 130/83 98 % -- None (Room air) -- FS    06/17/25 0400 98.1 °F (36.7 °C) 79 16 125/78 96 % -- None (Room air) -- SP    06/17/25 0000 97.6 °F (36.4 °C) 85 12 125/82 94 % -- None (Room air) -- SP

## 2025-06-18 NOTE — PROGRESS NOTES
[facility]  part of [facility]    Progress Note    Shavon Browning Patient Status:  Inpatient    1970 MRN [id number]   Location [facility] 3W/SW Attending Marcin Christopher,*   Hosp Day # 4 PCP No primary care provider on file.       Subjective:   Shavon Browning is a(n) 55 year old female who I am seeing for ESRD      Sitting up in bed    Objective:   /72 (BP Location: Right arm)   Pulse 85   Temp 97.6 °F (36.4 °C) (Oral)   Resp 18   Ht 5' (1.524 m)   Wt 134 lb 14.7 oz (61.2 kg)   SpO2 98%   BMI 26.35 kg/m²      Intake/Output Summary (Last 24 hours) at 2025 0935  Last data filed at 2025 0900  Gross per 24 hour   Intake 710 ml   Output --   Net 710 ml     Wt Readings from Last 1 Encounters:   25 134 lb 14.7 oz (61.2 kg)       Exam  Gen: No acute distress, Heent: NC AT, mucous memb clear, neck supple  Pulm: Lungs clear, normal respiratory effort  CV: Heart with regular rate and rhythm, no edema  Abd: Abdomen soft, nontender, nondistended, no organomegaly, bowel sounds present  Skin: no symptoms reported  Psych: alert and oriented    Assessment and Plan:       1 - ESRD  The patient receives  dialysis.  Plan dialysis again today.  She should not miss any treatments as it is a life-sustaining modality     The patient told me that she goes to the [facility] dialysis unit  and Friday     2 -Vtach /CHFrEF  Per cardiology.  She is on amiodarone     3 - pancytopenia  On Epogen               Results:     Recent Labs   Lab 25  0741 25  0312 25  0714   RBC 2.50* 2.61* 2.50*   HGB 7.6* 7.8* 7.8*   HCT 24.5* 25.0* 24.0*   MCV 98.0 95.8 96.0   MCH 30.4 29.9 31.2   MCHC 31.0 31.2 32.5   RDW 19.4* 19.1* 18.8*   NEPRELIM 1.99 2.30 1.60   WBC 3.5* 3.6* 2.8*   .0* 145.0* 139.0*         Recent Labs   Lab 25  0741 25  0312 25  0714   * 103* 92   BUN 31* 21 34*   CREATSERUM  6.82* 4.25* 6.34*   CA 9.3 8.8 9.3    137 137   K 5.1 4.3 4.5    100 101   CO2 27.0 29.0 27.0          No results found.        PILI TELLO MD  6/18/2025

## 2025-06-18 NOTE — PROGRESS NOTES
[facility]  part of [facility]    Progress Note    Shavon Browning Patient Status:  Inpatient    1970 MRN [id number]   Location [facility] 3W/SW Attending Marcin Christopher,*   Hosp Day # 4 PCP No primary care provider on file.     Chief Complaint: ok    Subjective:     Constitutional:  Negative for appetite change and chills.   HENT:  Negative for congestion.    Respiratory:  Negative for chest tightness and shortness of breath.    Cardiovascular:  Negative for leg swelling.   Gastrointestinal:  Negative for abdominal pain.   Genitourinary:  Negative for dysuria.   Musculoskeletal:  Negative for joint swelling and joint pain.   Neurological:  Negative for syncope and light-headedness.   Psychiatric/Behavioral:  Negative for decreased concentration. The patient is not nervous/anxious.        Objective:   Blood pressure 128/72, pulse 85, temperature 97.6 °F (36.4 °C), temperature source Oral, resp. rate 18, height 5' (1.524 m), weight 134 lb 14.7 oz (61.2 kg), SpO2 98%.  Physical Exam  Vitals and nursing note reviewed.   Constitutional:       Appearance: She is normal weight.   HENT:      Head: Normocephalic and atraumatic.   Cardiovascular:      Rate and Rhythm: Normal rate and regular rhythm.      Pulses: Normal pulses.   Pulmonary:      Effort: Pulmonary effort is normal.      Breath sounds: Rhonchi present.   Abdominal:      General: Bowel sounds are normal.      Palpations: Abdomen is soft.      Tenderness: There is no abdominal tenderness.   Skin:     General: Skin is warm and dry.      Capillary Refill: Capillary refill takes less than 2 seconds.   Neurological:      General: No focal deficit present.      Mental Status: She is alert and oriented to person, place, and time.   Psychiatric:         Behavior: Behavior normal.         Results:   Lab Results   Component Value Date    WBC 2.8 (L) 2025    HGB 7.8 (L) 2025    HCT 24.0 (L) 2025    PLT  139.0 (L) 06/18/2025    CREATSERUM 6.34 (H) 06/18/2025    BUN 34 (H) 06/18/2025     06/18/2025    K 4.5 06/18/2025     06/18/2025    CO2 27.0 06/18/2025    GLU 92 06/18/2025    CA 9.3 06/18/2025    ALB 3.7 06/18/2025    MG 2.3 06/13/2025    PHOS 3.2 06/18/2025    TROPHS 9 06/13/2025       No results found.  EKG 12 Lead  Result Date: 6/17/2025  Normal sinus rhythm LVH with repolarization abnormality ( Rodriguez product , Romhilt-Alvarado ) Prolonged QT interval or tu fusion, consider myocardial disease, electrolyte imbalance, or drug effects Abnormal ECG Confirmed by Anderson Rodriguez ([id number]) on 6/17/2025 11:55:11 AM    EKG 12 Lead  Result Date: 6/16/2025  Normal sinus rhythm Minimal voltage criteria for LVH, may be normal variant ( Rodriguez product ) ST & T wave abnormality, consider inferior ischemia ST & T wave abnormality, consider anterolateral ischemia Prolonged QT interval or tu fusion, consider myocardial disease, electrolyte imbalance, or drug effects Abnormal ECG When compared with ECG of 13-JUN-2025 22:39, ST no longer depressed in Anterior leads T wave inversion now evident in anterolateral leads Confirmed by juan pablo oviedo ([id number]) on 6/16/2025 3:51:15 PM      Assessment & Plan:     Sustained pulseless V. tach  Requiring defibrillation, given amiodarone in the field, currently on lidocaine drip now off  Cardiology has been consulted, device to be interrogated  Apprec cards consult - now off lidocaine drip and on amiodarone. Monitor on tele   Echo repeated, dr garcia to see - repeat echo with ef 21%reversible restrictive pattern, decreased left ventricular diastolic compliance or increase left atrial pressure grade 3 dd , mod mr, mod tr , pulm pressure markedly elevated   H/o idiopathic dilated cmy ef 25%  Interrogation showing 6 shocks of vt/vf 6/13  Dc'ed from [facility] Friday  Qt prolonged on amio. EKG repeated. Decrease amio per cards today     Syncope  Secondary to V. tach, responded to meds, unclear if  shock was delivered.  Will continue to monitor closely; interrogation to be done      Chronic systolic heart failure  No signs of acute exacerbation, EF 25%.  Resume home meds.     ESRD  Nephrology consulted, dialysis days M/W/F, electrolytes currently stable.     Latent TB  Continue rifampin      Prolonged QT syndrome  Now on amio cont to monitor on tele      Prophylaxis  Subcutaneous heparin     CODE STATUS  Full        Complex mdm; coordinating care with nurse and trying to  pt about vtach          BEENA MAGANA MD

## 2025-06-18 NOTE — PLAN OF CARE
Problem: Patient Centered Care  Goal: Patient preferences are identified and integrated in the patient's plan of care  Description: Interventions:  - What would you like us to know as we care for you?   - Provide timely, complete, and accurate information to patient/family  - Incorporate patient and family knowledge, values, beliefs, and cultural backgrounds into the planning and delivery of care  - Encourage patient/family to participate in care and decision-making at the level they choose  - Honor patient and family perspectives and choices  Outcome: Progressing     Problem: Patient/Family Goals  Goal: Patient/Family Long Term Goal  Description: Patient's Long Term Goal: discharge from hospital     Interventions:  - - Monitor vitals  - Monitor appropriate labs  - Pain management  - Follow MD order  - Diagnostics per order  - Update/Informing patient and family on plan of care  - Discharge planning  - See additional Care Plan goals for specific interventions  Outcome: Progressing     Problem: CARDIOVASCULAR - ADULT  Goal: Maintains optimal cardiac output and hemodynamic stability  Description: INTERVENTIONS:  - Monitor vital signs, rhythm, and trends  - Monitor for bleeding, hypotension and signs of decreased cardiac output  - Evaluate effectiveness of vasoactive medications to optimize hemodynamic stability  - Monitor arterial and/or venous puncture sites for bleeding and/or hematoma  - Assess quality of pulses, skin color and temperature  - Assess for signs of decreased coronary artery perfusion - ex. Angina  - Evaluate fluid balance, assess for edema, trend weights  Outcome: Progressing  Goal: Absence of cardiac arrhythmias or at baseline  Description: INTERVENTIONS:  - Continuous cardiac monitoring, monitor vital signs, obtain 12 lead EKG if indicated  - Evaluate effectiveness of antiarrhythmic and heart rate control medications as ordered  - Initiate emergency measures for life threatening arrhythmias  -  Monitor electrolytes and administer replacement therapy as ordered  Outcome: Progressing     Problem: RESPIRATORY - ADULT  Goal: Achieves optimal ventilation and oxygenation  Description: INTERVENTIONS:  - Assess for changes in respiratory status  - Assess for changes in mentation and behavior  - Position to facilitate oxygenation and minimize respiratory effort  - Oxygen supplementation based on oxygen saturation or ABGs  - Provide Smoking Cessation handout, if applicable  - Encourage broncho-pulmonary hygiene including cough, deep breathe, Incentive Spirometry  - Assess the need for suctioning and perform as needed  - Assess and instruct to report SOB or any respiratory difficulty  - Respiratory Therapy support as indicated  - Manage/alleviate anxiety  - Monitor for signs/symptoms of CO2 retention  Outcome: Progressing     Problem: PAIN - ADULT  Goal: Verbalizes/displays adequate comfort level or patient's stated pain goal  Description: INTERVENTIONS:  - Encourage pt to monitor pain and request assistance  - Assess pain using appropriate pain scale  - Administer analgesics based on type and severity of pain and evaluate response  - Implement non-pharmacological measures as appropriate and evaluate response  - Consider cultural and social influences on pain and pain management  - Manage/alleviate anxiety  - Utilize distraction and/or relaxation techniques  - Monitor for opioid side effects  - Notify MD/LIP if interventions unsuccessful or patient reports new pain  - Anticipate increased pain with activity and pre-medicate as appropriate  Outcome: Progressing     Problem: SAFETY ADULT - FALL  Goal: Free from fall injury  Description: INTERVENTIONS:  - Assess pt frequently for physical needs  - Identify cognitive and physical deficits and behaviors that affect risk of falls.  - [place] fall precautions as indicated by assessment.  - Educate pt/family on patient safety including physical limitations  - Instruct pt  to call for assistance with activity based on assessment  - Modify environment to reduce risk of injury  - Provide assistive devices as appropriate  - Consider OT/PT consult to assist with strengthening/mobility  - Encourage toileting schedule  Outcome: Progressing     Problem: DISCHARGE PLANNING  Goal: Discharge to home or other facility with appropriate resources  Description: INTERVENTIONS:  - Identify barriers to discharge w/pt and caregiver  - Include patient/family/discharge partner in discharge planning  - Arrange for needed discharge resources and transportation as appropriate  - Identify discharge learning needs (meds, wound care, etc)  - Arrange for interpreters to assist at discharge as needed  - Consider post-discharge preferences of patient/family/discharge partner  - Complete POLST form as appropriate  - Assess patient's ability to be responsible for managing their own health  - Refer to Case Management Department for coordinating discharge planning if the patient needs post-hospital services based on physician/LIP order or complex needs related to functional status, cognitive ability or social support system  Outcome: Progressing     Problem: METABOLIC/FLUID AND ELECTROLYTES - ADULT  Goal: Electrolytes maintained within normal limits  Description: INTERVENTIONS:  - Monitor labs and rhythm and assess patient for signs and symptoms of electrolyte imbalances  - Administer electrolyte replacement as ordered  - Monitor response to electrolyte replacements, including rhythm and repeat lab results as appropriate  - Fluid restriction as ordered  - Instruct patient on fluid and nutrition restrictions as appropriate  Outcome: Progressing     Problem: SKIN/TISSUE INTEGRITY - ADULT  Goal: Skin integrity remains intact  Description: INTERVENTIONS  - Assess and document risk factors for pressure ulcer development  - Assess and document skin integrity  - Monitor for areas of redness and/or skin breakdown  - Initiate  interventions, skin care algorithm/standards of care as needed  Outcome: Progressing     Problem: HEMATOLOGIC - ADULT  Goal: Free from bleeding injury  Description: (Example usage: patient with low platelets)  INTERVENTIONS:  - Avoid intramuscular injections, enemas and rectal medication administration  - Ensure safe mobilization of patient  - Hold pressure on venipuncture sites to achieve adequate hemostasis  - Assess for signs and symptoms of internal bleeding  - Monitor lab trends  - Patient is to report abnormal signs of bleeding to staff  - Avoid use of toothpicks and dental floss  - Use electric shaver for shaving  - Use soft bristle tooth brush  - Limit straining and forceful nose blowing  Outcome: Progressing     Problem: Altered Communication/Language Barrier  Goal: Patient/Family is able to understand and participate in their care  Description: Interventions:  - Assess communication ability and preferred communication style  - Implement communication aides and strategies  - Use visual cues when possible  - Listen attentively, be patient, do not interrupt  - Minimize distractions  - Allow time for understanding and response  - Establish method for patient to ask for assistance (call light)  - Provide an  as needed  - Communicate barriers and strategies to overcome with those who interact with patient  Outcome: Progressing     Problem: Patient/Family Goals  Goal: Patient/Family Short Term Goal  Description: Patient's Short Term Goal: improve dizziness     Interventions:   - - Monitor vitals  - Monitor appropriate labs  - Pain management  - Follow MD order  - Diagnostics per order  - Update/Informing patient and family on plan of care  - Discharge planning  - See additional Care Plan goals for specific interventions  Outcome: Not Progressing    Patient completed hemodialysis this morning, 2.5L removed per HD RN. This RN reinforced education and fluids and electrolytes. HD called for 6/20 per  orders. Patient reports some dizziness after HD session, vitals stable. Tolerating room air and ambulation. Consented to virtual RN. EKG completed per cardiology orders.

## 2025-06-19 LAB
ANION GAP SERPL CALC-SCNC: 10 MMOL/L (ref 0–18)
ATRIAL RATE: 76 BPM
BASOPHILS # BLD AUTO: 0.07 X10(3) UL (ref 0–0.2)
BASOPHILS NFR BLD AUTO: 2 %
BUN BLD-MCNC: 32 MG/DL (ref 9–23)
BUN/CREAT SERPL: 6.6 (ref 10–20)
CALCIUM BLD-MCNC: 9.7 MG/DL (ref 8.7–10.4)
CHLORIDE SERPL-SCNC: 97 MMOL/L (ref 98–112)
CO2 SERPL-SCNC: 29 MMOL/L (ref 21–32)
CREAT BLD-MCNC: 4.82 MG/DL (ref 0.55–1.02)
DEPRECATED RDW RBC AUTO: 65.8 FL (ref 35.1–46.3)
EGFRCR SERPLBLD CKD-EPI 2021: 10 ML/MIN/1.73M2 (ref 60–?)
EOSINOPHIL # BLD AUTO: 0.19 X10(3) UL (ref 0–0.7)
EOSINOPHIL NFR BLD AUTO: 5.4 %
ERYTHROCYTE [DISTWIDTH] IN BLOOD BY AUTOMATED COUNT: 18.5 % (ref 11–15)
GLUCOSE BLD-MCNC: 96 MG/DL (ref 70–99)
HCT VFR BLD AUTO: 26.2 % (ref 35–48)
HGB BLD-MCNC: 8.3 G/DL (ref 12–16)
IMM GRANULOCYTES # BLD AUTO: 0.01 X10(3) UL (ref 0–1)
IMM GRANULOCYTES NFR BLD: 0.3 %
LYMPHOCYTES # BLD AUTO: 0.83 X10(3) UL (ref 1–4)
LYMPHOCYTES NFR BLD AUTO: 23.4 %
MAGNESIUM SERPL-MCNC: 2.3 MG/DL (ref 1.6–2.6)
MCH RBC QN AUTO: 31.2 PG (ref 26–34)
MCHC RBC AUTO-ENTMCNC: 31.7 G/DL (ref 31–37)
MCV RBC AUTO: 98.5 FL (ref 80–100)
MONOCYTES # BLD AUTO: 0.36 X10(3) UL (ref 0.1–1)
MONOCYTES NFR BLD AUTO: 10.2 %
NEUTROPHILS # BLD AUTO: 2.08 X10 (3) UL (ref 1.5–7.7)
NEUTROPHILS # BLD AUTO: 2.08 X10(3) UL (ref 1.5–7.7)
NEUTROPHILS NFR BLD AUTO: 58.7 %
OSMOLALITY SERPL CALC.SUM OF ELEC: 289 MOSM/KG (ref 275–295)
P AXIS: 5 DEGREES
P-R INTERVAL: 152 MS
PHOSPHATE SERPL-MCNC: 3.4 MG/DL (ref 2.4–5.1)
PLATELET # BLD AUTO: 152 10(3)UL (ref 150–450)
POTASSIUM SERPL-SCNC: 4.4 MMOL/L (ref 3.5–5.1)
Q-T INTERVAL: 454 MS
QRS DURATION: 88 MS
QTC CALCULATION (BEZET): 510 MS
R AXIS: 5 DEGREES
RBC # BLD AUTO: 2.66 X10(6)UL (ref 3.8–5.3)
SODIUM SERPL-SCNC: 136 MMOL/L (ref 136–145)
T AXIS: 178 DEGREES
T3FREE SERPL-MCNC: 2.71 PG/ML (ref 2.4–4.2)
VENTRICULAR RATE: 76 BPM
WBC # BLD AUTO: 3.5 X10(3) UL (ref 4–11)

## 2025-06-19 PROCEDURE — 99232 SBSQ HOSP IP/OBS MODERATE 35: CPT | Performed by: INTERNAL MEDICINE

## 2025-06-19 PROCEDURE — 99233 SBSQ HOSP IP/OBS HIGH 50: CPT | Performed by: HOSPITALIST

## 2025-06-19 NOTE — PLAN OF CARE
Pt alert and oriented. [place] speaking only. Left arm precautions. Plan for dialysis tm. Call light within reach. Safety precautions in place.   Problem: Patient Centered Care  Goal: Patient preferences are identified and integrated in the patient's plan of care  Description: Interventions:  - What would you like us to know as we care for you?   - Provide timely, complete, and accurate information to patient/family  - Incorporate patient and family knowledge, values, beliefs, and cultural backgrounds into the planning and delivery of care  - Encourage patient/family to participate in care and decision-making at the level they choose  - Honor patient and family perspectives and choices  Outcome: Progressing     Problem: Patient/Family Goals  Goal: Patient/Family Long Term Goal  Description: Patient's Long Term Goal: discharge from hospital     Interventions:  - - Monitor vitals  - Monitor appropriate labs  - Pain management  - Follow MD order  - Diagnostics per order  - Update/Informing patient and family on plan of care  - Discharge planning  - See additional Care Plan goals for specific interventions  Outcome: Progressing  Goal: Patient/Family Short Term Goal  Description: Patient's Short Term Goal: improve dizziness     Interventions:   - - Monitor vitals  - Monitor appropriate labs  - Pain management  - Follow MD order  - Diagnostics per order  - Update/Informing patient and family on plan of care  - Discharge planning  - See additional Care Plan goals for specific interventions  Outcome: Progressing     Problem: CARDIOVASCULAR - ADULT  Goal: Maintains optimal cardiac output and hemodynamic stability  Description: INTERVENTIONS:  - Monitor vital signs, rhythm, and trends  - Monitor for bleeding, hypotension and signs of decreased cardiac output  - Evaluate effectiveness of vasoactive medications to optimize hemodynamic stability  - Monitor arterial and/or venous puncture sites for bleeding and/or hematoma  -  Assess quality of pulses, skin color and temperature  - Assess for signs of decreased coronary artery perfusion - ex. Angina  - Evaluate fluid balance, assess for edema, trend weights  Outcome: Progressing  Goal: Absence of cardiac arrhythmias or at baseline  Description: INTERVENTIONS:  - Continuous cardiac monitoring, monitor vital signs, obtain 12 lead EKG if indicated  - Evaluate effectiveness of antiarrhythmic and heart rate control medications as ordered  - Initiate emergency measures for life threatening arrhythmias  - Monitor electrolytes and administer replacement therapy as ordered  Outcome: Progressing     Problem: RESPIRATORY - ADULT  Goal: Achieves optimal ventilation and oxygenation  Description: INTERVENTIONS:  - Assess for changes in respiratory status  - Assess for changes in mentation and behavior  - Position to facilitate oxygenation and minimize respiratory effort  - Oxygen supplementation based on oxygen saturation or ABGs  - Provide Smoking Cessation handout, if applicable  - Encourage broncho-pulmonary hygiene including cough, deep breathe, Incentive Spirometry  - Assess the need for suctioning and perform as needed  - Assess and instruct to report SOB or any respiratory difficulty  - Respiratory Therapy support as indicated  - Manage/alleviate anxiety  - Monitor for signs/symptoms of CO2 retention  Outcome: Progressing     Problem: PAIN - ADULT  Goal: Verbalizes/displays adequate comfort level or patient's stated pain goal  Description: INTERVENTIONS:  - Encourage pt to monitor pain and request assistance  - Assess pain using appropriate pain scale  - Administer analgesics based on type and severity of pain and evaluate response  - Implement non-pharmacological measures as appropriate and evaluate response  - Consider cultural and social influences on pain and pain management  - Manage/alleviate anxiety  - Utilize distraction and/or relaxation techniques  - Monitor for opioid side effects  -  Notify MD/LIP if interventions unsuccessful or patient reports new pain  - Anticipate increased pain with activity and pre-medicate as appropriate  Outcome: Progressing     Problem: SAFETY ADULT - FALL  Goal: Free from fall injury  Description: INTERVENTIONS:  - Assess pt frequently for physical needs  - Identify cognitive and physical deficits and behaviors that affect risk of falls.  - [place] fall precautions as indicated by assessment.  - Educate pt/family on patient safety including physical limitations  - Instruct pt to call for assistance with activity based on assessment  - Modify environment to reduce risk of injury  - Provide assistive devices as appropriate  - Consider OT/PT consult to assist with strengthening/mobility  - Encourage toileting schedule  Outcome: Progressing     Problem: DISCHARGE PLANNING  Goal: Discharge to home or other facility with appropriate resources  Description: INTERVENTIONS:  - Identify barriers to discharge w/pt and caregiver  - Include patient/family/discharge partner in discharge planning  - Arrange for needed discharge resources and transportation as appropriate  - Identify discharge learning needs (meds, wound care, etc)  - Arrange for interpreters to assist at discharge as needed  - Consider post-discharge preferences of patient/family/discharge partner  - Complete POLST form as appropriate  - Assess patient's ability to be responsible for managing their own health  - Refer to Case Management Department for coordinating discharge planning if the patient needs post-hospital services based on physician/LIP order or complex needs related to functional status, cognitive ability or social support system  Outcome: Progressing     Problem: METABOLIC/FLUID AND ELECTROLYTES - ADULT  Goal: Electrolytes maintained within normal limits  Description: INTERVENTIONS:  - Monitor labs and rhythm and assess patient for signs and symptoms of electrolyte imbalances  - Administer electrolyte  replacement as ordered  - Monitor response to electrolyte replacements, including rhythm and repeat lab results as appropriate  - Fluid restriction as ordered  - Instruct patient on fluid and nutrition restrictions as appropriate  Outcome: Progressing     Problem: SKIN/TISSUE INTEGRITY - ADULT  Goal: Skin integrity remains intact  Description: INTERVENTIONS  - Assess and document risk factors for pressure ulcer development  - Assess and document skin integrity  - Monitor for areas of redness and/or skin breakdown  - Initiate interventions, skin care algorithm/standards of care as needed  Outcome: Progressing     Problem: HEMATOLOGIC - ADULT  Goal: Free from bleeding injury  Description: (Example usage: patient with low platelets)  INTERVENTIONS:  - Avoid intramuscular injections, enemas and rectal medication administration  - Ensure safe mobilization of patient  - Hold pressure on venipuncture sites to achieve adequate hemostasis  - Assess for signs and symptoms of internal bleeding  - Monitor lab trends  - Patient is to report abnormal signs of bleeding to staff  - Avoid use of toothpicks and dental floss  - Use electric shaver for shaving  - Use soft bristle tooth brush  - Limit straining and forceful nose blowing  Outcome: Progressing     Problem: Altered Communication/Language Barrier  Goal: Patient/Family is able to understand and participate in their care  Description: Interventions:  - Assess communication ability and preferred communication style  - Implement communication aides and strategies  - Use visual cues when possible  - Listen attentively, be patient, do not interrupt  - Minimize distractions  - Allow time for understanding and response  - Establish method for patient to ask for assistance (call light)  - Provide an  as needed  - Communicate barriers and strategies to overcome with those who interact with patient  Outcome: Progressing

## 2025-06-19 NOTE — WOUND PROGRESS NOTE
Wound Care  Nursing consult for dressing reqs for the pt's right subclavian catheter. The pt. is asleep in bed. Chart reviewed and spoke with the PICC line nurse and she agreed that the central line dressing needs to be a sterile dressings with the central line dressing kit per protocol. Spoke with the nurse.

## 2025-06-19 NOTE — PROGRESS NOTES
Progress Note  Shavon Terrazasvar Patient Status:  Inpatient    1970 MRN [id number]   Location [facility] 3W/SW Attending Marcin Christopher,*   Hosp Day # 5 PCP No primary care provider on file.     SUBJECTIVE:    Patient seen during HD. ROS completed with facility .  Denies chest pain or dyspnea.     VITALS:  /79 (BP Location: Right arm)   Pulse 87   Temp 98.3 °F (36.8 °C) (Oral)   Resp 16   Ht 5' (1.524 m)   Wt 121 lb 11.2 oz (55.2 kg)   SpO2 98%   BMI 23.77 kg/m²   INTAKE/OUTPUT:    Intake/Output Summary (Last 24 hours) at 2025 0751  Last data filed at 2025 0500  Gross per 24 hour   Intake 800 ml   Output 2500 ml   Net -1700 ml     Last 3 Weights   25 0514 121 lb 11.2 oz (55.2 kg)   25 0700 134 lb 14.7 oz (61.2 kg)   25 0051 131 lb 6.3 oz (59.6 kg)   25 2234 116 lb 13.5 oz (53 kg)     LABS:  Recent Labs   Lab 25  0741 25  0312 25  0714   * 103* 92   BUN 31* 21 34*   CREATSERUM 6.82* 4.25* 6.34*   EGFRCR 7* 12* 7*   CA 9.3 8.8 9.3    137 137   K 5.1 4.3 4.5    100 101   CO2 27.0 29.0 27.0     Recent Labs   Lab 25  0741 25  0312 25  0714   RBC 2.50* 2.61* 2.50*   HGB 7.6* 7.8* 7.8*   HCT 24.5* 25.0* 24.0*   MCV 98.0 95.8 96.0   MCH 30.4 29.9 31.2   MCHC 31.0 31.2 32.5   RDW 19.4* 19.1* 18.8*   NEPRELIM 1.99 2.30 1.60   WBC 3.5* 3.6* 2.8*   .0* 145.0* 139.0*     No results for input(s): \"TROP\", \"CK\" in the last 168 hours.  DIAGNOSTICS:  TELEMETRY:      ECHO 1970:  Conclusions:   1. Left ventricle: The cavity size was increased. Wall thickness was normal.      Systolic function was markedly reduced. The estimated ejection fraction      was 21%, by biplane method of disks. There was severe diffuse      hypokinesis. Doppler parameters are consistent with a reversible      restrictive pattern, indicative of decreased left ventricular diastolic      compliance and/or  increased left atrial pressure - grade 3 diastolic      dysfunction.   2. Left atrium: The left atrial volume was markedly increased.   3. Mitral valve: There was moderate regurgitation, with multiple jets. The      regurgitant vena contracta width was 0.5cm.   4. Tricuspid valve: There was moderate regurgitation.   5. Pulmonary arteries: Systolic pressure was markedly increased, estimated      to be 90mm Hg.   6. Pericardium, extracardiac: There was a right pleural effusion.   Impressions:  No previous study from [facility] was   available for comparison.     ROS: Negative unless noted above   PHYSICAL EXAM:  General: Alert and oriented x 3. No apparent distress.  HEENT: Normocephalic, sclera are icteric. Hearing appropriate bilaterally.  Neck: No JVD or Carotid bruits. Trachea midline.   Cardiac: Regular rate and rhythm. S1, S2 auscultated. No murmurs, rubs, or gallops appreciated.   Lungs: Clear without wheezes, rales, rhonchi or dullness. Chest expansion symmetrical. Regular effort.  Abdomen: Soft, non-tender, +BS. No hepatosplenomegaly or appreciable masses.   Extremities: Without clubbing, cyanosis. Peripheral pulses are 2+. Edema   Neurologic: Motor and sensory nerves grossly intact.   Psych: Appropriate affect   Skin: Warm and dry. No obvious lesions, wounds, or ulcerations.     MEDICATIONS:  Scheduled Medications[1]  Medication Infusions[2]    ASSESSMENT:    VT/VF s/p 6 Shocks on 6/13/25: [place] [facility]- ICD  - C 10/2025 at [place] without obstructive disease   - s/p Lidocaine gtt, now on oral Amiodarone; LFTs stable, TSH elevated T4 normal   - EKG with prolonged Qtc earlier this stay therefore Amio was decreased   - Bedside tele stable     Chronic HFrEF, LVEF 20%, NICM   Mod MR & TR   ESRD on HD: M-W-F  Hx Left Radical Nephrectomy 2022  Chronic Anemia   - Nephro following, volume with HD  - Not currently medical therapy for NICM   - o/p on Midodrine for HD.  No ACE/ARB/ARNI/ MRA with ESRD  -  Being considered for Heart and Kidney Transplant through [facility]    Latent TB  - Rifampin     PLAN:  - TSH mildly elevated but T4 normal - monitor outpt  -  AST 40, multifactorial, monitor outpt  - Bps have been stable here start lopressor; likely trial nitrates    - She has a f/u appt with [facility] on June 25th   Cont same amio  Home soon    Plan of care discussed with patient and RN.     Janine Caldera, MSN, FNP-BC, CCK  06/19/25   7:52 AM      Cardiology Attending:  Note reviewed and Independent exam, assessment and plan developed. Labs and tests reviewed.  Note is my recommendations with assessment and plan as I have made changes and additions within note and merged.    S/no change in symptoms  Physical Exam:  General: Alert and oriented. No apparent distress. No respiratory or constitutional distress.  HEENT: Normocephalic, anicteric sclera, neck supple  Neck: elevated JVD, carotids 2+,  Cardiac: irregular/Regular rate and rhythm. No pathologic murmur.  Lungs: Rales with normal effort.  Normal excursions and effort.  Abdomen: Soft, non-tender. BS-present.  Extremities: Without clubbing, cyanosis.  Edema, Peripheral pulses present.  Neurologic: Alert and oriented, normal affect. Motor ok.  Skin: Warm and dry.   A/P:f/u labs outpt, cont amio 200, fu [facility]    Agree plan above  Cheng Melendez MD  [facility]  Cardiac Electrophysiology  6/19/2025  3v           [1]    metoprolol tartrate  12.5 mg Oral 2x Daily(Beta Blocker)    amiodarone  200 mg Oral Daily    heparin  1.5 mL Intracatheter Once    epoetin gabriela  5,000 Units Subcutaneous Once per day on Monday Wednesday Friday    heparin  5,000 Units Subcutaneous 2 times per day    rifAMPin  600 mg Oral Daily   [2]

## 2025-06-19 NOTE — PROGRESS NOTES
[facility]  part of [facility]    Progress Note    Shavon Browning Patient Status:  Inpatient    1970 MRN [id number]   Location [facility] 3W/SW Attending Marcin Christopher,*   Hosp Day # 5 PCP No primary care provider on file.       Subjective:   Shavon Browning is a(n) 55 year old female who I am seeing for ESRD      Resting    Objective:   /71 (BP Location: Right arm)   Pulse 78   Temp 98.4 °F (36.9 °C) (Oral)   Resp 18   Ht 5' (1.524 m)   Wt 121 lb 11.2 oz (55.2 kg)   SpO2 98%   BMI 23.77 kg/m²      Intake/Output Summary (Last 24 hours) at 2025 1130  Last data filed at 2025 0500  Gross per 24 hour   Intake 440 ml   Output 2500 ml   Net -2060 ml     Wt Readings from Last 1 Encounters:   25 121 lb 11.2 oz (55.2 kg)       Exam  Gen: No acute distress, Heent: NC AT, mucous memb clear, neck supple  Pulm: Lungs clear, normal respiratory effort  CV: Heart with regular rate and rhythm, no edema  Abd: Abdomen soft, nontender, nondistended, no organomegaly, bowel sounds present  Skin: no symptoms reported  Psych: alert and oriented    Assessment and Plan:       1 - ESRD  The patient receives  dialysis.  Plan dialysis again tomorrow.  She should not miss any treatments as it is a life-sustaining modality     The patient told me that she goes to the [place] dialysis unit  and Friday     2 -Vtach /CHFrEF  Per cardiology.  She is on amiodarone     3 - pancytopenia  On Epogen          Results:     Recent Labs   Lab 25  0741 25  0312 25  0714   RBC 2.50* 2.61* 2.50*   HGB 7.6* 7.8* 7.8*   HCT 24.5* 25.0* 24.0*   MCV 98.0 95.8 96.0   MCH 30.4 29.9 31.2   MCHC 31.0 31.2 32.5   RDW 19.4* 19.1* 18.8*   NEPRELIM 1.99 2.30 1.60   WBC 3.5* 3.6* 2.8*   .0* 145.0* 139.0*         Recent Labs   Lab 25  0741 25  0312 25  0714   * 103* 92   BUN 31* 21 34*   CREATSERUM 6.82*  4.25* 6.34*   CA 9.3 8.8 9.3    137 137   K 5.1 4.3 4.5    100 101   CO2 27.0 29.0 27.0          No results found.        PILI TELLO MD  6/19/2025

## 2025-06-19 NOTE — PROGRESS NOTES
[facility]  part of [facility]    Progress Note    Shavon Browning Patient Status:  Inpatient    1970 MRN [id number]   Location [facility] 3W/SW Attending Marcin Christopher,*   Hosp Day # 5 PCP No primary care provider on file.     Chief Complaint: ok    Subjective:     Constitutional:  Negative for appetite change and chills.   HENT:  Negative for congestion.    Respiratory:  Negative for chest tightness and shortness of breath.    Cardiovascular:  Negative for leg swelling.   Gastrointestinal:  Negative for abdominal pain.   Genitourinary:  Negative for dysuria.   Musculoskeletal:  Negative for joint swelling and joint pain.   Neurological:  Negative for syncope and light-headedness.   Psychiatric/Behavioral:  Negative for decreased concentration. The patient is not nervous/anxious.        Objective:   Blood pressure 130/71, pulse 78, temperature 98.4 °F (36.9 °C), temperature source Oral, resp. rate 18, height 5' (1.524 m), weight 121 lb 11.2 oz (55.2 kg), SpO2 98%.  Physical Exam  Vitals and nursing note reviewed.   Constitutional:       Appearance: She is normal weight.   HENT:      Head: Normocephalic and atraumatic.   Cardiovascular:      Rate and Rhythm: Normal rate and regular rhythm.      Pulses: Normal pulses.   Pulmonary:      Effort: Pulmonary effort is normal.      Breath sounds: Rhonchi present.   Abdominal:      General: Bowel sounds are normal.      Palpations: Abdomen is soft.      Tenderness: There is no abdominal tenderness.   Skin:     General: Skin is warm and dry.      Capillary Refill: Capillary refill takes less than 2 seconds.   Neurological:      General: No focal deficit present.      Mental Status: She is alert and oriented to person, place, and time.   Psychiatric:         Behavior: Behavior normal.         Results:   Lab Results   Component Value Date    WBC 3.5 (L) 2025    HGB 8.3 (L) 2025    HCT 26.2 (L) 2025    PLT  152.0 06/19/2025    CREATSERUM 4.82 (H) 06/19/2025    BUN 32 (H) 06/19/2025     06/19/2025    K 4.4 06/19/2025    CL 97 (L) 06/19/2025    CO2 29.0 06/19/2025    GLU 96 06/19/2025    CA 9.7 06/19/2025    ALB 3.7 06/18/2025    AST 40 (H) 06/18/2025    ALT 26 06/18/2025    T4F 1.3 06/18/2025    TSH 8.794 (H) 06/18/2025    MG 2.3 06/19/2025    PHOS 3.4 06/19/2025    TROPHS 9 06/13/2025       No results found.  EKG 12 Lead  Result Date: 6/19/2025  Normal sinus rhythm LVH with repolarization abnormality ( R in aVL , Sokolow-Wheeler , Aladdin product , Romhilt-Alvarado ) Nonspecific ST-t wave changes Prolonged QT interval or tu fusion, consider myocardial disease, electrolyte imbalance, or drug effects Abnormal ECG When compared with ECG of 18-JUN-2025 13:50, No significant change was found Confirmed by DENI SKELTON JORDAN ([id number]) on 6/19/2025 9:28:30 AM    EKG 12 Lead  Result Date: 6/18/2025  Normal sinus rhythm Moderate voltage criteria for LVH, may be normal variant ( R in aVL , Aladdin product ) ST & T wave abnormality, consider lateral ischemia Prolonged QT interval or tu fusion, consider myocardial disease, electrolyte imbalance, or drug effects Abnormal ECG When compared with ECG of 17-JUN-2025 09:04, No significant change was found Confirmed by juan pablo oveido ([id number]) on 6/18/2025 4:20:40 PM      Assessment & Plan:     Sustained pulseless V. tach  Requiring defibrillation, given amiodarone in the field, currently on lidocaine drip now off  Cardiology has been consulted, device to be interrogated  Apprec cards consult - now off lidocaine drip and on amiodarone. Monitor on tele   Echo repeated, dr garcia to see - repeat echo with ef 21%reversible restrictive pattern, decreased left ventricular diastolic compliance or increase left atrial pressure grade 3 dd , mod mr, mod tr , pulm pressure markedly elevated   H/o idiopathic dilated cmy ef 25%  Interrogation showing 6 shocks of vt/vf 6/13  [facility]ed from [facility]  Friday  Qt prolonged on amio. EKG repeated. Decrease amio per cards     Syncope  Secondary to V. tach, responded to meds, unclear if shock was delivered.  Will continue to monitor closely; interrogation to be done      Chronic systolic heart failure  No signs of acute exacerbation, EF 25%.  Resume home meds.     ESRD  Nephrology consulted, dialysis days M/W/F, electrolytes currently stable.     Latent TB  Continue rifampin      Prolonged QT syndrome  Now on amio cont to monitor on tele      Prophylaxis  Subcutaneous heparin     CODE STATUS  Full        Complex mdm; coordinating care with nurse and counseling pt through  about poss dc; dialysis          BEENA MAGANA MD

## 2025-06-20 VITALS
WEIGHT: 118.38 LBS | SYSTOLIC BLOOD PRESSURE: 125 MMHG | TEMPERATURE: 99 F | OXYGEN SATURATION: 99 % | DIASTOLIC BLOOD PRESSURE: 77 MMHG | HEIGHT: 60 IN | HEART RATE: 88 BPM | BODY MASS INDEX: 23.24 KG/M2 | RESPIRATION RATE: 18 BRPM

## 2025-06-20 LAB
ANION GAP SERPL CALC-SCNC: 9 MMOL/L (ref 0–18)
ATRIAL RATE: 71 BPM
BASOPHILS # BLD AUTO: 0.05 X10(3) UL (ref 0–0.2)
BASOPHILS NFR BLD AUTO: 1.7 %
BUN BLD-MCNC: 29 MG/DL (ref 9–23)
BUN/CREAT SERPL: 7.6 (ref 10–20)
CALCIUM BLD-MCNC: 9.5 MG/DL (ref 8.7–10.4)
CHLORIDE SERPL-SCNC: 99 MMOL/L (ref 98–112)
CO2 SERPL-SCNC: 28 MMOL/L (ref 21–32)
CREAT BLD-MCNC: 3.8 MG/DL (ref 0.55–1.02)
DEPRECATED RDW RBC AUTO: 64.2 FL (ref 35.1–46.3)
EGFRCR SERPLBLD CKD-EPI 2021: 14 ML/MIN/1.73M2 (ref 60–?)
EOSINOPHIL # BLD AUTO: 0.14 X10(3) UL (ref 0–0.7)
EOSINOPHIL NFR BLD AUTO: 4.7 %
ERYTHROCYTE [DISTWIDTH] IN BLOOD BY AUTOMATED COUNT: 18.1 % (ref 11–15)
GLUCOSE BLD-MCNC: 99 MG/DL (ref 70–99)
HCT VFR BLD AUTO: 25.3 % (ref 35–48)
HGB BLD-MCNC: 8.1 G/DL (ref 12–16)
IMM GRANULOCYTES # BLD AUTO: 0.01 X10(3) UL (ref 0–1)
IMM GRANULOCYTES NFR BLD: 0.3 %
LYMPHOCYTES # BLD AUTO: 0.72 X10(3) UL (ref 1–4)
LYMPHOCYTES NFR BLD AUTO: 23.9 %
MAGNESIUM SERPL-MCNC: 2.1 MG/DL (ref 1.6–2.6)
MCH RBC QN AUTO: 31.2 PG (ref 26–34)
MCHC RBC AUTO-ENTMCNC: 32 G/DL (ref 31–37)
MCV RBC AUTO: 97.3 FL (ref 80–100)
MONOCYTES # BLD AUTO: 0.27 X10(3) UL (ref 0.1–1)
MONOCYTES NFR BLD AUTO: 9 %
NEUTROPHILS # BLD AUTO: 1.82 X10 (3) UL (ref 1.5–7.7)
NEUTROPHILS # BLD AUTO: 1.82 X10(3) UL (ref 1.5–7.7)
NEUTROPHILS NFR BLD AUTO: 60.4 %
OSMOLALITY SERPL CALC.SUM OF ELEC: 288 MOSM/KG (ref 275–295)
P AXIS: 10 DEGREES
P-R INTERVAL: 166 MS
PHOSPHATE SERPL-MCNC: 2.1 MG/DL (ref 2.4–5.1)
PLATELET # BLD AUTO: 133 10(3)UL (ref 150–450)
POTASSIUM SERPL-SCNC: 3.5 MMOL/L (ref 3.5–5.1)
Q-T INTERVAL: 490 MS
QRS DURATION: 100 MS
QTC CALCULATION (BEZET): 532 MS
R AXIS: -1 DEGREES
RBC # BLD AUTO: 2.6 X10(6)UL (ref 3.8–5.3)
SODIUM SERPL-SCNC: 136 MMOL/L (ref 136–145)
T AXIS: 179 DEGREES
VENTRICULAR RATE: 71 BPM
WBC # BLD AUTO: 3 X10(3) UL (ref 4–11)

## 2025-06-20 PROCEDURE — 90935 HEMODIALYSIS ONE EVALUATION: CPT | Performed by: INTERNAL MEDICINE

## 2025-06-20 PROCEDURE — 99239 HOSP IP/OBS DSCHRG MGMT >30: CPT | Performed by: HOSPITALIST

## 2025-06-20 RX ORDER — AMIODARONE HYDROCHLORIDE 200 MG/1
200 TABLET ORAL DAILY
Qty: 30 TABLET | Refills: 0 | Status: SHIPPED | OUTPATIENT
Start: 2025-06-20

## 2025-06-20 RX ORDER — LEVOTHYROXINE SODIUM 25 UG/1
25 TABLET ORAL
Qty: 30 TABLET | Refills: 0 | Status: SHIPPED | OUTPATIENT
Start: 2025-06-20

## 2025-06-20 RX ORDER — METOPROLOL TARTRATE 25 MG/1
12.5 TABLET, FILM COATED ORAL
Qty: 60 TABLET | Refills: 3 | Status: SHIPPED | OUTPATIENT
Start: 2025-06-20

## 2025-06-20 RX ORDER — HEPARIN SODIUM 1000 [USP'U]/ML
1.5 INJECTION, SOLUTION INTRAVENOUS; SUBCUTANEOUS ONCE
Status: COMPLETED | OUTPATIENT
Start: 2025-06-20 | End: 2025-06-20

## 2025-06-20 NOTE — PLAN OF CARE
Problem: Patient Centered Care  Goal: Patient preferences are identified and integrated in the patient's plan of care  Description: Interventions:  - What would you like us to know as we care for you?   - Provide timely, complete, and accurate information to patient/family  - Incorporate patient and family knowledge, values, beliefs, and cultural backgrounds into the planning and delivery of care  - Encourage patient/family to participate in care and decision-making at the level they choose  - Honor patient and family perspectives and choices  Outcome: Adequate for Discharge     Problem: Patient/Family Goals  Goal: Patient/Family Long Term Goal  Description: Patient's Long Term Goal: discharge from hospital     Interventions:  - - Monitor vitals  - Monitor appropriate labs  - Pain management  - Follow MD order  - Diagnostics per order  - Update/Informing patient and family on plan of care  - Discharge planning  - See additional Care Plan goals for specific interventions  Outcome: Adequate for Discharge  Goal: Patient/Family Short Term Goal  Description: Patient's Short Term Goal: improve dizziness     Interventions:   - - Monitor vitals  - Monitor appropriate labs  - Pain management  - Follow MD order  - Diagnostics per order  - Update/Informing patient and family on plan of care  - Discharge planning  - See additional Care Plan goals for specific interventions  Outcome: Adequate for Discharge     Problem: CARDIOVASCULAR - ADULT  Goal: Maintains optimal cardiac output and hemodynamic stability  Description: INTERVENTIONS:  - Monitor vital signs, rhythm, and trends  - Monitor for bleeding, hypotension and signs of decreased cardiac output  - Evaluate effectiveness of vasoactive medications to optimize hemodynamic stability  - Monitor arterial and/or venous puncture sites for bleeding and/or hematoma  - Assess quality of pulses, skin color and temperature  - Assess for signs of decreased coronary artery perfusion -  ex. Angina  - Evaluate fluid balance, assess for edema, trend weights  Outcome: Adequate for Discharge  Goal: Absence of cardiac arrhythmias or at baseline  Description: INTERVENTIONS:  - Continuous cardiac monitoring, monitor vital signs, obtain 12 lead EKG if indicated  - Evaluate effectiveness of antiarrhythmic and heart rate control medications as ordered  - Initiate emergency measures for life threatening arrhythmias  - Monitor electrolytes and administer replacement therapy as ordered  Outcome: Adequate for Discharge     Problem: RESPIRATORY - ADULT  Goal: Achieves optimal ventilation and oxygenation  Description: INTERVENTIONS:  - Assess for changes in respiratory status  - Assess for changes in mentation and behavior  - Position to facilitate oxygenation and minimize respiratory effort  - Oxygen supplementation based on oxygen saturation or ABGs  - Provide Smoking Cessation handout, if applicable  - Encourage broncho-pulmonary hygiene including cough, deep breathe, Incentive Spirometry  - Assess the need for suctioning and perform as needed  - Assess and instruct to report SOB or any respiratory difficulty  - Respiratory Therapy support as indicated  - Manage/alleviate anxiety  - Monitor for signs/symptoms of CO2 retention  Outcome: Adequate for Discharge     Problem: PAIN - ADULT  Goal: Verbalizes/displays adequate comfort level or patient's stated pain goal  Description: INTERVENTIONS:  - Encourage pt to monitor pain and request assistance  - Assess pain using appropriate pain scale  - Administer analgesics based on type and severity of pain and evaluate response  - Implement non-pharmacological measures as appropriate and evaluate response  - Consider cultural and social influences on pain and pain management  - Manage/alleviate anxiety  - Utilize distraction and/or relaxation techniques  - Monitor for opioid side effects  - Notify MD/LIP if interventions unsuccessful or patient reports new pain  -  Anticipate increased pain with activity and pre-medicate as appropriate  Outcome: Adequate for Discharge     Problem: SAFETY ADULT - FALL  Goal: Free from fall injury  Description: INTERVENTIONS:  - Assess pt frequently for physical needs  - Identify cognitive and physical deficits and behaviors that affect risk of falls.  - [place] fall precautions as indicated by assessment.  - Educate pt/family on patient safety including physical limitations  - Instruct pt to call for assistance with activity based on assessment  - Modify environment to reduce risk of injury  - Provide assistive devices as appropriate  - Consider OT/PT consult to assist with strengthening/mobility  - Encourage toileting schedule  Outcome: Adequate for Discharge     Problem: DISCHARGE PLANNING  Goal: Discharge to home or other facility with appropriate resources  Description: INTERVENTIONS:  - Identify barriers to discharge w/pt and caregiver  - Include patient/family/discharge partner in discharge planning  - Arrange for needed discharge resources and transportation as appropriate  - Identify discharge learning needs (meds, wound care, etc)  - Arrange for interpreters to assist at discharge as needed  - Consider post-discharge preferences of patient/family/discharge partner  - Complete POLST form as appropriate  - Assess patient's ability to be responsible for managing their own health  - Refer to Case Management Department for coordinating discharge planning if the patient needs post-hospital services based on physician/LIP order or complex needs related to functional status, cognitive ability or social support system  Outcome: Adequate for Discharge     Problem: METABOLIC/FLUID AND ELECTROLYTES - ADULT  Goal: Electrolytes maintained within normal limits  Description: INTERVENTIONS:  - Monitor labs and rhythm and assess patient for signs and symptoms of electrolyte imbalances  - Administer electrolyte replacement as ordered  - Monitor  response to electrolyte replacements, including rhythm and repeat lab results as appropriate  - Fluid restriction as ordered  - Instruct patient on fluid and nutrition restrictions as appropriate  Outcome: Adequate for Discharge     Problem: SKIN/TISSUE INTEGRITY - ADULT  Goal: Skin integrity remains intact  Description: INTERVENTIONS  - Assess and document risk factors for pressure ulcer development  - Assess and document skin integrity  - Monitor for areas of redness and/or skin breakdown  - Initiate interventions, skin care algorithm/standards of care as needed  Outcome: Adequate for Discharge     Problem: HEMATOLOGIC - ADULT  Goal: Free from bleeding injury  Description: (Example usage: patient with low platelets)  INTERVENTIONS:  - Avoid intramuscular injections, enemas and rectal medication administration  - Ensure safe mobilization of patient  - Hold pressure on venipuncture sites to achieve adequate hemostasis  - Assess for signs and symptoms of internal bleeding  - Monitor lab trends  - Patient is to report abnormal signs of bleeding to staff  - Avoid use of toothpicks and dental floss  - Use electric shaver for shaving  - Use soft bristle tooth brush  - Limit straining and forceful nose blowing  Outcome: Adequate for Discharge     Problem: Altered Communication/Language Barrier  Goal: Patient/Family is able to understand and participate in their care  Description: Interventions:  - Assess communication ability and preferred communication style  - Implement communication aides and strategies  - Use visual cues when possible  - Listen attentively, be patient, do not interrupt  - Minimize distractions  - Allow time for understanding and response  - Establish method for patient to ask for assistance (call light)  - Provide an  as needed  - Communicate barriers and strategies to overcome with those who interact with patient  Outcome: Adequate for Discharge    Patient medically cleared for discharge,  leaving hospital via [facility]. Completed hemodialysis this morning, 2L removed per HD RN. This RN completed discharge instructions and education with pt. No questions at this time. Video  used for communication. PIV and telemetry monitor removed.

## 2025-06-20 NOTE — CM/SW NOTE
06/20/25 1535   Discharge disposition   Expected discharge disposition Home or Self   Post Acute Care Provider Home   Discharge transportation [facility]     Received notice from DEANN Copeland, pt is cleared for DC and will need [facility] home. RN confirmed address on file is correct.    MEGHAN spoke to Rebecca hubbard/ [facility] set for ETA 6PM. Pt's [employer] Medicaid will cover cost of transportation. PCS completed in Epic - pt's RN to print w/ pt's AVS.    DEANN Copeland updated on transport ETA.    Pt is cleared from MEGHAN/MOSES stand point.      PLAN: Home, [facility] ETA 6PM, PCS done        DINA Leon, LSW [id number]

## 2025-06-20 NOTE — DISCHARGE INSTRUCTIONS
Recheck tsh/t4 3 weeks  Fu [facility] 6/25  Continue dialysis [facility]  Fu here with dr hanson if new primary needed; she speaks [place] I believe  Low salt renal diet     Medication List        START taking these medications      epoetin gabriela 2000 UNIT/ML SOLN 2,000 Units, epoetin gabriela 3000 UNIT/ML SOLN 3,000 Units  Inject 5,000 Units into the skin 3 (three) times a week at 1600.     levothyroxine 25 MCG Tabs  Commonly known as: Synthroid  Take 1 tablet (25 mcg total) by mouth before breakfast.     metoprolol tartrate 25 MG Tabs  Commonly known as: Lopressor  Take 0.5 tablets (12.5 mg total) by mouth 2x Daily(Beta Blocker).            CHANGE how you take these medications      amiodarone 200 MG Tabs  Commonly known as: Pacerone  Take 1 tablet (200 mg total) by mouth daily.  What changed: when to take this            CONTINUE taking these medications      hydrOXYzine 25 MG Tabs  Commonly known as: Atarax     Lanthanum Carbonate 1000 MG Chew  Commonly known as: FOSRENOL     rifAMPin 300 MG Caps  Commonly known as: Rifadin            STOP taking these medications      HYDROcodone-acetaminophen 5-325 MG Tabs  Commonly known as: Norco               Where to Get Your Medications        These medications were sent to [id number] - [place], [place] - [address]  AT [facility], [phone], [phone]  [address] , [facility] [address]      Phone: [phone]   amiodarone 200 MG Tabs  levothyroxine 25 MCG Tabs  metoprolol tartrate 25 MG Tabs       You can get these medications from any pharmacy    Bring a paper prescription for each of these medications  epoetin gabriela 2000 UNIT/ML SOLN 2,000 Units, epoetin gabriela 3000 UNIT/ML SOLN 3,000 Units

## 2025-06-20 NOTE — PROGRESS NOTES
[facility]  part of [facility]    Progress Note    Shavon Browning Patient Status:  Inpatient    1970 MRN [id number]   Location [facility] 3W/SW Attending Marcin Christopher,*   Hosp Day # 6 PCP No primary care provider on file.       Subjective:   Shavon Browning is a(n) 55 year old female who I am seeing for ESRD      The patient is seen on dialysis    Objective:   /79 (BP Location: Right arm)   Pulse 73   Temp 98.7 °F (37.1 °C) (Oral)   Resp 18   Ht 5' (1.524 m)   Wt 118 lb 6.4 oz (53.7 kg)   SpO2 97%   BMI 23.12 kg/m²      Intake/Output Summary (Last 24 hours) at 2025 0956  Last data filed at 2025 0745  Gross per 24 hour   Intake 60 ml   Output 0 ml   Net 60 ml     Wt Readings from Last 1 Encounters:   25 118 lb 6.4 oz (53.7 kg)       Exam  Gen: No acute distress, Heent: NC AT, mucous memb clear, neck supple  Pulm: Lungs clear, normal respiratory effort  CV: Heart with regular rate and rhythm, no edema  Abd: Abdomen soft, nontender, nondistended, no organomegaly, bowel sounds present  Skin: no symptoms reported  Psych: alert and oriented  Right IJ catheter is accessed  Assessment and Plan:       1 - ESRD  The patient receives  dialysis.  She is getting dialyzed today     The patient told me that she goes to the [place] dialysis unit  and Friday     2 -Vtach /CHFrEF  Per cardiology.  She is on amiodarone     3 - pancytopenia  On Epogen               Results:     Recent Labs   Lab 25  0714 25  1112 25  0848   RBC 2.50* 2.66* 2.60*   HGB 7.8* 8.3* 8.1*   HCT 24.0* 26.2* 25.3*   MCV 96.0 98.5 97.3   MCH 31.2 31.2 31.2   MCHC 32.5 31.7 32.0   RDW 18.8* 18.5* 18.1*   NEPRELIM 1.60 2.08 1.82   WBC 2.8* 3.5* 3.0*   .0* 152.0 133.0*         Recent Labs   Lab 25  0714 25  1112 25  0848   GLU 92 96 99   BUN 34* 32* 29*   CREATSERUM 6.34* 4.82* 3.80*   CA 9.3 9.7  9.5    136 136   K 4.5 4.4 3.5    97* 99   CO2 27.0 29.0 28.0          No results found.        PILI TELLO MD  6/20/2025

## 2025-06-20 NOTE — PROGRESS NOTES
Progress Note  Shavon Chau Browning Patient Status:  Inpatient    1970 MRN [id number]   Location [facility] 3W/SW Attending Marcin Christopher,*   Hosp Day # 6 PCP No primary care provider on file.     SUBJECTIVE:    ROS completed with facility .  Denies chest pain or dyspnea. No complaints.     VITALS:  /81 (BP Location: Right arm)   Pulse 83   Temp 98.3 °F (36.8 °C) (Oral)   Resp 16   Ht 5' (1.524 m)   Wt 118 lb 6.4 oz (53.7 kg)   SpO2 99%   BMI 23.12 kg/m²   INTAKE/OUTPUT:    Intake/Output Summary (Last 24 hours) at 2025 0623  Last data filed at 2025 0500  Gross per 24 hour   Intake 260 ml   Output 0 ml   Net 260 ml     Last 3 Weights   25 0500 118 lb 6.4 oz (53.7 kg)   25 0514 121 lb 11.2 oz (55.2 kg)   25 0700 134 lb 14.7 oz (61.2 kg)   25 0051 131 lb 6.3 oz (59.6 kg)   25 2234 116 lb 13.5 oz (53 kg)     LABS:  Recent Labs   Lab 252 25  0714 25  1112   * 92 96   BUN 21 34* 32*   CREATSERUM 4.25* 6.34* 4.82*   EGFRCR 12* 7* 10*   CA 8.8 9.3 9.7    137 136   K 4.3 4.5 4.4    101 97*   CO2 29.0 27.0 29.0     Recent Labs   Lab 25  0714 25  1112   RBC 2.61* 2.50* 2.66*   HGB 7.8* 7.8* 8.3*   HCT 25.0* 24.0* 26.2*   MCV 95.8 96.0 98.5   MCH 29.9 31.2 31.2   MCHC 31.2 32.5 31.7   RDW 19.1* 18.8* 18.5*   NEPRELIM 2.30 1.60 2.08   WBC 3.6* 2.8* 3.5*   .0* 139.0* 152.0     No results for input(s): \"TROP\", \"CK\" in the last 168 hours.  DIAGNOSTICS:  TELEMETRY: SR     ECHO 1970:  Conclusions:   1. Left ventricle: The cavity size was increased. Wall thickness was normal.      Systolic function was markedly reduced. The estimated ejection fraction      was 21%, by biplane method of disks. There was severe diffuse      hypokinesis. Doppler parameters are consistent with a reversible      restrictive pattern, indicative of decreased left ventricular diastolic       compliance and/or increased left atrial pressure - grade 3 diastolic      dysfunction.   2. Left atrium: The left atrial volume was markedly increased.   3. Mitral valve: There was moderate regurgitation, with multiple jets. The      regurgitant vena contracta width was 0.5cm.   4. Tricuspid valve: There was moderate regurgitation.   5. Pulmonary arteries: Systolic pressure was markedly increased, estimated      to be 90mm Hg.   6. Pericardium, extracardiac: There was a right pleural effusion.   Impressions:  No previous study from [facility] was   available for comparison.     ROS: Negative unless noted above   PHYSICAL EXAM:  General: Alert and oriented x 3. No apparent distress.  HEENT: Normocephalic, sclera are icteric. Hearing appropriate bilaterally.  Neck: No JVD or Carotid bruits. Trachea midline.   Cardiac: Regular rate and rhythm. S1, S2 auscultated. No murmurs, rubs, or gallops appreciated.   Lungs: Clear without wheezes, rales, rhonchi or dullness. Chest expansion symmetrical. Regular effort.  Abdomen: Soft, non-tender, +BS. No hepatosplenomegaly or appreciable masses.   Extremities: Without clubbing, cyanosis. Peripheral pulses are 2+. Edema none   Neurologic: Motor and sensory nerves grossly intact.   Psych: Appropriate affect   Skin: Warm and dry. No obvious lesions, wounds, or ulcerations.     MEDICATIONS:  Scheduled Medications[1]  Medication Infusions[2]    ASSESSMENT:    VT/VF s/p 6 Shocks on 6/13/25: [place] Sci- ICD  - C 10/2025 at [place] without obstructive disease   - s/p Lidocaine gtt, now on oral Amiodarone; LFTs stable, TSH elevated T4 normal   - EKG with prolonged Qtc earlier this stay therefore Amio was decreased   - Bedside tele stable     Chronic HFrEF, LVEF 20%, NICM   Mod MR & TR   ESRD on HD: M-W-F  Hx Left Radical Nephrectomy 2022  Chronic Anemia   - Nephro following, volume with HD  - Medical therapy for NICM limited with hx of hypotension with med titration  - o/p  on Midodrine for HD.  No ACE/ARB/ARNI/ MRA with ESRD  - Being considered for Heart and Kidney Transplant through [facility]    Latent TB  - Rifampin     PLAN:  - TSH mildly elevated but T4 normal - monitor outpt  - Evaluated by EP, continue Amio, AST 40, multifactorial, monitor outpt  - Bps have been stable here on lopressor, further GDMT titration o/p as BP allows   - She has a f/u appt with [facility] Team on June 25th   - Stable for discharge from a cardiology standpoint. Will follow peripherally.     Plan of care discussed with patient and RN.     Janine Caldera, MSN, FNP-BC, CCK  06/20/25   6:23 AM        =======================================================  Note reviewed and labs reviewed.  Agree with above assessment and plan.  Telemetry currently stable.    In regards to chronic HFrEF (EF 20%) and nonischemic cardiomyopathy, GDMT remains limited due to hypotension and concurrent ESRD. Historically intolerant to ACE/ARB/MRA therapies. Maintains stability on carvedilol, previously on midodrine for intradialytic hypotension. Patient is followed closely by nephrology and the [facility] transplant program for dual heart-kidney evaluation.    From an arrhythmia standpoint, EP has evaluated the patient and recommended continuation of amiodarone with outpatient monitoring.    Patient is currently hemodynamically stable and suitable for discharge from a cardiology standpoint.     I have personally performed the medical decision making in its entirety.   Wellington Tapia DO               [1]    metoprolol tartrate  12.5 mg Oral 2x Daily(Beta Blocker)    amiodarone  200 mg Oral Daily    heparin  1.5 mL Intracatheter Once    epoetin gabriela  5,000 Units Subcutaneous Once per day on Monday Wednesday Friday    heparin  5,000 Units Subcutaneous 2 times per day    rifAMPin  600 mg Oral Daily   [2]

## 2025-06-20 NOTE — DISCHARGE SUMMARY
Dc summary#[id number]  > 30 min spent on dc    Hospital Discharge Diagnoses: pulseless vtach arrest    Lace+ Score: 37  59-90 High Risk  29-58 Medium Risk  0-28   Low Risk.    TCM Follow-Up Recommendation:  LACE > 58: High Risk of readmission after discharge from the hospital. Tcm fu recommended

## 2025-06-20 NOTE — PAYOR COMM NOTE
--------------  CONTINUED STAY REVIEW    Payor: [facility]  Subscriber #:  [id number]  Authorization Number: [id number]      6/20:     Cardiology Prog Note:       Recent Labs   Lab 06/17/25 0312 06/18/25 0714 06/19/25  1112   * 92 96   BUN 21 34* 32*   CREATSERUM 4.25* 6.34* 4.82*   EGFRCR 12* 7* 10*   CA 8.8 9.3 9.7    137 136   K 4.3 4.5 4.4    101 97*   CO2 29.0 27.0 29.0      Lab 06/17/25 0312 06/18/25 0714 06/19/25  1112   RBC 2.61* 2.50* 2.66*   HGB 7.8* 7.8* 8.3*   HCT 25.0* 24.0* 26.2*   MCV 95.8 96.0 98.5   MCH 29.9 31.2 31.2   MCHC 31.2 32.5 31.7   RDW 19.1* 18.8* 18.5*   NEPRELIM 2.30 1.60 2.08   WBC 3.6* 2.8* 3.5*   .0* 139.0* 152.0     ASSESSMENT:     VT/VF s/p 6 Shocks on 6/13/25: [place] [facility]- ICD  - [facility] 10/2025 at [place] without obstructive disease   - s/p Lidocaine gtt, now on oral Amiodarone; LFTs stable, TSH elevated T4 normal   - EKG with prolonged Qtc earlier this stay therefore Amio was decreased   - Bedside tele stable      Chronic HFrEF, LVEF 20%, NICM   Mod MR & TR   ESRD on HD: M-W-F  Hx Left Radical Nephrectomy 2022  Chronic Anemia   - Nephro following, volume with HD  - Medical therapy for NICM limited with hx of hypotension with med titration  - o/p on Midodrine for HD.  No ACE/ARB/ARNI/ MRA with ESRD  - Being considered for Heart and Kidney Transplant through [place]     Latent TB  - Rifampin      PLAN:  - TSH mildly elevated but T4 normal - monitor outpt  - Evaluated by EP, continue Amio, AST 40, multifactorial, monitor outpt  - Bps have been stable here on lopressor, further GDMT titration o/p as BP allows   - She has a f/u appt with [facility] on June 25th         Nephrology Progress Note:     The patient is seen on dialysis       Assessment and Plan:      1 - ESRD  The patient receives Monday Wednesday Friday dialysis.  She is getting dialyzed today     The patient told me that she goes to the [place]  dialysis unit Monday Wednesday and Friday     2 -Vtach /CHFrEF  Per cardiology.  She is on amiodarone     3 - pancytopenia  On Epogen    Recent Labs   Lab 06/18/25  0714 06/19/25  1112 06/20/25  0848   RBC 2.50* 2.66* 2.60*   HGB 7.8* 8.3* 8.1*   HCT 24.0* 26.2* 25.3*   MCV 96.0 98.5 97.3   MCH 31.2 31.2 31.2   MCHC 32.5 31.7 32.0   RDW 18.8* 18.5* 18.1*   NEPRELIM 1.60 2.08 1.82   WBC 2.8* 3.5* 3.0*   .0* 152.0 133.0*      Lab 06/18/25  0714 06/19/25  1112 06/20/25  0848   GLU 92 96 99   BUN 34* 32* 29*   CREATSERUM 6.34* 4.82* 3.80*   CA 9.3 9.7 9.5    136 136   K 4.5 4.4 3.5    97* 99   CO2 27.0 29.0 28.0            MEDICATIONS ADMINISTERED IN LAST 1 DAY:  amiodarone (Pacerone) tab 200 mg       Date Action Dose Route User    6/20/2025 1227 Given 200 mg Oral Dolly Solares RN          heparin (Porcine) 1000 UNIT/ML injection 1,500 Units       Date Action Dose Route User    6/20/2025 0945 Given by Other 1,500 Units Intracatheter Dolly Solares RN          heparin (Porcine) 5000 UNIT/ML injection 5,000 Units       Date Action Dose Route User    6/20/2025 1227 Given 5,000 Units Subcutaneous (Right Lower Abdomen) Dolly Solares RN    6/19/2025 2145 Given 5,000 Units Subcutaneous (Right Lower Abdomen) Korin Aponte RN          metoprolol tartrate (Lopressor) partial tab 12.5 mg       Date Action Dose Route User    6/20/2025 0507 Given 12.5 mg Oral Korin Aponte RN    6/19/2025 1737 Given 12.5 mg Oral PepeaTeresa RN          rifAMPin (Rifadin) cap 600 mg       Date Action Dose Route User    6/20/2025 1227 Given 600 mg Oral Dolly Solares, RN            Vitals (last day)       Date/Time Temp Pulse Resp BP SpO2 Weight O2 Device O2 Flow Rate (L/min) [facility]    06/20/25 1226 98.4 °F (36.9 °C) 73 17 131/70 100 % -- None (Room air) --     06/20/25 0742 98.7 °F (37.1 °C) 73 18 126/79 97 % -- None (Room air) --     06/20/25 0506 98.3 °F (36.8 °C) 83 16 127/81 99 % -- None (Room  air) -- KG    06/20/25 0500 -- -- -- -- -- 118 lb 6.4 oz (53.7 kg) -- -- DW    06/19/25 2142 98.4 °F (36.9 °C) 77 18 123/79 98 % -- None (Room air) -- KG    06/19/25 1735 98.6 °F (37 °C) 77 20 126/76 100 % -- None (Room air) -- AA    06/19/25 0904 98.4 °F (36.9 °C) 78 18 130/71 98 % -- None (Room air) -- AA    06/19/25 0514 -- -- -- -- -- 121 lb 11.2 oz (55.2 kg) -- -- MO    06/19/25 0455 98.3 °F (36.8 °C) 87 16 126/79 98 % -- None (Room air) -- KG

## 2025-06-21 NOTE — DISCHARGE SUMMARY
[facility]    PATIENT'S NAME: JASON ERVIN   ATTENDING PHYSICIAN: Marcin Christopher MD   PATIENT ACCOUNT#:   [id number]    LOCATION:  [address] [place] [address]  MEDICAL RECORD #:   [id number]       YOB: 1970  ADMISSION DATE:       06/13/2025      DISCHARGE DATE:  06/20/2025    DISCHARGE SUMMARY    Greater than 35 minutes were spent preparing this discharge.    DISCHARGE DIAGNOSIS:  Status post recovery from pulseless V-tach arrest.    HISTORY AND HOSPITAL COURSE:  This is a very pleasant 55-year-old German-speaking  American female who had a history of significant V-fib/V-tach arrest at home.  She was taken to the hospital, and she was found to be pulseless in V-tach and given amiodarone, and there may or may not have been a shock delivered.  In either case, she recovered and was seen by Cardiology here and on IV lidocaine and started on that, and she had received amiodarone.  Given her severe situation, she was evaluated by EP who recommended that she be placed on p.o. amiodarone, and lidocaine was stopped.  She had dialysis here and improved, and she was cleared by all services to be discharged today and we discharged her home.  She asked that we find her a ride.    PHYSICAL EXAMINATION ON DISCHARGE:  VITAL SIGNS:  Temperature is 98.9, pulse 88, respiratory rate 18, blood pressure is 125/77, 99%.  LUNGS:  Occasional rhonchi.  HEART:  Normal S1, S2.  No S3.  ABDOMEN:  Soft.  EXTREMITIES:  Without edema.  NEUROLOGIC:  She is alert and oriented, friendly and cooperative.     LABORATORY STUDIES:  Please see chart.    ASSESSMENT AND PLAN:  1.   Ventricular tachycardia/ventricular fibrillation arrest.  Patient doing well.  Continue amiodarone.  Await her evaluation at [place] for heart transplant on June 25.  Avoid coffee, stress, alcohol.  Hopefully, she will be okay.  2.   End-stage renal disease on dialysis.  Continue Monday, Wednesday, Friday dialysis.  3.   Syncope from above.  4.    Chronic systolic congestive heart failure.  Currently stable.  5.   Latent tuberculosis.  Continue rifampin.  6.   Prolonged QT syndrome.  Now on amiodarone.  Patient okay on telemetry.  7.   Mildly hypothyroid.  Resume Synthroid.    CONDITION ON DISCHARGE:  Stable.    CODE STATUS:  Full Code.    DIET:  Low fat, low salt, 70 g protein, renal.    ACTIVITY:  As tolerated.    FOLLOWUP:  With Dr. Licona for dialysis.  Follow up with Dr. Mccabe if a new primary needed.  Follow up with her [place] transplant team June 25.  Follow up TSH, T4 in 3 weeks.    DISCHARGE MEDICATIONS:  1.   Lanthanum 1000 mg 3 times a day.  2.   Amiodarone 200 mg daily.  3.   Atarax 25 mg 3 times a day.  4.   Rifampin 600 mg daily.  5.   Erythropoietin 5000 units 3 times a week.  6.   Metoprolol 12.5 mg twice a day.  7.   Synthroid 25 mcg daily.    RISK OF READMISSION:  High.  TCM followup recommended.    Dictated By Marcin Christopher MD  d: 06/20/2025 17:59:54  t: 06/20/2025 18:06:35  Job [id number]  [facility]/

## 2025-06-22 ENCOUNTER — TELEPHONE (OUTPATIENT)
Dept: CARE COORDINATION | Age: 55
End: 2025-06-22

## 2025-06-23 ENCOUNTER — PATIENT OUTREACH (OUTPATIENT)
Dept: CASE MANAGEMENT | Age: 55
End: 2025-06-23

## 2025-06-23 ENCOUNTER — TELEPHONE (OUTPATIENT)
Dept: CARE COORDINATION | Age: 55
End: 2025-06-23

## 2025-06-23 ENCOUNTER — HOSPITAL ENCOUNTER (INPATIENT)
Age: 55
LOS: 1 days | Discharge: ANOTHER HOSPITAL | DRG: 201 | End: 2025-06-23
Attending: EMERGENCY MEDICINE | Admitting: INTERNAL MEDICINE

## 2025-06-23 ENCOUNTER — APPOINTMENT (OUTPATIENT)
Dept: GENERAL RADIOLOGY | Age: 55
DRG: 201 | End: 2025-06-23
Attending: EMERGENCY MEDICINE

## 2025-06-23 VITALS
SYSTOLIC BLOOD PRESSURE: 139 MMHG | DIASTOLIC BLOOD PRESSURE: 76 MMHG | RESPIRATION RATE: 18 BRPM | BODY MASS INDEX: 24.8 KG/M2 | HEIGHT: 60 IN | OXYGEN SATURATION: 100 % | WEIGHT: 126.32 LBS | TEMPERATURE: 96.8 F | HEART RATE: 83 BPM

## 2025-06-23 DIAGNOSIS — Z99.2 ESRD ON HEMODIALYSIS  (CMD): ICD-10-CM

## 2025-06-23 DIAGNOSIS — E87.6 HYPOKALEMIA: ICD-10-CM

## 2025-06-23 DIAGNOSIS — I47.20 VENTRICULAR TACHYCARDIA  (CMD): Primary | ICD-10-CM

## 2025-06-23 DIAGNOSIS — I47.21 TORSADES DE POINTES  (CMD): ICD-10-CM

## 2025-06-23 DIAGNOSIS — E87.3 ALKALOSIS: ICD-10-CM

## 2025-06-23 DIAGNOSIS — N18.6 ESRD ON HEMODIALYSIS  (CMD): ICD-10-CM

## 2025-06-23 DIAGNOSIS — I46.9 CARDIAC ARREST (CMD): ICD-10-CM

## 2025-06-23 DIAGNOSIS — R00.2 PALPITATIONS: ICD-10-CM

## 2025-06-23 DIAGNOSIS — R94.31 PROLONGED QT INTERVAL: ICD-10-CM

## 2025-06-23 DIAGNOSIS — E83.51 HYPOCALCEMIA: ICD-10-CM

## 2025-06-23 LAB
ALBUMIN SERPL-MCNC: 3.2 G/DL (ref 3.4–5)
ALBUMIN/GLOB SERPL: 0.7 {RATIO} (ref 1–2.4)
ALP SERPL-CCNC: 252 UNITS/L (ref 45–117)
ALT SERPL-CCNC: 29 UNITS/L
ANION GAP SERPL CALC-SCNC: 10 MMOL/L (ref 7–19)
AST SERPL-CCNC: 33 UNITS/L
BASE EXCESS / DEFICIT, ARTERIAL - RESPIRATORY: 6 MMOL/L (ref -2–3)
BASOPHILS # BLD: 0.1 K/MCL (ref 0–0.3)
BASOPHILS NFR BLD: 2 %
BDY SITE: ABNORMAL
BDY SITE: ABNORMAL
BILIRUB SERPL-MCNC: 0.8 MG/DL (ref 0.2–1)
BODY TEMPERATURE: 36.8 DEGREES C
BODY TEMPERATURE: 37 DEGREES C
BUN SERPL-MCNC: 21 MG/DL (ref 6–20)
BUN/CREAT SERPL: 6 (ref 7–25)
CA-I BLD-SCNC: 1.04 MMOL/L (ref 1.15–1.29)
CA-I BLD-SCNC: 1.2 MMOL/L (ref 1.15–1.29)
CALCIUM SERPL-MCNC: 9.2 MG/DL (ref 8.4–10.2)
CHLORIDE BLD-SCNC: 97 MMOL/L (ref 97–110)
CHLORIDE BLD-SCNC: 98 MMOL/L (ref 97–110)
CHLORIDE SERPL-SCNC: 96 MMOL/L (ref 97–110)
CO2 SERPL-SCNC: 30 MMOL/L (ref 21–32)
COHGB MFR BLDV: 1.1 %
COHGB MFR BLDV: 1.8 %
CONDITION: ABNORMAL
CREAT SERPL-MCNC: 3.79 MG/DL (ref 0.51–0.95)
DEPRECATED RDW RBC: 59.6 FL (ref 39–50)
EGFRCR SERPLBLD CKD-EPI 2021: 14 ML/MIN/{1.73_M2}
EOSINOPHIL # BLD: 0.1 K/MCL (ref 0–0.5)
EOSINOPHIL NFR BLD: 4 %
ERYTHROCYTE [DISTWIDTH] IN BLOOD: 17.2 % (ref 11–15)
FASTING DURATION TIME PATIENT: ABNORMAL H
FIO2 ON VENT: 21 %
GLOBULIN SER-MCNC: 4.3 G/DL (ref 2–4)
GLUCOSE BLD-MCNC: 98 MG/DL (ref 65–99)
GLUCOSE BLDC GLUCOMTR-MCNC: 127 MG/DL (ref 70–99)
GLUCOSE SERPL-MCNC: 106 MG/DL (ref 70–99)
HCO3 BLDA-SCNC: 26 MMOL/L (ref 22–28)
HCT VFR BLD CALC: 25 % (ref 36–46.5)
HCT VFR BLD CALC: 26.2 % (ref 36–46.5)
HCT VFR BLD CALC: 29 % (ref 36–46.5)
HGB BLD-MCNC: 8.3 G/DL (ref 12–15.5)
HGB BLD-MCNC: 8.7 G/DL (ref 12–15.5)
HGB BLD-MCNC: 9.5 G/DL (ref 12–15.5)
IMM GRANULOCYTES # BLD AUTO: 0 K/MCL (ref 0–0.2)
IMM GRANULOCYTES # BLD: 1 %
LYMPHOCYTES # BLD: 1.1 K/MCL (ref 1–4)
LYMPHOCYTES NFR BLD: 33 %
MAGNESIUM SERPL-MCNC: 1.8 MG/DL (ref 1.7–2.4)
MCH RBC QN AUTO: 31.3 PG (ref 26–34)
MCHC RBC AUTO-ENTMCNC: 33.2 G/DL (ref 32–36.5)
MCV RBC AUTO: 94.2 FL (ref 78–100)
METHGB MFR BLDMV: 0.6 %
METHGB MFR BLDMV: 0.7 %
MONOCYTES # BLD: 0.3 K/MCL (ref 0.3–0.9)
MONOCYTES NFR BLD: 9 %
NEUTROPHILS # BLD: 1.8 K/MCL (ref 1.8–7.7)
NEUTROPHILS NFR BLD: 51 %
NRBC BLD MANUAL-RTO: 0 /100 WBC
OXYHGB MFR BLDA: 97 % (ref 94–98)
OXYHGB MFR BLDV: 79 % (ref 60–80)
PCO2 BLDA: 21 MM HG (ref 32–45)
PCO2 BLDV: 21 MM HG (ref 38–51)
PH BLDA: 7.7 UNITS (ref 7.35–7.45)
PH BLDV: >7.71 UNITS (ref 7.35–7.45)
PLATELET # BLD AUTO: 159 K/MCL (ref 140–450)
PO2 BLDA: 151 MM HG (ref 83–108)
PO2 BLDV: 41 MM HG (ref 35–42)
POTASSIUM BLD-SCNC: 3.1 MMOL/L (ref 3.4–5.1)
POTASSIUM BLD-SCNC: 3.2 MMOL/L (ref 3.4–5.1)
POTASSIUM SERPL-SCNC: 3.2 MMOL/L (ref 3.4–5.1)
PROT SERPL-MCNC: 7.5 G/DL (ref 6.4–8.2)
RAINBOW EXTRA TUBES HOLD SPECIMEN: NORMAL
RAINBOW EXTRA TUBES HOLD SPECIMEN: NORMAL
RBC # BLD: 2.78 MIL/MCL (ref 4–5.2)
SAO2 % BLDA: 12 % (ref 15–23)
SAO2 % BLDA: 99 % (ref 95–99)
SAO2 % BLDV: 11 %
SAO2 DF BLDV: 81 % (ref 60–80)
SODIUM BLD-SCNC: 134 MMOL/L (ref 135–145)
SODIUM BLD-SCNC: 134 MMOL/L (ref 135–145)
SODIUM SERPL-SCNC: 133 MMOL/L (ref 135–145)
WBC # BLD: 3.4 K/MCL (ref 4.2–11)

## 2025-06-23 PROCEDURE — 96365 THER/PROPH/DIAG IV INF INIT: CPT

## 2025-06-23 PROCEDURE — 82435 ASSAY OF BLOOD CHLORIDE: CPT

## 2025-06-23 PROCEDURE — 36600 WITHDRAWAL OF ARTERIAL BLOOD: CPT

## 2025-06-23 PROCEDURE — 93005 ELECTROCARDIOGRAM TRACING: CPT | Performed by: EMERGENCY MEDICINE

## 2025-06-23 PROCEDURE — 99255 IP/OBS CONSLTJ NEW/EST HI 80: CPT | Performed by: INTERNAL MEDICINE

## 2025-06-23 PROCEDURE — 10002807 HB RX 258: Performed by: INTERNAL MEDICINE

## 2025-06-23 PROCEDURE — 5A12012 PERFORMANCE OF CARDIAC OUTPUT, SINGLE, MANUAL: ICD-10-PCS | Performed by: EMERGENCY MEDICINE

## 2025-06-23 PROCEDURE — 10002800 HB RX 250 W HCPCS

## 2025-06-23 PROCEDURE — 99292 CRITICAL CARE ADDL 30 MIN: CPT | Performed by: INTERNAL MEDICINE

## 2025-06-23 PROCEDURE — 85018 HEMOGLOBIN: CPT

## 2025-06-23 PROCEDURE — 93010 ELECTROCARDIOGRAM REPORT: CPT | Performed by: INTERNAL MEDICINE

## 2025-06-23 PROCEDURE — 99291 CRITICAL CARE FIRST HOUR: CPT | Performed by: EMERGENCY MEDICINE

## 2025-06-23 PROCEDURE — 99291 CRITICAL CARE FIRST HOUR: CPT | Performed by: INTERNAL MEDICINE

## 2025-06-23 PROCEDURE — 10002803 HB RX 637

## 2025-06-23 PROCEDURE — 82947 ASSAY GLUCOSE BLOOD QUANT: CPT

## 2025-06-23 PROCEDURE — 10002801 HB RX 250 W/O HCPCS: Performed by: EMERGENCY MEDICINE

## 2025-06-23 PROCEDURE — 71045 X-RAY EXAM CHEST 1 VIEW: CPT

## 2025-06-23 PROCEDURE — 83735 ASSAY OF MAGNESIUM: CPT | Performed by: EMERGENCY MEDICINE

## 2025-06-23 PROCEDURE — 10002801 HB RX 250 W/O HCPCS: Performed by: INTERNAL MEDICINE

## 2025-06-23 PROCEDURE — 10002800 HB RX 250 W HCPCS: Performed by: EMERGENCY MEDICINE

## 2025-06-23 PROCEDURE — 96372 THER/PROPH/DIAG INJ SC/IM: CPT | Performed by: INTERNAL MEDICINE

## 2025-06-23 PROCEDURE — 10000008 HB ROOM CHARGE ICU OR CCU

## 2025-06-23 PROCEDURE — 10002800 HB RX 250 W HCPCS: Performed by: INTERNAL MEDICINE

## 2025-06-23 PROCEDURE — 82330 ASSAY OF CALCIUM: CPT

## 2025-06-23 PROCEDURE — 96367 TX/PROPH/DG ADDL SEQ IV INF: CPT

## 2025-06-23 PROCEDURE — 99291 CRITICAL CARE FIRST HOUR: CPT

## 2025-06-23 PROCEDURE — 82805 BLOOD GASES W/O2 SATURATION: CPT

## 2025-06-23 PROCEDURE — 10002803 HB RX 637: Performed by: INTERNAL MEDICINE

## 2025-06-23 PROCEDURE — 83050 HGB METHEMOGLOBIN QUAN: CPT

## 2025-06-23 PROCEDURE — 96375 TX/PRO/DX INJ NEW DRUG ADDON: CPT

## 2025-06-23 PROCEDURE — 85025 COMPLETE CBC W/AUTO DIFF WBC: CPT | Performed by: EMERGENCY MEDICINE

## 2025-06-23 PROCEDURE — 80053 COMPREHEN METABOLIC PANEL: CPT | Performed by: EMERGENCY MEDICINE

## 2025-06-23 PROCEDURE — 92950 HEART/LUNG RESUSCITATION CPR: CPT

## 2025-06-23 PROCEDURE — 10004180 HB COUNTER-TRANSPORT

## 2025-06-23 RX ORDER — POTASSIUM CHLORIDE 14.9 MG/ML
20 INJECTION INTRAVENOUS ONCE
Status: COMPLETED | OUTPATIENT
Start: 2025-06-23 | End: 2025-06-23

## 2025-06-23 RX ORDER — LIDOCAINE HYDROCHLORIDE ANHYDROUS AND DEXTROSE MONOHYDRATE 5; 400 G/100ML; MG/100ML
0.5 INJECTION, SOLUTION INTRAVENOUS CONTINUOUS
Status: DISCONTINUED | OUTPATIENT
Start: 2025-06-23 | End: 2025-06-23 | Stop reason: HOSPADM

## 2025-06-23 RX ORDER — 0.9 % SODIUM CHLORIDE 0.9 %
10 VIAL (ML) INJECTION PRN
Status: DISCONTINUED | OUTPATIENT
Start: 2025-06-23 | End: 2025-06-23 | Stop reason: HOSPADM

## 2025-06-23 RX ORDER — LEVOTHYROXINE SODIUM 25 UG/1
25 TABLET ORAL DAILY
COMMUNITY

## 2025-06-23 RX ORDER — METOPROLOL TARTRATE 25 MG/1
12.5 TABLET, FILM COATED ORAL EVERY 12 HOURS SCHEDULED
Status: DISCONTINUED | OUTPATIENT
Start: 2025-06-23 | End: 2025-06-23 | Stop reason: HOSPADM

## 2025-06-23 RX ORDER — HYDROCODONE BITARTRATE AND ACETAMINOPHEN 5; 325 MG/1; MG/1
TABLET ORAL
Status: COMPLETED
Start: 2025-06-23 | End: 2025-06-23

## 2025-06-23 RX ORDER — SODIUM CHLORIDE 9 MG/ML
INJECTION, SOLUTION INTRAVENOUS CONTINUOUS PRN
Status: DISCONTINUED | OUTPATIENT
Start: 2025-06-23 | End: 2025-06-23 | Stop reason: HOSPADM

## 2025-06-23 RX ORDER — HYDROCODONE BITARTRATE AND ACETAMINOPHEN 5; 325 MG/1; MG/1
1 TABLET ORAL EVERY 4 HOURS PRN
Refills: 0 | Status: DISCONTINUED | OUTPATIENT
Start: 2025-06-23 | End: 2025-06-23 | Stop reason: HOSPADM

## 2025-06-23 RX ORDER — LANTHANUM CARBONATE 500 MG/1
1000 TABLET, CHEWABLE ORAL
Status: DISCONTINUED | OUTPATIENT
Start: 2025-06-23 | End: 2025-06-23

## 2025-06-23 RX ORDER — ALPRAZOLAM 0.25 MG
0.5 TABLET ORAL NIGHTLY PRN
Status: DISCONTINUED | OUTPATIENT
Start: 2025-06-23 | End: 2025-06-23 | Stop reason: HOSPADM

## 2025-06-23 RX ORDER — 0.9 % SODIUM CHLORIDE 0.9 %
2 VIAL (ML) INJECTION EVERY 12 HOURS SCHEDULED
Status: DISCONTINUED | OUTPATIENT
Start: 2025-06-23 | End: 2025-06-23 | Stop reason: HOSPADM

## 2025-06-23 RX ORDER — METOPROLOL TARTRATE 25 MG/1
12.5 TABLET, FILM COATED ORAL EVERY 12 HOURS SCHEDULED
COMMUNITY

## 2025-06-23 RX ORDER — CALCIUM GLUCONATE 20 MG/ML
1 INJECTION, SOLUTION INTRAVENOUS ONCE
Status: DISCONTINUED | OUTPATIENT
Start: 2025-06-23 | End: 2025-06-23

## 2025-06-23 RX ORDER — RIFAMPIN 300 MG/1
300 CAPSULE ORAL 2 TIMES DAILY
Status: DISCONTINUED | OUTPATIENT
Start: 2025-06-23 | End: 2025-06-23 | Stop reason: HOSPADM

## 2025-06-23 RX ORDER — CALCIUM GLUCONATE 20 MG/ML
2 INJECTION, SOLUTION INTRAVENOUS ONCE
Status: COMPLETED | OUTPATIENT
Start: 2025-06-23 | End: 2025-06-23

## 2025-06-23 RX ORDER — HYDROXYZINE HYDROCHLORIDE 10 MG/1
25 TABLET, FILM COATED ORAL 3 TIMES DAILY PRN
Status: DISCONTINUED | OUTPATIENT
Start: 2025-06-23 | End: 2025-06-23 | Stop reason: HOSPADM

## 2025-06-23 RX ORDER — LEVOTHYROXINE SODIUM 50 UG/1
25 TABLET ORAL
Status: DISCONTINUED | OUTPATIENT
Start: 2025-06-24 | End: 2025-06-23 | Stop reason: HOSPADM

## 2025-06-23 RX ORDER — HEPARIN SODIUM 5000 [USP'U]/ML
5000 INJECTION, SOLUTION INTRAVENOUS; SUBCUTANEOUS EVERY 8 HOURS SCHEDULED
Status: DISCONTINUED | OUTPATIENT
Start: 2025-06-23 | End: 2025-06-23 | Stop reason: HOSPADM

## 2025-06-23 RX ADMIN — POTASSIUM CHLORIDE 20 MEQ: 14.9 INJECTION, SOLUTION INTRAVENOUS at 18:01

## 2025-06-23 RX ADMIN — HEPARIN SODIUM 5000 UNITS: 5000 INJECTION INTRAVENOUS; SUBCUTANEOUS at 17:56

## 2025-06-23 RX ADMIN — HYDROCODONE BITARTRATE AND ACETAMINOPHEN 1 TABLET: 5; 325 TABLET ORAL at 17:03

## 2025-06-23 RX ADMIN — HYDROCODONE BITARTRATE AND ACETAMINOPHEN 1 TABLET: 5; 325 TABLET ORAL at 17:04

## 2025-06-23 RX ADMIN — POTASSIUM CHLORIDE 20 MEQ: 14.9 INJECTION, SOLUTION INTRAVENOUS at 14:52

## 2025-06-23 RX ADMIN — ISOPROTERENOL HYDROCHLORIDE 0.5 MCG/MIN: 0.2 INJECTION, SOLUTION INTRACARDIAC; INTRAMUSCULAR; INTRAVENOUS; SUBCUTANEOUS at 15:30

## 2025-06-23 RX ADMIN — FENTANYL CITRATE 12.5 MCG: 50 INJECTION INTRAMUSCULAR; INTRAVENOUS at 20:25

## 2025-06-23 RX ADMIN — MAGNESIUM SULFATE HEPTAHYDRATE 2 G: 40 INJECTION, SOLUTION INTRAVENOUS at 14:25

## 2025-06-23 RX ADMIN — LIDOCAINE HYDROCHLORIDE 0.5 MG/MIN: 4 INJECTION, SOLUTION INTRAVENOUS at 17:32

## 2025-06-23 RX ADMIN — CALCIUM GLUCONATE 2 G: 20 INJECTION, SOLUTION INTRAVENOUS at 14:09

## 2025-06-23 SDOH — HEALTH STABILITY: PHYSICAL HEALTH: DO YOU HAVE SERIOUS DIFFICULTY WALKING OR CLIMBING STAIRS?: NO

## 2025-06-23 SDOH — ECONOMIC STABILITY: INCOME INSECURITY: IN THE PAST 12 MONTHS, HAS THE ELECTRIC, GAS, OIL, OR WATER COMPANY THREATENED TO SHUT OFF SERVICE IN YOUR HOME?: NO

## 2025-06-23 SDOH — HEALTH STABILITY: GENERAL: BECAUSE OF A PHYSICAL, MENTAL, OR EMOTIONAL CONDITION, DO YOU HAVE DIFFICULTY DOING ERRANDS ALONE?: NO

## 2025-06-23 SDOH — ECONOMIC STABILITY: HOUSING INSECURITY: DO YOU HAVE PROBLEMS WITH ANY OF THE FOLLOWING?: NONE OF THE ABOVE

## 2025-06-23 SDOH — ECONOMIC STABILITY: FOOD INSECURITY: WITHIN THE PAST 12 MONTHS, THE FOOD YOU BOUGHT JUST DIDN'T LAST AND YOU DIDN'T HAVE MONEY TO GET MORE.: NEVER TRUE

## 2025-06-23 SDOH — SOCIAL STABILITY: SOCIAL INSECURITY: HOW OFTEN DOES ANYONE, INCLUDING FAMILY AND FRIENDS, PHYSICALLY HURT YOU?: NEVER

## 2025-06-23 SDOH — ECONOMIC STABILITY: HOUSING INSECURITY: WHAT IS YOUR LIVING SITUATION TODAY?: I HAVE A STEADY PLACE TO LIVE

## 2025-06-23 SDOH — HEALTH STABILITY: PHYSICAL HEALTH: DO YOU HAVE DIFFICULTY DRESSING OR BATHING?: NO

## 2025-06-23 SDOH — SOCIAL STABILITY: SOCIAL INSECURITY: HOW OFTEN DOES ANYONE, INCLUDING FAMILY AND FRIENDS, THREATEN YOU WITH HARM?: NEVER

## 2025-06-23 SDOH — SOCIAL STABILITY: SOCIAL INSECURITY: HOW OFTEN DOES ANYONE, INCLUDING FAMILY AND FRIENDS, INSULT OR TALK DOWN TO YOU?: NEVER

## 2025-06-23 SDOH — ECONOMIC STABILITY: HOUSING INSECURITY: WHAT IS YOUR LIVING SITUATION TODAY?: APARTMENT

## 2025-06-23 SDOH — ECONOMIC STABILITY: GENERAL

## 2025-06-23 SDOH — SOCIAL STABILITY: SOCIAL NETWORK: SUPPORT SYSTEMS: CHILDREN

## 2025-06-23 SDOH — SOCIAL STABILITY: SOCIAL INSECURITY: HOW OFTEN DOES ANYONE, INCLUDING FAMILY AND FRIENDS, SCREAM OR CURSE AT YOU?: NEVER

## 2025-06-23 SDOH — ECONOMIC STABILITY: HOUSING INSECURITY: WHAT IS YOUR LIVING SITUATION TODAY?: ADULT CHILDREN;FAMILY MEMBERS

## 2025-06-23 ASSESSMENT — PATIENT HEALTH QUESTIONNAIRE - PHQ9
CLINICAL INTERPRETATION OF PHQ2 SCORE: NO FURTHER SCREENING NEEDED
1. LITTLE INTEREST OR PLEASURE IN DOING THINGS: NOT AT ALL
SUM OF ALL RESPONSES TO PHQ9 QUESTIONS 1 AND 2: 0
IS PATIENT ABLE TO COMPLETE PHQ2 OR PHQ9: YES
SUM OF ALL RESPONSES TO PHQ9 QUESTIONS 1 AND 2: 0
2. FEELING DOWN, DEPRESSED OR HOPELESS: NOT AT ALL

## 2025-06-23 ASSESSMENT — ACTIVITIES OF DAILY LIVING (ADL)
ADL_SCORE: 12
RECENT_DECLINE_ADL: YES, ACUTE ILLNESS WITHOUT THERAPY NEEDS
ADL_SHORT_OF_BREATH: YES
ADL_BEFORE_ADMISSION: INDEPENDENT

## 2025-06-23 ASSESSMENT — ENCOUNTER SYMPTOMS: FATIGUE: 1

## 2025-06-23 ASSESSMENT — PAIN SCALES - GENERAL
PAINLEVEL_OUTOF10: 6
PAINLEVEL_OUTOF10: 3
PAINLEVEL_OUTOF10: 5

## 2025-06-23 ASSESSMENT — COLUMBIA-SUICIDE SEVERITY RATING SCALE - C-SSRS
IS THE PATIENT ABLE TO COMPLETE C-SSRS: YES
2. HAVE YOU ACTUALLY HAD ANY THOUGHTS OF KILLING YOURSELF?: NO
1. WITHIN THE PAST MONTH, HAVE YOU WISHED YOU WERE DEAD OR WISHED YOU COULD GO TO SLEEP AND NOT WAKE UP?: NO

## 2025-06-24 LAB
ATRIAL RATE (BPM): 85
ATRIAL RATE (BPM): 87
P AXIS (DEGREES): 0
P AXIS (DEGREES): 31
PR-INTERVAL (MSEC): 150
PR-INTERVAL (MSEC): 150
QRS-INTERVAL (MSEC): 86
QRS-INTERVAL (MSEC): 98
QT-INTERVAL (MSEC): 524
QT-INTERVAL (MSEC): 554
QTC: 630
QTC: 659
R AXIS (DEGREES): -4
R AXIS (DEGREES): 9
REPORT TEXT: NORMAL
REPORT TEXT: NORMAL
T AXIS (DEGREES): -132
T AXIS (DEGREES): -174
VENTRICULAR RATE EKG/MIN (BPM): 85
VENTRICULAR RATE EKG/MIN (BPM): 87

## 2025-06-29 ENCOUNTER — TELEPHONE (OUTPATIENT)
Dept: CARE COORDINATION | Age: 55
End: 2025-06-29

## 2025-06-30 ENCOUNTER — TELEPHONE (OUTPATIENT)
Dept: CARE COORDINATION | Age: 55
End: 2025-06-30

## 2025-07-02 ENCOUNTER — HOSPITAL ENCOUNTER (EMERGENCY)
Age: 55
Discharge: HOME | End: 2025-07-02
Attending: EMERGENCY MEDICINE

## 2025-07-02 ENCOUNTER — APPOINTMENT (OUTPATIENT)
Dept: GENERAL RADIOLOGY | Age: 55
End: 2025-07-02
Attending: EMERGENCY MEDICINE

## 2025-07-02 VITALS
SYSTOLIC BLOOD PRESSURE: 126 MMHG | HEIGHT: 60 IN | HEART RATE: 68 BPM | RESPIRATION RATE: 16 BRPM | TEMPERATURE: 97.6 F | BODY MASS INDEX: 24.67 KG/M2 | DIASTOLIC BLOOD PRESSURE: 77 MMHG | OXYGEN SATURATION: 100 %

## 2025-07-02 DIAGNOSIS — Z86.79 HISTORY OF VENTRICULAR TACHYCARDIA: ICD-10-CM

## 2025-07-02 DIAGNOSIS — F41.9 ANXIETY: Primary | ICD-10-CM

## 2025-07-02 LAB
ALBUMIN SERPL-MCNC: 3.4 G/DL (ref 3.4–5)
ALBUMIN/GLOB SERPL: 0.8 {RATIO} (ref 1–2.4)
ALP SERPL-CCNC: 214 UNITS/L (ref 45–117)
ALT SERPL-CCNC: 28 UNITS/L
ANION GAP SERPL CALC-SCNC: 14 MMOL/L (ref 7–19)
AST SERPL-CCNC: 32 UNITS/L
BASOPHILS # BLD: 0.1 K/MCL (ref 0–0.3)
BASOPHILS NFR BLD: 1 %
BILIRUB SERPL-MCNC: 0.5 MG/DL (ref 0.2–1)
BUN SERPL-MCNC: 23 MG/DL (ref 6–20)
BUN/CREAT SERPL: 5 (ref 7–25)
CALCIUM SERPL-MCNC: 9.5 MG/DL (ref 8.4–10.2)
CHLORIDE SERPL-SCNC: 100 MMOL/L (ref 97–110)
CO2 SERPL-SCNC: 22 MMOL/L (ref 21–32)
CREAT SERPL-MCNC: 5.01 MG/DL (ref 0.51–0.95)
DEPRECATED RDW RBC: 56.3 FL (ref 39–50)
EGFRCR SERPLBLD CKD-EPI 2021: 10 ML/MIN/{1.73_M2}
EOSINOPHIL # BLD: 0.2 K/MCL (ref 0–0.5)
EOSINOPHIL NFR BLD: 4 %
ERYTHROCYTE [DISTWIDTH] IN BLOOD: 16.2 % (ref 11–15)
FASTING DURATION TIME PATIENT: ABNORMAL H
GLOBULIN SER-MCNC: 4.2 G/DL (ref 2–4)
GLUCOSE SERPL-MCNC: 114 MG/DL (ref 70–99)
HCT VFR BLD CALC: 26.5 % (ref 36–46.5)
HGB BLD-MCNC: 8.5 G/DL (ref 12–15.5)
IMM GRANULOCYTES # BLD AUTO: 0 K/MCL (ref 0–0.2)
IMM GRANULOCYTES # BLD: 1 %
LYMPHOCYTES # BLD: 1.3 K/MCL (ref 1–4)
LYMPHOCYTES NFR BLD: 29 %
MAGNESIUM SERPL-MCNC: 2.4 MG/DL (ref 1.7–2.4)
MCH RBC QN AUTO: 30.5 PG (ref 26–34)
MCHC RBC AUTO-ENTMCNC: 32.1 G/DL (ref 32–36.5)
MCV RBC AUTO: 95 FL (ref 78–100)
MONOCYTES # BLD: 0.4 K/MCL (ref 0.3–0.9)
MONOCYTES NFR BLD: 10 %
NEUTROPHILS # BLD: 2.4 K/MCL (ref 1.8–7.7)
NEUTROPHILS NFR BLD: 55 %
NRBC BLD MANUAL-RTO: 0 /100 WBC
NT-PROBNP SERPL-MCNC: 7288 PG/ML
PLATELET # BLD AUTO: 149 K/MCL (ref 140–450)
POTASSIUM SERPL-SCNC: 3.7 MMOL/L (ref 3.4–5.1)
PROT SERPL-MCNC: 7.6 G/DL (ref 6.4–8.2)
RBC # BLD: 2.79 MIL/MCL (ref 4–5.2)
SODIUM SERPL-SCNC: 132 MMOL/L (ref 135–145)
TROPONIN I SERPL DL<=0.01 NG/ML-MCNC: 16 NG/L
TSH SERPL-ACNC: 4.19 MCUNITS/ML (ref 0.35–5)
WBC # BLD: 4.3 K/MCL (ref 4.2–11)

## 2025-07-02 PROCEDURE — 83880 ASSAY OF NATRIURETIC PEPTIDE: CPT | Performed by: EMERGENCY MEDICINE

## 2025-07-02 PROCEDURE — 71045 X-RAY EXAM CHEST 1 VIEW: CPT

## 2025-07-02 PROCEDURE — 84443 ASSAY THYROID STIM HORMONE: CPT | Performed by: EMERGENCY MEDICINE

## 2025-07-02 PROCEDURE — 84484 ASSAY OF TROPONIN QUANT: CPT | Performed by: EMERGENCY MEDICINE

## 2025-07-02 PROCEDURE — 83735 ASSAY OF MAGNESIUM: CPT | Performed by: EMERGENCY MEDICINE

## 2025-07-02 PROCEDURE — 85025 COMPLETE CBC W/AUTO DIFF WBC: CPT | Performed by: EMERGENCY MEDICINE

## 2025-07-02 PROCEDURE — 93005 ELECTROCARDIOGRAM TRACING: CPT | Performed by: EMERGENCY MEDICINE

## 2025-07-02 PROCEDURE — 10002800 HB RX 250 W HCPCS: Performed by: EMERGENCY MEDICINE

## 2025-07-02 PROCEDURE — 99285 EMERGENCY DEPT VISIT HI MDM: CPT | Performed by: EMERGENCY MEDICINE

## 2025-07-02 PROCEDURE — 96374 THER/PROPH/DIAG INJ IV PUSH: CPT

## 2025-07-02 PROCEDURE — 80053 COMPREHEN METABOLIC PANEL: CPT | Performed by: EMERGENCY MEDICINE

## 2025-07-02 RX ORDER — ALPRAZOLAM 0.25 MG
0.25 TABLET ORAL 3 TIMES DAILY PRN
Qty: 15 TABLET | Refills: 0 | Status: SHIPPED | OUTPATIENT
Start: 2025-07-02 | End: 2025-07-07

## 2025-07-02 RX ORDER — DIAZEPAM 10 MG/2ML
2 INJECTION, SOLUTION INTRAMUSCULAR; INTRAVENOUS ONCE
Status: COMPLETED | OUTPATIENT
Start: 2025-07-02 | End: 2025-07-02

## 2025-07-02 RX ADMIN — DIAZEPAM 2 MG: 10 INJECTION, SOLUTION INTRAMUSCULAR; INTRAVENOUS at 18:29

## 2025-07-02 ASSESSMENT — PAIN SCALES - GENERAL: PAINLEVEL_OUTOF10: 6

## 2025-07-03 LAB
ATRIAL RATE (BPM): 72
P AXIS (DEGREES): 7
PR-INTERVAL (MSEC): 154
QRS-INTERVAL (MSEC): 94
QT-INTERVAL (MSEC): 468
QTC: 512
R AXIS (DEGREES): -7
REPORT TEXT: NORMAL
T AXIS (DEGREES): -157
VENTRICULAR RATE EKG/MIN (BPM): 72

## 2025-07-07 ENCOUNTER — APPOINTMENT (OUTPATIENT)
Dept: DIALYSIS | Age: 55
DRG: 951 | End: 2025-07-07
Attending: INTERNAL MEDICINE

## 2025-07-07 ENCOUNTER — APPOINTMENT (OUTPATIENT)
Dept: GENERAL RADIOLOGY | Age: 55
DRG: 951 | End: 2025-07-07
Attending: STUDENT IN AN ORGANIZED HEALTH CARE EDUCATION/TRAINING PROGRAM

## 2025-07-07 ENCOUNTER — HOSPITAL ENCOUNTER (INPATIENT)
Age: 55
LOS: 4 days | Discharge: HOME | DRG: 951 | End: 2025-07-11
Attending: STUDENT IN AN ORGANIZED HEALTH CARE EDUCATION/TRAINING PROGRAM | Admitting: INTERNAL MEDICINE

## 2025-07-07 DIAGNOSIS — R94.31 LONG QT INTERVAL: ICD-10-CM

## 2025-07-07 DIAGNOSIS — R06.02 SHORTNESS OF BREATH: Primary | ICD-10-CM

## 2025-07-07 DIAGNOSIS — N18.6 ESRD (END STAGE RENAL DISEASE)  (CMD): ICD-10-CM

## 2025-07-07 LAB
ALBUMIN SERPL-MCNC: 3.6 G/DL (ref 3.4–5)
ALBUMIN/GLOB SERPL: 0.9 {RATIO} (ref 1–2.4)
ALP SERPL-CCNC: 201 UNITS/L (ref 45–117)
ALT SERPL-CCNC: 18 UNITS/L
ANION GAP SERPL CALC-SCNC: 15 MMOL/L (ref 7–19)
AST SERPL-CCNC: 27 UNITS/L
BASOPHILS # BLD: 0 K/MCL (ref 0–0.3)
BASOPHILS NFR BLD: 1 %
BILIRUB SERPL-MCNC: 0.7 MG/DL (ref 0.2–1)
BUN SERPL-MCNC: 40 MG/DL (ref 6–20)
BUN/CREAT SERPL: 5 (ref 7–25)
CALCIUM SERPL-MCNC: 9.5 MG/DL (ref 8.4–10.2)
CHLORIDE SERPL-SCNC: 97 MMOL/L (ref 97–110)
CO2 SERPL-SCNC: 24 MMOL/L (ref 21–32)
CREAT SERPL-MCNC: 7.47 MG/DL (ref 0.51–0.95)
DEPRECATED RDW RBC: 53.4 FL (ref 39–50)
EGFRCR SERPLBLD CKD-EPI 2021: 6 ML/MIN/{1.73_M2}
EOSINOPHIL # BLD: 0.2 K/MCL (ref 0–0.5)
EOSINOPHIL NFR BLD: 5 %
ERYTHROCYTE [DISTWIDTH] IN BLOOD: 15.5 % (ref 11–15)
FASTING DURATION TIME PATIENT: ABNORMAL H
GLOBULIN SER-MCNC: 4 G/DL (ref 2–4)
GLUCOSE SERPL-MCNC: 113 MG/DL (ref 70–99)
HBV SURFACE AG SER QL: NEGATIVE
HCT VFR BLD CALC: 28 % (ref 36–46.5)
HGB BLD-MCNC: 9.4 G/DL (ref 12–15.5)
IMM GRANULOCYTES # BLD AUTO: 0 K/MCL (ref 0–0.2)
IMM GRANULOCYTES # BLD: 0 %
LYMPHOCYTES # BLD: 1.1 K/MCL (ref 1–4)
LYMPHOCYTES NFR BLD: 29 %
MAGNESIUM SERPL-MCNC: 2.7 MG/DL (ref 1.7–2.4)
MCH RBC QN AUTO: 31.4 PG (ref 26–34)
MCHC RBC AUTO-ENTMCNC: 33.6 G/DL (ref 32–36.5)
MCV RBC AUTO: 93.6 FL (ref 78–100)
MONOCYTES # BLD: 0.2 K/MCL (ref 0.3–0.9)
MONOCYTES NFR BLD: 5 %
NEUTROPHILS # BLD: 2.3 K/MCL (ref 1.8–7.7)
NEUTROPHILS NFR BLD: 60 %
NRBC BLD MANUAL-RTO: 0 /100 WBC
NT-PROBNP SERPL-MCNC: ABNORMAL PG/ML
PLATELET # BLD AUTO: 132 K/MCL (ref 140–450)
POTASSIUM SERPL-SCNC: 5.3 MMOL/L (ref 3.4–5.1)
PROCALCITONIN SERPL IA-MCNC: 0.16 NG/ML
PROT SERPL-MCNC: 7.6 G/DL (ref 6.4–8.2)
RAINBOW EXTRA TUBES HOLD SPECIMEN: NORMAL
RAINBOW EXTRA TUBES HOLD SPECIMEN: NORMAL
RBC # BLD: 2.99 MIL/MCL (ref 4–5.2)
SODIUM SERPL-SCNC: 131 MMOL/L (ref 135–145)
TROPONIN I SERPL DL<=0.01 NG/ML-MCNC: 14 NG/L
WBC # BLD: 3.7 K/MCL (ref 4.2–11)

## 2025-07-07 PROCEDURE — 80053 COMPREHEN METABOLIC PANEL: CPT | Performed by: STUDENT IN AN ORGANIZED HEALTH CARE EDUCATION/TRAINING PROGRAM

## 2025-07-07 PROCEDURE — 71045 X-RAY EXAM CHEST 1 VIEW: CPT

## 2025-07-07 PROCEDURE — 93005 ELECTROCARDIOGRAM TRACING: CPT | Performed by: STUDENT IN AN ORGANIZED HEALTH CARE EDUCATION/TRAINING PROGRAM

## 2025-07-07 PROCEDURE — 10006031 HB ROOM CHARGE TELEMETRY

## 2025-07-07 PROCEDURE — 85025 COMPLETE CBC W/AUTO DIFF WBC: CPT | Performed by: STUDENT IN AN ORGANIZED HEALTH CARE EDUCATION/TRAINING PROGRAM

## 2025-07-07 PROCEDURE — 5A1D70Z PERFORMANCE OF URINARY FILTRATION, INTERMITTENT, LESS THAN 6 HOURS PER DAY: ICD-10-PCS | Performed by: INTERNAL MEDICINE

## 2025-07-07 PROCEDURE — 99223 1ST HOSP IP/OBS HIGH 75: CPT | Performed by: HOSPITALIST

## 2025-07-07 PROCEDURE — 84484 ASSAY OF TROPONIN QUANT: CPT | Performed by: STUDENT IN AN ORGANIZED HEALTH CARE EDUCATION/TRAINING PROGRAM

## 2025-07-07 PROCEDURE — 93005 ELECTROCARDIOGRAM TRACING: CPT | Performed by: HOSPITALIST

## 2025-07-07 PROCEDURE — 93010 ELECTROCARDIOGRAM REPORT: CPT | Performed by: INTERNAL MEDICINE

## 2025-07-07 PROCEDURE — 90935 HEMODIALYSIS ONE EVALUATION: CPT

## 2025-07-07 PROCEDURE — 87340 HEPATITIS B SURFACE AG IA: CPT | Performed by: INTERNAL MEDICINE

## 2025-07-07 PROCEDURE — 83880 ASSAY OF NATRIURETIC PEPTIDE: CPT | Performed by: STUDENT IN AN ORGANIZED HEALTH CARE EDUCATION/TRAINING PROGRAM

## 2025-07-07 PROCEDURE — 84145 PROCALCITONIN (PCT): CPT | Performed by: STUDENT IN AN ORGANIZED HEALTH CARE EDUCATION/TRAINING PROGRAM

## 2025-07-07 PROCEDURE — 96372 THER/PROPH/DIAG INJ SC/IM: CPT | Performed by: HOSPITALIST

## 2025-07-07 PROCEDURE — 99285 EMERGENCY DEPT VISIT HI MDM: CPT | Performed by: STUDENT IN AN ORGANIZED HEALTH CARE EDUCATION/TRAINING PROGRAM

## 2025-07-07 PROCEDURE — 10002803 HB RX 637: Performed by: HOSPITALIST

## 2025-07-07 PROCEDURE — A4216 STERILE WATER/SALINE, 10 ML: HCPCS | Performed by: HOSPITALIST

## 2025-07-07 PROCEDURE — 10002800 HB RX 250 W HCPCS: Performed by: HOSPITALIST

## 2025-07-07 PROCEDURE — 83735 ASSAY OF MAGNESIUM: CPT | Performed by: STUDENT IN AN ORGANIZED HEALTH CARE EDUCATION/TRAINING PROGRAM

## 2025-07-07 PROCEDURE — 10004651 HB RX, NO CHARGE ITEM: Performed by: HOSPITALIST

## 2025-07-07 RX ORDER — ACETAMINOPHEN 650 MG/1
650 SUPPOSITORY RECTAL EVERY 4 HOURS PRN
Status: DISCONTINUED | OUTPATIENT
Start: 2025-07-07 | End: 2025-07-11 | Stop reason: HOSPADM

## 2025-07-07 RX ORDER — POLYETHYLENE GLYCOL 3350 17 G/17G
17 POWDER, FOR SOLUTION ORAL DAILY PRN
Status: DISCONTINUED | OUTPATIENT
Start: 2025-07-07 | End: 2025-07-11 | Stop reason: HOSPADM

## 2025-07-07 RX ORDER — RIFAMPIN 300 MG/1
300 CAPSULE ORAL 2 TIMES DAILY
Status: DISCONTINUED | OUTPATIENT
Start: 2025-07-07 | End: 2025-07-11 | Stop reason: HOSPADM

## 2025-07-07 RX ORDER — ACETAMINOPHEN 325 MG/1
650 TABLET ORAL EVERY 4 HOURS PRN
Status: DISCONTINUED | OUTPATIENT
Start: 2025-07-07 | End: 2025-07-11 | Stop reason: HOSPADM

## 2025-07-07 RX ORDER — HEPARIN SODIUM 5000 [USP'U]/ML
5000 INJECTION, SOLUTION INTRAVENOUS; SUBCUTANEOUS EVERY 8 HOURS SCHEDULED
Status: DISCONTINUED | OUTPATIENT
Start: 2025-07-07 | End: 2025-07-11 | Stop reason: HOSPADM

## 2025-07-07 RX ORDER — HYDRALAZINE HYDROCHLORIDE 10 MG/1
10 TABLET, FILM COATED ORAL 3 TIMES DAILY
COMMUNITY

## 2025-07-07 RX ORDER — ISOSORBIDE DINITRATE 20 MG/1
10 TABLET ORAL 3 TIMES DAILY
Status: DISCONTINUED | OUTPATIENT
Start: 2025-07-07 | End: 2025-07-11 | Stop reason: HOSPADM

## 2025-07-07 RX ORDER — HYDRALAZINE HYDROCHLORIDE 10 MG/1
10 TABLET, FILM COATED ORAL 3 TIMES DAILY
Status: DISCONTINUED | OUTPATIENT
Start: 2025-07-07 | End: 2025-07-11 | Stop reason: HOSPADM

## 2025-07-07 RX ORDER — ISOSORBIDE DINITRATE 10 MG/1
10 TABLET ORAL 3 TIMES DAILY
COMMUNITY

## 2025-07-07 RX ORDER — 0.9 % SODIUM CHLORIDE 0.9 %
2 VIAL (ML) INJECTION EVERY 12 HOURS SCHEDULED
Status: DISCONTINUED | OUTPATIENT
Start: 2025-07-07 | End: 2025-07-11 | Stop reason: HOSPADM

## 2025-07-07 RX ORDER — LANTHANUM CARBONATE 500 MG/1
1000 TABLET, CHEWABLE ORAL
Status: DISCONTINUED | OUTPATIENT
Start: 2025-07-07 | End: 2025-07-11 | Stop reason: HOSPADM

## 2025-07-07 RX ORDER — SODIUM CITRATE 4 % (5 ML)
3 SYRINGE (ML) MISCELLANEOUS PRN
Status: DISCONTINUED | OUTPATIENT
Start: 2025-07-07 | End: 2025-07-11 | Stop reason: HOSPADM

## 2025-07-07 RX ORDER — 0.9 % SODIUM CHLORIDE 0.9 %
10 VIAL (ML) INJECTION PRN
Status: DISCONTINUED | OUTPATIENT
Start: 2025-07-07 | End: 2025-07-11 | Stop reason: HOSPADM

## 2025-07-07 RX ORDER — LEVOTHYROXINE SODIUM 25 UG/1
25 TABLET ORAL DAILY
Status: DISCONTINUED | OUTPATIENT
Start: 2025-07-07 | End: 2025-07-11 | Stop reason: HOSPADM

## 2025-07-07 RX ADMIN — RIFAMPIN 300 MG: 300 CAPSULE ORAL at 21:09

## 2025-07-07 RX ADMIN — ISOSORBIDE DINITRATE 10 MG: 20 TABLET ORAL at 21:01

## 2025-07-07 RX ADMIN — HYDRALAZINE HYDROCHLORIDE 10 MG: 10 TABLET ORAL at 21:01

## 2025-07-07 RX ADMIN — SODIUM CHLORIDE, PRESERVATIVE FREE 2 ML: 5 INJECTION INTRAVENOUS at 21:02

## 2025-07-07 RX ADMIN — HEPARIN SODIUM 5000 UNITS: 5000 INJECTION INTRAVENOUS; SUBCUTANEOUS at 21:02

## 2025-07-07 SDOH — SOCIAL STABILITY: SOCIAL INSECURITY: HOW OFTEN DOES ANYONE, INCLUDING FAMILY AND FRIENDS, PHYSICALLY HURT YOU?: NEVER

## 2025-07-07 SDOH — ECONOMIC STABILITY: FOOD INSECURITY: WITHIN THE PAST 12 MONTHS, THE FOOD YOU BOUGHT JUST DIDN'T LAST AND YOU DIDN'T HAVE MONEY TO GET MORE.: NEVER TRUE

## 2025-07-07 SDOH — SOCIAL STABILITY: SOCIAL INSECURITY: HOW OFTEN DOES ANYONE, INCLUDING FAMILY AND FRIENDS, THREATEN YOU WITH HARM?: NEVER

## 2025-07-07 SDOH — ECONOMIC STABILITY: HOUSING INSECURITY: WHAT IS YOUR LIVING SITUATION TODAY?: CHILDREN

## 2025-07-07 SDOH — HEALTH STABILITY: PHYSICAL HEALTH: DO YOU HAVE DIFFICULTY DRESSING OR BATHING?: NO

## 2025-07-07 SDOH — HEALTH STABILITY: GENERAL: BECAUSE OF A PHYSICAL, MENTAL, OR EMOTIONAL CONDITION, DO YOU HAVE DIFFICULTY DOING ERRANDS ALONE?: NO

## 2025-07-07 SDOH — HEALTH STABILITY: PHYSICAL HEALTH: DO YOU HAVE SERIOUS DIFFICULTY WALKING OR CLIMBING STAIRS?: NO

## 2025-07-07 SDOH — ECONOMIC STABILITY: HOUSING INSECURITY: WHAT IS YOUR LIVING SITUATION TODAY?: APARTMENT

## 2025-07-07 SDOH — ECONOMIC STABILITY: HOUSING INSECURITY: DO YOU HAVE PROBLEMS WITH ANY OF THE FOLLOWING?: NONE OF THE ABOVE

## 2025-07-07 SDOH — ECONOMIC STABILITY: INCOME INSECURITY: IN THE PAST 12 MONTHS, HAS THE ELECTRIC, GAS, OIL, OR WATER COMPANY THREATENED TO SHUT OFF SERVICE IN YOUR HOME?: NO

## 2025-07-07 SDOH — ECONOMIC STABILITY: GENERAL

## 2025-07-07 SDOH — SOCIAL STABILITY: SOCIAL NETWORK: SUPPORT SYSTEMS: CHILDREN

## 2025-07-07 SDOH — ECONOMIC STABILITY: HOUSING INSECURITY: WHAT IS YOUR LIVING SITUATION TODAY?: I HAVE A STEADY PLACE TO LIVE

## 2025-07-07 SDOH — SOCIAL STABILITY: SOCIAL INSECURITY: HOW OFTEN DOES ANYONE, INCLUDING FAMILY AND FRIENDS, SCREAM OR CURSE AT YOU?: NEVER

## 2025-07-07 SDOH — SOCIAL STABILITY: SOCIAL INSECURITY: HOW OFTEN DOES ANYONE, INCLUDING FAMILY AND FRIENDS, INSULT OR TALK DOWN TO YOU?: NEVER

## 2025-07-07 ASSESSMENT — ENCOUNTER SYMPTOMS
SHORTNESS OF BREATH: 1
WOUND: 0
COUGH: 0
DIZZINESS: 0
NAUSEA: 0
ABDOMINAL PAIN: 0
CHILLS: 0
VOICE CHANGE: 0
CONFUSION: 0
FEVER: 0
VOMITING: 0
HEADACHES: 0

## 2025-07-07 ASSESSMENT — LIFESTYLE VARIABLES
HOW OFTEN DO YOU HAVE A DRINK CONTAINING ALCOHOL: NEVER
HOW OFTEN DO YOU HAVE 6 OR MORE DRINKS ON ONE OCCASION: NEVER
HOW MANY STANDARD DRINKS CONTAINING ALCOHOL DO YOU HAVE ON A TYPICAL DAY: 0,1 OR 2
AUDIT-C TOTAL SCORE: 0

## 2025-07-07 ASSESSMENT — PATIENT HEALTH QUESTIONNAIRE - PHQ9
1. LITTLE INTEREST OR PLEASURE IN DOING THINGS: NOT AT ALL
SUM OF ALL RESPONSES TO PHQ9 QUESTIONS 1 AND 2: 0
SUM OF ALL RESPONSES TO PHQ9 QUESTIONS 1 AND 2: 0
2. FEELING DOWN, DEPRESSED OR HOPELESS: NOT AT ALL
IS PATIENT ABLE TO COMPLETE PHQ2 OR PHQ9: YES
CLINICAL INTERPRETATION OF PHQ2 SCORE: NO FURTHER SCREENING NEEDED

## 2025-07-07 ASSESSMENT — PAIN SCALES - GENERAL
PAINLEVEL_OUTOF10: 0
PAINLEVEL_OUTOF10: 0

## 2025-07-07 ASSESSMENT — ACTIVITIES OF DAILY LIVING (ADL)
ADL_BEFORE_ADMISSION: INDEPENDENT
RECENT_DECLINE_ADL: NO
ADL_SHORT_OF_BREATH: NO
ADL_SCORE: 12

## 2025-07-08 ENCOUNTER — APPOINTMENT (OUTPATIENT)
Dept: DIALYSIS | Age: 55
DRG: 951 | End: 2025-07-08
Attending: INTERNAL MEDICINE

## 2025-07-08 LAB
ABO + RH BLD: NORMAL
ANION GAP SERPL CALC-SCNC: 5 MMOL/L (ref 7–19)
ATRIAL RATE (BPM): 67
BASOPHILS # BLD: 0 K/MCL (ref 0–0.3)
BASOPHILS NFR BLD: 1 %
BLD GP AB SCN SERPL QL GEL: NEGATIVE
BLOOD EXPIRATION DATE: NORMAL
BUN SERPL-MCNC: 21 MG/DL (ref 6–20)
BUN/CREAT SERPL: 5 (ref 7–25)
CALCIUM SERPL-MCNC: 8.5 MG/DL (ref 8.4–10.2)
CHLORIDE SERPL-SCNC: 104 MMOL/L (ref 97–110)
CO2 SERPL-SCNC: 32 MMOL/L (ref 21–32)
CREAT SERPL-MCNC: 4.66 MG/DL (ref 0.51–0.95)
CROSSMATCH RESULT: NORMAL
DEPRECATED RDW RBC: 56.5 FL (ref 39–50)
DISPENSE STATUS: NORMAL
EGFRCR SERPLBLD CKD-EPI 2021: 11 ML/MIN/{1.73_M2}
EOSINOPHIL # BLD: 0.3 K/MCL (ref 0–0.5)
EOSINOPHIL NFR BLD: 8 %
ERYTHROCYTE [DISTWIDTH] IN BLOOD: 15.8 % (ref 11–15)
FASTING DURATION TIME PATIENT: ABNORMAL H
GLUCOSE SERPL-MCNC: 94 MG/DL (ref 70–99)
HCT VFR BLD CALC: 20.8 % (ref 36–46.5)
HCT VFR BLD CALC: 21.8 % (ref 36–46.5)
HCT VFR BLD CALC: 23.9 % (ref 36–46.5)
HGB BLD-MCNC: 6.8 G/DL (ref 12–15.5)
HGB BLD-MCNC: 7 G/DL (ref 12–15.5)
HGB BLD-MCNC: 8 G/DL (ref 12–15.5)
IMM GRANULOCYTES # BLD AUTO: 0 K/MCL (ref 0–0.2)
IMM GRANULOCYTES # BLD: 0 %
ISBT BLOOD TYPE: 9500
ISSUE DATE/TIME: NORMAL
LYMPHOCYTES # BLD: 0.8 K/MCL (ref 1–4)
LYMPHOCYTES NFR BLD: 23 %
MAGNESIUM SERPL-MCNC: 2.5 MG/DL (ref 1.7–2.4)
MCH RBC QN AUTO: 31.8 PG (ref 26–34)
MCHC RBC AUTO-ENTMCNC: 32.7 G/DL (ref 32–36.5)
MCV RBC AUTO: 97.2 FL (ref 78–100)
MONOCYTES # BLD: 0.3 K/MCL (ref 0.3–0.9)
MONOCYTES NFR BLD: 8 %
NEUTROPHILS # BLD: 2 K/MCL (ref 1.8–7.7)
NEUTROPHILS NFR BLD: 60 %
NRBC BLD MANUAL-RTO: 0 /100 WBC
P AXIS (DEGREES): 15
PLATELET # BLD AUTO: 109 K/MCL (ref 140–450)
POTASSIUM SERPL-SCNC: 5.2 MMOL/L (ref 3.4–5.1)
PR-INTERVAL (MSEC): 162
PRODUCT CODE: NORMAL
PRODUCT DESCRIPTION: NORMAL
PRODUCT ID: NORMAL
QRS-INTERVAL (MSEC): 86
QT-INTERVAL (MSEC): 542
QTC: 572
R AXIS (DEGREES): -15
RBC # BLD: 2.14 MIL/MCL (ref 4–5.2)
REPORT TEXT: NORMAL
SODIUM SERPL-SCNC: 136 MMOL/L (ref 135–145)
T AXIS (DEGREES): -107
TYPE AND SCREEN EXPIRATION DATE: NORMAL
UNIT BLOOD TYPE: NORMAL
UNIT NUMBER: NORMAL
VENTRICULAR RATE EKG/MIN (BPM): 67
WBC # BLD: 3.4 K/MCL (ref 4.2–11)

## 2025-07-08 PROCEDURE — 10006031 HB ROOM CHARGE TELEMETRY

## 2025-07-08 PROCEDURE — 10002800 HB RX 250 W HCPCS: Performed by: HOSPITALIST

## 2025-07-08 PROCEDURE — 85014 HEMATOCRIT: CPT | Performed by: INTERNAL MEDICINE

## 2025-07-08 PROCEDURE — 96372 THER/PROPH/DIAG INJ SC/IM: CPT | Performed by: HOSPITALIST

## 2025-07-08 PROCEDURE — 10004651 HB RX, NO CHARGE ITEM: Performed by: HOSPITALIST

## 2025-07-08 PROCEDURE — 99233 SBSQ HOSP IP/OBS HIGH 50: CPT | Performed by: HOSPITALIST

## 2025-07-08 PROCEDURE — 86923 COMPATIBILITY TEST ELECTRIC: CPT

## 2025-07-08 PROCEDURE — 36415 COLL VENOUS BLD VENIPUNCTURE: CPT | Performed by: HOSPITALIST

## 2025-07-08 PROCEDURE — 85025 COMPLETE CBC W/AUTO DIFF WBC: CPT | Performed by: HOSPITALIST

## 2025-07-08 PROCEDURE — 83735 ASSAY OF MAGNESIUM: CPT | Performed by: HOSPITALIST

## 2025-07-08 PROCEDURE — 85014 HEMATOCRIT: CPT | Performed by: HOSPITALIST

## 2025-07-08 PROCEDURE — 80048 BASIC METABOLIC PNL TOTAL CA: CPT | Performed by: HOSPITALIST

## 2025-07-08 PROCEDURE — A4216 STERILE WATER/SALINE, 10 ML: HCPCS | Performed by: HOSPITALIST

## 2025-07-08 PROCEDURE — 90935 HEMODIALYSIS ONE EVALUATION: CPT

## 2025-07-08 PROCEDURE — 86850 RBC ANTIBODY SCREEN: CPT | Performed by: HOSPITALIST

## 2025-07-08 PROCEDURE — 99254 IP/OBS CNSLTJ NEW/EST MOD 60: CPT | Performed by: SURGERY

## 2025-07-08 PROCEDURE — 99255 IP/OBS CONSLTJ NEW/EST HI 80: CPT | Performed by: INTERNAL MEDICINE

## 2025-07-08 PROCEDURE — 10002803 HB RX 637: Performed by: HOSPITALIST

## 2025-07-08 RX ORDER — ONDANSETRON 2 MG/ML
4 INJECTION INTRAMUSCULAR; INTRAVENOUS EVERY 6 HOURS PRN
Status: DISCONTINUED | OUTPATIENT
Start: 2025-07-08 | End: 2025-07-08

## 2025-07-08 RX ORDER — ALPRAZOLAM 0.25 MG
0.25 TABLET ORAL 3 TIMES DAILY PRN
COMMUNITY

## 2025-07-08 RX ORDER — METOPROLOL TARTRATE 25 MG/1
25 TABLET, FILM COATED ORAL DAILY
Status: ON HOLD | COMMUNITY
End: 2025-07-11 | Stop reason: HOSPADM

## 2025-07-08 RX ORDER — DIPHENHYDRAMINE HCL 25 MG
25 CAPSULE ORAL EVERY 4 HOURS PRN
Status: DISCONTINUED | OUTPATIENT
Start: 2025-07-08 | End: 2025-07-11 | Stop reason: HOSPADM

## 2025-07-08 RX ORDER — METOCLOPRAMIDE HYDROCHLORIDE 5 MG/ML
5 INJECTION INTRAMUSCULAR; INTRAVENOUS EVERY 6 HOURS PRN
Status: DISCONTINUED | OUTPATIENT
Start: 2025-07-08 | End: 2025-07-11 | Stop reason: HOSPADM

## 2025-07-08 RX ADMIN — HEPARIN SODIUM 5000 UNITS: 5000 INJECTION INTRAVENOUS; SUBCUTANEOUS at 05:45

## 2025-07-08 RX ADMIN — ISOSORBIDE DINITRATE 10 MG: 20 TABLET ORAL at 16:02

## 2025-07-08 RX ADMIN — HYDRALAZINE HYDROCHLORIDE 10 MG: 10 TABLET ORAL at 20:13

## 2025-07-08 RX ADMIN — ISOSORBIDE DINITRATE 10 MG: 20 TABLET ORAL at 10:00

## 2025-07-08 RX ADMIN — ISOSORBIDE DINITRATE 10 MG: 20 TABLET ORAL at 20:13

## 2025-07-08 RX ADMIN — HYDRALAZINE HYDROCHLORIDE 10 MG: 10 TABLET ORAL at 16:02

## 2025-07-08 RX ADMIN — CALCIUM CARBONATE 600 MG (1,500 MG)-VITAMIN D3 400 UNIT TABLET 1 TABLET: at 09:59

## 2025-07-08 RX ADMIN — DIPHENHYDRAMINE HYDROCHLORIDE 25 MG: 25 CAPSULE ORAL at 10:00

## 2025-07-08 RX ADMIN — SODIUM CHLORIDE, PRESERVATIVE FREE 2 ML: 5 INJECTION INTRAVENOUS at 20:13

## 2025-07-08 RX ADMIN — SODIUM CHLORIDE, PRESERVATIVE FREE 2 ML: 5 INJECTION INTRAVENOUS at 10:05

## 2025-07-08 RX ADMIN — LANTHANUM CARBONATE 1000 MG: 500 TABLET, CHEWABLE ORAL at 10:00

## 2025-07-08 RX ADMIN — RIFAMPIN 300 MG: 300 CAPSULE ORAL at 10:00

## 2025-07-08 RX ADMIN — HYDRALAZINE HYDROCHLORIDE 10 MG: 10 TABLET ORAL at 10:00

## 2025-07-08 RX ADMIN — LEVOTHYROXINE SODIUM 25 MCG: 0.03 TABLET ORAL at 05:45

## 2025-07-08 SDOH — ECONOMIC STABILITY: HOUSING INSECURITY: WHAT IS YOUR LIVING SITUATION TODAY?: I HAVE A STEADY PLACE TO LIVE

## 2025-07-08 ASSESSMENT — PAIN SCALES - GENERAL
PAINLEVEL_OUTOF10: 0
PAINLEVEL_OUTOF10: 0

## 2025-07-08 ASSESSMENT — COGNITIVE AND FUNCTIONAL STATUS - GENERAL
DO YOU HAVE DIFFICULTY DRESSING OR BATHING: NO
DO YOU HAVE SERIOUS DIFFICULTY WALKING OR CLIMBING STAIRS: NO

## 2025-07-09 LAB
ANION GAP SERPL CALC-SCNC: 8 MMOL/L (ref 7–19)
ATRIAL RATE (BPM): 62
ATRIAL RATE (BPM): 63
BUN SERPL-MCNC: 15 MG/DL (ref 6–20)
BUN/CREAT SERPL: 4 (ref 7–25)
CALCIUM SERPL-MCNC: 8.5 MG/DL (ref 8.4–10.2)
CHLORIDE SERPL-SCNC: 105 MMOL/L (ref 97–110)
CO2 SERPL-SCNC: 31 MMOL/L (ref 21–32)
CREAT SERPL-MCNC: 3.77 MG/DL (ref 0.51–0.95)
DEPRECATED RDW RBC: 63.4 FL (ref 39–50)
EGFRCR SERPLBLD CKD-EPI 2021: 14 ML/MIN/{1.73_M2}
ERYTHROCYTE [DISTWIDTH] IN BLOOD: 18.4 % (ref 11–15)
FASTING DURATION TIME PATIENT: ABNORMAL H
GLUCOSE SERPL-MCNC: 88 MG/DL (ref 70–99)
HCT VFR BLD CALC: 24.6 % (ref 36–46.5)
HGB BLD-MCNC: 7.9 G/DL (ref 12–15.5)
MCH RBC QN AUTO: 30.4 PG (ref 26–34)
MCHC RBC AUTO-ENTMCNC: 32.1 G/DL (ref 32–36.5)
MCV RBC AUTO: 94.6 FL (ref 78–100)
NRBC BLD MANUAL-RTO: 0 /100 WBC
P AXIS (DEGREES): 14
P AXIS (DEGREES): 19
PLATELET # BLD AUTO: 98 K/MCL (ref 140–450)
POTASSIUM SERPL-SCNC: 4.5 MMOL/L (ref 3.4–5.1)
PR-INTERVAL (MSEC): 158
PR-INTERVAL (MSEC): 160
QRS-INTERVAL (MSEC): 88
QRS-INTERVAL (MSEC): 90
QT-INTERVAL (MSEC): 530
QT-INTERVAL (MSEC): 542
QTC: 543
QTC: 550
R AXIS (DEGREES): -29
R AXIS (DEGREES): 1
RBC # BLD: 2.6 MIL/MCL (ref 4–5.2)
REPORT TEXT: NORMAL
REPORT TEXT: NORMAL
SODIUM SERPL-SCNC: 139 MMOL/L (ref 135–145)
T AXIS (DEGREES): -151
T AXIS (DEGREES): 155
VENTRICULAR RATE EKG/MIN (BPM): 62
VENTRICULAR RATE EKG/MIN (BPM): 63
WBC # BLD: 3.1 K/MCL (ref 4.2–11)

## 2025-07-09 PROCEDURE — 10006031 HB ROOM CHARGE TELEMETRY

## 2025-07-09 PROCEDURE — 99232 SBSQ HOSP IP/OBS MODERATE 35: CPT | Performed by: SURGERY

## 2025-07-09 PROCEDURE — 99233 SBSQ HOSP IP/OBS HIGH 50: CPT | Performed by: HOSPITALIST

## 2025-07-09 PROCEDURE — 10002803 HB RX 637: Performed by: HOSPITALIST

## 2025-07-09 PROCEDURE — 36415 COLL VENOUS BLD VENIPUNCTURE: CPT | Performed by: HOSPITALIST

## 2025-07-09 PROCEDURE — 10004651 HB RX, NO CHARGE ITEM: Performed by: HOSPITALIST

## 2025-07-09 PROCEDURE — 85027 COMPLETE CBC AUTOMATED: CPT | Performed by: HOSPITALIST

## 2025-07-09 PROCEDURE — 80048 BASIC METABOLIC PNL TOTAL CA: CPT | Performed by: HOSPITALIST

## 2025-07-09 PROCEDURE — 10002803 HB RX 637: Performed by: INTERNAL MEDICINE

## 2025-07-09 PROCEDURE — A4216 STERILE WATER/SALINE, 10 ML: HCPCS | Performed by: HOSPITALIST

## 2025-07-09 PROCEDURE — 93010 ELECTROCARDIOGRAM REPORT: CPT | Performed by: INTERNAL MEDICINE

## 2025-07-09 PROCEDURE — 99233 SBSQ HOSP IP/OBS HIGH 50: CPT | Performed by: INTERNAL MEDICINE

## 2025-07-09 RX ORDER — MEXILETINE HYDROCHLORIDE 150 MG/1
150 CAPSULE ORAL EVERY 12 HOURS SCHEDULED
Status: DISCONTINUED | OUTPATIENT
Start: 2025-07-09 | End: 2025-07-11

## 2025-07-09 RX ORDER — ALPRAZOLAM 0.25 MG
0.25 TABLET ORAL EVERY 8 HOURS PRN
Status: DISCONTINUED | OUTPATIENT
Start: 2025-07-09 | End: 2025-07-11 | Stop reason: HOSPADM

## 2025-07-09 RX ADMIN — POLYETHYLENE GLYCOL 3350 17 G: 17 POWDER, FOR SOLUTION ORAL at 21:40

## 2025-07-09 RX ADMIN — ISOSORBIDE DINITRATE 10 MG: 20 TABLET ORAL at 09:39

## 2025-07-09 RX ADMIN — MEXILETINE HYDROCHLORIDE 150 MG: 150 CAPSULE ORAL at 21:27

## 2025-07-09 RX ADMIN — LEVOTHYROXINE SODIUM 25 MCG: 0.03 TABLET ORAL at 05:29

## 2025-07-09 RX ADMIN — HYDRALAZINE HYDROCHLORIDE 10 MG: 10 TABLET ORAL at 21:28

## 2025-07-09 RX ADMIN — CALCIUM CARBONATE 600 MG (1,500 MG)-VITAMIN D3 400 UNIT TABLET 1 TABLET: at 09:39

## 2025-07-09 RX ADMIN — SODIUM CHLORIDE, PRESERVATIVE FREE 2 ML: 5 INJECTION INTRAVENOUS at 21:42

## 2025-07-09 RX ADMIN — SODIUM CHLORIDE, PRESERVATIVE FREE 2 ML: 5 INJECTION INTRAVENOUS at 09:39

## 2025-07-09 RX ADMIN — ALPRAZOLAM 0.25 MG: 0.25 TABLET ORAL at 16:51

## 2025-07-09 RX ADMIN — HYDRALAZINE HYDROCHLORIDE 10 MG: 10 TABLET ORAL at 09:39

## 2025-07-09 RX ADMIN — ISOSORBIDE DINITRATE 10 MG: 20 TABLET ORAL at 21:28

## 2025-07-09 RX ADMIN — MEXILETINE HYDROCHLORIDE 150 MG: 150 CAPSULE ORAL at 11:59

## 2025-07-09 ASSESSMENT — PAIN SCALES - GENERAL
PAINLEVEL_OUTOF10: 0
PAINLEVEL_OUTOF10: 0

## 2025-07-10 ENCOUNTER — ANESTHESIA (OUTPATIENT)
Dept: SURGERY | Age: 55
End: 2025-07-10

## 2025-07-10 ENCOUNTER — ANESTHESIA EVENT (OUTPATIENT)
Dept: SURGERY | Age: 55
End: 2025-07-10

## 2025-07-10 ENCOUNTER — APPOINTMENT (OUTPATIENT)
Dept: DIALYSIS | Age: 55
DRG: 951 | End: 2025-07-10
Attending: INTERNAL MEDICINE

## 2025-07-10 LAB
ANION GAP SERPL CALC-SCNC: 10 MMOL/L (ref 7–19)
BUN SERPL-MCNC: 30 MG/DL (ref 6–20)
BUN/CREAT SERPL: 5 (ref 7–25)
CALCIUM SERPL-MCNC: 9.1 MG/DL (ref 8.4–10.2)
CHLORIDE SERPL-SCNC: 103 MMOL/L (ref 97–110)
CO2 SERPL-SCNC: 29 MMOL/L (ref 21–32)
CREAT SERPL-MCNC: 5.66 MG/DL (ref 0.51–0.95)
DEPRECATED RDW RBC: 58.7 FL (ref 39–50)
EGFRCR SERPLBLD CKD-EPI 2021: 8 ML/MIN/{1.73_M2}
ERYTHROCYTE [DISTWIDTH] IN BLOOD: 17 % (ref 11–15)
FASTING DURATION TIME PATIENT: ABNORMAL H
GLUCOSE BLDC GLUCOMTR-MCNC: 115 MG/DL (ref 70–99)
GLUCOSE SERPL-MCNC: 83 MG/DL (ref 70–99)
HCT VFR BLD CALC: 24.5 % (ref 36–46.5)
HGB BLD-MCNC: 7.9 G/DL (ref 12–15.5)
MCH RBC QN AUTO: 30.7 PG (ref 26–34)
MCHC RBC AUTO-ENTMCNC: 32.2 G/DL (ref 32–36.5)
MCV RBC AUTO: 95.3 FL (ref 78–100)
NRBC BLD MANUAL-RTO: 0 /100 WBC
PLATELET # BLD AUTO: 94 K/MCL (ref 140–450)
POTASSIUM SERPL-SCNC: 4 MMOL/L (ref 3.4–5.1)
POTASSIUM SERPL-SCNC: 4.5 MMOL/L (ref 3.4–5.1)
RBC # BLD: 2.57 MIL/MCL (ref 4–5.2)
SODIUM SERPL-SCNC: 137 MMOL/L (ref 135–145)
WBC # BLD: 3.3 K/MCL (ref 4.2–11)

## 2025-07-10 PROCEDURE — 10004651 HB RX, NO CHARGE ITEM: Performed by: HOSPITALIST

## 2025-07-10 PROCEDURE — 10002800 HB RX 250 W HCPCS: Performed by: NURSE ANESTHETIST, CERTIFIED REGISTERED

## 2025-07-10 PROCEDURE — 10002803 HB RX 637: Performed by: HOSPITALIST

## 2025-07-10 PROCEDURE — 82962 GLUCOSE BLOOD TEST: CPT

## 2025-07-10 PROCEDURE — 93005 ELECTROCARDIOGRAM TRACING: CPT | Performed by: CLINICAL NURSE SPECIALIST

## 2025-07-10 PROCEDURE — 93005 ELECTROCARDIOGRAM TRACING: CPT | Performed by: INTERNAL MEDICINE

## 2025-07-10 PROCEDURE — 0WHG43Z INSERTION OF INFUSION DEVICE INTO PERITONEAL CAVITY, PERCUTANEOUS ENDOSCOPIC APPROACH: ICD-10-PCS | Performed by: SURGERY

## 2025-07-10 PROCEDURE — 10006027 HB SUPPLY 278: Performed by: SURGERY

## 2025-07-10 PROCEDURE — C1750 CATH, HEMODIALYSIS,LONG-TERM: HCPCS | Performed by: SURGERY

## 2025-07-10 PROCEDURE — 84132 ASSAY OF SERUM POTASSIUM: CPT | Performed by: SURGERY

## 2025-07-10 PROCEDURE — 13000003 HB ANESTHESIA  GENERAL EA ADD MINUTE: Performed by: SURGERY

## 2025-07-10 PROCEDURE — 10002800 HB RX 250 W HCPCS: Performed by: SURGERY

## 2025-07-10 PROCEDURE — 10002803 HB RX 637: Performed by: INTERNAL MEDICINE

## 2025-07-10 PROCEDURE — 10002807 HB RX 258: Performed by: NURSE ANESTHETIST, CERTIFIED REGISTERED

## 2025-07-10 PROCEDURE — 10004452 HB PACU ADDL 30 MINUTES: Performed by: SURGERY

## 2025-07-10 PROCEDURE — 10002803 HB RX 637: Performed by: ANESTHESIOLOGY

## 2025-07-10 PROCEDURE — 13000036 HB COMPLEX  CASE S/U + 1ST 15 MIN: Performed by: SURGERY

## 2025-07-10 PROCEDURE — 10004451 HB PACU RECOVERY 1ST 30 MINUTES: Performed by: SURGERY

## 2025-07-10 PROCEDURE — 90935 HEMODIALYSIS ONE EVALUATION: CPT

## 2025-07-10 PROCEDURE — 80048 BASIC METABOLIC PNL TOTAL CA: CPT | Performed by: HOSPITALIST

## 2025-07-10 PROCEDURE — 13000002 HB ANESTHESIA  GENERAL   S/U + 1ST 15 MIN: Performed by: SURGERY

## 2025-07-10 PROCEDURE — 99233 SBSQ HOSP IP/OBS HIGH 50: CPT | Performed by: CLINICAL NURSE SPECIALIST

## 2025-07-10 PROCEDURE — 10002801 HB RX 250 W/O HCPCS: Performed by: NURSE ANESTHETIST, CERTIFIED REGISTERED

## 2025-07-10 PROCEDURE — 13000037 HB COMPLEX CASE EACH ADD MINUTE: Performed by: SURGERY

## 2025-07-10 PROCEDURE — 10006023 HB SUPPLY 272: Performed by: SURGERY

## 2025-07-10 PROCEDURE — 10006031 HB ROOM CHARGE TELEMETRY

## 2025-07-10 PROCEDURE — A4216 STERILE WATER/SALINE, 10 ML: HCPCS | Performed by: HOSPITALIST

## 2025-07-10 PROCEDURE — 10002801 HB RX 250 W/O HCPCS: Performed by: SURGERY

## 2025-07-10 PROCEDURE — 36415 COLL VENOUS BLD VENIPUNCTURE: CPT | Performed by: SURGERY

## 2025-07-10 PROCEDURE — 99233 SBSQ HOSP IP/OBS HIGH 50: CPT | Performed by: HOSPITALIST

## 2025-07-10 PROCEDURE — 49324 LAP INSERT TUNNEL IP CATH: CPT | Performed by: SURGERY

## 2025-07-10 PROCEDURE — A9150 MISC/EXPER NON-PRESCRIPT DRU: HCPCS | Performed by: HOSPITALIST

## 2025-07-10 PROCEDURE — 85027 COMPLETE CBC AUTOMATED: CPT | Performed by: HOSPITALIST

## 2025-07-10 DEVICE — PERITONEAL DIALYSIS CATHETER,SWAN NECK MISSOURI CURL CATH, 2 CUFF RIGHT,62.5 CM
Type: IMPLANTABLE DEVICE | Site: ABDOMEN | Status: FUNCTIONAL
Brand: ARGYLE

## 2025-07-10 RX ORDER — 0.9 % SODIUM CHLORIDE 0.9 %
2 VIAL (ML) INJECTION EVERY 12 HOURS SCHEDULED
OUTPATIENT
Start: 2025-07-10

## 2025-07-10 RX ORDER — 0.9 % SODIUM CHLORIDE 0.9 %
2 VIAL (ML) INJECTION EVERY 12 HOURS SCHEDULED
Status: DISCONTINUED | OUTPATIENT
Start: 2025-07-10 | End: 2025-07-10 | Stop reason: HOSPADM

## 2025-07-10 RX ORDER — LIDOCAINE HYDROCHLORIDE 20 MG/ML
INJECTION, SOLUTION INFILTRATION; PERINEURAL PRN
Status: DISCONTINUED | OUTPATIENT
Start: 2025-07-10 | End: 2025-07-10

## 2025-07-10 RX ORDER — DEXTROSE MONOHYDRATE 25 G/50ML
25 INJECTION, SOLUTION INTRAVENOUS PRN
Status: DISCONTINUED | OUTPATIENT
Start: 2025-07-10 | End: 2025-07-10 | Stop reason: HOSPADM

## 2025-07-10 RX ORDER — OXYCODONE HYDROCHLORIDE 5 MG/1
5 TABLET ORAL
Refills: 0 | Status: DISPENSED | OUTPATIENT
Start: 2025-07-10 | End: 2025-07-10

## 2025-07-10 RX ORDER — ETOMIDATE 2 MG/ML
INJECTION INTRAVENOUS PRN
Status: DISCONTINUED | OUTPATIENT
Start: 2025-07-10 | End: 2025-07-10

## 2025-07-10 RX ORDER — DIPHENHYDRAMINE HYDROCHLORIDE 50 MG/ML
12.5 INJECTION, SOLUTION INTRAMUSCULAR; INTRAVENOUS
Status: ACTIVE | OUTPATIENT
Start: 2025-07-10 | End: 2025-07-10

## 2025-07-10 RX ORDER — ROCURONIUM BROMIDE 10 MG/ML
INJECTION, SOLUTION INTRAVENOUS PRN
Status: DISCONTINUED | OUTPATIENT
Start: 2025-07-10 | End: 2025-07-10

## 2025-07-10 RX ORDER — MIDAZOLAM HYDROCHLORIDE 1 MG/ML
INJECTION, SOLUTION INTRAMUSCULAR; INTRAVENOUS PRN
Status: DISCONTINUED | OUTPATIENT
Start: 2025-07-10 | End: 2025-07-10

## 2025-07-10 RX ORDER — 0.9 % SODIUM CHLORIDE 0.9 %
10 VIAL (ML) INJECTION PRN
Status: DISCONTINUED | OUTPATIENT
Start: 2025-07-10 | End: 2025-07-10 | Stop reason: HOSPADM

## 2025-07-10 RX ORDER — SODIUM CHLORIDE 9 MG/ML
INJECTION, SOLUTION INTRAVENOUS CONTINUOUS PRN
Status: DISCONTINUED | OUTPATIENT
Start: 2025-07-10 | End: 2025-07-10

## 2025-07-10 RX ORDER — ONDANSETRON 2 MG/ML
4 INJECTION INTRAMUSCULAR; INTRAVENOUS
Status: ACTIVE | OUTPATIENT
Start: 2025-07-10 | End: 2025-07-10

## 2025-07-10 RX ORDER — KETAMINE HYDROCHLORIDE 100 MG/ML
INJECTION, SOLUTION INTRAMUSCULAR; INTRAVENOUS PRN
Status: DISCONTINUED | OUTPATIENT
Start: 2025-07-10 | End: 2025-07-10

## 2025-07-10 RX ORDER — HYDROCODONE BITARTRATE AND ACETAMINOPHEN 5; 325 MG/1; MG/1
1 TABLET ORAL EVERY 4 HOURS PRN
Refills: 0 | OUTPATIENT
Start: 2025-07-10

## 2025-07-10 RX ORDER — HYDROCODONE BITARTRATE AND ACETAMINOPHEN 10; 325 MG/1; MG/1
1 TABLET ORAL EVERY 4 HOURS PRN
Refills: 0 | OUTPATIENT
Start: 2025-07-10

## 2025-07-10 RX ORDER — ONDANSETRON 2 MG/ML
INJECTION INTRAMUSCULAR; INTRAVENOUS PRN
Status: DISCONTINUED | OUTPATIENT
Start: 2025-07-10 | End: 2025-07-10

## 2025-07-10 RX ORDER — HYDROCODONE BITARTRATE AND ACETAMINOPHEN 5; 325 MG/1; MG/1
2 TABLET ORAL
Refills: 0 | Status: DISCONTINUED | OUTPATIENT
Start: 2025-07-10 | End: 2025-07-10 | Stop reason: HOSPADM

## 2025-07-10 RX ORDER — HYDRALAZINE HYDROCHLORIDE 20 MG/ML
5 INJECTION INTRAMUSCULAR; INTRAVENOUS EVERY 10 MIN PRN
Status: DISCONTINUED | OUTPATIENT
Start: 2025-07-10 | End: 2025-07-10 | Stop reason: HOSPADM

## 2025-07-10 RX ORDER — KETAMINE HCL IN NACL, ISO-OSM 100MG/10ML
SYRINGE (ML) INJECTION PRN
Status: DISCONTINUED | OUTPATIENT
Start: 2025-07-10 | End: 2025-07-10

## 2025-07-10 RX ORDER — NICOTINE POLACRILEX 4 MG
30 LOZENGE BUCCAL
Status: DISCONTINUED | OUTPATIENT
Start: 2025-07-10 | End: 2025-07-10 | Stop reason: HOSPADM

## 2025-07-10 RX ORDER — 0.9 % SODIUM CHLORIDE 0.9 %
10 VIAL (ML) INJECTION PRN
OUTPATIENT
Start: 2025-07-10

## 2025-07-10 RX ADMIN — ROCURONIUM BROMIDE 30 MG: 10 INJECTION INTRAVENOUS at 07:44

## 2025-07-10 RX ADMIN — OXYCODONE HYDROCHLORIDE 5 MG: 5 TABLET ORAL at 09:33

## 2025-07-10 RX ADMIN — ACETAMINOPHEN 650 MG: 325 TABLET ORAL at 18:54

## 2025-07-10 RX ADMIN — CEFAZOLIN 2000 MG: 2 INJECTION, POWDER, FOR SOLUTION INTRAMUSCULAR; INTRAVENOUS at 07:54

## 2025-07-10 RX ADMIN — LIDOCAINE HYDROCHLORIDE 2 ML: 20 INJECTION, SOLUTION INFILTRATION; PERINEURAL at 07:44

## 2025-07-10 RX ADMIN — ISOSORBIDE DINITRATE 10 MG: 20 TABLET ORAL at 15:42

## 2025-07-10 RX ADMIN — MEXILETINE HYDROCHLORIDE 150 MG: 150 CAPSULE ORAL at 21:30

## 2025-07-10 RX ADMIN — MIDAZOLAM HYDROCHLORIDE 1 MG: 1 INJECTION, SOLUTION INTRAMUSCULAR; INTRAVENOUS at 07:33

## 2025-07-10 RX ADMIN — LANTHANUM CARBONATE 1000 MG: 500 TABLET, CHEWABLE ORAL at 16:49

## 2025-07-10 RX ADMIN — ISOSORBIDE DINITRATE 10 MG: 20 TABLET ORAL at 21:30

## 2025-07-10 RX ADMIN — MIDAZOLAM HYDROCHLORIDE 0.5 MG: 1 INJECTION, SOLUTION INTRAMUSCULAR; INTRAVENOUS at 07:29

## 2025-07-10 RX ADMIN — KETAMINE HYDROCHLORIDE 10 MG: 100 INJECTION, SOLUTION, CONCENTRATE INTRAMUSCULAR; INTRAVENOUS at 07:44

## 2025-07-10 RX ADMIN — FENTANYL CITRATE 50 MCG: 50 INJECTION INTRAMUSCULAR; INTRAVENOUS at 07:55

## 2025-07-10 RX ADMIN — HYDRALAZINE HYDROCHLORIDE 10 MG: 10 TABLET ORAL at 21:30

## 2025-07-10 RX ADMIN — ETOMIDATE 12 MG: 20 INJECTION, SOLUTION INTRAVENOUS at 07:44

## 2025-07-10 RX ADMIN — Medication 10 MG: at 07:44

## 2025-07-10 RX ADMIN — SODIUM CHLORIDE, PRESERVATIVE FREE 2 ML: 5 INJECTION INTRAVENOUS at 21:30

## 2025-07-10 RX ADMIN — FENTANYL CITRATE 50 MCG: 50 INJECTION INTRAMUSCULAR; INTRAVENOUS at 07:47

## 2025-07-10 RX ADMIN — SODIUM CHLORIDE: 9 INJECTION, SOLUTION INTRAVENOUS at 07:28

## 2025-07-10 RX ADMIN — ONDANSETRON 4 MG: 2 INJECTION INTRAMUSCULAR; INTRAVENOUS at 08:03

## 2025-07-10 RX ADMIN — SUGAMMADEX 200 MG: 100 INJECTION, SOLUTION INTRAVENOUS at 08:03

## 2025-07-10 RX ADMIN — LEVOTHYROXINE SODIUM 25 MCG: 0.03 TABLET ORAL at 05:48

## 2025-07-10 RX ADMIN — HYDRALAZINE HYDROCHLORIDE 10 MG: 10 TABLET ORAL at 15:42

## 2025-07-10 SDOH — SOCIAL STABILITY: SOCIAL INSECURITY: RISK FACTORS: KIDNEY DISEASE

## 2025-07-10 SDOH — SOCIAL STABILITY: SOCIAL INSECURITY: RISK FACTORS: HEMATOLOGICAL DISEASE

## 2025-07-10 SDOH — SOCIAL STABILITY: SOCIAL INSECURITY: RISK FACTORS: HEART DISEASE

## 2025-07-10 ASSESSMENT — PAIN SCALES - GENERAL
PAINLEVEL_OUTOF10: 0
PAINLEVEL_OUTOF10: 4
PAINLEVEL_OUTOF10: 3
PAINLEVEL_OUTOF10: 6
PAINLEVEL_OUTOF10: 3
PAINLEVEL_OUTOF10: 5

## 2025-07-11 VITALS
TEMPERATURE: 98.4 F | DIASTOLIC BLOOD PRESSURE: 68 MMHG | BODY MASS INDEX: 24.11 KG/M2 | WEIGHT: 122.8 LBS | RESPIRATION RATE: 18 BRPM | HEART RATE: 66 BPM | OXYGEN SATURATION: 99 % | SYSTOLIC BLOOD PRESSURE: 116 MMHG | HEIGHT: 60 IN

## 2025-07-11 LAB
ANION GAP SERPL CALC-SCNC: 8 MMOL/L (ref 7–19)
ATRIAL RATE (BPM): 67
ATRIAL RATE (BPM): 68
BUN SERPL-MCNC: 20 MG/DL (ref 6–20)
BUN/CREAT SERPL: 5 (ref 7–25)
CALCIUM SERPL-MCNC: 9.4 MG/DL (ref 8.4–10.2)
CHLORIDE SERPL-SCNC: 100 MMOL/L (ref 97–110)
CO2 SERPL-SCNC: 31 MMOL/L (ref 21–32)
CREAT SERPL-MCNC: 4.08 MG/DL (ref 0.51–0.95)
DEPRECATED RDW RBC: 56.6 FL (ref 39–50)
EGFRCR SERPLBLD CKD-EPI 2021: 12 ML/MIN/{1.73_M2}
ERYTHROCYTE [DISTWIDTH] IN BLOOD: 16.3 % (ref 11–15)
FASTING DURATION TIME PATIENT: ABNORMAL H
GLUCOSE SERPL-MCNC: 92 MG/DL (ref 70–99)
HCT VFR BLD CALC: 24.4 % (ref 36–46.5)
HGB BLD-MCNC: 7.9 G/DL (ref 12–15.5)
MCH RBC QN AUTO: 30.6 PG (ref 26–34)
MCHC RBC AUTO-ENTMCNC: 32.4 G/DL (ref 32–36.5)
MCV RBC AUTO: 94.6 FL (ref 78–100)
NRBC BLD MANUAL-RTO: 0 /100 WBC
P AXIS (DEGREES): 18
P AXIS (DEGREES): 3
PLATELET # BLD AUTO: 104 K/MCL (ref 140–450)
POTASSIUM SERPL-SCNC: 4.4 MMOL/L (ref 3.4–5.1)
PR-INTERVAL (MSEC): 148
PR-INTERVAL (MSEC): 156
QRS-INTERVAL (MSEC): 90
QRS-INTERVAL (MSEC): 92
QT-INTERVAL (MSEC): 540
QT-INTERVAL (MSEC): 558
QTC: 574
QTC: 589
R AXIS (DEGREES): -3
R AXIS (DEGREES): 6
RBC # BLD: 2.58 MIL/MCL (ref 4–5.2)
REPORT TEXT: NORMAL
REPORT TEXT: NORMAL
SODIUM SERPL-SCNC: 135 MMOL/L (ref 135–145)
T AXIS (DEGREES): -106
T AXIS (DEGREES): -131
VENTRICULAR RATE EKG/MIN (BPM): 67
VENTRICULAR RATE EKG/MIN (BPM): 68
WBC # BLD: 3.7 K/MCL (ref 4.2–11)

## 2025-07-11 PROCEDURE — 93010 ELECTROCARDIOGRAM REPORT: CPT | Performed by: INTERNAL MEDICINE

## 2025-07-11 PROCEDURE — 99024 POSTOP FOLLOW-UP VISIT: CPT | Performed by: SURGERY

## 2025-07-11 PROCEDURE — A9150 MISC/EXPER NON-PRESCRIPT DRU: HCPCS | Performed by: HOSPITALIST

## 2025-07-11 PROCEDURE — 10002803 HB RX 637: Performed by: INTERNAL MEDICINE

## 2025-07-11 PROCEDURE — 99239 HOSP IP/OBS DSCHRG MGMT >30: CPT | Performed by: HOSPITALIST

## 2025-07-11 PROCEDURE — 10002803 HB RX 637: Performed by: HOSPITALIST

## 2025-07-11 PROCEDURE — 10004651 HB RX, NO CHARGE ITEM: Performed by: HOSPITALIST

## 2025-07-11 PROCEDURE — 36415 COLL VENOUS BLD VENIPUNCTURE: CPT | Performed by: HOSPITALIST

## 2025-07-11 PROCEDURE — 80048 BASIC METABOLIC PNL TOTAL CA: CPT | Performed by: HOSPITALIST

## 2025-07-11 PROCEDURE — 93005 ELECTROCARDIOGRAM TRACING: CPT | Performed by: INTERNAL MEDICINE

## 2025-07-11 PROCEDURE — 85027 COMPLETE CBC AUTOMATED: CPT | Performed by: HOSPITALIST

## 2025-07-11 PROCEDURE — A4216 STERILE WATER/SALINE, 10 ML: HCPCS | Performed by: HOSPITALIST

## 2025-07-11 PROCEDURE — 10002800 HB RX 250 W HCPCS: Performed by: HOSPITALIST

## 2025-07-11 PROCEDURE — 99233 SBSQ HOSP IP/OBS HIGH 50: CPT | Performed by: INTERNAL MEDICINE

## 2025-07-11 RX ORDER — MEXILETINE HYDROCHLORIDE 150 MG/1
300 CAPSULE ORAL EVERY 12 HOURS SCHEDULED
Qty: 120 CAPSULE | Refills: 0 | Status: SHIPPED | OUTPATIENT
Start: 2025-07-11 | End: 2025-08-10

## 2025-07-11 RX ORDER — MEXILETINE HYDROCHLORIDE 150 MG/1
300 CAPSULE ORAL EVERY 12 HOURS SCHEDULED
Status: DISCONTINUED | OUTPATIENT
Start: 2025-07-11 | End: 2025-07-11 | Stop reason: HOSPADM

## 2025-07-11 RX ORDER — METOPROLOL SUCCINATE 25 MG/1
25 TABLET, EXTENDED RELEASE ORAL DAILY
Qty: 30 TABLET | Refills: 0 | Status: SHIPPED | OUTPATIENT
Start: 2025-07-11

## 2025-07-11 RX ORDER — METOPROLOL SUCCINATE 25 MG/1
25 TABLET, EXTENDED RELEASE ORAL DAILY
Status: DISCONTINUED | OUTPATIENT
Start: 2025-07-11 | End: 2025-07-11 | Stop reason: HOSPADM

## 2025-07-11 RX ORDER — RIFABUTIN 150 MG/1
300 CAPSULE ORAL
Status: DISCONTINUED | OUTPATIENT
Start: 2025-07-11 | End: 2025-07-11 | Stop reason: HOSPADM

## 2025-07-11 RX ORDER — RIFABUTIN 150 MG/1
300 CAPSULE ORAL
Qty: 30 CAPSULE | Refills: 0 | Status: SHIPPED | OUTPATIENT
Start: 2025-07-11 | End: 2025-07-26

## 2025-07-11 RX ADMIN — RIFABUTIN 300 MG: 150 CAPSULE ORAL at 16:59

## 2025-07-11 RX ADMIN — MEXILETINE HYDROCHLORIDE 150 MG: 150 CAPSULE ORAL at 10:03

## 2025-07-11 RX ADMIN — LEVOTHYROXINE SODIUM 25 MCG: 0.03 TABLET ORAL at 05:00

## 2025-07-11 RX ADMIN — ISOSORBIDE DINITRATE 10 MG: 20 TABLET ORAL at 10:03

## 2025-07-11 RX ADMIN — METOPROLOL SUCCINATE 25 MG: 25 TABLET, EXTENDED RELEASE ORAL at 15:27

## 2025-07-11 RX ADMIN — HYDRALAZINE HYDROCHLORIDE 10 MG: 10 TABLET ORAL at 10:03

## 2025-07-11 RX ADMIN — METOCLOPRAMIDE HYDROCHLORIDE 5 MG: 5 INJECTION INTRAMUSCULAR; INTRAVENOUS at 12:22

## 2025-07-11 RX ADMIN — CALCIUM CARBONATE 600 MG (1,500 MG)-VITAMIN D3 400 UNIT TABLET 1 TABLET: at 10:03

## 2025-07-11 RX ADMIN — HYDRALAZINE HYDROCHLORIDE 10 MG: 10 TABLET ORAL at 13:23

## 2025-07-11 RX ADMIN — DIPHENHYDRAMINE HYDROCHLORIDE 25 MG: 25 CAPSULE ORAL at 12:26

## 2025-07-11 RX ADMIN — LANTHANUM CARBONATE 1000 MG: 500 TABLET, CHEWABLE ORAL at 10:03

## 2025-07-11 RX ADMIN — LANTHANUM CARBONATE 1000 MG: 500 TABLET, CHEWABLE ORAL at 16:59

## 2025-07-11 RX ADMIN — SODIUM CHLORIDE, PRESERVATIVE FREE 2 ML: 5 INJECTION INTRAVENOUS at 08:25

## 2025-07-11 RX ADMIN — LANTHANUM CARBONATE 1000 MG: 500 TABLET, CHEWABLE ORAL at 13:23

## 2025-07-11 RX ADMIN — ISOSORBIDE DINITRATE 10 MG: 20 TABLET ORAL at 13:23

## 2025-07-11 RX ADMIN — ACETAMINOPHEN 650 MG: 325 TABLET ORAL at 08:26

## 2025-07-11 ASSESSMENT — ENCOUNTER SYMPTOMS
HEADACHES: 0
RESPIRATORY NEGATIVE: 1
CHILLS: 0
LIGHT-HEADEDNESS: 0
FEVER: 0
SINUS PAIN: 0
NEUROLOGICAL NEGATIVE: 1
NAUSEA: 0
ENDOCRINE NEGATIVE: 1
VOMITING: 0
SHORTNESS OF BREATH: 0
SORE THROAT: 0
ABDOMINAL PAIN: 0
BACK PAIN: 0
COUGH: 0
UNEXPECTED WEIGHT CHANGE: 0
ALLERGIC/IMMUNOLOGIC NEGATIVE: 1
HEMATOLOGIC/LYMPHATIC NEGATIVE: 1
PSYCHIATRIC NEGATIVE: 1
ACTIVITY CHANGE: 0
CONSTIPATION: 0
DIARRHEA: 0
DIZZINESS: 0
WOUND: 0
COLOR CHANGE: 0
SINUS PRESSURE: 0
FATIGUE: 1
ABDOMINAL DISTENTION: 0
EYES NEGATIVE: 1

## 2025-07-11 ASSESSMENT — PAIN SCALES - GENERAL
PAINLEVEL_OUTOF10: 3
PAINLEVEL_OUTOF10: 3

## 2025-07-14 ENCOUNTER — CASE MANAGEMENT (OUTPATIENT)
Dept: CARE COORDINATION | Age: 55
End: 2025-07-14

## 2025-07-15 ENCOUNTER — CASE MANAGEMENT (OUTPATIENT)
Dept: CARE COORDINATION | Age: 55
End: 2025-07-15

## 2025-07-16 ENCOUNTER — CASE MANAGEMENT (OUTPATIENT)
Dept: CARE COORDINATION | Age: 55
End: 2025-07-16

## 2025-07-16 ASSESSMENT — SLEEP AND FATIGUE QUESTIONNAIRES
SLEEP DESCRIPTORS: DIFFICULTY FALLING ASLEEP
HOURS OF UNINTERRUPTED SLEEP: 5

## 2025-07-16 ASSESSMENT — ACTIVITIES OF DAILY LIVING (ADL): DME_IN_USE: BP MONITOR

## 2025-07-16 ASSESSMENT — PATIENT HEALTH QUESTIONNAIRE - PHQ9: SUM OF ALL RESPONSES TO PHQ9 QUESTIONS 1 AND 2: 0

## 2025-07-21 ENCOUNTER — APPOINTMENT (OUTPATIENT)
Dept: GENERAL RADIOLOGY | Age: 55
DRG: 249 | End: 2025-07-21
Attending: EMERGENCY MEDICINE

## 2025-07-21 ENCOUNTER — APPOINTMENT (OUTPATIENT)
Dept: ULTRASOUND IMAGING | Age: 55
DRG: 249 | End: 2025-07-21
Attending: HOSPITALIST

## 2025-07-21 ENCOUNTER — HOSPITAL ENCOUNTER (INPATIENT)
Age: 55
LOS: 4 days | Discharge: HOME | DRG: 249 | End: 2025-07-25
Attending: EMERGENCY MEDICINE | Admitting: INTERNAL MEDICINE

## 2025-07-21 ENCOUNTER — CASE MANAGEMENT (OUTPATIENT)
Dept: CARE COORDINATION | Age: 55
End: 2025-07-21

## 2025-07-21 ENCOUNTER — APPOINTMENT (OUTPATIENT)
Dept: DIALYSIS | Age: 55
DRG: 249 | End: 2025-07-21
Attending: INTERNAL MEDICINE

## 2025-07-21 ENCOUNTER — APPOINTMENT (OUTPATIENT)
Dept: CT IMAGING | Age: 55
DRG: 249 | End: 2025-07-21
Attending: EMERGENCY MEDICINE

## 2025-07-21 DIAGNOSIS — R11.2 INTRACTABLE VOMITING WITH NAUSEA: ICD-10-CM

## 2025-07-21 DIAGNOSIS — E80.6 HYPERBILIRUBINEMIA: ICD-10-CM

## 2025-07-21 DIAGNOSIS — E87.5 HYPERKALEMIA: ICD-10-CM

## 2025-07-21 DIAGNOSIS — E87.1 HYPONATREMIA: Primary | ICD-10-CM

## 2025-07-21 DIAGNOSIS — R19.7 DIARRHEA, UNSPECIFIED TYPE: ICD-10-CM

## 2025-07-21 LAB
ABO + RH BLD: NORMAL
ALBUMIN SERPL-MCNC: 3.3 G/DL (ref 3.4–5)
ALP SERPL-CCNC: 296 UNITS/L (ref 45–117)
ALT SERPL-CCNC: 89 UNITS/L
ANION GAP SERPL CALC-SCNC: 22 MMOL/L (ref 7–19)
APPEARANCE FLD: NORMAL
APPEARANCE FLD: NORMAL
APTT PPP: 30 SEC (ref 22–32)
AST SERPL-CCNC: 77 UNITS/L
ATRIAL RATE (BPM): 87
BASOPHILS # BLD: 0.1 K/MCL (ref 0–0.3)
BASOPHILS NFR BLD: 1 %
BASOPHILS NFR FLD: 12 %
BASOPHILS NFR FLD: 4 %
BILIRUB CONJ SERPL-MCNC: 1.7 MG/DL (ref 0–0.2)
BILIRUB SERPL-MCNC: 2.4 MG/DL (ref 0.2–1)
BLD GP AB INVEST PLASRBC-IMP: NORMAL
BLD GP AB SCN SERPL QL GEL: POSITIVE
BLOOD EXPIRATION DATE: NORMAL
BUN SERPL-MCNC: 36 MG/DL (ref 6–20)
BUN/CREAT SERPL: 5 (ref 7–25)
CALCIUM SERPL-MCNC: 9.1 MG/DL (ref 8.4–10.2)
CHLORIDE SERPL-SCNC: 89 MMOL/L (ref 97–110)
CO2 SERPL-SCNC: 21 MMOL/L (ref 21–32)
COLOR FLD: NORMAL
COLOR FLD: NORMAL
CREAT SERPL-MCNC: 7.53 MG/DL (ref 0.51–0.95)
CROSSMATCH RESULT: NORMAL
DEPRECATED RDW RBC: 53.9 FL (ref 39–50)
DISPENSE STATUS: NORMAL
EGFRCR SERPLBLD CKD-EPI 2021: 6 ML/MIN/{1.73_M2}
EOSINOPHIL # BLD: 0 K/MCL (ref 0–0.5)
EOSINOPHIL NFR BLD: 0 %
EOSINOPHIL NFR FLD: 10 %
EOSINOPHIL NFR FLD: 6 %
ERYTHROCYTE [DISTWIDTH] IN BLOOD: 16.2 % (ref 11–15)
FASTING DURATION TIME PATIENT: ABNORMAL H
GLUCOSE BLDC GLUCOMTR-MCNC: 117 MG/DL (ref 70–99)
GLUCOSE BLDC GLUCOMTR-MCNC: 92 MG/DL (ref 70–99)
GLUCOSE BLDC GLUCOMTR-MCNC: 99 MG/DL (ref 70–99)
GLUCOSE SERPL-MCNC: 104 MG/DL (ref 70–99)
HBV CORE IGG+IGM SER QL: NEGATIVE
HBV SURFACE AG SER QL: NEGATIVE
HCT VFR BLD CALC: 21.7 % (ref 36–46.5)
HGB BLD-MCNC: 7.1 G/DL (ref 12–15.5)
IMM GRANULOCYTES # BLD AUTO: 0.1 K/MCL (ref 0–0.2)
IMM GRANULOCYTES # BLD: 1 %
INR PPP: 1.3
ISBT BLOOD TYPE: 5100
ISSUE DATE/TIME: NORMAL
LIPASE SERPL-CCNC: 32 UNITS/L (ref 15–77)
LYMPHOCYTES # BLD: 0.7 K/MCL (ref 1–4)
LYMPHOCYTES NFR BLD: 7 %
LYMPHOCYTES NFR FLD: 39 %
LYMPHOCYTES NFR FLD: 45 %
MAGNESIUM SERPL-MCNC: 2.5 MG/DL (ref 1.7–2.4)
MCH RBC QN AUTO: 30.7 PG (ref 26–34)
MCHC RBC AUTO-ENTMCNC: 32.7 G/DL (ref 32–36.5)
MCV RBC AUTO: 93.9 FL (ref 78–100)
MESOTHL CELL NFR FLD: 5 %
MONOCYTES # BLD: 0.7 K/MCL (ref 0.3–0.9)
MONOCYTES NFR BLD: 7 %
MONOCYTES NFR FLD: 29 %
MONOCYTES NFR FLD: 34 %
NEUTROPHILS # BLD: 8.3 K/MCL (ref 1.8–7.7)
NEUTROPHILS NFR BLD: 84 %
NEUTS SEG NFR FLD: 7 %
NEUTS SEG NFR FLD: 9 %
NRBC BLD MANUAL-RTO: 0 /100 WBC
P AXIS (DEGREES): -18
PLATELET # BLD AUTO: 162 K/MCL (ref 140–450)
POTASSIUM SERPL-SCNC: 5.7 MMOL/L (ref 3.4–5.1)
PR-INTERVAL (MSEC): 152
PRODUCT CODE: NORMAL
PRODUCT DESCRIPTION: NORMAL
PRODUCT ID: NORMAL
PROT SERPL-MCNC: 7.4 G/DL (ref 6.4–8.2)
PROTHROMBIN TIME: 13.3 SEC (ref 9.7–11.8)
QRS-INTERVAL (MSEC): 102
QT-INTERVAL (MSEC): 456
QTC: 548
R AXIS (DEGREES): -18
RAINBOW EXTRA TUBES HOLD SPECIMEN: NORMAL
RBC # BLD: 2.31 MIL/MCL (ref 4–5.2)
REPORT TEXT: NORMAL
SODIUM SERPL-SCNC: 126 MMOL/L (ref 135–145)
T AXIS (DEGREES): 160
TOTAL CELLS COUNTED FLD: 100
TOTAL CELLS COUNTED FLD: 100
TYPE AND SCREEN EXPIRATION DATE: NORMAL
UNIT BLOOD TYPE: NORMAL
UNIT NUMBER: NORMAL
VENTRICULAR RATE EKG/MIN (BPM): 87
WBC # BLD: 9.8 K/MCL (ref 4.2–11)
WBC # FLD: 340 /MCL (ref 0–1000)
WBC # FLD: 35 /MCL (ref 0–1000)

## 2025-07-21 PROCEDURE — 86922 COMPATIBILITY TEST ANTIGLOB: CPT

## 2025-07-21 PROCEDURE — 86704 HEP B CORE ANTIBODY TOTAL: CPT | Performed by: INTERNAL MEDICINE

## 2025-07-21 PROCEDURE — 99223 1ST HOSP IP/OBS HIGH 75: CPT | Performed by: HOSPITALIST

## 2025-07-21 PROCEDURE — 10004651 HB RX, NO CHARGE ITEM: Performed by: HOSPITALIST

## 2025-07-21 PROCEDURE — 83690 ASSAY OF LIPASE: CPT | Performed by: EMERGENCY MEDICINE

## 2025-07-21 PROCEDURE — 96375 TX/PRO/DX INJ NEW DRUG ADDON: CPT

## 2025-07-21 PROCEDURE — A4216 STERILE WATER/SALINE, 10 ML: HCPCS | Performed by: HOSPITALIST

## 2025-07-21 PROCEDURE — 99291 CRITICAL CARE FIRST HOUR: CPT

## 2025-07-21 PROCEDURE — 94640 AIRWAY INHALATION TREATMENT: CPT

## 2025-07-21 PROCEDURE — 90935 HEMODIALYSIS ONE EVALUATION: CPT

## 2025-07-21 PROCEDURE — 99285 EMERGENCY DEPT VISIT HI MDM: CPT | Performed by: EMERGENCY MEDICINE

## 2025-07-21 PROCEDURE — 10003585 HB ROOM CHARGE INTERMEDIATE

## 2025-07-21 PROCEDURE — 85025 COMPLETE CBC W/AUTO DIFF WBC: CPT | Performed by: EMERGENCY MEDICINE

## 2025-07-21 PROCEDURE — 82962 GLUCOSE BLOOD TEST: CPT

## 2025-07-21 PROCEDURE — 10004180 HB COUNTER-TRANSPORT

## 2025-07-21 PROCEDURE — 85730 THROMBOPLASTIN TIME PARTIAL: CPT | Performed by: EMERGENCY MEDICINE

## 2025-07-21 PROCEDURE — 74177 CT ABD & PELVIS W/CONTRAST: CPT

## 2025-07-21 PROCEDURE — 5A1D70Z PERFORMANCE OF URINARY FILTRATION, INTERMITTENT, LESS THAN 6 HOURS PER DAY: ICD-10-PCS | Performed by: INTERNAL MEDICINE

## 2025-07-21 PROCEDURE — 96374 THER/PROPH/DIAG INJ IV PUSH: CPT

## 2025-07-21 PROCEDURE — 83735 ASSAY OF MAGNESIUM: CPT | Performed by: EMERGENCY MEDICINE

## 2025-07-21 PROCEDURE — 85610 PROTHROMBIN TIME: CPT | Performed by: EMERGENCY MEDICINE

## 2025-07-21 PROCEDURE — 10002800 HB RX 250 W HCPCS: Performed by: INTERNAL MEDICINE

## 2025-07-21 PROCEDURE — 76705 ECHO EXAM OF ABDOMEN: CPT

## 2025-07-21 PROCEDURE — 89051 BODY FLUID CELL COUNT: CPT | Performed by: EMERGENCY MEDICINE

## 2025-07-21 PROCEDURE — 71045 X-RAY EXAM CHEST 1 VIEW: CPT

## 2025-07-21 PROCEDURE — 86870 RBC ANTIBODY IDENTIFICATION: CPT | Performed by: EMERGENCY MEDICINE

## 2025-07-21 PROCEDURE — 10002807 HB RX 258: Performed by: EMERGENCY MEDICINE

## 2025-07-21 PROCEDURE — 86706 HEP B SURFACE ANTIBODY: CPT | Performed by: INTERNAL MEDICINE

## 2025-07-21 PROCEDURE — 93005 ELECTROCARDIOGRAM TRACING: CPT | Performed by: EMERGENCY MEDICINE

## 2025-07-21 PROCEDURE — 94664 DEMO&/EVAL PT USE INHALER: CPT

## 2025-07-21 PROCEDURE — 90945 DIALYSIS ONE EVALUATION: CPT

## 2025-07-21 PROCEDURE — 10002801 HB RX 250 W/O HCPCS: Performed by: INTERNAL MEDICINE

## 2025-07-21 PROCEDURE — 80076 HEPATIC FUNCTION PANEL: CPT | Performed by: EMERGENCY MEDICINE

## 2025-07-21 PROCEDURE — 10002800 HB RX 250 W HCPCS: Performed by: EMERGENCY MEDICINE

## 2025-07-21 PROCEDURE — 10002805 HB CONTRAST AGENT: Performed by: EMERGENCY MEDICINE

## 2025-07-21 PROCEDURE — 86850 RBC ANTIBODY SCREEN: CPT | Performed by: EMERGENCY MEDICINE

## 2025-07-21 PROCEDURE — 86905 BLOOD TYPING RBC ANTIGENS: CPT

## 2025-07-21 PROCEDURE — 87070 CULTURE OTHR SPECIMN AEROBIC: CPT | Performed by: INTERNAL MEDICINE

## 2025-07-21 PROCEDURE — 96372 THER/PROPH/DIAG INJ SC/IM: CPT | Performed by: HOSPITALIST

## 2025-07-21 PROCEDURE — 89051 BODY FLUID CELL COUNT: CPT | Performed by: INTERNAL MEDICINE

## 2025-07-21 PROCEDURE — 10002801 HB RX 250 W/O HCPCS: Performed by: EMERGENCY MEDICINE

## 2025-07-21 PROCEDURE — 80048 BASIC METABOLIC PNL TOTAL CA: CPT | Performed by: EMERGENCY MEDICINE

## 2025-07-21 PROCEDURE — 87340 HEPATITIS B SURFACE AG IA: CPT | Performed by: INTERNAL MEDICINE

## 2025-07-21 PROCEDURE — 10002800 HB RX 250 W HCPCS: Performed by: HOSPITALIST

## 2025-07-21 RX ORDER — LEVOTHYROXINE SODIUM 25 UG/1
25 TABLET ORAL DAILY
Status: DISCONTINUED | OUTPATIENT
Start: 2025-07-21 | End: 2025-07-25 | Stop reason: HOSPADM

## 2025-07-21 RX ORDER — HEPARIN SODIUM 5000 [USP'U]/ML
5000 INJECTION, SOLUTION INTRAVENOUS; SUBCUTANEOUS EVERY 8 HOURS SCHEDULED
Status: DISCONTINUED | OUTPATIENT
Start: 2025-07-21 | End: 2025-07-25 | Stop reason: HOSPADM

## 2025-07-21 RX ORDER — ONDANSETRON 2 MG/ML
4 INJECTION INTRAMUSCULAR; INTRAVENOUS ONCE
Status: COMPLETED | OUTPATIENT
Start: 2025-07-21 | End: 2025-07-21

## 2025-07-21 RX ORDER — SODIUM CITRATE 4 % (5 ML)
3 SYRINGE (ML) MISCELLANEOUS PRN
Status: DISCONTINUED | OUTPATIENT
Start: 2025-07-21 | End: 2025-07-25 | Stop reason: HOSPADM

## 2025-07-21 RX ORDER — ONDANSETRON 4 MG/1
4 TABLET, ORALLY DISINTEGRATING ORAL EVERY 12 HOURS PRN
Status: DISCONTINUED | OUTPATIENT
Start: 2025-07-21 | End: 2025-07-25 | Stop reason: HOSPADM

## 2025-07-21 RX ORDER — ALBUTEROL SULFATE 0.83 MG/ML
10 SOLUTION RESPIRATORY (INHALATION) ONCE
Status: COMPLETED | OUTPATIENT
Start: 2025-07-21 | End: 2025-07-21

## 2025-07-21 RX ORDER — ONDANSETRON 2 MG/ML
4 INJECTION INTRAMUSCULAR; INTRAVENOUS EVERY 12 HOURS PRN
Status: DISCONTINUED | OUTPATIENT
Start: 2025-07-21 | End: 2025-07-25 | Stop reason: HOSPADM

## 2025-07-21 RX ORDER — HYDRALAZINE HYDROCHLORIDE 10 MG/1
10 TABLET, FILM COATED ORAL 3 TIMES DAILY
Status: DISCONTINUED | OUTPATIENT
Start: 2025-07-21 | End: 2025-07-25 | Stop reason: HOSPADM

## 2025-07-21 RX ORDER — 0.9 % SODIUM CHLORIDE 0.9 %
2 VIAL (ML) INJECTION EVERY 12 HOURS SCHEDULED
Status: DISCONTINUED | OUTPATIENT
Start: 2025-07-21 | End: 2025-07-25 | Stop reason: HOSPADM

## 2025-07-21 RX ORDER — ISOSORBIDE DINITRATE 10 MG/1
10 TABLET ORAL 3 TIMES DAILY
Status: DISCONTINUED | OUTPATIENT
Start: 2025-07-21 | End: 2025-07-25 | Stop reason: HOSPADM

## 2025-07-21 RX ORDER — FAMOTIDINE 10 MG/ML
40 INJECTION, SOLUTION INTRAVENOUS ONCE
Status: COMPLETED | OUTPATIENT
Start: 2025-07-21 | End: 2025-07-21

## 2025-07-21 RX ORDER — DEXTROSE MONOHYDRATE 25 G/50ML
25 INJECTION, SOLUTION INTRAVENOUS ONCE
Status: COMPLETED | OUTPATIENT
Start: 2025-07-21 | End: 2025-07-21

## 2025-07-21 RX ORDER — METOPROLOL SUCCINATE 25 MG/1
25 TABLET, EXTENDED RELEASE ORAL DAILY
Status: DISCONTINUED | OUTPATIENT
Start: 2025-07-21 | End: 2025-07-25 | Stop reason: HOSPADM

## 2025-07-21 RX ORDER — MEXILETINE HYDROCHLORIDE 150 MG/1
300 CAPSULE ORAL EVERY 12 HOURS SCHEDULED
Status: DISCONTINUED | OUTPATIENT
Start: 2025-07-21 | End: 2025-07-25 | Stop reason: HOSPADM

## 2025-07-21 RX ORDER — RIFABUTIN 150 MG/1
300 CAPSULE ORAL
Status: DISCONTINUED | OUTPATIENT
Start: 2025-07-21 | End: 2025-07-25 | Stop reason: HOSPADM

## 2025-07-21 RX ORDER — 0.9 % SODIUM CHLORIDE 0.9 %
10 VIAL (ML) INJECTION PRN
Status: DISCONTINUED | OUTPATIENT
Start: 2025-07-21 | End: 2025-07-25 | Stop reason: HOSPADM

## 2025-07-21 RX ORDER — ALPRAZOLAM 0.25 MG
0.25 TABLET ORAL 3 TIMES DAILY PRN
Status: DISCONTINUED | OUTPATIENT
Start: 2025-07-21 | End: 2025-07-25 | Stop reason: HOSPADM

## 2025-07-21 RX ADMIN — WATER 2000 MG: 1 INJECTION INTRAMUSCULAR; INTRAVENOUS; SUBCUTANEOUS at 22:28

## 2025-07-21 RX ADMIN — HEPARIN SODIUM 5000 UNITS: 5000 INJECTION INTRAVENOUS; SUBCUTANEOUS at 22:36

## 2025-07-21 RX ADMIN — FAMOTIDINE 40 MG: 10 INJECTION INTRAVENOUS at 11:10

## 2025-07-21 RX ADMIN — ALBUTEROL SULFATE 10 MG: 2.5 SOLUTION RESPIRATORY (INHALATION) at 11:58

## 2025-07-21 RX ADMIN — METRONIDAZOLE 500 MG: 5 INJECTION, SOLUTION INTRAVENOUS at 22:32

## 2025-07-21 RX ADMIN — DEXTROSE MONOHYDRATE 25 G: 25 INJECTION, SOLUTION INTRAVENOUS at 11:43

## 2025-07-21 RX ADMIN — ONDANSETRON 4 MG: 2 INJECTION INTRAMUSCULAR; INTRAVENOUS at 11:10

## 2025-07-21 RX ADMIN — SODIUM CHLORIDE 5 UNITS: 900 INJECTION, SOLUTION INTRAVENOUS at 11:47

## 2025-07-21 RX ADMIN — SODIUM CHLORIDE, PRESERVATIVE FREE 2 ML: 5 INJECTION INTRAVENOUS at 22:05

## 2025-07-21 RX ADMIN — IOHEXOL 80 ML: 350 INJECTION, SOLUTION INTRAVENOUS at 12:58

## 2025-07-21 SDOH — ECONOMIC STABILITY: HOUSING INSECURITY: WHAT IS YOUR LIVING SITUATION TODAY?: CHILDREN

## 2025-07-21 SDOH — ECONOMIC STABILITY: HOUSING INSECURITY: DO YOU HAVE PROBLEMS WITH ANY OF THE FOLLOWING?: NONE OF THE ABOVE

## 2025-07-21 SDOH — ECONOMIC STABILITY: FOOD INSECURITY: WITHIN THE PAST 12 MONTHS, THE FOOD YOU BOUGHT JUST DIDN'T LAST AND YOU DIDN'T HAVE MONEY TO GET MORE.: NEVER TRUE

## 2025-07-21 SDOH — ECONOMIC STABILITY: INCOME INSECURITY: IN THE PAST 12 MONTHS, HAS THE ELECTRIC, GAS, OIL, OR WATER COMPANY THREATENED TO SHUT OFF SERVICE IN YOUR HOME?: NO

## 2025-07-21 SDOH — ECONOMIC STABILITY: HOUSING INSECURITY: WHAT IS YOUR LIVING SITUATION TODAY?: I HAVE A STEADY PLACE TO LIVE

## 2025-07-21 SDOH — SOCIAL STABILITY: SOCIAL INSECURITY: HOW OFTEN DOES ANYONE, INCLUDING FAMILY AND FRIENDS, INSULT OR TALK DOWN TO YOU?: NEVER

## 2025-07-21 SDOH — SOCIAL STABILITY: SOCIAL INSECURITY: HOW OFTEN DOES ANYONE, INCLUDING FAMILY AND FRIENDS, THREATEN YOU WITH HARM?: NEVER

## 2025-07-21 SDOH — SOCIAL STABILITY: SOCIAL NETWORK: SUPPORT SYSTEMS: FAMILY MEMBERS

## 2025-07-21 SDOH — HEALTH STABILITY: GENERAL: BECAUSE OF A PHYSICAL, MENTAL, OR EMOTIONAL CONDITION, DO YOU HAVE DIFFICULTY DOING ERRANDS ALONE?: NO

## 2025-07-21 SDOH — HEALTH STABILITY: PHYSICAL HEALTH: DO YOU HAVE DIFFICULTY DRESSING OR BATHING?: NO

## 2025-07-21 SDOH — SOCIAL STABILITY: SOCIAL INSECURITY: HOW OFTEN DOES ANYONE, INCLUDING FAMILY AND FRIENDS, PHYSICALLY HURT YOU?: NEVER

## 2025-07-21 SDOH — SOCIAL STABILITY: SOCIAL INSECURITY: HOW OFTEN DOES ANYONE, INCLUDING FAMILY AND FRIENDS, SCREAM OR CURSE AT YOU?: NEVER

## 2025-07-21 SDOH — ECONOMIC STABILITY: GENERAL

## 2025-07-21 SDOH — HEALTH STABILITY: PHYSICAL HEALTH: DO YOU HAVE SERIOUS DIFFICULTY WALKING OR CLIMBING STAIRS?: NO

## 2025-07-21 SDOH — ECONOMIC STABILITY: HOUSING INSECURITY: WHAT IS YOUR LIVING SITUATION TODAY?: APARTMENT

## 2025-07-21 ASSESSMENT — ENCOUNTER SYMPTOMS
LIGHT-HEADEDNESS: 0
CONSTITUTIONAL NEGATIVE: 1
WOUND: 0
ENDOCRINE NEGATIVE: 1
SINUS PRESSURE: 0
NEUROLOGICAL NEGATIVE: 1
BACK PAIN: 0
FATIGUE: 0
HEMATOLOGIC/LYMPHATIC NEGATIVE: 1
CHILLS: 0
COLOR CHANGE: 0
DIARRHEA: 1
ALLERGIC/IMMUNOLOGIC NEGATIVE: 1
ACTIVITY CHANGE: 0
EYES NEGATIVE: 1
UNEXPECTED WEIGHT CHANGE: 0
HEADACHES: 0
RESPIRATORY NEGATIVE: 1
SORE THROAT: 0
NAUSEA: 1
SHORTNESS OF BREATH: 0
COUGH: 0
PSYCHIATRIC NEGATIVE: 1
DIZZINESS: 0
CONSTIPATION: 0
ABDOMINAL DISTENTION: 1
VOMITING: 1
SINUS PAIN: 0
FEVER: 0

## 2025-07-21 ASSESSMENT — LIFESTYLE VARIABLES
ALCOHOL_USE_STATUS: NO OR LOW RISK WITH VALIDATED TOOL
HOW OFTEN DO YOU HAVE 6 OR MORE DRINKS ON ONE OCCASION: NEVER
HOW OFTEN DO YOU HAVE A DRINK CONTAINING ALCOHOL: NEVER

## 2025-07-21 ASSESSMENT — COLUMBIA-SUICIDE SEVERITY RATING SCALE - C-SSRS
2. HAVE YOU ACTUALLY HAD ANY THOUGHTS OF KILLING YOURSELF?: NO
6. HAVE YOU EVER DONE ANYTHING, STARTED TO DO ANYTHING, OR PREPARED TO DO ANYTHING TO END YOUR LIFE?: NO
1. WITHIN THE PAST MONTH, HAVE YOU WISHED YOU WERE DEAD OR WISHED YOU COULD GO TO SLEEP AND NOT WAKE UP?: NO
IS THE PATIENT ABLE TO COMPLETE C-SSRS: YES

## 2025-07-21 ASSESSMENT — ACTIVITIES OF DAILY LIVING (ADL)
ADL_BEFORE_ADMISSION: INDEPENDENT
ADL_SCORE: 12
RECENT_DECLINE_ADL: NO
ADL_SHORT_OF_BREATH: NO

## 2025-07-21 ASSESSMENT — PAIN SCALES - GENERAL
PAINLEVEL_OUTOF10: 0
PAINLEVEL_OUTOF10: 0

## 2025-07-22 LAB
ADV 40+41 FIB PROT STL QL NAA+PROBE: NOT DETECTED
ALBUMIN SERPL-MCNC: 2.7 G/DL (ref 3.4–5)
ALP SERPL-CCNC: 230 UNITS/L (ref 45–117)
ALT SERPL-CCNC: 78 UNITS/L
ANION GAP SERPL CALC-SCNC: 11 MMOL/L (ref 7–19)
AST SERPL-CCNC: 63 UNITS/L
ASTRO TYP 1-8 RNA STL QL NAA+NON-PROBE: NOT DETECTED
BASOPHILS # BLD: 0.1 K/MCL (ref 0–0.3)
BASOPHILS NFR BLD: 1 %
BILIRUB CONJ SERPL-MCNC: 0.6 MG/DL (ref 0–0.2)
BILIRUB SERPL-MCNC: 0.9 MG/DL (ref 0.2–1)
BUN SERPL-MCNC: 19 MG/DL (ref 6–20)
BUN/CREAT SERPL: 4 (ref 7–25)
C CAYETANENSIS DNA STL QL NAA+NON-PROBE: NOT DETECTED
C COLI+JEJ+LAR 16S RRNA STL QL NAA+PROBE: NOT DETECTED
C DIFF TOX B TCDB STL QL NAA+PROBE: NOT DETECTED
C PARVUM+HOMINIS COWP STL QL NAA+PROBE: NOT DETECTED
CALCIUM SERPL-MCNC: 8.4 MG/DL (ref 8.4–10.2)
CHLORIDE SERPL-SCNC: 102 MMOL/L (ref 97–110)
CO2 SERPL-SCNC: 29 MMOL/L (ref 21–32)
CREAT SERPL-MCNC: 4.45 MG/DL (ref 0.51–0.95)
DEPRECATED RDW RBC: 56.2 FL (ref 39–50)
E HISTOLYTICA 18S RRNA STL QL NAA+PROBE: NOT DETECTED
EAEC PAA PLAS AGGR+AATA ST NAA+NON-PRB: NOT DETECTED
EC STX1+STX2 GENES STL QL NAA+NON-PROBE: NOT DETECTED
EGFRCR SERPLBLD CKD-EPI 2021: 11 ML/MIN/{1.73_M2}
EOSINOPHIL # BLD: 0.1 K/MCL (ref 0–0.5)
EOSINOPHIL NFR BLD: 2 %
EPEC EAE GENE STL QL NAA+NON-PROBE: NOT DETECTED
ERYTHROCYTE [DISTWIDTH] IN BLOOD: 16.6 % (ref 11–15)
ETEC ELTA+ESTB GENES STL QL NAA+PROBE: NOT DETECTED
FASTING DURATION TIME PATIENT: ABNORMAL H
G LAMBLIA 18S RRNA STL QL NAA+PROBE: NOT DETECTED
GLUCOSE SERPL-MCNC: 94 MG/DL (ref 70–99)
HBV SURFACE AB SER-ACNC: 24.76 MUNITS/ML
HCT VFR BLD CALC: 19.9 % (ref 36–46.5)
HCT VFR BLD CALC: 24 % (ref 36–46.5)
HGB BLD-MCNC: 6.4 G/DL (ref 12–15.5)
HGB BLD-MCNC: 7.8 G/DL (ref 12–15.5)
IMM GRANULOCYTES # BLD AUTO: 0 K/MCL (ref 0–0.2)
IMM GRANULOCYTES # BLD: 1 %
LYMPHOCYTES # BLD: 0.6 K/MCL (ref 1–4)
LYMPHOCYTES NFR BLD: 14 %
MAGNESIUM SERPL-MCNC: 2.3 MG/DL (ref 1.7–2.4)
MCH RBC QN AUTO: 31.1 PG (ref 26–34)
MCHC RBC AUTO-ENTMCNC: 32.2 G/DL (ref 32–36.5)
MCV RBC AUTO: 96.6 FL (ref 78–100)
MONOCYTES # BLD: 0.5 K/MCL (ref 0.3–0.9)
MONOCYTES NFR BLD: 11 %
NEUTROPHILS # BLD: 3 K/MCL (ref 1.8–7.7)
NEUTROPHILS NFR BLD: 71 %
NOROVIRUS GI+II RNA STL QL NAA+NON-PROBE: NOT DETECTED
NRBC BLD MANUAL-RTO: 0 /100 WBC
P SHIGELLOIDES DNA STL QL NAA+NON-PROBE: NOT DETECTED
PATH REV BLD -IMP: NORMAL
PLATELET # BLD AUTO: 100 K/MCL (ref 140–450)
POTASSIUM SERPL-SCNC: 4.5 MMOL/L (ref 3.4–5.1)
PROT SERPL-MCNC: 6.1 G/DL (ref 6.4–8.2)
RBC # BLD: 2.06 MIL/MCL (ref 4–5.2)
RVA VP6 STL QL NAA+PROBE: NOT DETECTED
SALMONELLA SP INVA+FLIC STL QL NAA+PROBE: NOT DETECTED
SAPO I+II+IV+V RNA STL QL NAA+NON-PROBE: NOT DETECTED
SHIGELLA SP+EIEC IPAH ST NAA+NON-PROBE: NOT DETECTED
SODIUM SERPL-SCNC: 137 MMOL/L (ref 135–145)
V CHOL+PARA+VUL DNA STL QL NAA+NON-PROBE: NOT DETECTED
VIBRIO CHOL TOXIN CTXA STL QL NAA+PROBE: NOT DETECTED
WBC # BLD: 4.2 K/MCL (ref 4.2–11)
Y ENTEROCOL DNA STL QL NAA+NON-PROBE: NOT DETECTED

## 2025-07-22 PROCEDURE — 85025 COMPLETE CBC W/AUTO DIFF WBC: CPT | Performed by: HOSPITALIST

## 2025-07-22 PROCEDURE — 10002803 HB RX 637: Performed by: HOSPITALIST

## 2025-07-22 PROCEDURE — 10002800 HB RX 250 W HCPCS: Performed by: HOSPITALIST

## 2025-07-22 PROCEDURE — 87507 IADNA-DNA/RNA PROBE TQ 12-25: CPT | Performed by: INTERNAL MEDICINE

## 2025-07-22 PROCEDURE — 10002800 HB RX 250 W HCPCS: Performed by: INTERNAL MEDICINE

## 2025-07-22 PROCEDURE — 87493 C DIFF AMPLIFIED PROBE: CPT | Performed by: HOSPITALIST

## 2025-07-22 PROCEDURE — 10004651 HB RX, NO CHARGE ITEM: Performed by: HOSPITALIST

## 2025-07-22 PROCEDURE — 96372 THER/PROPH/DIAG INJ SC/IM: CPT | Performed by: HOSPITALIST

## 2025-07-22 PROCEDURE — 10006031 HB ROOM CHARGE TELEMETRY

## 2025-07-22 PROCEDURE — 80048 BASIC METABOLIC PNL TOTAL CA: CPT | Performed by: HOSPITALIST

## 2025-07-22 PROCEDURE — 10002801 HB RX 250 W/O HCPCS: Performed by: INTERNAL MEDICINE

## 2025-07-22 PROCEDURE — 36415 COLL VENOUS BLD VENIPUNCTURE: CPT | Performed by: HOSPITALIST

## 2025-07-22 PROCEDURE — 83735 ASSAY OF MAGNESIUM: CPT | Performed by: HOSPITALIST

## 2025-07-22 PROCEDURE — P9016 RBC LEUKOCYTES REDUCED: HCPCS

## 2025-07-22 PROCEDURE — 99233 SBSQ HOSP IP/OBS HIGH 50: CPT | Performed by: HOSPITALIST

## 2025-07-22 PROCEDURE — A4216 STERILE WATER/SALINE, 10 ML: HCPCS | Performed by: HOSPITALIST

## 2025-07-22 PROCEDURE — 80076 HEPATIC FUNCTION PANEL: CPT | Performed by: INTERNAL MEDICINE

## 2025-07-22 PROCEDURE — 86902 BLOOD TYPE ANTIGEN DONOR EA: CPT

## 2025-07-22 PROCEDURE — 85014 HEMATOCRIT: CPT | Performed by: HOSPITALIST

## 2025-07-22 RX ORDER — SODIUM CHLORIDE 9 MG/ML
INJECTION, SOLUTION INTRAVENOUS CONTINUOUS PRN
Status: ACTIVE | OUTPATIENT
Start: 2025-07-22 | End: 2025-07-22

## 2025-07-22 RX ORDER — CINACALCET 30 MG/1
30 TABLET, FILM COATED ORAL DAILY
Status: DISCONTINUED | OUTPATIENT
Start: 2025-07-22 | End: 2025-07-25 | Stop reason: HOSPADM

## 2025-07-22 RX ORDER — RIFAMPIN 300 MG/1
300 CAPSULE ORAL DAILY
Status: ON HOLD | COMMUNITY
End: 2025-07-25 | Stop reason: HOSPADM

## 2025-07-22 RX ORDER — CINACALCET 30 MG/1
30 TABLET, FILM COATED ORAL DAILY
COMMUNITY

## 2025-07-22 RX ORDER — RIFAMPIN 300 MG/1
300 CAPSULE ORAL DAILY
Status: DISCONTINUED | OUTPATIENT
Start: 2025-07-22 | End: 2025-07-22

## 2025-07-22 RX ADMIN — ISOSORBIDE DINITRATE 10 MG: 10 TABLET ORAL at 21:20

## 2025-07-22 RX ADMIN — MEXILETINE HYDROCHLORIDE 300 MG: 150 CAPSULE ORAL at 21:20

## 2025-07-22 RX ADMIN — CINACALCET HYDROCHLORIDE 30 MG: 30 TABLET, FILM COATED ORAL at 16:41

## 2025-07-22 RX ADMIN — SODIUM CHLORIDE, PRESERVATIVE FREE 2 ML: 5 INJECTION INTRAVENOUS at 21:23

## 2025-07-22 RX ADMIN — HYDRALAZINE HYDROCHLORIDE 10 MG: 10 TABLET ORAL at 21:20

## 2025-07-22 RX ADMIN — METRONIDAZOLE 500 MG: 5 INJECTION, SOLUTION INTRAVENOUS at 16:45

## 2025-07-22 RX ADMIN — SODIUM CHLORIDE, PRESERVATIVE FREE 2 ML: 5 INJECTION INTRAVENOUS at 09:21

## 2025-07-22 RX ADMIN — MEXILETINE HYDROCHLORIDE 300 MG: 150 CAPSULE ORAL at 09:21

## 2025-07-22 RX ADMIN — Medication 3 MG: at 21:20

## 2025-07-22 RX ADMIN — ONDANSETRON 4 MG: 4 TABLET, ORALLY DISINTEGRATING ORAL at 21:20

## 2025-07-22 RX ADMIN — HYDRALAZINE HYDROCHLORIDE 10 MG: 10 TABLET ORAL at 16:41

## 2025-07-22 RX ADMIN — ISOSORBIDE DINITRATE 10 MG: 10 TABLET ORAL at 16:41

## 2025-07-22 RX ADMIN — METRONIDAZOLE 500 MG: 5 INJECTION, SOLUTION INTRAVENOUS at 07:00

## 2025-07-22 RX ADMIN — METRONIDAZOLE 500 MG: 5 INJECTION, SOLUTION INTRAVENOUS at 21:28

## 2025-07-22 RX ADMIN — WATER 2000 MG: 1 INJECTION INTRAMUSCULAR; INTRAVENOUS; SUBCUTANEOUS at 09:21

## 2025-07-22 RX ADMIN — ALPRAZOLAM 0.25 MG: 0.25 TABLET ORAL at 21:20

## 2025-07-22 ASSESSMENT — PATIENT HEALTH QUESTIONNAIRE - PHQ9
1. LITTLE INTEREST OR PLEASURE IN DOING THINGS: NOT AT ALL
IS PATIENT ABLE TO COMPLETE PHQ2 OR PHQ9: YES
2. FEELING DOWN, DEPRESSED OR HOPELESS: NOT AT ALL
SUM OF ALL RESPONSES TO PHQ9 QUESTIONS 1 AND 2: 0
CLINICAL INTERPRETATION OF PHQ2 SCORE: NO FURTHER SCREENING NEEDED
SUM OF ALL RESPONSES TO PHQ9 QUESTIONS 1 AND 2: 0

## 2025-07-22 ASSESSMENT — PAIN SCALES - GENERAL
PAINLEVEL_OUTOF10: 0

## 2025-07-23 ENCOUNTER — APPOINTMENT (OUTPATIENT)
Dept: DIALYSIS | Age: 55
DRG: 249 | End: 2025-07-23
Attending: INTERNAL MEDICINE

## 2025-07-23 ENCOUNTER — APPOINTMENT (OUTPATIENT)
Dept: NUCLEAR MEDICINE | Age: 55
DRG: 249 | End: 2025-07-23
Attending: INTERNAL MEDICINE

## 2025-07-23 LAB
ALBUMIN SERPL-MCNC: 2.9 G/DL (ref 3.4–5)
ALBUMIN/GLOB SERPL: 0.8 {RATIO} (ref 1–2.4)
ALP SERPL-CCNC: 256 UNITS/L (ref 45–117)
ALT SERPL-CCNC: 86 UNITS/L
ANION GAP SERPL CALC-SCNC: 12 MMOL/L (ref 7–19)
AST SERPL-CCNC: 84 UNITS/L
BILIRUB SERPL-MCNC: 1.2 MG/DL (ref 0.2–1)
BUN SERPL-MCNC: 29 MG/DL (ref 6–20)
BUN/CREAT SERPL: 5 (ref 7–25)
CALCIUM SERPL-MCNC: 8.7 MG/DL (ref 8.4–10.2)
CHLORIDE SERPL-SCNC: 101 MMOL/L (ref 97–110)
CO2 SERPL-SCNC: 25 MMOL/L (ref 21–32)
CREAT SERPL-MCNC: 6.11 MG/DL (ref 0.51–0.95)
DEPRECATED RDW RBC: 57.9 FL (ref 39–50)
EGFRCR SERPLBLD CKD-EPI 2021: 8 ML/MIN/{1.73_M2}
ERYTHROCYTE [DISTWIDTH] IN BLOOD: 16.9 % (ref 11–15)
FASTING DURATION TIME PATIENT: ABNORMAL H
GLOBULIN SER-MCNC: 3.5 G/DL (ref 2–4)
GLUCOSE SERPL-MCNC: 104 MG/DL (ref 70–99)
HCT VFR BLD CALC: 23.1 % (ref 36–46.5)
HGB BLD-MCNC: 7.4 G/DL (ref 12–15.5)
MCH RBC QN AUTO: 31.2 PG (ref 26–34)
MCHC RBC AUTO-ENTMCNC: 32 G/DL (ref 32–36.5)
MCV RBC AUTO: 97.5 FL (ref 78–100)
NRBC BLD MANUAL-RTO: 0 /100 WBC
PLATELET # BLD AUTO: 125 K/MCL (ref 140–450)
POTASSIUM SERPL-SCNC: 4.2 MMOL/L (ref 3.4–5.1)
PROT SERPL-MCNC: 6.4 G/DL (ref 6.4–8.2)
RBC # BLD: 2.37 MIL/MCL (ref 4–5.2)
SODIUM SERPL-SCNC: 134 MMOL/L (ref 135–145)
WBC # BLD: 5.4 K/MCL (ref 4.2–11)

## 2025-07-23 PROCEDURE — 99255 IP/OBS CONSLTJ NEW/EST HI 80: CPT

## 2025-07-23 PROCEDURE — 10006031 HB ROOM CHARGE TELEMETRY

## 2025-07-23 PROCEDURE — 10002800 HB RX 250 W HCPCS: Performed by: HOSPITALIST

## 2025-07-23 PROCEDURE — A4216 STERILE WATER/SALINE, 10 ML: HCPCS | Performed by: INTERNAL MEDICINE

## 2025-07-23 PROCEDURE — 10004651 HB RX, NO CHARGE ITEM: Performed by: HOSPITALIST

## 2025-07-23 PROCEDURE — A9537 TC99M MEBROFENIN: HCPCS | Performed by: INTERNAL MEDICINE

## 2025-07-23 PROCEDURE — 99233 SBSQ HOSP IP/OBS HIGH 50: CPT | Performed by: HOSPITALIST

## 2025-07-23 PROCEDURE — 10002800 HB RX 250 W HCPCS: Performed by: INTERNAL MEDICINE

## 2025-07-23 PROCEDURE — 78227 HEPATOBIL SYST IMAGE W/DRUG: CPT

## 2025-07-23 PROCEDURE — 36415 COLL VENOUS BLD VENIPUNCTURE: CPT | Performed by: HOSPITALIST

## 2025-07-23 PROCEDURE — 80053 COMPREHEN METABOLIC PANEL: CPT | Performed by: HOSPITALIST

## 2025-07-23 PROCEDURE — 10004651 HB RX, NO CHARGE ITEM: Performed by: INTERNAL MEDICINE

## 2025-07-23 PROCEDURE — 96372 THER/PROPH/DIAG INJ SC/IM: CPT | Performed by: HOSPITALIST

## 2025-07-23 PROCEDURE — A4216 STERILE WATER/SALINE, 10 ML: HCPCS | Performed by: HOSPITALIST

## 2025-07-23 PROCEDURE — 10002803 HB RX 637: Performed by: HOSPITALIST

## 2025-07-23 PROCEDURE — 85027 COMPLETE CBC AUTOMATED: CPT | Performed by: HOSPITALIST

## 2025-07-23 PROCEDURE — 90935 HEMODIALYSIS ONE EVALUATION: CPT

## 2025-07-23 PROCEDURE — 10006150 HB RX 343: Performed by: INTERNAL MEDICINE

## 2025-07-23 RX ORDER — LOPERAMIDE HYDROCHLORIDE 2 MG/1
2 CAPSULE ORAL 4 TIMES DAILY PRN
Status: DISCONTINUED | OUTPATIENT
Start: 2025-07-23 | End: 2025-07-25 | Stop reason: HOSPADM

## 2025-07-23 RX ORDER — KIT FOR THE PREPARATION OF TECHNETIUM TC 99M MEBROFENIN 45 MG/10ML
5 INJECTION, POWDER, LYOPHILIZED, FOR SOLUTION INTRAVENOUS ONCE
Status: COMPLETED | OUTPATIENT
Start: 2025-07-23 | End: 2025-07-23

## 2025-07-23 RX ADMIN — ISOSORBIDE DINITRATE 10 MG: 10 TABLET ORAL at 20:31

## 2025-07-23 RX ADMIN — HEPARIN SODIUM 5000 UNITS: 5000 INJECTION INTRAVENOUS; SUBCUTANEOUS at 16:22

## 2025-07-23 RX ADMIN — CINACALCET HYDROCHLORIDE 30 MG: 30 TABLET, FILM COATED ORAL at 16:21

## 2025-07-23 RX ADMIN — SODIUM CHLORIDE, PRESERVATIVE FREE 2 ML: 5 INJECTION INTRAVENOUS at 16:55

## 2025-07-23 RX ADMIN — SODIUM CHLORIDE, PRESERVATIVE FREE 2 ML: 5 INJECTION INTRAVENOUS at 20:31

## 2025-07-23 RX ADMIN — METRONIDAZOLE 500 MG: 5 INJECTION, SOLUTION INTRAVENOUS at 05:29

## 2025-07-23 RX ADMIN — MEBROFENIN 5 MILLICURIE: 45 INJECTION, POWDER, LYOPHILIZED, FOR SOLUTION INTRAVENOUS at 07:22

## 2025-07-23 RX ADMIN — CALCIUM CARBONATE 600 MG (1,500 MG)-VITAMIN D3 400 UNIT TABLET 1 TABLET: at 16:21

## 2025-07-23 RX ADMIN — MEXILETINE HYDROCHLORIDE 300 MG: 150 CAPSULE ORAL at 16:21

## 2025-07-23 RX ADMIN — MEXILETINE HYDROCHLORIDE 300 MG: 150 CAPSULE ORAL at 20:31

## 2025-07-23 RX ADMIN — LEVOTHYROXINE SODIUM 25 MCG: 0.03 TABLET ORAL at 16:21

## 2025-07-23 RX ADMIN — ISOSORBIDE DINITRATE 10 MG: 10 TABLET ORAL at 16:20

## 2025-07-23 RX ADMIN — HYDRALAZINE HYDROCHLORIDE 10 MG: 10 TABLET ORAL at 16:20

## 2025-07-23 RX ADMIN — SINCALIDE 1.14 MCG: 5 INJECTION, POWDER, LYOPHILIZED, FOR SOLUTION INTRAVENOUS at 09:10

## 2025-07-23 RX ADMIN — HYDRALAZINE HYDROCHLORIDE 10 MG: 10 TABLET ORAL at 20:31

## 2025-07-23 ASSESSMENT — PAIN SCALES - GENERAL: PAINLEVEL_OUTOF10: 0

## 2025-07-24 ENCOUNTER — APPOINTMENT (OUTPATIENT)
Dept: MRI IMAGING | Age: 55
DRG: 249 | End: 2025-07-24
Attending: HOSPITALIST

## 2025-07-24 LAB
ALBUMIN SERPL-MCNC: 3 G/DL (ref 3.4–5)
ALBUMIN/GLOB SERPL: 0.8 {RATIO} (ref 1–2.4)
ALP SERPL-CCNC: 285 UNITS/L (ref 45–117)
ALT SERPL-CCNC: 87 UNITS/L
ANION GAP SERPL CALC-SCNC: 12 MMOL/L (ref 7–19)
AST SERPL-CCNC: 72 UNITS/L
BILIRUB SERPL-MCNC: 1.1 MG/DL (ref 0.2–1)
BUN SERPL-MCNC: 16 MG/DL (ref 6–20)
BUN/CREAT SERPL: 4 (ref 7–25)
CALCIUM SERPL-MCNC: 8.4 MG/DL (ref 8.4–10.2)
CHLORIDE SERPL-SCNC: 103 MMOL/L (ref 97–110)
CO2 SERPL-SCNC: 25 MMOL/L (ref 21–32)
CREAT SERPL-MCNC: 3.69 MG/DL (ref 0.51–0.95)
DEPRECATED RDW RBC: 60.2 FL (ref 39–50)
EGFRCR SERPLBLD CKD-EPI 2021: 14 ML/MIN/{1.73_M2}
ERYTHROCYTE [DISTWIDTH] IN BLOOD: 17.2 % (ref 11–15)
FASTING DURATION TIME PATIENT: ABNORMAL H
GLOBULIN SER-MCNC: 3.7 G/DL (ref 2–4)
GLUCOSE SERPL-MCNC: 125 MG/DL (ref 70–99)
HCT VFR BLD CALC: 25.5 % (ref 36–46.5)
HGB BLD-MCNC: 8.2 G/DL (ref 12–15.5)
MCH RBC QN AUTO: 32 PG (ref 26–34)
MCHC RBC AUTO-ENTMCNC: 32.2 G/DL (ref 32–36.5)
MCV RBC AUTO: 99.6 FL (ref 78–100)
NRBC BLD MANUAL-RTO: 0 /100 WBC
PLATELET # BLD AUTO: 166 K/MCL (ref 140–450)
POTASSIUM SERPL-SCNC: 4.1 MMOL/L (ref 3.4–5.1)
PROT SERPL-MCNC: 6.7 G/DL (ref 6.4–8.2)
RBC # BLD: 2.56 MIL/MCL (ref 4–5.2)
SODIUM SERPL-SCNC: 136 MMOL/L (ref 135–145)
WBC # BLD: 6 K/MCL (ref 4.2–11)

## 2025-07-24 PROCEDURE — A9585 GADOBUTROL INJECTION: HCPCS | Performed by: HOSPITALIST

## 2025-07-24 PROCEDURE — 10002803 HB RX 637: Performed by: HOSPITALIST

## 2025-07-24 PROCEDURE — 36415 COLL VENOUS BLD VENIPUNCTURE: CPT | Performed by: HOSPITALIST

## 2025-07-24 PROCEDURE — 10004651 HB RX, NO CHARGE ITEM: Performed by: HOSPITALIST

## 2025-07-24 PROCEDURE — 90945 DIALYSIS ONE EVALUATION: CPT

## 2025-07-24 PROCEDURE — A4216 STERILE WATER/SALINE, 10 ML: HCPCS | Performed by: HOSPITALIST

## 2025-07-24 PROCEDURE — 10002800 HB RX 250 W HCPCS: Performed by: HOSPITALIST

## 2025-07-24 PROCEDURE — 99233 SBSQ HOSP IP/OBS HIGH 50: CPT | Performed by: HOSPITALIST

## 2025-07-24 PROCEDURE — 96372 THER/PROPH/DIAG INJ SC/IM: CPT | Performed by: HOSPITALIST

## 2025-07-24 PROCEDURE — 74183 MRI ABD W/O CNTR FLWD CNTR: CPT

## 2025-07-24 PROCEDURE — 85027 COMPLETE CBC AUTOMATED: CPT | Performed by: HOSPITALIST

## 2025-07-24 PROCEDURE — 10002805 HB CONTRAST AGENT: Performed by: HOSPITALIST

## 2025-07-24 PROCEDURE — 80053 COMPREHEN METABOLIC PANEL: CPT | Performed by: HOSPITALIST

## 2025-07-24 PROCEDURE — 10006031 HB ROOM CHARGE TELEMETRY

## 2025-07-24 PROCEDURE — 99255 IP/OBS CONSLTJ NEW/EST HI 80: CPT | Performed by: INTERNAL MEDICINE

## 2025-07-24 RX ORDER — ACETAMINOPHEN 325 MG/1
650 TABLET ORAL EVERY 4 HOURS PRN
Status: DISCONTINUED | OUTPATIENT
Start: 2025-07-24 | End: 2025-07-25 | Stop reason: HOSPADM

## 2025-07-24 RX ORDER — GADOBUTROL 604.72 MG/ML
7.5 INJECTION INTRAVENOUS ONCE
Status: COMPLETED | OUTPATIENT
Start: 2025-07-24 | End: 2025-07-24

## 2025-07-24 RX ADMIN — METOPROLOL SUCCINATE 25 MG: 25 TABLET, EXTENDED RELEASE ORAL at 08:27

## 2025-07-24 RX ADMIN — ISOSORBIDE DINITRATE 10 MG: 10 TABLET ORAL at 21:03

## 2025-07-24 RX ADMIN — ISOSORBIDE DINITRATE 10 MG: 10 TABLET ORAL at 08:27

## 2025-07-24 RX ADMIN — GADOBUTROL 7.5 ML: 604.72 INJECTION INTRAVENOUS at 11:07

## 2025-07-24 RX ADMIN — ALPRAZOLAM 0.25 MG: 0.25 TABLET ORAL at 14:40

## 2025-07-24 RX ADMIN — LEVOTHYROXINE SODIUM 25 MCG: 0.03 TABLET ORAL at 08:27

## 2025-07-24 RX ADMIN — SODIUM CHLORIDE, PRESERVATIVE FREE 2 ML: 5 INJECTION INTRAVENOUS at 08:27

## 2025-07-24 RX ADMIN — HYDRALAZINE HYDROCHLORIDE 10 MG: 10 TABLET ORAL at 21:04

## 2025-07-24 RX ADMIN — HEPARIN SODIUM 5000 UNITS: 5000 INJECTION INTRAVENOUS; SUBCUTANEOUS at 06:13

## 2025-07-24 RX ADMIN — MEXILETINE HYDROCHLORIDE 300 MG: 150 CAPSULE ORAL at 08:27

## 2025-07-24 RX ADMIN — CINACALCET HYDROCHLORIDE 30 MG: 30 TABLET, FILM COATED ORAL at 08:27

## 2025-07-24 RX ADMIN — HYDRALAZINE HYDROCHLORIDE 10 MG: 10 TABLET ORAL at 08:27

## 2025-07-24 RX ADMIN — HEPARIN SODIUM 5000 UNITS: 5000 INJECTION INTRAVENOUS; SUBCUTANEOUS at 14:40

## 2025-07-24 RX ADMIN — MEXILETINE HYDROCHLORIDE 300 MG: 150 CAPSULE ORAL at 21:04

## 2025-07-24 RX ADMIN — HYDRALAZINE HYDROCHLORIDE 10 MG: 10 TABLET ORAL at 14:40

## 2025-07-24 RX ADMIN — ISOSORBIDE DINITRATE 10 MG: 10 TABLET ORAL at 14:40

## 2025-07-24 RX ADMIN — SODIUM CHLORIDE, PRESERVATIVE FREE 2 ML: 5 INJECTION INTRAVENOUS at 21:04

## 2025-07-24 RX ADMIN — CALCIUM CARBONATE 600 MG (1,500 MG)-VITAMIN D3 400 UNIT TABLET 1 TABLET: at 08:27

## 2025-07-24 RX ADMIN — HEPARIN SODIUM 5000 UNITS: 5000 INJECTION INTRAVENOUS; SUBCUTANEOUS at 21:05

## 2025-07-24 ASSESSMENT — PAIN SCALES - GENERAL
PAINLEVEL_OUTOF10: 0
PAINLEVEL_OUTOF10: 0

## 2025-07-25 ENCOUNTER — APPOINTMENT (OUTPATIENT)
Dept: DIALYSIS | Age: 55
DRG: 249 | End: 2025-07-25
Attending: INTERNAL MEDICINE

## 2025-07-25 VITALS
OXYGEN SATURATION: 99 % | HEART RATE: 75 BPM | RESPIRATION RATE: 16 BRPM | TEMPERATURE: 97.3 F | HEIGHT: 60 IN | SYSTOLIC BLOOD PRESSURE: 150 MMHG | BODY MASS INDEX: 22.42 KG/M2 | DIASTOLIC BLOOD PRESSURE: 83 MMHG | WEIGHT: 114.2 LBS

## 2025-07-25 LAB
ALBUMIN SERPL-MCNC: 2.9 G/DL (ref 3.4–5)
ALBUMIN/GLOB SERPL: 0.8 {RATIO} (ref 1–2.4)
ALP SERPL-CCNC: 260 UNITS/L (ref 45–117)
ALT SERPL-CCNC: 70 UNITS/L
ANION GAP SERPL CALC-SCNC: 11 MMOL/L (ref 7–19)
AST SERPL-CCNC: 44 UNITS/L
BASOPHILS # BLD: 0.1 K/MCL (ref 0–0.3)
BASOPHILS NFR BLD: 2 %
BILIRUB CONJ SERPL-MCNC: 0.6 MG/DL (ref 0–0.2)
BILIRUB SERPL-MCNC: 0.9 MG/DL (ref 0.2–1)
BUN SERPL-MCNC: 20 MG/DL (ref 6–20)
BUN/CREAT SERPL: 4 (ref 7–25)
CALCIUM SERPL-MCNC: 8.1 MG/DL (ref 8.4–10.2)
CHLORIDE SERPL-SCNC: 101 MMOL/L (ref 97–110)
CO2 SERPL-SCNC: 27 MMOL/L (ref 21–32)
CREAT SERPL-MCNC: 4.7 MG/DL (ref 0.51–0.95)
DEPRECATED RDW RBC: 58.8 FL (ref 39–50)
EGFRCR SERPLBLD CKD-EPI 2021: 11 ML/MIN/{1.73_M2}
EOSINOPHIL # BLD: 0.2 K/MCL (ref 0–0.5)
EOSINOPHIL NFR BLD: 5 %
ERYTHROCYTE [DISTWIDTH] IN BLOOD: 17.4 % (ref 11–15)
FASTING DURATION TIME PATIENT: ABNORMAL H
GLOBULIN SER-MCNC: 3.7 G/DL (ref 2–4)
GLUCOSE SERPL-MCNC: 110 MG/DL (ref 70–99)
HCT VFR BLD CALC: 24.8 % (ref 36–46.5)
HGB BLD-MCNC: 7.9 G/DL (ref 12–15.5)
IMM GRANULOCYTES # BLD AUTO: 0 K/MCL (ref 0–0.2)
IMM GRANULOCYTES # BLD: 0 %
INR PPP: 1.4
LYMPHOCYTES # BLD: 1 K/MCL (ref 1–4)
LYMPHOCYTES NFR BLD: 21 %
MCH RBC QN AUTO: 30.9 PG (ref 26–34)
MCHC RBC AUTO-ENTMCNC: 31.9 G/DL (ref 32–36.5)
MCV RBC AUTO: 96.9 FL (ref 78–100)
MONOCYTES # BLD: 0.5 K/MCL (ref 0.3–0.9)
MONOCYTES NFR BLD: 11 %
NEUTROPHILS # BLD: 2.9 K/MCL (ref 1.8–7.7)
NEUTROPHILS NFR BLD: 61 %
NRBC BLD MANUAL-RTO: 0 /100 WBC
PLATELET # BLD AUTO: 156 K/MCL (ref 140–450)
POTASSIUM SERPL-SCNC: 3.4 MMOL/L (ref 3.4–5.1)
PROT SERPL-MCNC: 6.6 G/DL (ref 6.4–8.2)
PROTHROMBIN TIME: 14.7 SEC (ref 9.7–11.8)
RBC # BLD: 2.56 MIL/MCL (ref 4–5.2)
SODIUM SERPL-SCNC: 136 MMOL/L (ref 135–145)
WBC # BLD: 4.7 K/MCL (ref 4.2–11)

## 2025-07-25 PROCEDURE — 85610 PROTHROMBIN TIME: CPT

## 2025-07-25 PROCEDURE — A4216 STERILE WATER/SALINE, 10 ML: HCPCS | Performed by: HOSPITALIST

## 2025-07-25 PROCEDURE — 96372 THER/PROPH/DIAG INJ SC/IM: CPT | Performed by: INTERNAL MEDICINE

## 2025-07-25 PROCEDURE — 10002800 HB RX 250 W HCPCS: Performed by: INTERNAL MEDICINE

## 2025-07-25 PROCEDURE — 90935 HEMODIALYSIS ONE EVALUATION: CPT

## 2025-07-25 PROCEDURE — 85025 COMPLETE CBC W/AUTO DIFF WBC: CPT

## 2025-07-25 PROCEDURE — 36415 COLL VENOUS BLD VENIPUNCTURE: CPT

## 2025-07-25 PROCEDURE — 10002800 HB RX 250 W HCPCS: Performed by: HOSPITALIST

## 2025-07-25 PROCEDURE — 99239 HOSP IP/OBS DSCHRG MGMT >30: CPT | Performed by: HOSPITALIST

## 2025-07-25 PROCEDURE — 10002803 HB RX 637: Performed by: HOSPITALIST

## 2025-07-25 PROCEDURE — 80053 COMPREHEN METABOLIC PANEL: CPT | Performed by: HOSPITALIST

## 2025-07-25 PROCEDURE — 96372 THER/PROPH/DIAG INJ SC/IM: CPT | Performed by: HOSPITALIST

## 2025-07-25 PROCEDURE — 82248 BILIRUBIN DIRECT: CPT

## 2025-07-25 PROCEDURE — 10004651 HB RX, NO CHARGE ITEM: Performed by: HOSPITALIST

## 2025-07-25 PROCEDURE — 90945 DIALYSIS ONE EVALUATION: CPT

## 2025-07-25 RX ORDER — LOPERAMIDE HYDROCHLORIDE 2 MG/1
2 CAPSULE ORAL 4 TIMES DAILY PRN
Status: SHIPPED | COMMUNITY
Start: 2025-07-25

## 2025-07-25 RX ADMIN — EPOETIN ALFA-EPBX 10000 UNITS: 10000 INJECTION, SOLUTION INTRAVENOUS; SUBCUTANEOUS at 15:27

## 2025-07-25 RX ADMIN — CALCIUM CARBONATE 600 MG (1,500 MG)-VITAMIN D3 400 UNIT TABLET 1 TABLET: at 09:37

## 2025-07-25 RX ADMIN — ISOSORBIDE DINITRATE 10 MG: 10 TABLET ORAL at 09:39

## 2025-07-25 RX ADMIN — MEXILETINE HYDROCHLORIDE 300 MG: 150 CAPSULE ORAL at 09:35

## 2025-07-25 RX ADMIN — HYDRALAZINE HYDROCHLORIDE 10 MG: 10 TABLET ORAL at 15:27

## 2025-07-25 RX ADMIN — CINACALCET HYDROCHLORIDE 30 MG: 30 TABLET, FILM COATED ORAL at 09:35

## 2025-07-25 RX ADMIN — ISOSORBIDE DINITRATE 10 MG: 10 TABLET ORAL at 15:27

## 2025-07-25 RX ADMIN — SODIUM CHLORIDE, PRESERVATIVE FREE 2 ML: 5 INJECTION INTRAVENOUS at 09:43

## 2025-07-25 RX ADMIN — HEPARIN SODIUM 5000 UNITS: 5000 INJECTION INTRAVENOUS; SUBCUTANEOUS at 15:28

## 2025-07-25 RX ADMIN — HEPARIN SODIUM 5000 UNITS: 5000 INJECTION INTRAVENOUS; SUBCUTANEOUS at 06:07

## 2025-07-25 RX ADMIN — LEVOTHYROXINE SODIUM 25 MCG: 0.03 TABLET ORAL at 09:36

## 2025-07-25 RX ADMIN — ONDANSETRON 4 MG: 2 INJECTION, SOLUTION INTRAMUSCULAR; INTRAVENOUS at 09:43

## 2025-07-25 ASSESSMENT — PAIN SCALES - GENERAL: PAINLEVEL_OUTOF10: 0

## 2025-07-26 LAB
BACTERIA SPEC ANAEROBE+AEROBE CULT: NORMAL
GRAM STN SPEC: NORMAL

## 2025-07-28 ENCOUNTER — HOSPITAL ENCOUNTER (INPATIENT)
Facility: HOSPITAL | Age: 55
LOS: 6 days | Discharge: HOME | End: 2025-08-03
Attending: STUDENT IN AN ORGANIZED HEALTH CARE EDUCATION/TRAINING PROGRAM | Admitting: HOSPITALIST

## 2025-07-28 ENCOUNTER — CASE MANAGEMENT (OUTPATIENT)
Dept: CARE COORDINATION | Age: 55
End: 2025-07-28

## 2025-07-28 ENCOUNTER — APPOINTMENT (OUTPATIENT)
Dept: GENERAL RADIOLOGY | Facility: HOSPITAL | Age: 55
End: 2025-07-28

## 2025-07-28 DIAGNOSIS — I47.21 TORSADES DE POINTES (HCC): Primary | ICD-10-CM

## 2025-07-28 PROBLEM — I47.29 POLYMORPHIC VENTRICULAR TACHYCARDIA (HCC): Status: ACTIVE | Noted: 2025-07-28

## 2025-07-28 LAB
ALBUMIN SERPL-MCNC: 3.6 G/DL (ref 3.2–4.8)
ANION GAP SERPL CALC-SCNC: 11 MMOL/L (ref 0–18)
ANION GAP SERPL CALC-SCNC: 13 MMOL/L (ref 0–18)
APTT PPP: 27 SECONDS (ref 23–36)
ATRIAL RATE: 81 BPM
ATRIAL RATE: 88 BPM
BASOPHILS # BLD AUTO: 0.03 X10(3) UL (ref 0–0.2)
BASOPHILS NFR BLD AUTO: 0.6 %
BUN BLD-MCNC: 11 MG/DL (ref 9–23)
BUN BLD-MCNC: 9 MG/DL (ref 9–23)
BUN/CREAT SERPL: 3.2 (ref 10–20)
BUN/CREAT SERPL: 3.2 (ref 10–20)
CA-I BLD-SCNC: 0.89 MMOL/L (ref 0.95–1.32)
CALCIUM BLD-MCNC: 7.8 MG/DL (ref 8.7–10.4)
CALCIUM BLD-MCNC: 9.3 MG/DL (ref 8.7–10.4)
CHLORIDE SERPL-SCNC: 93 MMOL/L (ref 98–112)
CHLORIDE SERPL-SCNC: 96 MMOL/L (ref 98–112)
CO2 SERPL-SCNC: 29 MMOL/L (ref 21–32)
CO2 SERPL-SCNC: 31 MMOL/L (ref 21–32)
COHGB MFR BLD: 2.7 % (ref 0–3)
CREAT BLD-MCNC: 2.85 MG/DL (ref 0.55–1.02)
CREAT BLD-MCNC: 3.42 MG/DL (ref 0.55–1.02)
DEPRECATED RDW RBC AUTO: 53.4 FL (ref 35.1–46.3)
EGFRCR SERPLBLD CKD-EPI 2021: 15 ML/MIN/1.73M2 (ref 60–?)
EGFRCR SERPLBLD CKD-EPI 2021: 19 ML/MIN/1.73M2 (ref 60–?)
EOSINOPHIL # BLD AUTO: 0.06 X10(3) UL (ref 0–0.7)
EOSINOPHIL NFR BLD AUTO: 1.2 %
ERYTHROCYTE [DISTWIDTH] IN BLOOD BY AUTOMATED COUNT: 16.1 % (ref 11–15)
GLUCOSE BLD-MCNC: 111 MG/DL (ref 70–99)
GLUCOSE BLD-MCNC: 131 MG/DL (ref 70–99)
GLUCOSE BLDC GLUCOMTR-MCNC: 125 MG/DL (ref 70–99)
HCT VFR BLD AUTO: 27.9 % (ref 35–48)
HGB BLD-MCNC: 9.4 G/DL (ref 12–16)
HGB BLD-MCNC: 9.5 G/DL (ref 12–16)
IMM GRANULOCYTES # BLD AUTO: 0.03 X10(3) UL (ref 0–1)
IMM GRANULOCYTES NFR BLD: 0.6 %
INR BLD: 1.1 (ref 0.8–1.2)
LACTATE BLD-SCNC: 2.5 MMOL/L (ref 0.5–2)
LYMPHOCYTES # BLD AUTO: 1.6 X10(3) UL (ref 1–4)
LYMPHOCYTES NFR BLD AUTO: 32.9 %
MAGNESIUM SERPL-MCNC: 1.9 MG/DL (ref 1.6–2.6)
MAGNESIUM SERPL-MCNC: 3.4 MG/DL (ref 1.6–2.6)
MCH RBC QN AUTO: 31 PG (ref 26–34)
MCHC RBC AUTO-ENTMCNC: 34.1 G/DL (ref 31–37)
MCV RBC AUTO: 91.2 FL (ref 80–100)
METHGB MFR BLD: 2.5 % SAT (ref 0.4–1.5)
MODIFIED ALLEN TEST: POSITIVE
MONOCYTES # BLD AUTO: 0.5 X10(3) UL (ref 0.1–1)
MONOCYTES NFR BLD AUTO: 10.3 %
NEUTROPHILS # BLD AUTO: 2.65 X10 (3) UL (ref 1.5–7.7)
NEUTROPHILS # BLD AUTO: 2.65 X10(3) UL (ref 1.5–7.7)
NEUTROPHILS NFR BLD AUTO: 54.4 %
O2 CT BLD-SCNC: 12.6 VOL% (ref 15–23)
OSMOLALITY SERPL CALC.SUM OF ELEC: 280 MOSM/KG (ref 275–295)
OSMOLALITY SERPL CALC.SUM OF ELEC: 286 MOSM/KG (ref 275–295)
P AXIS: -21 DEGREES
P AXIS: 248 DEGREES
P-R INTERVAL: 112 MS
P-R INTERVAL: 120 MS
PCO2 BLDA: 21 MM HG (ref 35–45)
PH BLDA: 7.73 (ref 7.35–7.45)
PHOSPHATE SERPL-MCNC: 5.1 MG/DL (ref 2.4–5.1)
PHOSPHATE SERPL-MCNC: <0.3 MG/DL (ref 2.4–5.1)
PLATELET # BLD AUTO: 190 10(3)UL (ref 150–450)
PO2 BLDA: 109 MM HG (ref 80–100)
POTASSIUM BLD-SCNC: 3.2 MMOL/L (ref 3.6–5.1)
POTASSIUM SERPL-SCNC: 3 MMOL/L (ref 3.5–5.1)
POTASSIUM SERPL-SCNC: 4.8 MMOL/L (ref 3.5–5.1)
PROTHROMBIN TIME: 14.9 SECONDS (ref 11.6–14.8)
PUNCTURE CHARGE: YES
Q-T INTERVAL: 274 MS
Q-T INTERVAL: 566 MS
QRS DURATION: 162 MS
QRS DURATION: 88 MS
QTC CALCULATION (BEZET): 497 MS
QTC CALCULATION (BEZET): 684 MS
R AXIS: -6 DEGREES
R AXIS: 87 DEGREES
RBC # BLD AUTO: 3.06 X10(6)UL (ref 3.8–5.3)
SAO2 % BLDA: 98.8 % (ref 94–100)
SODIUM BLD-SCNC: 132 MMOL/L (ref 135–145)
SODIUM SERPL-SCNC: 135 MMOL/L (ref 136–145)
SODIUM SERPL-SCNC: 138 MMOL/L (ref 136–145)
T AXIS: 162 DEGREES
T AXIS: 252 DEGREES
TROPONIN I SERPL HS-MCNC: 13 NG/L (ref ?–34)
VENTRICULAR RATE: 198 BPM
VENTRICULAR RATE: 88 BPM
WBC # BLD AUTO: 4.9 X10(3) UL (ref 4–11)

## 2025-07-28 PROCEDURE — 99223 1ST HOSP IP/OBS HIGH 75: CPT | Performed by: HOSPITALIST

## 2025-07-28 PROCEDURE — 71045 X-RAY EXAM CHEST 1 VIEW: CPT | Performed by: STUDENT IN AN ORGANIZED HEALTH CARE EDUCATION/TRAINING PROGRAM

## 2025-07-28 PROCEDURE — 99291 CRITICAL CARE FIRST HOUR: CPT | Performed by: INTERNAL MEDICINE

## 2025-07-28 RX ORDER — HEPARIN SODIUM 5000 [USP'U]/ML
5000 INJECTION, SOLUTION INTRAVENOUS; SUBCUTANEOUS EVERY 12 HOURS SCHEDULED
Status: DISCONTINUED | OUTPATIENT
Start: 2025-07-28 | End: 2025-08-03

## 2025-07-28 RX ORDER — MEXILETINE HYDROCHLORIDE 150 MG/1
300 CAPSULE ORAL EVERY 12 HOURS
COMMUNITY
End: 2025-08-03

## 2025-07-28 RX ORDER — METOPROLOL SUCCINATE 25 MG/1
25 TABLET, EXTENDED RELEASE ORAL DAILY
COMMUNITY
End: 2025-08-03

## 2025-07-28 RX ORDER — METOPROLOL SUCCINATE 25 MG/1
25 TABLET, EXTENDED RELEASE ORAL
Status: DISCONTINUED | OUTPATIENT
Start: 2025-07-28 | End: 2025-08-03

## 2025-07-28 RX ORDER — ACETAMINOPHEN 500 MG
500 TABLET ORAL EVERY 4 HOURS PRN
Status: DISCONTINUED | OUTPATIENT
Start: 2025-07-28 | End: 2025-08-03

## 2025-07-28 RX ORDER — ISOSORBIDE DINITRATE 10 MG/1
10 TABLET ORAL 3 TIMES DAILY
COMMUNITY
End: 2025-08-03

## 2025-07-28 RX ORDER — POTASSIUM CHLORIDE 14.9 MG/ML
20 INJECTION INTRAVENOUS ONCE
Status: COMPLETED | OUTPATIENT
Start: 2025-07-28 | End: 2025-07-28

## 2025-07-28 RX ORDER — MAGNESIUM SULFATE HEPTAHYDRATE 40 MG/ML
2 INJECTION, SOLUTION INTRAVENOUS ONCE
Status: COMPLETED | OUTPATIENT
Start: 2025-07-28 | End: 2025-07-28

## 2025-07-28 RX ORDER — MEXILETINE HYDROCHLORIDE 150 MG/1
300 CAPSULE ORAL EVERY 8 HOURS SCHEDULED
Status: DISCONTINUED | OUTPATIENT
Start: 2025-07-28 | End: 2025-08-03

## 2025-07-29 ENCOUNTER — CASE MANAGEMENT (OUTPATIENT)
Dept: CARE COORDINATION | Age: 55
End: 2025-07-29

## 2025-07-29 ENCOUNTER — APPOINTMENT (OUTPATIENT)
Dept: GENERAL RADIOLOGY | Facility: HOSPITAL | Age: 55
End: 2025-07-29
Attending: INTERNAL MEDICINE

## 2025-07-29 PROBLEM — E83.51 HYPOCALCEMIA: Status: ACTIVE | Noted: 2025-07-29

## 2025-07-29 PROBLEM — Z99.2 ESRD (END STAGE RENAL DISEASE) ON DIALYSIS (HCC): Status: ACTIVE | Noted: 2025-07-29

## 2025-07-29 PROBLEM — N18.6 ESRD (END STAGE RENAL DISEASE) ON DIALYSIS (HCC): Status: ACTIVE | Noted: 2025-07-29

## 2025-07-29 PROBLEM — E87.8 ABNORMAL BLOOD ELECTROLYTE LEVEL: Status: ACTIVE | Noted: 2025-07-29

## 2025-07-29 LAB
ALBUMIN SERPL-MCNC: 3.8 G/DL (ref 3.2–4.8)
ANION GAP SERPL CALC-SCNC: 9 MMOL/L (ref 0–18)
ATRIAL RATE: 67 BPM
BASOPHILS # BLD AUTO: 0.06 X10(3) UL (ref 0–0.2)
BASOPHILS NFR BLD AUTO: 1.1 %
BUN BLD-MCNC: 12 MG/DL (ref 9–23)
BUN/CREAT SERPL: 3.1 (ref 10–20)
CALCIUM BLD-MCNC: 8.1 MG/DL (ref 8.7–10.4)
CHLORIDE SERPL-SCNC: 96 MMOL/L (ref 98–112)
CO2 SERPL-SCNC: 32 MMOL/L (ref 21–32)
CREAT BLD-MCNC: 3.93 MG/DL (ref 0.55–1.02)
DEPRECATED RDW RBC AUTO: 58.9 FL (ref 35.1–46.3)
EGFRCR SERPLBLD CKD-EPI 2021: 13 ML/MIN/1.73M2 (ref 60–?)
EOSINOPHIL # BLD AUTO: 0.19 X10(3) UL (ref 0–0.7)
EOSINOPHIL NFR BLD AUTO: 3.6 %
ERYTHROCYTE [DISTWIDTH] IN BLOOD BY AUTOMATED COUNT: 16.7 % (ref 11–15)
GLUCOSE BLD-MCNC: 95 MG/DL (ref 70–99)
GLUCOSE BLDC GLUCOMTR-MCNC: 107 MG/DL (ref 70–99)
GLUCOSE BLDC GLUCOMTR-MCNC: 113 MG/DL (ref 70–99)
HCT VFR BLD AUTO: 27.1 % (ref 35–48)
HGB BLD-MCNC: 8.7 G/DL (ref 12–16)
IMM GRANULOCYTES # BLD AUTO: 0.01 X10(3) UL (ref 0–1)
IMM GRANULOCYTES NFR BLD: 0.2 %
LYMPHOCYTES # BLD AUTO: 1.25 X10(3) UL (ref 1–4)
LYMPHOCYTES NFR BLD AUTO: 23.8 %
MAGNESIUM SERPL-MCNC: 3.4 MG/DL (ref 1.6–2.6)
MCH RBC QN AUTO: 30.7 PG (ref 26–34)
MCHC RBC AUTO-ENTMCNC: 32.1 G/DL (ref 31–37)
MCV RBC AUTO: 95.8 FL (ref 80–100)
MONOCYTES # BLD AUTO: 0.43 X10(3) UL (ref 0.1–1)
MONOCYTES NFR BLD AUTO: 8.2 %
NEUTROPHILS # BLD AUTO: 3.32 X10 (3) UL (ref 1.5–7.7)
NEUTROPHILS # BLD AUTO: 3.32 X10(3) UL (ref 1.5–7.7)
NEUTROPHILS NFR BLD AUTO: 63.1 %
OSMOLALITY SERPL CALC.SUM OF ELEC: 284 MOSM/KG (ref 275–295)
P AXIS: 14 DEGREES
P-R INTERVAL: 168 MS
PHOSPHATE SERPL-MCNC: 5.6 MG/DL (ref 2.4–5.1)
PLATELET # BLD AUTO: 173 10(3)UL (ref 150–450)
POTASSIUM SERPL-SCNC: 4.9 MMOL/L (ref 3.5–5.1)
Q-T INTERVAL: 612 MS
QRS DURATION: 98 MS
QTC CALCULATION (BEZET): 646 MS
R AXIS: -7 DEGREES
RBC # BLD AUTO: 2.83 X10(6)UL (ref 3.8–5.3)
SODIUM SERPL-SCNC: 137 MMOL/L (ref 136–145)
T AXIS: 214 DEGREES
TSI SER-ACNC: 3.46 UIU/ML (ref 0.55–4.78)
VENTRICULAR RATE: 67 BPM
VIT D+METAB SERPL-MCNC: 47.9 NG/ML (ref 30–100)
WBC # BLD AUTO: 5.3 X10(3) UL (ref 4–11)

## 2025-07-29 PROCEDURE — 3E1M39Z IRRIGATION OF PERITONEAL CAVITY USING DIALYSATE, PERCUTANEOUS APPROACH: ICD-10-PCS | Performed by: INTERNAL MEDICINE

## 2025-07-29 PROCEDURE — 99232 SBSQ HOSP IP/OBS MODERATE 35: CPT | Performed by: INTERNAL MEDICINE

## 2025-07-29 PROCEDURE — 99233 SBSQ HOSP IP/OBS HIGH 50: CPT | Performed by: INTERNAL MEDICINE

## 2025-07-29 PROCEDURE — 71045 X-RAY EXAM CHEST 1 VIEW: CPT | Performed by: INTERNAL MEDICINE

## 2025-07-29 PROCEDURE — 99233 SBSQ HOSP IP/OBS HIGH 50: CPT | Performed by: HOSPITALIST

## 2025-07-29 RX ORDER — LEVOTHYROXINE SODIUM 25 UG/1
25 TABLET ORAL
Status: DISCONTINUED | OUTPATIENT
Start: 2025-07-30 | End: 2025-08-03

## 2025-07-29 RX ORDER — ALPRAZOLAM 0.25 MG
0.25 TABLET ORAL 3 TIMES DAILY PRN
Status: DISCONTINUED | OUTPATIENT
Start: 2025-07-29 | End: 2025-08-03

## 2025-07-29 RX ORDER — DEXTROSE MONOHYDRATE, SODIUM CHLORIDE, SODIUM LACTATE, CALCIUM CHLORIDE, MAGNESIUM CHLORIDE 1.5; 538; 448; 18.4; 5.08 G/100ML; MG/100ML; MG/100ML; MG/100ML; MG/100ML
5000 SOLUTION INTRAPERITONEAL
Status: COMPLETED | OUTPATIENT
Start: 2025-07-29 | End: 2025-07-29

## 2025-07-30 ENCOUNTER — CASE MANAGEMENT (OUTPATIENT)
Dept: CARE COORDINATION | Age: 55
End: 2025-07-30

## 2025-07-30 LAB
ALBUMIN SERPL-MCNC: 3.9 G/DL (ref 3.2–4.8)
ANION GAP SERPL CALC-SCNC: 10 MMOL/L (ref 0–18)
BUN BLD-MCNC: 18 MG/DL (ref 9–23)
BUN/CREAT SERPL: 3.8 (ref 10–20)
CALCIUM BLD-MCNC: 8.8 MG/DL (ref 8.7–10.4)
CHLORIDE SERPL-SCNC: 94 MMOL/L (ref 98–112)
CO2 SERPL-SCNC: 29 MMOL/L (ref 21–32)
CREAT BLD-MCNC: 4.78 MG/DL (ref 0.55–1.02)
EGFRCR SERPLBLD CKD-EPI 2021: 10 ML/MIN/1.73M2 (ref 60–?)
GLUCOSE BLD-MCNC: 108 MG/DL (ref 70–99)
MAGNESIUM SERPL-MCNC: 3.3 MG/DL (ref 1.6–2.6)
OSMOLALITY SERPL CALC.SUM OF ELEC: 278 MOSM/KG (ref 275–295)
PHOSPHATE SERPL-MCNC: 5.4 MG/DL (ref 2.4–5.1)
POTASSIUM SERPL-SCNC: 4.6 MMOL/L (ref 3.5–5.1)
PTH-INTACT SERPL-MCNC: 571.3 PG/ML (ref 18.5–88)
SODIUM SERPL-SCNC: 133 MMOL/L (ref 136–145)

## 2025-07-30 PROCEDURE — 99232 SBSQ HOSP IP/OBS MODERATE 35: CPT | Performed by: INTERNAL MEDICINE

## 2025-07-30 PROCEDURE — 99233 SBSQ HOSP IP/OBS HIGH 50: CPT | Performed by: INTERNAL MEDICINE

## 2025-07-30 PROCEDURE — 99233 SBSQ HOSP IP/OBS HIGH 50: CPT | Performed by: HOSPITALIST

## 2025-07-30 RX ORDER — DEXTROSE MONOHYDRATE, SODIUM CHLORIDE, SODIUM LACTATE, CALCIUM CHLORIDE, MAGNESIUM CHLORIDE 1.5; 538; 448; 18.4; 5.08 G/100ML; MG/100ML; MG/100ML; MG/100ML; MG/100ML
6000 SOLUTION INTRAPERITONEAL
Status: DISCONTINUED | OUTPATIENT
Start: 2025-07-30 | End: 2025-07-31

## 2025-07-31 LAB
ANION GAP SERPL CALC-SCNC: 11 MMOL/L (ref 0–18)
BUN BLD-MCNC: 26 MG/DL (ref 9–23)
BUN/CREAT SERPL: 4.1 (ref 10–20)
C DIFF TOX B STL QL: NEGATIVE
CALCIUM BLD-MCNC: 8.5 MG/DL (ref 8.7–10.4)
CHLORIDE SERPL-SCNC: 95 MMOL/L (ref 98–112)
CO2 SERPL-SCNC: 28 MMOL/L (ref 21–32)
CREAT BLD-MCNC: 6.3 MG/DL (ref 0.55–1.02)
DEPRECATED RDW RBC AUTO: 57.6 FL (ref 35.1–46.3)
EGFRCR SERPLBLD CKD-EPI 2021: 7 ML/MIN/1.73M2 (ref 60–?)
ERYTHROCYTE [DISTWIDTH] IN BLOOD BY AUTOMATED COUNT: 16.4 % (ref 11–15)
GLUCOSE BLD-MCNC: 110 MG/DL (ref 70–99)
HCT VFR BLD AUTO: 24.4 % (ref 35–48)
HGB BLD-MCNC: 7.7 G/DL (ref 12–16)
IRON SATN MFR SERPL: 26 % (ref 15–50)
IRON SERPL-MCNC: 49 UG/DL (ref 50–170)
MAGNESIUM SERPL-MCNC: 2.9 MG/DL (ref 1.6–2.6)
MCH RBC QN AUTO: 30.3 PG (ref 26–34)
MCHC RBC AUTO-ENTMCNC: 31.6 G/DL (ref 31–37)
MCV RBC AUTO: 96.1 FL (ref 80–100)
OSMOLALITY SERPL CALC.SUM OF ELEC: 283 MOSM/KG (ref 275–295)
PLATELET # BLD AUTO: 139 10(3)UL (ref 150–450)
PLATELETS.RETICULATED NFR BLD AUTO: 6.6 % (ref 0–7)
POTASSIUM SERPL-SCNC: 4.4 MMOL/L (ref 3.5–5.1)
RBC # BLD AUTO: 2.54 X10(6)UL (ref 3.8–5.3)
SODIUM SERPL-SCNC: 134 MMOL/L (ref 136–145)
TOTAL IRON BINDING CAPACITY: 187 UG/DL (ref 250–425)
TRANSFERRIN SERPL-MCNC: 118 MG/DL (ref 250–380)
WBC # BLD AUTO: 4.7 X10(3) UL (ref 4–11)

## 2025-07-31 PROCEDURE — 99233 SBSQ HOSP IP/OBS HIGH 50: CPT | Performed by: INTERNAL MEDICINE

## 2025-07-31 PROCEDURE — 99232 SBSQ HOSP IP/OBS MODERATE 35: CPT | Performed by: INTERNAL MEDICINE

## 2025-07-31 PROCEDURE — 99233 SBSQ HOSP IP/OBS HIGH 50: CPT | Performed by: HOSPITALIST

## 2025-07-31 RX ORDER — DEXTROSE MONOHYDRATE, SODIUM CHLORIDE, SODIUM LACTATE, CALCIUM CHLORIDE, MAGNESIUM CHLORIDE 1.5; 538; 448; 18.4; 5.08 G/100ML; MG/100ML; MG/100ML; MG/100ML; MG/100ML
6000 SOLUTION INTRAPERITONEAL
Status: DISCONTINUED | OUTPATIENT
Start: 2025-07-31 | End: 2025-08-01

## 2025-08-01 ENCOUNTER — APPOINTMENT (OUTPATIENT)
Dept: PICC SERVICES | Facility: HOSPITAL | Age: 55
End: 2025-08-01
Attending: HOSPITALIST

## 2025-08-01 PROBLEM — Z78.9 DIFFICULT INTRAVENOUS ACCESS: Status: ACTIVE | Noted: 2025-08-01

## 2025-08-01 LAB
ALBUMIN SERPL-MCNC: 4 G/DL (ref 3.2–4.8)
ANION GAP SERPL CALC-SCNC: 12 MMOL/L (ref 0–18)
ATRIAL RATE: 66 BPM
ATRIAL RATE: 71 BPM
BUN BLD-MCNC: 31 MG/DL (ref 9–23)
BUN/CREAT SERPL: 4.3 (ref 10–20)
CALCIUM BLD-MCNC: 8.7 MG/DL (ref 8.7–10.4)
CHLORIDE SERPL-SCNC: 94 MMOL/L (ref 98–112)
CO2 SERPL-SCNC: 26 MMOL/L (ref 21–32)
CREAT BLD-MCNC: 7.23 MG/DL (ref 0.55–1.02)
DEPRECATED RDW RBC AUTO: 57.9 FL (ref 35.1–46.3)
EGFRCR SERPLBLD CKD-EPI 2021: 6 ML/MIN/1.73M2 (ref 60–?)
ERYTHROCYTE [DISTWIDTH] IN BLOOD BY AUTOMATED COUNT: 16 % (ref 11–15)
GLUCOSE BLD-MCNC: 100 MG/DL (ref 70–99)
HCT VFR BLD AUTO: 27.3 % (ref 35–48)
HGB BLD-MCNC: 8.6 G/DL (ref 12–16)
MCH RBC QN AUTO: 30.9 PG (ref 26–34)
MCHC RBC AUTO-ENTMCNC: 31.5 G/DL (ref 31–37)
MCV RBC AUTO: 98.2 FL (ref 80–100)
OSMOLALITY SERPL CALC.SUM OF ELEC: 281 MOSM/KG (ref 275–295)
P AXIS: 14 DEGREES
P AXIS: 33 DEGREES
P-R INTERVAL: 180 MS
P-R INTERVAL: 194 MS
PHOSPHATE SERPL-MCNC: 6.5 MG/DL (ref 2.4–5.1)
PLATELET # BLD AUTO: 151 10(3)UL (ref 150–450)
POTASSIUM SERPL-SCNC: 4.9 MMOL/L (ref 3.5–5.1)
Q-T INTERVAL: 494 MS
Q-T INTERVAL: 504 MS
QRS DURATION: 102 MS
QRS DURATION: 104 MS
QTC CALCULATION (BEZET): 528 MS
QTC CALCULATION (BEZET): 536 MS
R AXIS: -15 DEGREES
R AXIS: 28 DEGREES
RBC # BLD AUTO: 2.78 X10(6)UL (ref 3.8–5.3)
SODIUM SERPL-SCNC: 132 MMOL/L (ref 136–145)
T AXIS: 153 DEGREES
T AXIS: 223 DEGREES
VENTRICULAR RATE: 66 BPM
VENTRICULAR RATE: 71 BPM
WBC # BLD AUTO: 5.6 X10(3) UL (ref 4–11)

## 2025-08-01 PROCEDURE — 99233 SBSQ HOSP IP/OBS HIGH 50: CPT | Performed by: INTERNAL MEDICINE

## 2025-08-01 PROCEDURE — 99232 SBSQ HOSP IP/OBS MODERATE 35: CPT | Performed by: INTERNAL MEDICINE

## 2025-08-01 PROCEDURE — 99233 SBSQ HOSP IP/OBS HIGH 50: CPT | Performed by: HOSPITALIST

## 2025-08-01 RX ORDER — METOPROLOL SUCCINATE 25 MG/1
25 TABLET, EXTENDED RELEASE ORAL
Qty: 60 TABLET | Refills: 3 | Status: SHIPPED | OUTPATIENT
Start: 2025-08-01

## 2025-08-01 RX ORDER — LOPERAMIDE HYDROCHLORIDE 2 MG/1
2 CAPSULE ORAL 4 TIMES DAILY PRN
Status: DISCONTINUED | OUTPATIENT
Start: 2025-08-01 | End: 2025-08-03

## 2025-08-01 RX ORDER — MEXILETINE HYDROCHLORIDE 150 MG/1
300 CAPSULE ORAL EVERY 8 HOURS SCHEDULED
Qty: 90 CAPSULE | Refills: 3 | Status: SHIPPED | OUTPATIENT
Start: 2025-08-01

## 2025-08-01 RX ORDER — DEXTROSE MONOHYDRATE, SODIUM CHLORIDE, SODIUM LACTATE, CALCIUM CHLORIDE, MAGNESIUM CHLORIDE 1.5; 538; 448; 18.4; 5.08 G/100ML; MG/100ML; MG/100ML; MG/100ML; MG/100ML
7500 SOLUTION INTRAPERITONEAL
Status: COMPLETED | OUTPATIENT
Start: 2025-08-01 | End: 2025-08-01

## 2025-08-02 LAB
ALBUMIN SERPL-MCNC: 3.8 G/DL (ref 3.2–4.8)
ANION GAP SERPL CALC-SCNC: 13 MMOL/L (ref 0–18)
ATRIAL RATE: 61 BPM
BUN BLD-MCNC: 38 MG/DL (ref 9–23)
BUN/CREAT SERPL: 4.5 (ref 10–20)
CALCIUM BLD-MCNC: 8.6 MG/DL (ref 8.7–10.4)
CHLORIDE SERPL-SCNC: 93 MMOL/L (ref 98–112)
CO2 SERPL-SCNC: 24 MMOL/L (ref 21–32)
CREAT BLD-MCNC: 8.42 MG/DL (ref 0.55–1.02)
EGFRCR SERPLBLD CKD-EPI 2021: 5 ML/MIN/1.73M2 (ref 60–?)
GLUCOSE BLD-MCNC: 102 MG/DL (ref 70–99)
OSMOLALITY SERPL CALC.SUM OF ELEC: 279 MOSM/KG (ref 275–295)
P AXIS: 12 DEGREES
P-R INTERVAL: 184 MS
PHOSPHATE SERPL-MCNC: 7.4 MG/DL (ref 2.4–5.1)
POTASSIUM SERPL-SCNC: 5.3 MMOL/L (ref 3.5–5.1)
Q-T INTERVAL: 556 MS
QRS DURATION: 102 MS
QTC CALCULATION (BEZET): 559 MS
R AXIS: -7 DEGREES
SODIUM SERPL-SCNC: 130 MMOL/L (ref 136–145)
T AXIS: 170 DEGREES
VENTRICULAR RATE: 61 BPM

## 2025-08-02 PROCEDURE — 99233 SBSQ HOSP IP/OBS HIGH 50: CPT | Performed by: HOSPITALIST

## 2025-08-02 PROCEDURE — 99232 SBSQ HOSP IP/OBS MODERATE 35: CPT | Performed by: INTERNAL MEDICINE

## 2025-08-02 PROCEDURE — 99233 SBSQ HOSP IP/OBS HIGH 50: CPT | Performed by: INTERNAL MEDICINE

## 2025-08-02 RX ORDER — DEXTROSE MONOHYDRATE, SODIUM CHLORIDE, SODIUM LACTATE, CALCIUM CHLORIDE, MAGNESIUM CHLORIDE 1.5; 538; 448; 18.4; 5.08 G/100ML; MG/100ML; MG/100ML; MG/100ML; MG/100ML
12000 SOLUTION INTRAPERITONEAL
Status: DISCONTINUED | OUTPATIENT
Start: 2025-08-02 | End: 2025-08-03

## 2025-08-02 RX ORDER — ONDANSETRON 2 MG/ML
4 INJECTION INTRAMUSCULAR; INTRAVENOUS EVERY 6 HOURS PRN
Status: DISCONTINUED | OUTPATIENT
Start: 2025-08-02 | End: 2025-08-03

## 2025-08-03 VITALS
BODY MASS INDEX: 24.2 KG/M2 | HEART RATE: 56 BPM | TEMPERATURE: 98 F | OXYGEN SATURATION: 100 % | WEIGHT: 123.25 LBS | DIASTOLIC BLOOD PRESSURE: 76 MMHG | RESPIRATION RATE: 16 BRPM | HEIGHT: 60 IN | SYSTOLIC BLOOD PRESSURE: 124 MMHG

## 2025-08-03 LAB
ALBUMIN SERPL-MCNC: 3.9 G/DL (ref 3.2–4.8)
ANION GAP SERPL CALC-SCNC: 16 MMOL/L (ref 0–18)
BUN BLD-MCNC: 40 MG/DL (ref 9–23)
BUN/CREAT SERPL: 4.7 (ref 10–20)
CALCIUM BLD-MCNC: 8.8 MG/DL (ref 8.7–10.4)
CHLORIDE SERPL-SCNC: 91 MMOL/L (ref 98–112)
CO2 SERPL-SCNC: 24 MMOL/L (ref 21–32)
CREAT BLD-MCNC: 8.43 MG/DL (ref 0.55–1.02)
EGFRCR SERPLBLD CKD-EPI 2021: 5 ML/MIN/1.73M2 (ref 60–?)
GLUCOSE BLD-MCNC: 131 MG/DL (ref 70–99)
OSMOLALITY SERPL CALC.SUM OF ELEC: 284 MOSM/KG (ref 275–295)
PHOSPHATE SERPL-MCNC: 7.6 MG/DL (ref 2.4–5.1)
POTASSIUM SERPL-SCNC: 3.7 MMOL/L (ref 3.5–5.1)
SODIUM SERPL-SCNC: 131 MMOL/L (ref 136–145)

## 2025-08-03 PROCEDURE — 99239 HOSP IP/OBS DSCHRG MGMT >30: CPT | Performed by: HOSPITALIST

## 2025-08-03 PROCEDURE — 99231 SBSQ HOSP IP/OBS SF/LOW 25: CPT | Performed by: INTERNAL MEDICINE

## 2025-08-03 PROCEDURE — 99233 SBSQ HOSP IP/OBS HIGH 50: CPT | Performed by: INTERNAL MEDICINE

## 2025-08-05 ENCOUNTER — CASE MANAGEMENT (OUTPATIENT)
Dept: CARE COORDINATION | Age: 55
End: 2025-08-05

## 2025-08-05 PROBLEM — I42.0 DILATED CARDIOMYOPATHY  (CMD): Status: ACTIVE | Noted: 2024-10-07

## 2025-08-06 ENCOUNTER — CASE MANAGEMENT (OUTPATIENT)
Dept: CARE COORDINATION | Age: 55
End: 2025-08-06

## 2025-08-07 ENCOUNTER — APPOINTMENT (OUTPATIENT)
Dept: SURGERY | Age: 55
End: 2025-08-07

## 2025-08-07 VITALS
WEIGHT: 117.28 LBS | OXYGEN SATURATION: 100 % | HEIGHT: 60 IN | SYSTOLIC BLOOD PRESSURE: 132 MMHG | BODY MASS INDEX: 23.03 KG/M2 | HEART RATE: 78 BPM | DIASTOLIC BLOOD PRESSURE: 87 MMHG

## 2025-08-07 DIAGNOSIS — K82.8 BILIARY DYSKINESIA: Primary | ICD-10-CM

## 2025-08-07 ASSESSMENT — PAIN SCALES - GENERAL: PAINLEVEL_OUTOF10: 0

## 2025-08-08 ENCOUNTER — CASE MANAGEMENT (OUTPATIENT)
Dept: CARE COORDINATION | Age: 55
End: 2025-08-08

## 2025-08-14 ENCOUNTER — CASE MANAGEMENT (OUTPATIENT)
Dept: CARE COORDINATION | Age: 55
End: 2025-08-14

## 2025-08-15 ENCOUNTER — CASE MANAGEMENT (OUTPATIENT)
Dept: CARE COORDINATION | Age: 55
End: 2025-08-15

## 2025-08-19 ENCOUNTER — APPOINTMENT (OUTPATIENT)
Dept: CARDIOLOGY | Age: 55
End: 2025-08-19
Attending: NURSE PRACTITIONER

## 2025-08-19 ENCOUNTER — APPOINTMENT (OUTPATIENT)
Dept: CARDIOLOGY | Age: 55
End: 2025-08-19

## 2025-08-20 ENCOUNTER — CASE MANAGEMENT (OUTPATIENT)
Dept: CARE COORDINATION | Age: 55
End: 2025-08-20

## (undated) DEVICE — SOLUTION IRRIG 1000ML 0.9% NACL USP BTL

## (undated) DEVICE — Device

## (undated) DEVICE — SYRINGE 10 ML GRAD NONPYROGENIC DEHP FREE PVC FREE LOK MED

## (undated) DEVICE — SUTURE ETHIBOND EXCEL 0 CT-1 L30 IN BRAID NABSB

## (undated) DEVICE — GLOVE SURG 7.5 PROTEXIS LF CRM PF SMTH BEAD CUFF STRL

## (undated) DEVICE — CONTAINER SPEC 4 OZ STD SCR TOP LID GRAD TRANS GENT-L-KARE

## (undated) DEVICE — ELECTRODE PT RTN C30- LB 9FT CORD NONIRRITATE NONSENSITIZE

## (undated) DEVICE — STRIP 4X.5IN STRSTRP POLY REINFORCE SKNCLS WHT STRL LF

## (undated) DEVICE — TROCAR LAPSCP 100MM 5MM KII Z THRD SLV RTNT DISC STRL LF

## (undated) DEVICE — SUTURE MONOCRYL 4-0 PS-2 L18 IN MONO UNDYED ABS

## (undated) DEVICE — MINOR GENERAL: Brand: MEDLINE INDUSTRIES, INC.

## (undated) DEVICE — APPLICATOR BNZN TNCT .6ML STRSTRP SKNCLS NONHYPOALLERGENIC

## (undated) DEVICE — STRIP 4X.5IN STRSTRP IPHR POLY POR ADH REINFORCE

## (undated) DEVICE — SUTURE VCL+ 4-0 SH 27IN BRAID COAT ABS UNDYED

## (undated) DEVICE — SUTURE ETHILON BLK 3-0 PS-2 L18 IN MONO NABSB

## (undated) DEVICE — GLOVE SURG 6.5 PREMIERPRO LF NATURAL PF TXTR SMTH STRL

## (undated) DEVICE — GOWN SURG XL L3 NONREINFORCE SET IN SLV STRL LF DISP BLUE

## (undated) DEVICE — ADHESIVE SKIN CLSR DERMABOND ADVANCED .7 ML LIQUID APL

## (undated) DEVICE — SUTURE PROLENE 6-0 BV-1 24IN 2 ARM MONO NABSB BLUE 8805H

## (undated) DEVICE — GLOVE SURG 8 PROTEXIS LF CRM PF BEAD CUFF STRL PLISPRN 12IN

## (undated) DEVICE — GLOVE SURG 7 PROTEXIS PI CLASSIC PWDR FREE BEAD CUFF PLISPRN

## (undated) DEVICE — DRAPE SURG REINFORCE FENESTRATE CORD HOLD TAB ELASTIC L146

## (undated) DEVICE — SPONGE GAUZE 4X4IN CTN 16 PLY WOVEN FOLD EDGE STRL LF

## (undated) DEVICE — SUT VCRL 3-0 18IN PS-2 ABSRB UD L19MM 3/8 CIR

## (undated) DEVICE — GLOVE SUR 7.5 SENSICARE PI PIP CRM PWD F

## (undated) DEVICE — GOWN SURG LG FABRIC ASTOUND BLUE LVL 3 NONREINFORCE SET IN

## (undated) DEVICE — DRAPE SURG MAJOR POUCH ABD

## (undated) DEVICE — SUTURE VICRYL 2-0 SH 27IN BRAID COAT ABS UNDYED J417H

## (undated) DEVICE — SUTURE SILK PERMA HAND BLK 4-0 L30 IN BRAID TIES 10 STRN

## (undated) DEVICE — HANDLE SCT POOLE GNTL SUMP STRL LF DISP

## (undated) DEVICE — GLOVE SURG 7.5 PREMIERPRO LF NATURAL PF TXTR SMTH STRL

## (undated) DEVICE — SUTURE 3-0 SH 27IN CR MONO ABS BRN G122H

## (undated) DEVICE — GLOVE SURG 7 PROTEXIS LF BLUE PF SMTH BEAD CUFF INTLK STRL

## (undated) DEVICE — TRAY CATH SURESTEP LUBRI-SIL STLK 16FR 350ML FOLEY URMTR

## (undated) DEVICE — DRAIN INCS 19FR FULL FLUTE RND .25IN SIL RADOPQ 4 FREE FLOW

## (undated) DEVICE — VIOLET BRAIDED (POLYGLACTIN 910), SYNTHETIC ABSORBABLE SUTURE: Brand: COATED VICRYL

## (undated) DEVICE — DRAPE .75 SHT FNFLD 76X52IN SURG CNVRT STRL LF DISP TIBURON

## (undated) DEVICE — SUTURE COATED VICRYL PLUS 3-0 SH L27 IN BRAID ANBCTRL COATED

## (undated) DEVICE — SUTURE PRMHND 2-0 CT1 30IN SILK BRAID NABSB BLK 423H

## (undated) DEVICE — SOLUTION IRR 0.9% NA CL 1000 ML PLASTIC POUR BTL ISOTONIC

## (undated) DEVICE — SUTURE SILK PERMA HAND BLK 0 L30 IN BRAID TIES NABSB

## (undated) DEVICE — ELECTRODE DFBR ADULT L4.5 IN X W6.25 IN PREBENT MEDI-TRACE

## (undated) DEVICE — TOWEL OR BLU 16X26IN 4PK

## (undated) DEVICE — DRESSING TRANS 4.75X4IN ADH HPOAL WTPRF TEGADERM PU STD STRL

## (undated) DEVICE — CANNULA PRFSN 3MM 2.5IN 3MM 1 WAY VLV FEMALE LL DCKBL BLUNT

## (undated) DEVICE — SUT SLK 0 SUTUPAK SA86G

## (undated) DEVICE — SOLUTION IRR 1000ML 0.9% NACL PLASTIC POUR BTL ISTNC N-PYRG

## (undated) DEVICE — ZZ--DISC-SUB-421016-SUT VCRL 0 L27IN ABSRB VLT L26MM UR-6 5/8-

## (undated) DEVICE — SUTURE COAT VICRYL 2-0 CT-2 L27 IN BRAID COAT UNDYED ABS

## (undated) DEVICE — COVER STAND 23IN MAYO CNVRT PLASTIC REINFORCE STRL LF DISP

## (undated) DEVICE — GLOVE SURG 6 PROTEXIS LF BLUE PF SMTH BEAD CUFF INTLK STRL

## (undated) DEVICE — CONTAINER,SPECIMEN,OR STERILE,4OZ: Brand: MEDLINE

## (undated) DEVICE — SOLUTION ANTIFOG 6CC NTOX NABRSV RADOPQ ADH SPNG MDCHC STRL

## (undated) DEVICE — NEEDLE HPO 25GA 1.5IN STD REG BVL LF

## (undated) DEVICE — COVER INST SUTBT ADH BACK CRTDG RADOPQ DISP YLW STRL LF

## (undated) DEVICE — TUBING INSFL PNEUMOCLEAR SET HFL

## (undated) DEVICE — GLOVE SURG 6.5 PROTEXIS LF BLUE PF SMTH BEAD CUFF INTLK STRL

## (undated) DEVICE — BLADE SURG 11 UNFRM SHARPNESS STRL SS

## (undated) DEVICE — CONNECTOR TBG MDVC STRGT PLASTIC LTWT TRANS SHTR RST 5IN 1

## (undated) DEVICE — SYRINGE 30 ML CONC TIP GRAD NONPYROGENIC DEHP FREE LOK MED

## (undated) DEVICE — 2% CHLORHEXIDINE SKIN PREP ORANGE 26ML

## (undated) DEVICE — SUTURE SILK PERMA HAND BLK 0 L18 IN BRAID TIE NABSB

## (undated) DEVICE — GOWN SURG LG L3 NONREINFORCE SET IN SLV STRL LF DISP BLUE

## (undated) DEVICE — GLOVE SURG 6.5 PROTEXIS LF CRM PF BEAD CUFF STRL PLISPRN

## (undated) DEVICE — STOCKINETTE ORTHO 36X9IN CTN PRXM HVWT IMPRV LF STRL

## (undated) DEVICE — SPONGE SURG 4X4IN CTN 16 PLY XRY DTCT STRL LF DISP

## (undated) DEVICE — SUTURE MTPS 3-0 PS2 27IN CR MONO ABS BRN 1638H

## (undated) DEVICE — SYRINGE 30ML CONC TIP GRAD N-PYRG DEHP-FR STRL MED LF DISP

## (undated) DEVICE — ELECTRODE PT RTN L9 FT VALLEYLAB REM POLYHESIVE ACRYLIC FOAM

## (undated) DEVICE — SPONGE GAUZE L4 IN X W4 IN 16 PLY WOVEN FOLD EDGE CTN

## (undated) DEVICE — PACK SURG BASIC CNVRT

## (undated) DEVICE — ELECTRODE ESURG BLADE PNCL 10FT BTN SWH CORD HLSTR EDGE PVC

## (undated) DEVICE — SYRINGE 10ML GRAD N-PYRG DEHP-FR PVC FREE STRL MED LF DISP

## (undated) DEVICE — SUTURE MONOCRYL MTPS 4-0 PS2 18IN MONO ABS UNDYED

## (undated) DEVICE — BULB 100CC SIL DRN LTWT LOW LVL SCT WND DRN STRL LF DISP

## (undated) DEVICE — DISSECTOR SURG 38IN SECTO PNUT SPNG

## (undated) DEVICE — TUBING SCT CLR 12FT 316IN MDVC MAXI-GRIP NCDTV MALE TO MALE

## (undated) DEVICE — SUTURE SILK PERMA HAND BLK 2-0 L30 IN BRAID TIES NABSB

## (undated) DEVICE — SUTURE VCL+ MTPS 4-0 PS2 27IN BRAID COAT ABS

## (undated) DEVICE — GLOVE SURG 7.5 PROTEXIS BLUE PI PWDR FREE SMTH BEAD CUFF INTLK

## (undated) DEVICE — DRAPE,T,LAPARO,TRANS,STERILE: Brand: MEDLINE

## (undated) DEVICE — SUTURE VICRYL 0 CT1 36IN BRAID COAT ABS VIOL J346H

## (undated) DEVICE — Device: Brand: JELCO

## (undated) DEVICE — SUTURE VCL+ 0 UR-6 27IN BRAID COAT ABS VIOL

## (undated) DEVICE — NEEDLE HPO 16GA 1.5IN REG WALL REG BVL LL HUB DEHP-FR STRL

## (undated) DEVICE — DRAPE 2 INCS FILM ANTIMICROBIAL 23X17IN SURG IOBAN STRL

## (undated) DEVICE — SLRDIVR ESURG 18CM 13.5MM LGSR IMPACT 14D 180D 34MM CRV JAW

## (undated) NOTE — [place] [address]  Authorization for Invasive Procedures  Date: 4/23/2025           Time: 0500     Por la presente, autorizo al doctor constance Murray médico y al asistente a realizar la operación/procedimiento quirúrgico a continuación, así daniel a administrar la anestesia que determine necesaria mi médico Nombre (s) de la operación/procedimiento: CVC placement en [facility]            Reconozco que gabe la operación/procedimiento quirúrgico, las condiciones imprevistas pueden requerir de procedimientos adicionales o diferentes a aquellos mencionados anteriormente.  Por lo tanto, autorizo y solicito además que el cirujano antes mencionado, los asistentes o las personas designadas realicen los procedimientos que, a castillo juicio, lin necesarios y convenientes.    Mi cirujano/médico conde discutido antes de mi cirugía los posibles beneficios, riesgos y efectos secundarios de steve procedimiento, la probabilidad de alcanzar las metas y los posibles problemas que puedan ocurrir gabe la recuperación.  Ellos también chong discutido las alternativas razonables al procedimiento, incluso los riesgos, beneficios y efectos secundarios relacionados con las alternativas y los riesgos relacionados con no realizar steve procedimiento.  Chong respondido a todas mis preguntas y confirmo que no se ha dado ninguna garantía en cuanto a los resultados que pueda obtener.    En lynda de que surja la necesidad gabe mi operación o gabe el periodo postoperatorio, también autorizo se aplique ana lilia y/o productos sanguíneos.  Asimismo, entiendo que a pesar de las cuidadosas pruebas y análisis de ana lilia o de los productos sanguíneos que realizan las entidades recolectoras, todavía puedo estar sujeto a efectos adversos daniel resultado de recibir itzel transfusión de ana lilia y/o productos sanguíneos.  A continuación se mencionan algunos, aunque no todos, los riesgos potenciales que pueden ocurrir: fiebre y reacciones  alérgicas, reacciones hemolíticas, trasmisión de enfermedades daniel hepatitis, SIDA y citomegalovirus (CMV), así daniel sobrecarga de líquidos.   En lynda de que desee tener itzel transfusión autóloga de mi propia ana lilia o itzel transfusión de un donante dirigido.  Lo discutiré con mi médico.   Check only if Refusing Blood or Blood Products  I understand refusal of blood or blood products as deemed necessary by my physician may have serious consequences to my condition to include possible death. I hereby assume responsibility for my refusal and release the hospital, its personnel, and my physicians from any responsibility for the consequences of my refusal.           o  Refuse   Autorizo el uso de cualquier muestra, órgano, tejido, parte del cuerpo u objeto extraño que pueda ser extraído de mi cuerpo gabe la operación/procedimiento para fines de diagnóstico, investigación o de enseñanza y castillo desecho posterior por las autoridades del hospital.  También, autorizo la revelación de los resultados de las pruebas de muestras y/o los informes escritos al médico tratante daniel personal médico del hospital u otros médicos de referencia o consulta involucrados en mi atención, a discreción del patólogo o de mi médico tratante.    Doy consentimiento para que se fotografíen o graben vídeos de las operaciones o procedimientos a realizarse, incluidas las partes de mi cuerpo que lin adecuadas para propósitos médicos, científicos o educativos, en el entendido de que, mi identidad no sea revelada por las fotografías o por textos descriptivos que las acompañen.  Si el procedimiento es fotografiado o grabado en vídeo, el cirujano obtendrá la imagen, cinta de vídeo o CD original.  El hospital no se hará responsable por el almacenamiento, la revelación o el mantenimiento de la imagen, cinta o CD.    Autorizo la presencia de un especialista de producto u observadores en el quirófano, según lo considere necesario mi médico o las personas que  éste designe.     Reconozco que en lynda de que mi procedimiento resulte en un tiempo prolongado de radiografía/fluoroscopia, puedo desarrollar itzel reacción en la piel.    Si tengo itzel orden de No intentar la reanimación (VICKI), irma estado se suspenderá mientras esté en el quirófano, la jericho de procedimientos y gabe el periodo de recuperación a menos que yo indique lo contrario explícitamente (o itzel persona autorizada a [place] el consentimiento en mi nombre). El cirujano o mi médico tratante determinarán cuándo termina el periodo de recuperación aplicable a los efectos de restablecer la orden de VICKI.  Pacientes que se realizan un procedimiento de esterilización: Entiendo que si el procedimiento tiene éxito, los resultados serán permanentes y que, por lo tanto, me será imposible inseminar, concebir o tener hijos.  Asimismo, entiendo que el procedimiento tiene daniel propósito la esterilidad, aunque el resultado no está garantizado.   Admito que mi médico me ha explicado la aplicación de sedación/analgesia, incluidos los riesgos y beneficios y, consiento a la administración de sedación/analgesia conforme sea necesario o conveniente a juicio de mi médico.    CERTIFICO QUE HE LEÍDO Y COMPRENDIDO EL CONSENTIMIENTO ANTERIOR PARA LA OPERACIÓN y/o PROCEDIMIENTO.      ___________________________________________________________________________________________________________________   (Firma del paciente)             (Fecha)     (Hora)    ___________________________________________________________________________________________________________________   (Persona responsable del paciente dennys de edad o inconsciente) (Relación con el paciente)  ___________________________________________________________________________________________________________________  (Firma del testigo)             (Fecha)     (Hora)   Patient Name: Shavon Sade  : 1970    Reviewed: 2024   Printed: 2025  Medical Record #:  [id number] Page 1 of 2           DECLARACIÓN DEL MÉDICO: Mi firma a continuación ratifica que, antes de la hora del procedimiento, he explicado al paciente y/o a castillo representante legal los riesgos y beneficios que comprende el tratamiento propuesto y cualquier alternativa razonable al tratamiento propuesto. También he explicado los riesgos y beneficios que comprende rechazar el tratamiento propuesto y las alternativas al tratamiento propuesto, y he respondido las preguntas del paciente. Si tengo algún interés financiero importante en un acuerdo de gestión compartida o un interés financiero importante en cualquier producto o implante, u otra relación importante usada en steve procedimiento/cirugía, lo he divulgado y discutido con mi paciente.      _______________________________________________________________________________________________________                (Firma del médico)                                                                                    (Fecha)                                       (Hora)              Patient Name: Shavon Stuart  : 1970    Reviewed: 2024   Printed: 2025  Medical Record #: [id number] Page 2 of 2

## (undated) NOTE — LETTER
[facility]   Administracion de Anestesia  Yo, Shavon RiosDeTorres, acepto ser atendido por un anestesiólogo, quien está especialmente capacitado para monitorearme y darme medicamento para hacerme dormir o mantenerme cómodo gabe mi procedimiento.   Entiendo que mi anestesiólogo no es un empleado o agente de [facility]. Él o maria del carmen trabaja para [facility]  [place] el paciente que solicita los servicios de anestesia, estoy de acuerdo con lo siguiente:  Permitir al anestesiólogo (médico de anestesia) que me suministre el medicamento y hacer los procedimientos adicionales que lin necesarios. Algunos ejemplos son: Al iniciar o utilizar itzel “IV” para suministrarme medicamentos, fluidos o ana lilia gabe mi procedimiento, y colocarme itzel sonda de respiración, me ayudará a respirar cuando esté dormido (intubación). En el lynda de que mi corazón deje de funcionar correctamente, entiendo que mi anestesiólogo hará todo lo posible para salvar mi melva, a menos que los documentos de No resucitar de [facility] lo indiquen de otra manera.  Informar a mi anestesista antes del procedimiento:  Si estoy embarazada.  La última vez que comí o bebí algo.  Todos los medicamentos que tanya (incluyendo medicamentos recetados, suplementos herbales y pastillas que puedo comprar sin receta médica (incluyendo drogas en la harrison [medicamentos ilegales]). No informar a mi anestesiólogo acerca de estos medicamentos puede aumentar mi riesgo de complicaciones con la anestesia.  Si soy alérgico a cualquier cosa o he tenido previamente itzel reacción adversa a la anestesia.  Entiendo cómo la anestesia me ayudará (beneficios).  Entiendo que con cualquier tipo de anestesia hay riesgos:  Los riesgos más comunes son: náuseas, vómitos, dolor de garganta, dolor muscular, daño a los ojos, la boca o los dientes (por la colocación de la sonda de respiración).  Los riesgos poco comunes incluyen:  recordar lo que sucedió gabe mi procedimiento, reacciones alérgicas a los medicamentos, lesiones en las vías respiratorias, el corazón, los pulmones, la visión, los nervios o músculos, y en casos sumamente raros, la muerte.  Mii médico me ha explicado otras opciones de atención disponibles para mí (alternativas).  Pacientes embarazadas (“epidural”):    Entiendo que los riesgos de recibir itzel inyección epidural (medicamento que se aplica en la espalda para ayudar a controlar el dolor gabe el parto), incluyen picazón, presión arterial baja, dificultad para orinar, dolor de adali o disminución en el ritmo del corazón del bebé. Los riesgos muy poco comunes incluyen infección, hemorragia, convulsiones, ritmo cardíaco irregular y lesión del nervio.  Anestesia regional (“columna vertebral”, “epidural” y “bloqueo de los nervios”):  Entiendo que hay complicaciones poco frecuentes octavio posibles, e incluyen dolor de adali, sangrado, infección, convulsiones, ritmo cardíaco irregular y la lesión del nervio.  .   Puedo cambiar de opinión acerca de recibir servicios de anestesia en cualquier momento antes de que se me administre el medicamento.         Paciente (o representante) Firma/Relación con el paciente  Fecha  Hora  Patient Signature/Signature of Responsible person Date Time           Nombre del intérprete (en castillo lynda)  Idioma/Organización  Hora  Name of  Language/Organization Time          Firma del anestesiólogo Fecha  Hora  Signature of anesthesiologist Date Time    He hablado del procedimiento y la información anterior con el paciente (o el representante del paciente) y respondí carlee preguntas. El paciente o castillo representante ha aceptado recibir los servicios de anestesia.         Testigo Fecha  Hora  Witness Date Time  He verificado que la firma es la del paciente o del representante del paciente, y que se firmó antes del procedimiento.    Nombre del paciente: Shavon Stuart  Fec. de Nac.:  5/26/1970                 En letra de imprenta: April 26, 2025  Expediente médico No. [id number]                         Página 1 de 2  ----------ANESTHESIA CONSENT----------

## (undated) NOTE — LETTER
[place] [address]  Authorization for Invasive Procedures  Date: 04/25/2025           Time: 17:46     Por la presente, autorizo al doctor Reed Pollack MD, mi médico y al asistente a realizar la operación/procedimiento quirúrgico a continuación, así daniel a administrar la anestesia que determine necesaria mi médico Nombre (s) de la operación/procedimiento: Removal of peritoneal dialysis catheter en Shavon RiosDeTorres            Reconozco que gabe la operación/procedimiento quirúrgico, las condiciones imprevistas pueden requerir de procedimientos adicionales o diferentes a aquellos mencionados anteriormente.  Por lo tanto, autorizo y solicito además que el cirujano antes mencionado, los asistentes o las personas designadas realicen los procedimientos que, a castillo juicio, lin necesarios y convenientes.    Mi cirujano/médico conde discutido antes de mi cirugía los posibles beneficios, riesgos y efectos secundarios de steve procedimiento, la probabilidad de alcanzar las metas y los posibles problemas que puedan ocurrir gabe la recuperación.  Ellos también chong discutido las alternativas razonables al procedimiento, incluso los riesgos, beneficios y efectos secundarios relacionados con las alternativas y los riesgos relacionados con no realizar steve procedimiento.  Chong respondido a todas mis preguntas y confirmo que no se ha dado ninguna garantía en cuanto a los resultados que pueda obtener.    En lynda de que surja la necesidad gabe mi operación o gabe el periodo postoperatorio, también autorizo se aplique ana lilia y/o productos sanguíneos.  Asimismo, entiendo que a pesar de las cuidadosas pruebas y análisis de ana lilia o de los productos sanguíneos que realizan las entidades recolectoras, todavía puedo estar sujeto a efectos adversos daniel resultado de recibir itzel transfusión de ana lilia y/o productos sanguíneos.  A continuación se mencionan algunos, aunque no todos, los riesgos  potenciales que pueden ocurrir: fiebre y reacciones alérgicas, reacciones hemolíticas, trasmisión de enfermedades daniel hepatitis, SIDA y citomegalovirus (CMV), así daniel sobrecarga de líquidos.   En lynda de que desee tener itzel transfusión autóloga de mi propia ana lilia o itzel transfusión de un donante dirigido.  Lo discutiré con mi médico.   Check only if Refusing Blood or Blood Products  I understand refusal of blood or blood products as deemed necessary by my physician may have serious consequences to my condition to include possible death. I hereby assume responsibility for my refusal and release the hospital, its personnel, and my physicians from any responsibility for the consequences of my refusal.           o  Refuse   Autorizo el uso de cualquier muestra, órgano, tejido, parte del cuerpo u objeto extraño que pueda ser extraído de mi cuerpo gabe la operación/procedimiento para fines de diagnóstico, investigación o de enseñanza y castillo desecho posterior por las autoridades del hospital.  También, autorizo la revelación de los resultados de las pruebas de muestras y/o los informes escritos al médico tratante daniel personal médico del hospital u otros médicos de referencia o consulta involucrados en mi atención, a discreción del patólogo o de mi médico tratante.    Doy consentimiento para que se fotografíen o graben vídeos de las operaciones o procedimientos a realizarse, incluidas las partes de mi cuerpo que lin adecuadas para propósitos médicos, científicos o educativos, en el entendido de que, mi identidad no sea revelada por las fotografías o por textos descriptivos que las acompañen.  Si el procedimiento es fotografiado o grabado en vídeo, el cirujano obtendrá la imagen, cinta de vídeo o CD original.  El hospital no se hará responsable por el almacenamiento, la revelación o el mantenimiento de la imagen, cinta o CD.    Autorizo la presencia de un especialista de producto u observadores en el quirófano, según lo  considere necesario mi médico o las personas que éste designe.     Reconozco que en lynda de que mi procedimiento resulte en un tiempo prolongado de radiografía/fluoroscopia, puedo desarrollar itzel reacción en la piel.    Si tengo itzel orden de No intentar la reanimación (VICKI), irma estado se suspenderá mientras esté en el quirófano, la jericho de procedimientos y gabe el periodo de recuperación a menos que yo indique lo contrario explícitamente (o itzel persona autorizada a [place] el consentimiento en mi nombre). El cirujano o mi médico tratante determinarán cuándo termina el periodo de recuperación aplicable a los efectos de restablecer la orden de VICKI.  Pacientes que se realizan un procedimiento de esterilización: Entiendo que si el procedimiento tiene éxito, los resultados serán permanentes y que, por lo tanto, me será imposible inseminar, concebir o tener hijos.  Asimismo, entiendo que el procedimiento tiene daniel propósito la esterilidad, aunque el resultado no está garantizado.   Admito que mi médico me ha explicado la aplicación de sedación/analgesia, incluidos los riesgos y beneficios y, consiento a la administración de sedación/analgesia conforme sea necesario o conveniente a juicio de mi médico.    CERTIFICO QUE HE LEÍDO Y COMPRENDIDO EL CONSENTIMIENTO ANTERIOR PARA LA OPERACIÓN y/o PROCEDIMIENTO.      ___________________________________________________________________________________________________________________   (Firma del paciente)             (Fecha)     (Hora)    ___________________________________________________________________________________________________________________   (Persona responsable del paciente dennys de edad o inconsciente) (Relación con el paciente)  ___________________________________________________________________________________________________________________  (Firma del testigo)             (Fecha)     (Hora)   Patient Name: Shavon Sade  : 1970    Reviewed:  2024   Printed: 2025  Medical Record #: [id number] Page 1 of 2           DECLARACIÓN DEL MÉDICO: Mi firma a continuación ratifica que, antes de la hora del procedimiento, he explicado al paciente y/o a castillo representante legal los riesgos y beneficios que comprende el tratamiento propuesto y cualquier alternativa razonable al tratamiento propuesto. También he explicado los riesgos y beneficios que comprende rechazar el tratamiento propuesto y las alternativas al tratamiento propuesto, y he respondido las preguntas del paciente. Si tengo algún interés financiero importante en un acuerdo de gestión compartida o un interés financiero importante en cualquier producto o implante, u otra relación importante usada en steve procedimiento/cirugía, lo he divulgado y discutido con mi paciente.      _______________________________________________________________________________________________________                (Firma del médico)                                                                                    (Fecha)                                       (Hora)              Patient Name: Shavon Stuart  : 1970    Reviewed: 2024   Printed: 2025  Medical Record #: [id number] Page 2 of 2

## (undated) NOTE — LETTER
[place] [address]  Authorization for Invasive Procedures  Date: 4/25/2025           Time: 09:50     Por la presente, autorizo al doctor Dr Wheat/Dr Galicia, mi médico y al asistente a realizar la operación/procedimiento quirúrgico a continuación, así daniel a administrar la anestesia que determine necesaria mi médico Nombre (s) de la operación/procedimiento: tunneled hemodialysis catheter insertion  en Shavon RiosDeTorres            Reconozco que gabe la operación/procedimiento quirúrgico, las condiciones imprevistas pueden requerir de procedimientos adicionales o diferentes a aquellos mencionados anteriormente.  Por lo tanto, autorizo y solicito además que el cirujano antes mencionado, los asistentes o las personas designadas realicen los procedimientos que, a castillo juicio, lin necesarios y convenientes.    Mi cirujano/médico conde discutido antes de mi cirugía los posibles beneficios, riesgos y efectos secundarios de steve procedimiento, la probabilidad de alcanzar las metas y los posibles problemas que puedan ocurrir gabe la recuperación.  Ellos también chong discutido las alternativas razonables al procedimiento, incluso los riesgos, beneficios y efectos secundarios relacionados con las alternativas y los riesgos relacionados con no realizar steve procedimiento.  Chong respondido a todas mis preguntas y confirmo que no se ha dado ninguna garantía en cuanto a los resultados que pueda obtener.    En lynda de que surja la necesidad gabe mi operación o gabe el periodo postoperatorio, también autorizo se aplique ana lilia y/o productos sanguíneos.  Asimismo, entiendo que a pesar de las cuidadosas pruebas y análisis de ana lilia o de los productos sanguíneos que realizan las entidades recolectoras, todavía puedo estar sujeto a efectos adversos daniel resultado de recibir itzel transfusión de ana lilia y/o productos sanguíneos.  A continuación se mencionan algunos, aunque no todos, los riesgos potenciales  que pueden ocurrir: fiebre y reacciones alérgicas, reacciones hemolíticas, trasmisión de enfermedades daniel hepatitis, SIDA y citomegalovirus (CMV), así daniel sobrecarga de líquidos.   En lynda de que desee tener itzel transfusión autóloga de mi propia ana lilia o itzel transfusión de un donante dirigido.  Lo discutiré con mi médico.   Check only if Refusing Blood or Blood Products  I understand refusal of blood or blood products as deemed necessary by my physician may have serious consequences to my condition to include possible death. I hereby assume responsibility for my refusal and release the hospital, its personnel, and my physicians from any responsibility for the consequences of my refusal.           o  Refuse   Autorizo el uso de cualquier muestra, órgano, tejido, parte del cuerpo u objeto extraño que pueda ser extraído de mi cuerpo gabe la operación/procedimiento para fines de diagnóstico, investigación o de enseñanza y castillo desecho posterior por las autoridades del hospital.  También, autorizo la revelación de los resultados de las pruebas de muestras y/o los informes escritos al médico tratante daniel personal médico del hospital u otros médicos de referencia o consulta involucrados en mi atención, a discreción del patólogo o de mi médico tratante.    Doy consentimiento para que se fotografíen o graben vídeos de las operaciones o procedimientos a realizarse, incluidas las partes de mi cuerpo que lin adecuadas para propósitos médicos, científicos o educativos, en el entendido de que, mi identidad no sea revelada por las fotografías o por textos descriptivos que las acompañen.  Si el procedimiento es fotografiado o grabado en vídeo, el cirujano obtendrá la imagen, cinta de vídeo o CD original.  El hospital no se hará responsable por el almacenamiento, la revelación o el mantenimiento de la imagen, cinta o CD.    Autorizo la presencia de un especialista de producto u observadores en el quirófano, según lo considere  necesario mi médico o las personas que éste designe.     Reconozco que en lynda de que mi procedimiento resulte en un tiempo prolongado de radiografía/fluoroscopia, puedo desarrollar itzel reacción en la piel.    Si tengo itzel orden de No intentar la reanimación (VICKI), irma estado se suspenderá mientras esté en el quirófano, la jericho de procedimientos y gabe el periodo de recuperación a menos que yo indique lo contrario explícitamente (o itzel persona autorizada a [place] el consentimiento en mi nombre). El cirujano o mi médico tratante determinarán cuándo termina el periodo de recuperación aplicable a los efectos de restablecer la orden de VICKI.  Pacientes que se realizan un procedimiento de esterilización: Entiendo que si el procedimiento tiene éxito, los resultados serán permanentes y que, por lo tanto, me será imposible inseminar, concebir o tener hijos.  Asimismo, entiendo que el procedimiento tiene daniel propósito la esterilidad, aunque el resultado no está garantizado.   Admito que mi médico me ha explicado la aplicación de sedación/analgesia, incluidos los riesgos y beneficios y, consiento a la administración de sedación/analgesia conforme sea necesario o conveniente a juicio de mi médico.    CERTIFICO QUE HE LEÍDO Y COMPRENDIDO EL CONSENTIMIENTO ANTERIOR PARA LA OPERACIÓN y/o PROCEDIMIENTO.      ___________________________________________________________________________________________________________________   (Firma del paciente)             (Fecha)     (Hora)    ___________________________________________________________________________________________________________________   (Persona responsable del paciente dennys de edad o inconsciente) (Relación con el paciente)  ___________________________________________________________________________________________________________________  (Firma del testigo)             (Fecha)     (Hora)   Patient Name: Shavon Sade  : 1970    Reviewed: 2024    Printed: 2025  Medical Record #: [id number] Page 1 of 2           DECLARACIÓN DEL MÉDICO: Mi firma a continuación ratifica que, antes de la hora del procedimiento, he explicado al paciente y/o a castillo representante legal los riesgos y beneficios que comprende el tratamiento propuesto y cualquier alternativa razonable al tratamiento propuesto. También he explicado los riesgos y beneficios que comprende rechazar el tratamiento propuesto y las alternativas al tratamiento propuesto, y he respondido las preguntas del paciente. Si tengo algún interés financiero importante en un acuerdo de gestión compartida o un interés financiero importante en cualquier producto o implante, u otra relación importante usada en steve procedimiento/cirugía, lo he divulgado y discutido con mi paciente.      _______________________________________________________________________________________________________                (Firma del médico)                                                                                    (Fecha)                                       (Hora)              Patient Name: Shavon Stuart  : 1970    Reviewed: 2024   Printed: 2025  Medical Record #: [id number] Page 2 of 2

## (undated) NOTE — LETTER
[address]  [address] [facility] [address]  [phone]    Consentimiento para la transfusión de ana lilia    En el curso de castillo tratamiento, puede ser necesario administrarle itzel transfusión de ana lilia o componentes de la ana lilia. Steve formulario da la información básica relacionada a steve procedimiento y, si está firmado por joanna, autoriza castillo administración.  Firmando steve formulario, ted acepta que el profesional médico que ha solicitado o la persona designada conde respondido a todas carlee preguntas sobre la administración de la ana lilia o de los productos de la ana lilia.    Descripción del procedimiento  La ana lilia se introduce en itzel de carlee venas, habitualmente en el brazo, mediante itzel aguja esterilizada desechable. La cantidad de ana lilia transfundida, y si la transfusión será de ana lilia o de componentes de la ana lilia, es itzel decisión que tomará el médico basándose en carlee necesidades particulares.    Riesgos  La transfusión es un procedimiento habitual de bajo riesgo.  CON FRECUENCIA HAY REACCIONES LEVES Y TEMPORALES, incluyendo un ligero hematoma, hinchazón o reacción local en el área donde la aguja atraviesa la piel, o itzel reacción no grave al propio material transfundido, incluyendo el dolor de adali, fiebre o itzel reacción leve en la piel, daniel un sarpullido.  Las reacciones graves son posibles, aunque poco probables, e incluyen itzel reacción alérgica grave (choque) y la destrucción (hemólisis) de las células sanguíneas transfundidas.  Entre las enfermedades infecciosas que se sabe que se transmiten por la transfusión de ana lilia, se incluyen ciertos tipos de hepatitis viral (infección del hígado por un virus), la infección por el virus de la inmunodeficiencia humana (VIH-1,2), itzel infección viral conocida por causar el síndrome de inmunodeficiencia adquirida (SIDA) y ciertas enfermedades bacterianas, virales y parasitarias. Aunque existe un riesgo mínimo de contraer itzel enfermedad infecciosa por medio  de la ana lilia transfundida, según la toya federal y estatal, se chong tomado todas las atenciones necesarias en la selección y análisis de los donantes para evitar la transmisión de enfermedades.    Alternativas  Si la pérdida de ana lilia supone itzel amenaza grave gabe castillo tratamiento, NO EXISTE ALTERNATIVA EFICAZ A LA TRANSFUSIÓN DE ANA LILIA. Sin embargo, si tiene más preguntas al respecto, castillo proveedor le explicará detalladamente las alternativas si aún no lo conde hecho.    Yo, _____________________, he leído/hecho que me lean lo de arriba. Entiendo las cuestiones que influyen en la decisión de autorizar o no itzel transfusión de ana lilia o componentes de la ana lilia. No tengo ninguna pregunta que no se haya respondido a mi entera satisfacción. Por la presente doy mi consentimiento para la transfusión que mi médico considere necesaria o aconsejable en el curso de mi tratamiento.      (Firma del paciente o de la persona responsable si es dennys de edad,  paciente incompetente o inconsciente).  (Nombre del paciente o de la persona responsable en letra de molde).       (Relación con el paciente si no es él mismo).  (Fecha y hora).        (Firma del testigo).  (Nombre del testigo en letra de molde).     Language line (intérprete)   Consentimiento telefónico/verbal/en video    _________________________        _________________________  (Firma del luis testigo         (Nombre en letra de molde del   consentimiento telefónico/verbal/en video)       luis testigo)           Patient Name: Shavon Sade     : 1970                 Printed: 2025     Medical Record #: [id number]    Rev: 2023